# Patient Record
Sex: MALE | Race: WHITE | NOT HISPANIC OR LATINO | Employment: OTHER | URBAN - METROPOLITAN AREA
[De-identification: names, ages, dates, MRNs, and addresses within clinical notes are randomized per-mention and may not be internally consistent; named-entity substitution may affect disease eponyms.]

---

## 2017-01-05 ENCOUNTER — PATIENT OUTREACH (OUTPATIENT)
Dept: HEALTH INFORMATION MANAGEMENT | Facility: OTHER | Age: 67
End: 2017-01-05

## 2017-01-05 NOTE — PROGRESS NOTES
Referral request from Director of Care Coordination. Patient does not have a telephone number listed in Epic for CC to contact patient to determine if interested in enrollment in care management.     CC contacted [facility]. Patient was discharged from skilled nursing. Per representative reports patient was discharge over a month ago.  CC did not see notes in Media.  Patient did not keep appointment with Pulmonary on 12/15/16 or Cardiologist on 12/29/16. Patient no showed to both appointments. CC unable to follow patient in care management. No contact information.

## 2017-01-11 ENCOUNTER — HOSPITAL ENCOUNTER (EMERGENCY)
Facility: MEDICAL CENTER | Age: 67
End: 2017-01-12
Attending: EMERGENCY MEDICINE
Payer: MEDICARE

## 2017-01-11 VITALS
HEART RATE: 96 BPM | RESPIRATION RATE: 20 BRPM | TEMPERATURE: 97.5 F | BODY MASS INDEX: 41.94 KG/M2 | DIASTOLIC BLOOD PRESSURE: 85 MMHG | WEIGHT: 267.86 LBS | OXYGEN SATURATION: 93 % | SYSTOLIC BLOOD PRESSURE: 151 MMHG

## 2017-01-11 DIAGNOSIS — R53.82 CHRONIC FATIGUE: ICD-10-CM

## 2017-01-11 DIAGNOSIS — R19.7 DIARRHEA, UNSPECIFIED TYPE: ICD-10-CM

## 2017-01-11 LAB
AMORPH CRY #/AREA URNS HPF: PRESENT /HPF
APPEARANCE UR: ABNORMAL
BACTERIA #/AREA URNS HPF: ABNORMAL /HPF
BILIRUB UR QL STRIP.AUTO: NEGATIVE
COLOR UR: ABNORMAL
CULTURE IF INDICATED INDCX: YES UA CULTURE
GLUCOSE UR STRIP.AUTO-MCNC: NEGATIVE MG/DL
KETONES UR STRIP.AUTO-MCNC: NEGATIVE MG/DL
LEUKOCYTE ESTERASE UR QL STRIP.AUTO: NEGATIVE
MICRO URNS: ABNORMAL
MUCOUS THREADS #/AREA URNS HPF: ABNORMAL /HPF
NITRITE UR QL STRIP.AUTO: NEGATIVE
PH UR STRIP.AUTO: 5.5 [PH]
PROT UR QL STRIP: 30 MG/DL
RBC UR QL AUTO: NEGATIVE
SP GR UR STRIP.AUTO: 1.02
WBC #/AREA URNS HPF: ABNORMAL /HPF

## 2017-01-11 PROCEDURE — 700102 HCHG RX REV CODE 250 W/ 637 OVERRIDE(OP): Performed by: EMERGENCY MEDICINE

## 2017-01-11 PROCEDURE — 87086 URINE CULTURE/COLONY COUNT: CPT

## 2017-01-11 PROCEDURE — 99284 EMERGENCY DEPT VISIT MOD MDM: CPT

## 2017-01-11 PROCEDURE — 93970 EXTREMITY STUDY: CPT

## 2017-01-11 PROCEDURE — 93005 ELECTROCARDIOGRAM TRACING: CPT | Performed by: EMERGENCY MEDICINE

## 2017-01-11 PROCEDURE — A9270 NON-COVERED ITEM OR SERVICE: HCPCS | Performed by: EMERGENCY MEDICINE

## 2017-01-11 PROCEDURE — 81001 URINALYSIS AUTO W/SCOPE: CPT

## 2017-01-11 PROCEDURE — 700101 HCHG RX REV CODE 250: Performed by: EMERGENCY MEDICINE

## 2017-01-11 PROCEDURE — 93970 EXTREMITY STUDY: CPT | Mod: 26 | Performed by: SURGERY

## 2017-01-11 RX ORDER — LIDOCAINE HYDROCHLORIDE 10 MG/ML
20 INJECTION, SOLUTION INFILTRATION; PERINEURAL ONCE
Status: COMPLETED | OUTPATIENT
Start: 2017-01-11 | End: 2017-01-11

## 2017-01-11 RX ORDER — FUROSEMIDE 40 MG/1
20 TABLET ORAL ONCE
Status: COMPLETED | OUTPATIENT
Start: 2017-01-11 | End: 2017-01-11

## 2017-01-11 RX ADMIN — LIDOCAINE HYDROCHLORIDE 20 ML: 10 INJECTION, SOLUTION INFILTRATION; PERINEURAL at 23:15

## 2017-01-11 RX ADMIN — FUROSEMIDE 20 MG: 40 TABLET ORAL at 21:15

## 2017-01-11 NOTE — ED NOTES
"Chief Complaint   Patient presents with   • Diarrhea     pt reports that he has a \"chronic white count.\" diarrhea x 3 weeks     Pt reports Hx c-diff, did not fill his Rx when he left rehab.  Blood pressure 151/85, pulse 100, temperature 36.4 °C (97.6 °F), resp. rate 17, weight 121.5 kg (267 lb 13.7 oz), SpO2 94 %.  Pt informed of wait times. Educated on triage process.  Asked to return to triage RN for any new or worsening of symptoms. Thanked for patience.        "

## 2017-01-11 NOTE — ED AVS SNAPSHOT
1/12/2017          Maxi Esposito  [address] [place] [address]    Dear Maxi:    [facility] wants to ensure your discharge home is safe and you or your loved ones have had all your questions answered regarding your care after you leave the hospital.    You may receive a telephone call within two days of your discharge.  This call is to make certain you understand your discharge instructions as well as ensure we provided you with the best care possible during your stay with us.     The call will only last approximately 3-5 minutes and will be done by a nurse.    Once again, we want to ensure your discharge home is safe and that you have a clear understanding of any next steps in your care.  If you have any questions or concerns, please do not hesitate to contact us, we are here for you.  Thank you for choosing [facility] for your healthcare needs.    Sincerely,    James Woods    [facility]

## 2017-01-11 NOTE — ED AVS SNAPSHOT
ITDatabase Access Code: [email or web address]  Expires: 2/11/2017  1:57 AM    Your email address is not on file at [facility].  Email Addresses are required for you to sign up for ITDatabase, please contact [phone] to verify your personal information and to provide your email address prior to attempting to register for ITDatabase.    Maxi Skinnynicole  [address] [place] [address]    [employer]  A secure, online tool to manage your health information     [facility]’s ITDatabase® is a secure, online tool that connects you to your personalized health information from the privacy of your home -- day or night - making it very easy for you to manage your healthcare. Once the activation process is completed, you can even access your medical information using the ITDatabase stephie, which is available for free in the Apple Stephie store or Google Play store.     To learn more about ITDatabase, visit [email or web address]    There are two levels of access available (as shown below):   My Chart Features  [facility] Primary Care Doctor [facility]  Specialists [facility]  Urgent  Care Non-[facility] Primary Care Doctor   Email your healthcare team securely and privately 24/7 X X X    Manage appointments: schedule your next appointment; view details of past/upcoming appointments X      Request prescription refills. X      View recent personal medical records, including lab and immunizations X X X X   View health record, including health history, allergies, medications X X X X   Read reports about your outpatient visits, procedures, consult and ER notes X X X X   See your discharge summary, which is a recap of your hospital and/or ER visit that includes your diagnosis, lab results, and care plan X X  X     How to register for [employer]:  Once your e-mail address has been verified, follow the following steps to sign up for ITDatabase.     1. Go to  [email or web address]  2. Click on the Sign Up Now box, which takes you to the New Member Sign Up page. You will need  to provide the following information:  a. Enter your [employer] Access Code exactly as it appears at the top of this page. (You will not need to use this code after you’ve completed the sign-up process. If you do not sign up before the expiration date, you must request a new code.)   b. Enter your date of birth.   c. Enter your home email address.   d. Click Submit, and follow the next screen’s instructions.  3. Create a [employer] ID. This will be your [employer] login ID and cannot be changed, so think of one that is secure and easy to remember.  4. Create a [employer] password. You can change your password at any time.  5. Enter your Password Reset Question and Answer. This can be used at a later time if you forget your password.   6. Enter your e-mail address. This allows you to receive e-mail notifications when new information is available in [employer].  7. Click Sign Up. You can now view your health information.    For assistance activating your [employer] account, call [phone]

## 2017-01-11 NOTE — ED AVS SNAPSHOT
After Visit Summary                                                                                                                Maxi Esposito   MRN: [id number]    Department:  [facility], Emergency Dept   Date of Visit:  1/11/2017            [facility], Emergency Dept    [address] [place] [id number]    Phone:  [phone]      You were seen by     Eileen Moreira M.D.      Your Diagnosis Was     Chronic fatigue     R53.82       These are the medications you received during your hospitalization from 01/11/2017 1159 to 01/12/2017 0157     Date/Time Order Dose Route Action    01/11/2017 2115 furosemide (LASIX) tablet 20 mg 20 mg Oral Given    01/11/2017 2315 lidocaine (XYLOCAINE) 1%  injection 20 mL Other Given      Follow-up Information     1. Follow up with [facility].    Why:  please call at 8:00 tomorrow morning to schedule a follow-up appointment. They will be able to treat you regardless of your insurance status.    Contact information    [address] [place] [address] [phone]        2. Go to [facility], Emergency Dept.    Specialty:  Emergency Medicine    Why:  If symptoms worsen or do not continue to improve    Contact information    [address] [place] [id number] [phone]      Medication Information     Review all of your home medications and newly ordered medications with your primary doctor and/or pharmacist as soon as possible. Follow medication instructions as directed by your doctor and/or pharmacist.     Please keep your complete medication list with you and share with your physician. Update the information when medications are discontinued, doses are changed, or new medications (including over-the-counter products) are added; and carry medication information at all times in the event of emergency situations.               Medication List      ASK your doctor about these medications        Instructions    Acetaminophen 650 MG Tabs    Take 650 mg by mouth every 6 hours as needed for Fever or Moderate Pain (Temp >101.5F).   Dose:  650 mg       alprazolam 0.5 MG Tabs   Commonly known as:  XANAX    Take 0.5 mg by mouth at bedtime as needed for Sleep.   Dose:  0.5 mg       aspirin EC 81 MG Tbec   Commonly known as:  ECOTRIN    Take 81 mg by mouth every day.   Dose:  81 mg       atorvastatin 20 MG Tabs   Commonly known as:  LIPITOR    Take 1 Tab by mouth every bedtime.   Dose:  20 mg       BREO ELLIPTA 100-25 MCG/INH Aepb   Generic drug:  Fluticasone Furoate-Vilanterol    Inhale 1 Puff by mouth every day.   Dose:  1 Puff       budesonide-formoterol 80-4.5 MCG/ACT Aero   Commonly known as:  SYMBICORT    Inhale 2 Puffs by mouth 2 Times a Day.   Dose:  2 Puff       carvedilol 6.25 MG Tabs   Commonly known as:  COREG    Take 1 Tab by mouth 2 times a day, with meals.   Dose:  6.25 mg       enalapril 20 MG tablet   Commonly known as:  VASOTEC    Take 40 mg by mouth every day.   Dose:  40 mg       furosemide 40 MG Tabs   Commonly known as:  LASIX    Take 40 mg by mouth every day.   Dose:  40 mg       hydrALAZINE 25 MG Tabs   Commonly known as:  APRESOLINE    Take 1 Tab by mouth 3 times a day.   Dose:  25 mg       ipratropium-albuterol 0.5-2.5 (3) MG/3ML nebulizer solution   Commonly known as:  DUONEB    3 mL by Nebulization route every four hours as needed for Shortness of Breath.   Dose:  3 mL       lisinopril 30 MG tablet   Commonly known as:  PRINIVIL, ZESTRIL    Take 30 mg by mouth every day.   Dose:  30 mg       NON SPECIFIED    Artifical Tears, SI Drop in both eyes twice a day       omeprazole 20 MG delayed-release capsule   Commonly known as:  PRILOSEC    Take 20 mg by mouth every day.   Dose:  20 mg       ondansetron 4 MG Tbdp   Commonly known as:  ZOFRAN ODT    Take 1 Tab by mouth every four hours as needed for Nausea/Vomiting (give PO if IV route is unavailable. May give per feeding tube.).   Dose:   4 mg       oxycodone immediate-release 5 MG Tabs   Commonly known as:  ROXICODONE    Take 1 Tab by mouth every 6 hours as needed for Severe Pain.   Dose:  5 mg       PAXIL 40 MG tablet   Generic drug:  paroxetine    Take 40 mg by mouth every day.   Dose:  40 mg       potassium chloride SA 20 MEQ Tbcr   Commonly known as:  K-DUR    Take 1 Tab by mouth every day.   Dose:  20 mEq       senna-docusate 8.6-50 MG Tabs   Commonly known as:  PERICOLACE or SENOKOT S    Take 1 Tab by mouth every 24 hours as needed for Constipation.   Dose:  1 Tab       SINGULAIR 10 MG Tabs   Generic drug:  montelukast    Take 10 mg by mouth every day.   Dose:  10 mg       XOPENEX 0.63 MG/3ML Nebu   Generic drug:  levalbuterol    0.63 mg by Nebulization route every four hours as needed for Shortness of Breath.   Dose:  0.63 mg               Procedures and tests performed during your visit     BTYPE NATRIURETIC PEPTIDE    CBC WITH DIFFERENTIAL    COMP METABOLIC PANEL    EKG (ER)    ESTIMATED GFR    LE VENOUS DUPLEX (Specify in Comments Left, Right Or Bilateral)    LIPASE    NURSING COMMUNICATION    TROPONIN    URINALYSIS CULTURE, IF INDICATED    URINE CULTURE(NEW)    URINE MICROSCOPIC (W/UA)        Discharge Instructions       Please follow-up with your primary care physician for complete recheck in 24-48 hours. Please return to the emergency department if you develop any new or worsening symptoms. Is very important that you fill your prescriptions and follow-up with your primary care provider.      Chronic Diarrhea  Diarrhea is frequent loose and watery bowel movements. It can cause you to feel weak and dehydrated. Dehydration can cause you to become tired and thirsty and to have a dry mouth, decreased urination, and dark yellow urine. Diarrhea is a sign of another problem, most often an infection that will not last long. In most cases, diarrhea lasts 2-3 days. Diarrhea that lasts longer than 4 weeks is called long-lasting (chronic) diarrhea.  It is important to treat your diarrhea as directed by your health care provider to lessen or prevent future episodes of diarrhea.   CAUSES   There are many causes of chronic diarrhea. The following are some possible causes:   · Gastrointestinal infections caused by viruses, bacteria, or parasites.    · Food poisoning or food allergies.    · Certain medicines, such as antibiotics, chemotherapy, and laxatives.    · Artificial sweeteners and fructose.    · Digestive disorders, such as celiac disease and inflammatory bowel diseases.    · Irritable bowel syndrome.  · Some disorders of the pancreas.  · Disorders of the thyroid.  · Reduced blood flow to the intestines.  · Cancer.  Sometimes the cause of chronic diarrhea is unknown.  RISK FACTORS  · Having a severely weakened immune system, such as from HIV or AIDS.    · Taking certain types of cancer-fighting drugs (such as with chemotherapy) or other medicines.    · Having had a recent organ transplant.    · Having a portion of the stomach or small bowel removed.    · Traveling to countries where food and water supplies are often contaminated.    SYMPTOMS   In addition to frequent, loose stools, diarrhea may cause:   · Cramping.    · Abdominal pain.    · Nausea.    · Fever.  · Fatigue.  · Urgent need to use the bathroom.  · Loss of bowel control.  DIAGNOSIS   Your health care provider must take a careful history and perform a physical exam. Tests given are based on your symptoms and history. Tests may include:   · Blood or stool tests. Three or more stool samples may be examined. Stool cultures may be used to test for bacteria or parasites.    · X-rays.    · A procedure in which a thin tube is inserted into the mouth or rectum (endoscopy). This allows the health care provider to look inside the intestine.    TREATMENT   · Treatment is aimed at correcting the cause of the diarrhea when possible.  · Diarrhea caused by an infection can often be treated with antibiotic  medicines.  · Diarrhea not caused by an infection may require you to take long-term medicine or have surgery. Specific treatment should be discussed with your health care provider.  · If the cause cannot be determined, treatment aims to relieve symptoms and prevent dehydration. Serious health problems can occur if you do not maintain proper fluid levels. Treatment may include:  ¨ Taking an oral rehydration solution (ORS).  ¨ Not drinking beverages that contain caffeine (such as tea, coffee, and soft drinks).  ¨ Not drinking alcohol.  ¨ Maintaining well-balanced nutrition to help you recover faster.  HOME CARE INSTRUCTIONS   · Drink enough fluids to keep urine clear or pale yellow. Drink 1 cup (8 oz) of fluid for each diarrhea episode. Avoid fluids that contain simple sugars, fruit juices, whole milk products, and sodas. Hydrate with an ORS. You may purchase the ORS or prepare it at home by mixing the following ingredients together:  ¨  - tsp (1.7-3  mL) table salt.  ¨ ¾ tsp (3 ¾ mL) baking soda.  ¨  tsp (1.7 mL) salt substitute containing potassium chloride.  ¨ 1 tbsp (20 mL) sugar.  ¨ 4.2 c (1 L) of water.    · Certain foods and beverages may increase the speed at which food moves through the gastrointestinal (GI) tract. These foods and beverages should be avoided. They include:  ¨ Caffeinated and alcoholic beverages.  ¨ High-fiber foods, such as raw fruits and vegetables, nuts, seeds, and whole grain breads and cereals.  ¨ Foods and beverages sweetened with sugar alcohols, such as xylitol, sorbitol, and mannitol.    · Some foods may be well tolerated and may help thicken stool. These include:  ¨ Starchy foods, such as rice, toast, pasta, low-sugar cereal, oatmeal, grits, baked potatoes, crackers, and bagels.  ¨ Bananas.  ¨ Applesauce.  · Add probiotic-rich foods to help increase healthy bacteria in the GI tract. These include yogurt and fermented milk products.  · Wash your hands well after each diarrhea  episode.  · Only take over-the-counter or prescription medicines as directed by your health care provider.  · Take a warm bath to relieve any burning or pain from frequent diarrhea episodes.  SEEK MEDICAL CARE IF:   · You are not urinating as often.  · Your urine is a dark color.  · You become very tired or dizzy.  · You have severe pain in the abdomen or rectum.  · Your have blood or pus in your stools.  · Your stools look black and tarry.  SEEK IMMEDIATE MEDICAL CARE IF:   · You are unable to keep fluids down.  · You have persistent vomiting.  · You have blood in your stool.  · Your stools are black and tarry.  · You do not urinate in 6-8 hours, or there is only a small amount of very dark urine.  · You have abdominal pain that increases or localizes.  · You have weakness, dizziness, confusion, or lightheadedness.  · You have a severe headache.  · Your diarrhea gets worse or does not get better.  · You have a fever or persistent symptoms for more than 2-3 days.  · You have a fever and your symptoms suddenly get worse.  MAKE SURE YOU:   · Understand these instructions.  · Will watch your condition.  · Will get help right away if you are not doing well or get worse.     This information is not intended to replace advice given to you by your health care provider. Make sure you discuss any questions you have with your health care provider.     Document Released: 03/09/2005 Document Revised: 12/23/2014 Document Reviewed: 06/12/2014  [employer] Interactive Patient Education ©2016 [employer]            Patient Information     Patient Information    Following emergency treatment: all patient requiring follow-up care must return either to a private physician or a clinic if your condition worsens before you are able to obtain further medical attention, please return to the emergency room.     Billing Information    At [facility], we work to make the billing process streamlined for our patients.  Our Representatives are  here to answer any questions you may have regarding your hospital bill.  If you have insurance coverage and have supplied your insurance information to us, we will submit a claim to your insurer on your behalf.  Should you have any questions regarding your bill, we can be reached online or by phone as follows:  Online: You are able pay your bills online or live chat with our representatives about any billing questions you may have. We are here to help Monday - Friday from 8:00am to 7:30pm and 9:00am - 12:00pm on Saturdays.  Please visit [email or web address]  for more information.   Phone:  [phone] or [phone]    Please note that your emergency physician, surgeon, pathologist, radiologist, anesthesiologist, and other specialists are not employed by [facility] and will therefore bill separately for their services.  Please contact them directly for any questions concerning their bills at the numbers below:     Emergency Physician Services:  [phone]  [facility]:  [phone]  [facility]:  [phone]  [facility]:  [phone]    1. Your final bill may vary from the amount quoted upon discharge if all procedures are not complete at that time, or if your doctor has additional procedures of which we are not aware. You will receive an additional bill if you return to the Emergency Department at [facility] for suture removal regardless of the facility of which the sutures were placed.     2. Please arrange for settlement of this account at the emergency registration.    3. All self-pay accounts are due in full at the time of treatment.  If you are unable to meet this obligation then payment is expected within 4-5 days.     4. If you have had radiology studies (CT, X-ray, Ultrasound, MRI), you have received a preliminary result during your emergency department visit. Please contact the radiology department [phone] to  receive a copy of your final result. Please discuss the Final result with your primary physician or with the follow up physician provided.     Crisis Hotline:  [place] Crisis Hotline:  4-731-TLNTLPK or 1-199.700.4477  [place] Crisis Hotline:    1-488.975.5279 or 567-647-0777         ED Discharge Follow Up Questions    1. In order to provide you with very good care, we would like to follow up with a phone call in the next few days.  May we have your permission to contact you?     YES /  NO    2. What is the best phone number to call you? (       )_____-__________    3. What is the best time to call you?      Morning  /  Afternoon  /  Evening                   Patient Signature:  ____________________________________________________________    Date:  ____________________________________________________________

## 2017-01-12 LAB
ALBUMIN SERPL BCP-MCNC: 3.3 G/DL (ref 3.2–4.9)
ALBUMIN/GLOB SERPL: 1.1 G/DL
ALP SERPL-CCNC: 53 U/L (ref 30–99)
ALT SERPL-CCNC: 6 U/L (ref 2–50)
ANION GAP SERPL CALC-SCNC: 10 MMOL/L (ref 0–11.9)
AST SERPL-CCNC: 18 U/L (ref 12–45)
BASOPHILS # BLD AUTO: 0.7 % (ref 0–1.8)
BASOPHILS # BLD: 0.08 K/UL (ref 0–0.12)
BILIRUB SERPL-MCNC: 0.7 MG/DL (ref 0.1–1.5)
BNP SERPL-MCNC: 39 PG/ML (ref 0–100)
BUN SERPL-MCNC: 9 MG/DL (ref 8–22)
CALCIUM SERPL-MCNC: 8.5 MG/DL (ref 8.5–10.5)
CHLORIDE SERPL-SCNC: 105 MMOL/L (ref 96–112)
CO2 SERPL-SCNC: 22 MMOL/L (ref 20–33)
CREAT SERPL-MCNC: 0.92 MG/DL (ref 0.5–1.4)
EOSINOPHIL # BLD AUTO: 0.48 K/UL (ref 0–0.51)
EOSINOPHIL NFR BLD: 4.3 % (ref 0–6.9)
ERYTHROCYTE [DISTWIDTH] IN BLOOD BY AUTOMATED COUNT: 54.5 FL (ref 35.9–50)
GFR SERPL CREATININE-BSD FRML MDRD: >60 ML/MIN/1.73 M 2
GLOBULIN SER CALC-MCNC: 3 G/DL (ref 1.9–3.5)
GLUCOSE SERPL-MCNC: 102 MG/DL (ref 65–99)
HCT VFR BLD AUTO: 46.1 % (ref 42–52)
HGB BLD-MCNC: 15.6 G/DL (ref 14–18)
IMM GRANULOCYTES # BLD AUTO: 0.03 K/UL (ref 0–0.11)
IMM GRANULOCYTES NFR BLD AUTO: 0.3 % (ref 0–0.9)
LIPASE SERPL-CCNC: 32 U/L (ref 11–82)
LYMPHOCYTES # BLD AUTO: 4.36 K/UL (ref 1–4.8)
LYMPHOCYTES NFR BLD: 39 % (ref 22–41)
MCH RBC QN AUTO: 29.5 PG (ref 27–33)
MCHC RBC AUTO-ENTMCNC: 33.8 G/DL (ref 33.7–35.3)
MCV RBC AUTO: 87.1 FL (ref 81.4–97.8)
MONOCYTES # BLD AUTO: 1.58 K/UL (ref 0–0.85)
MONOCYTES NFR BLD AUTO: 14.1 % (ref 0–13.4)
NEUTROPHILS # BLD AUTO: 4.66 K/UL (ref 1.82–7.42)
NEUTROPHILS NFR BLD: 41.6 % (ref 44–72)
NRBC # BLD AUTO: 0 K/UL
NRBC BLD AUTO-RTO: 0 /100 WBC
PLATELET # BLD AUTO: 330 K/UL (ref 164–446)
PMV BLD AUTO: 10.8 FL (ref 9–12.9)
POTASSIUM SERPL-SCNC: 3.6 MMOL/L (ref 3.6–5.5)
PROT SERPL-MCNC: 6.3 G/DL (ref 6–8.2)
RBC # BLD AUTO: 5.29 M/UL (ref 4.7–6.1)
SODIUM SERPL-SCNC: 137 MMOL/L (ref 135–145)
TROPONIN I SERPL-MCNC: <0.01 NG/ML (ref 0–0.04)
WBC # BLD AUTO: 11.2 K/UL (ref 4.8–10.8)

## 2017-01-12 PROCEDURE — 85025 COMPLETE CBC W/AUTO DIFF WBC: CPT

## 2017-01-12 PROCEDURE — 80053 COMPREHEN METABOLIC PANEL: CPT

## 2017-01-12 PROCEDURE — 83690 ASSAY OF LIPASE: CPT

## 2017-01-12 PROCEDURE — 83880 ASSAY OF NATRIURETIC PEPTIDE: CPT

## 2017-01-12 PROCEDURE — 84484 ASSAY OF TROPONIN QUANT: CPT

## 2017-01-12 NOTE — ED NOTES
Primary triage completed. Vital signs reassessed. Apologized for wait times and educated regarding ER process. Pt verbalizes understanding.

## 2017-01-12 NOTE — ED PROVIDER NOTES
"ED Provider Note    Scribed for Eileen Moreira M.D. by Wandy Sosa. 1/11/2017, 7:53 PM.    Primary care provider: Roberto Morales M.D.  Means of arrival: Walk-In  History obtained from: Patient  History limited by: None    CHIEF COMPLAINT  Chief Complaint   Patient presents with   • Diarrhea     pt reports that he has a \"chronic white count.\" diarrhea x 3 weeks     HPI  Maxi Esposito is a 67 y.o. male who presents to the Emergency Department with persistent diarrhea onset three weeks ago.  Eight weeks ago the patient was discharged from the hospital, despite having a reported \"chronically elevated white count.\"  The patient has been completing rehab since then but has not felt improved.  He denies receiving a recent prescription for antibiotics.  The patient reports feeling \"run down.\"  He has also noticed slight, bilateral leg swelling.  Per patient, the swelling will \"come out of nowhere\" and will have associated \"shooting pains.\"  Earlier today, after eating french fries, the patient's diarrhea worsened.  He developed generalized abdominal pain that has remained intermittent in nature.  The patient has associated chills.  He does not note any fevers or nausea.  His surgical history is limited to a splenectomy over thirty years ago.  The patient denies additional symptoms or further pertinent medical history.     He states he is having some financial difficulties, not currently adherent to prescribed medication regimen.     REVIEW OF SYSTEMS  Pertinent positives include abdominal pain, leg swelling, diarrhea, chills, weakness, fatigue. Pertinent negatives include no fevers, nausea.  All other systems reviewed and negative.    PAST MEDICAL HISTORY   has a past medical history of Hypertension; Diabetes (HCC); Sepsis (HCC); Pneumonia; Chronic obstructive pulmonary disease (HCC); and Medical non-compliance.    SURGICAL HISTORY   has past surgical history that includes splenectomy (1980).    SOCIAL " "HISTORY  Social History   Substance Use Topics   • Smoking status: Former Smoker     Quit date: 07/16/2016   • Smokeless tobacco: None   • Alcohol Use: No      History   Drug Use No   The patient was unaccompanied to the ED.     FAMILY HISTORY  History reviewed. No pertinent family history.    CURRENT MEDICATIONS  Home Medications     Reviewed by James Huggins R.N. (Registered Nurse) on 01/11/17 at 1927  Med List Status: Partial    Medication Last Dose Status    acetaminophen 650 MG Tab not taking Active    alprazolam (XANAX) 0.5 MG Tab ran out Active    aspirin EC (ECOTRIN) 81 MG Tablet Delayed Response not taking Active    atorvastatin (LIPITOR) 20 MG Tab ran out Active    budesonide-formoterol (SYMBICORT) 80-4.5 MCG/ACT Aerosol ran out Active    carvedilol (COREG) 6.25 MG Tab ran out Active    enalapril (VASOTEC) 20 MG tablet ran out Active    Fluticasone Furoate-Vilanterol (BREO ELLIPTA) 100-25 MCG/INH AEROSOL POWDER, BREATH ACTIVATED ran out Active    furosemide (LASIX) 40 MG Tab ran out Active    hydrALAZINE (APRESOLINE) 25 MG Tab ran out Active    ipratropium-albuterol (DUONEB) 0.5-2.5 (3) MG/3ML nebulizer solution  Active    levalbuterol (XOPENEX) 0.63 MG/3ML Nebu Soln not taking Active    lisinopril (PRINIVIL, ZESTRIL) 30 MG tablet ran out Active    montelukast (SINGULAIR) 10 MG Tab not taking Active    NON SPECIFIED  Active    omeprazole (PRILOSEC) 20 MG delayed-release capsule not taking Active    ondansetron (ZOFRAN ODT) 4 MG TABLET DISPERSIBLE  Active    oxycodone immediate-release (ROXICODONE) 5 MG Tab not taking Active    paroxetine (PAXIL) 40 MG tablet not taking Active    potassium chloride SA (K-DUR) 20 MEQ Tab CR  Active    senna-docusate (PERICOLACE OR SENOKOT S) 8.6-50 MG Tab  Active              ALLERGIES  Allergies   Allergen Reactions   • Shellfish Allergy Anaphylaxis and Swelling     \"eye's closed up really tight\" \"hard to brearth\"     PHYSICAL EXAM  Vital Signs: /85 mmHg  Pulse 96 "  Temp(Src) 36.4 °C (97.5 °F)  Resp 20  Wt 121.5 kg (267 lb 13.7 oz)  SpO2 93%  Constitutional: Alert, no acute distress  HENT: Normocephalic, atraumatic, moist mucus membranes  Eyes: Pupils equal and reactive, normal conjunctiva, non-icteric  Neck: Supple, normal range of motion, no stridor  Cardiovascular: Regular rate, regular rhythm, Normal peripheral perfusion, no cyanosis, Normal cardiac auscultation  Pulmonary: No respiratory distress, normal work of breathing, no accessory muscle usage, Clear to auscultation but quiet breath sounds bilaterally, no appreciable crackles or wheezes, no coarse breath sounds  Abdomen: Soft, non tender, no peritoneal signs, bowel sounds are present.   Skin: Warm, dry, no rashes or lesions  Back: No pain with active range of motion  Musculoskeletal: Normal range of motion in all extremities, no deformity noted, symmetric bilateral mild redness overlying distal lower extremity, not involving feet, circumferential, uncomfortable on palpation, 1 + edema, mild chronic scaling of skin overlying bilateral shins  Neurologic: Alert, oriented, normal motor function, no speech deficits  Psychiatric: Normal and appropriate mood and affect    DIAGNOSTIC STUDIES/PROCEDURES:    LABS  Labs Reviewed   CBC WITH DIFFERENTIAL - Abnormal; Notable for the following:     WBC 11.2 (*)     RDW 54.5 (*)     Neutrophils-Polys 41.60 (*)     Monocytes 14.10 (*)     Monos (Absolute) 1.58 (*)     All other components within normal limits   COMP METABOLIC PANEL - Abnormal; Notable for the following:     Glucose 102 (*)     All other components within normal limits   URINALYSIS,CULTURE IF INDICATED - Abnormal; Notable for the following:     Character Cloudy (*)     Protein 30 (*)     All other components within normal limits   URINE MICROSCOPIC (W/UA) - Abnormal; Notable for the following:     WBC 0-2 (*)     Bacteria Few (*)     All other components within normal limits   LIPASE   TROPONIN   BTYPE NATRIURETIC  PEPTIDE   URINE CULTURE(NEW)   ESTIMATED GFR     All labs reviewed by me.    Radiology results revealed:   LE VENOUS DUPLEX (Specify in Comments Left, Right Or Bilateral)   Preliminary Result          EKG: Rate 84, normal sinus rhythm, no ST elevation or depression, no T-wave inversions, no ectopy is significant complex morphology change from previous EKG dated 11/15/60    COURSE & MEDICAL DECISION MAKING  Pertinent Labs & Imaging studies reviewed. (See chart for details)    Review of old medical records for continuity of care. The patient's last ED discharge was in November 2016. Emergency department visit reviewed from 11/15/16, patient presented with multiple complaints predominantly concerned because he felt rundown and tired. Complained of edema to bilateral lower extremities. Additionally reported shortness of breath. Chest x-ray with no lobar consolidation, increased interstitial opacity which may represent minor edema, pneumonia or chronic fibrosis. He had recently been discharged from [facility] care facility. He does have long-standing history of noncompliance, case management was involved in his discharge for this reason. Discharge summary reviewed from 5/29/16, patient admitted for sepsis with pneumonia, additionally has diabetes, COPD with continued tobacco dependence, hypertension and nonadherence. Treated with Unasyn and vancomycin for right lower extremity cellulitis. Blood cultures were negative. Cellulitis improved significantly, nearly resolved.    Differential diagnoses include but are not limited to: Heart failure exacerbation, electrolyte abnormality, cellulitis, DVT, hyperglycemia, Clostridium difficile    7:53 PM - Patient seen and examined at bedside. Ordered CBC, CMP, Lipase Troponin, BNP, Urinalysis, Le Venous Duplex, and CDIFF by PCR to evaluate his symptoms. The patient will be treated with 20 mg of Lasix via tablet.     9:18 PM- At this time I obtained and reviewed the patient's EKG.  My  findings can be seen above. The patient will be updated during my next recheck.     9:32 PM- Reviewed the patient's urine microscopic and Le Venous Duplex results.  The patient presents with a negative urinalysis and urine microscopic.  The patient also presents with a negative Le Venous Duplex.     10:18 PM- The patient's attending nurse informed me that the phlebotomist has not yet been successful at drawing a sample from the patient.  The patient has requested a break and lab has agreed. He will be treated with Lidocaine, after which Lab can attempt another blood draw.     11:04 PM- I rechecked the patient at bedside. He was resting comfortably without further complaints. I began the ultrasound PIV. However, before I could complete this intervention, I was acutely called into another room.     11:36 PM- I returned to the patient's room at this time to finish his US PIV.  While this was done the patient and I discussed his available lab results.  Due to the patient's noncompliance to sit still, I was unable to establish an IV. He requested another break and a urinal. Another attempt will be made in a few minutes.     12:33 AM- Rechecked the patient at bedside. The patient is currently resting comfortably without questions, concerns, or complaints.  At this time I attempted the US PIV again.  This attempt was successful.  An IV was established, blood was drawn, and samples were sent to the lab. During this recheck, the patient reported being noncompliant with his daily medications. He reports failing to fill his prescriptions due to a lost Medicaid card. I spent approximately twenty minutes with the patient during this recheck, during which he remained calm and cooperative.     1:52 AM - At this time the patient's ED work up is complete.  I extensively reviewed the lab results, see above.  The patient will be updated shortly.     1:59 AM- Re-examined; The patient is resting in bed comfortably. I discussed his above  findings and plans for discharge. He was given a referral to [facility], where he will go for follow up in one day. The patient was instructed to return to the ED if his symptoms worsen. He was instructed to stay compliant with his medications.  The patient will receive further information on chronic diarrhea to take home.  All questions and concerns were addressed.  Patient understands and agrees.     Decision Making:  This is a 67 y.o. year old male who presents with history and physical exam as documented above. He presents with a multitude of vague symptoms including generalized fatigue, chronic diarrhea worsened today, loss of appetite and lower extremity pain and swelling. Lower extremity pain is in a stocking distribution bilaterally, suspect this may be caused by diabetic neuropathy. He does have some very mild redness, this appears to be chronic skin changes, appearance is much less concerning for cellulitis. Laboratory evaluation was delayed as the patient was a very difficult stick, I used ultrasound guidance to obtain blood sample.    Urinalysis negative for evidence of infectious etiology, negative for glucose. White blood count very mildly elevated 11.2, on review of previous lab values this white blood count is improved from previous. No bands. Normal immature granulocytes. He has no electrolyte abnormalities, undetectable troponin, negative lipase without evidence of pancreatitis. BNP is negative without evidence of heart failure exacerbation.    Bilateral lower extremity negative for DVT.    He continues to have complaints of chronic fatigue and chronic shortness of breath. He again states that he has not filled any of his outpatient medications, states he has all of his prescriptions in his apartment. He has not attempted to fill these medications, states he is not sure if his insurance will cover them. At this time I see no indication for admission to the hospital, he is not in heart  failure, oxygen saturation is at baseline. Strongly recommend the patient fill his medications and continue taking my outpatient basis. He is discharged home, instructed to contact [facility] this morning for a follow-up appointment. He has not yet attempted to schedule a primary care follow-up appointment. Counseled him extensively on the importance of outpatient follow-up as well as taking all prescribed medications. Return precautions given.     The patient will return for new or worsening symptoms and is stable at the time of discharge.    The patient is referred to a primary physician for blood pressure management, diabetic screening, and for all other preventative health concerns.    DISPOSITION:  Patient will be discharged home in stable condition.    FOLLOW UP:  [facility]  [address] [address]  [phone]    please call at 8:00 tomorrow morning to schedule a follow-up appointment. They will be able to treat you regardless of your insurance status.    [facility], Emergency Dept  [address] [facility] [address]  [phone]  Go to  If symptoms worsen or do not continue to improve    OUTPATIENT MEDICATIONS:  Discharge Medication List as of 1/12/2017  1:57 AM        FINAL IMPRESSION  1. Chronic fatigue    2. Diarrhea, unspecified type        I, Wandy Sosa (Scribe), am scribing for, and in the presence of, Eileen Moreira M.D..    Electronically signed by: Wandy Sosa (Scribe), 1/11/2017    IEileen M.D. personally performed the services described in this documentation, as scribed by Wandy Sosa in my presence, and it is both accurate and complete.    The note accurately reflects work and decisions made by me.  Eileen Moreira  1/12/2017  6:00 AM

## 2017-01-12 NOTE — ED NOTES
Pt d/c home. Told to f/u with PMD and to return for any increasing symptoms or concerns. Pt able to ambulate with a steady gait. Stable for d/c.

## 2017-01-12 NOTE — DISCHARGE INSTRUCTIONS
Please follow-up with your primary care physician for complete recheck in 24-48 hours. Please return to the emergency department if you develop any new or worsening symptoms. Is very important that you fill your prescriptions and follow-up with your primary care provider.      Chronic Diarrhea  Diarrhea is frequent loose and watery bowel movements. It can cause you to feel weak and dehydrated. Dehydration can cause you to become tired and thirsty and to have a dry mouth, decreased urination, and dark yellow urine. Diarrhea is a sign of another problem, most often an infection that will not last long. In most cases, diarrhea lasts 2-3 days. Diarrhea that lasts longer than 4 weeks is called long-lasting (chronic) diarrhea. It is important to treat your diarrhea as directed by your health care provider to lessen or prevent future episodes of diarrhea.   CAUSES   There are many causes of chronic diarrhea. The following are some possible causes:   · Gastrointestinal infections caused by viruses, bacteria, or parasites.    · Food poisoning or food allergies.    · Certain medicines, such as antibiotics, chemotherapy, and laxatives.    · Artificial sweeteners and fructose.    · Digestive disorders, such as celiac disease and inflammatory bowel diseases.    · Irritable bowel syndrome.  · Some disorders of the pancreas.  · Disorders of the thyroid.  · Reduced blood flow to the intestines.  · Cancer.  Sometimes the cause of chronic diarrhea is unknown.  RISK FACTORS  · Having a severely weakened immune system, such as from HIV or AIDS.    · Taking certain types of cancer-fighting drugs (such as with chemotherapy) or other medicines.    · Having had a recent organ transplant.    · Having a portion of the stomach or small bowel removed.    · Traveling to countries where food and water supplies are often contaminated.    SYMPTOMS   In addition to frequent, loose stools, diarrhea may cause:   · Cramping.    · Abdominal pain.     · Nausea.    · Fever.  · Fatigue.  · Urgent need to use the bathroom.  · Loss of bowel control.  DIAGNOSIS   Your health care provider must take a careful history and perform a physical exam. Tests given are based on your symptoms and history. Tests may include:   · Blood or stool tests. Three or more stool samples may be examined. Stool cultures may be used to test for bacteria or parasites.    · X-rays.    · A procedure in which a thin tube is inserted into the mouth or rectum (endoscopy). This allows the health care provider to look inside the intestine.    TREATMENT   · Treatment is aimed at correcting the cause of the diarrhea when possible.  · Diarrhea caused by an infection can often be treated with antibiotic medicines.  · Diarrhea not caused by an infection may require you to take long-term medicine or have surgery. Specific treatment should be discussed with your health care provider.  · If the cause cannot be determined, treatment aims to relieve symptoms and prevent dehydration. Serious health problems can occur if you do not maintain proper fluid levels. Treatment may include:  ¨ Taking an oral rehydration solution (ORS).  ¨ Not drinking beverages that contain caffeine (such as tea, coffee, and soft drinks).  ¨ Not drinking alcohol.  ¨ Maintaining well-balanced nutrition to help you recover faster.  HOME CARE INSTRUCTIONS   · Drink enough fluids to keep urine clear or pale yellow. Drink 1 cup (8 oz) of fluid for each diarrhea episode. Avoid fluids that contain simple sugars, fruit juices, whole milk products, and sodas. Hydrate with an ORS. You may purchase the ORS or prepare it at home by mixing the following ingredients together:  ¨  - tsp (1.7-3  mL) table salt.  ¨ ¾ tsp (3 ¾ mL) baking soda.  ¨  tsp (1.7 mL) salt substitute containing potassium chloride.  ¨ 1 tbsp (20 mL) sugar.  ¨ 4.2 c (1 L) of water.    · Certain foods and beverages may increase the speed at which food moves through the  gastrointestinal (GI) tract. These foods and beverages should be avoided. They include:  ¨ Caffeinated and alcoholic beverages.  ¨ High-fiber foods, such as raw fruits and vegetables, nuts, seeds, and whole grain breads and cereals.  ¨ Foods and beverages sweetened with sugar alcohols, such as xylitol, sorbitol, and mannitol.    · Some foods may be well tolerated and may help thicken stool. These include:  ¨ Starchy foods, such as rice, toast, pasta, low-sugar cereal, oatmeal, grits, baked potatoes, crackers, and bagels.  ¨ Bananas.  ¨ Applesauce.  · Add probiotic-rich foods to help increase healthy bacteria in the GI tract. These include yogurt and fermented milk products.  · Wash your hands well after each diarrhea episode.  · Only take over-the-counter or prescription medicines as directed by your health care provider.  · Take a warm bath to relieve any burning or pain from frequent diarrhea episodes.  SEEK MEDICAL CARE IF:   · You are not urinating as often.  · Your urine is a dark color.  · You become very tired or dizzy.  · You have severe pain in the abdomen or rectum.  · Your have blood or pus in your stools.  · Your stools look black and tarry.  SEEK IMMEDIATE MEDICAL CARE IF:   · You are unable to keep fluids down.  · You have persistent vomiting.  · You have blood in your stool.  · Your stools are black and tarry.  · You do not urinate in 6-8 hours, or there is only a small amount of very dark urine.  · You have abdominal pain that increases or localizes.  · You have weakness, dizziness, confusion, or lightheadedness.  · You have a severe headache.  · Your diarrhea gets worse or does not get better.  · You have a fever or persistent symptoms for more than 2-3 days.  · You have a fever and your symptoms suddenly get worse.  MAKE SURE YOU:   · Understand these instructions.  · Will watch your condition.  · Will get help right away if you are not doing well or get worse.     This information is not intended  to replace advice given to you by your health care provider. Make sure you discuss any questions you have with your health care provider.     Document Released: 03/09/2005 Document Revised: 12/23/2014 Document Reviewed: 06/12/2014  Elsevier Interactive Patient Education ©2016 [employer]

## 2017-01-13 LAB
BACTERIA UR CULT: NORMAL
SIGNIFICANT IND 70042: NORMAL
SITE SITE: NORMAL
SOURCE SOURCE: NORMAL

## 2017-01-18 NOTE — DISCHARGE PLANNING
Medical Social Work    This  received a call from pt stating that pt believed his ID got sent to [facility].  This  provided pt with [facility] phone number to call and speak with  there.  Pt understood.

## 2017-01-26 LAB — EKG IMPRESSION: NORMAL

## 2017-03-03 ENCOUNTER — RESOLUTE PROFESSIONAL BILLING HOSPITAL PROF FEE (OUTPATIENT)
Dept: HOSPITALIST | Facility: MEDICAL CENTER | Age: 67
End: 2017-03-03
Payer: MEDICARE

## 2017-03-03 ENCOUNTER — HOSPITAL ENCOUNTER (INPATIENT)
Facility: MEDICAL CENTER | Age: 67
LOS: 6 days | DRG: 189 | End: 2017-03-09
Attending: EMERGENCY MEDICINE | Admitting: HOSPITALIST
Payer: MEDICARE

## 2017-03-03 ENCOUNTER — APPOINTMENT (OUTPATIENT)
Dept: RADIOLOGY | Facility: MEDICAL CENTER | Age: 67
DRG: 189 | End: 2017-03-03
Attending: EMERGENCY MEDICINE
Payer: MEDICARE

## 2017-03-03 DIAGNOSIS — J44.1 ACUTE EXACERBATION OF CHRONIC OBSTRUCTIVE PULMONARY DISEASE (COPD) (HCC): ICD-10-CM

## 2017-03-03 DIAGNOSIS — J98.01 BRONCHOSPASM: ICD-10-CM

## 2017-03-03 DIAGNOSIS — R09.02 HYPOXIA: ICD-10-CM

## 2017-03-03 PROBLEM — J45.901 ACUTE EXACERBATION OF COPD WITH ASTHMA (HCC): Status: ACTIVE | Noted: 2017-03-03

## 2017-03-03 PROBLEM — J96.01 ACUTE RESPIRATORY FAILURE WITH HYPOXIA (HCC): Status: ACTIVE | Noted: 2017-03-03

## 2017-03-03 LAB
ALBUMIN SERPL BCP-MCNC: 3.2 G/DL (ref 3.2–4.9)
ALBUMIN/GLOB SERPL: 1 G/DL
ALP SERPL-CCNC: 68 U/L (ref 30–99)
ALT SERPL-CCNC: 8 U/L (ref 2–50)
ANION GAP SERPL CALC-SCNC: 10 MMOL/L (ref 0–11.9)
AST SERPL-CCNC: 14 U/L (ref 12–45)
BASOPHILS # BLD AUTO: 1 % (ref 0–1.8)
BASOPHILS # BLD: 0.12 K/UL (ref 0–0.12)
BILIRUB SERPL-MCNC: 0.4 MG/DL (ref 0.1–1.5)
BNP SERPL-MCNC: 37 PG/ML (ref 0–100)
BUN SERPL-MCNC: 10 MG/DL (ref 8–22)
CALCIUM SERPL-MCNC: 8.8 MG/DL (ref 8.5–10.5)
CHLORIDE SERPL-SCNC: 103 MMOL/L (ref 96–112)
CO2 SERPL-SCNC: 28 MMOL/L (ref 20–33)
CREAT SERPL-MCNC: 0.73 MG/DL (ref 0.5–1.4)
EKG IMPRESSION: NORMAL
EOSINOPHIL # BLD AUTO: 0.55 K/UL (ref 0–0.51)
EOSINOPHIL NFR BLD: 4.5 % (ref 0–6.9)
ERYTHROCYTE [DISTWIDTH] IN BLOOD BY AUTOMATED COUNT: 58.4 FL (ref 35.9–50)
GFR SERPL CREATININE-BSD FRML MDRD: >60 ML/MIN/1.73 M 2
GLOBULIN SER CALC-MCNC: 3.3 G/DL (ref 1.9–3.5)
GLUCOSE BLD-MCNC: 182 MG/DL (ref 65–99)
GLUCOSE SERPL-MCNC: 109 MG/DL (ref 65–99)
HCT VFR BLD AUTO: 48 % (ref 42–52)
HGB BLD-MCNC: 15.4 G/DL (ref 14–18)
IMM GRANULOCYTES # BLD AUTO: 0.04 K/UL (ref 0–0.11)
IMM GRANULOCYTES NFR BLD AUTO: 0.3 % (ref 0–0.9)
LACTATE BLD-SCNC: 1.3 MMOL/L (ref 0.5–2)
LYMPHOCYTES # BLD AUTO: 3.95 K/UL (ref 1–4.8)
LYMPHOCYTES NFR BLD: 32.1 % (ref 22–41)
MCH RBC QN AUTO: 29.2 PG (ref 27–33)
MCHC RBC AUTO-ENTMCNC: 32.1 G/DL (ref 33.7–35.3)
MCV RBC AUTO: 91.1 FL (ref 81.4–97.8)
MONOCYTES # BLD AUTO: 1.59 K/UL (ref 0–0.85)
MONOCYTES NFR BLD AUTO: 12.9 % (ref 0–13.4)
NEUTROPHILS # BLD AUTO: 6.04 K/UL (ref 1.82–7.42)
NEUTROPHILS NFR BLD: 49.2 % (ref 44–72)
NRBC # BLD AUTO: 0 K/UL
NRBC BLD AUTO-RTO: 0 /100 WBC
PLATELET # BLD AUTO: 286 K/UL (ref 164–446)
PMV BLD AUTO: 10.8 FL (ref 9–12.9)
POTASSIUM SERPL-SCNC: 4.2 MMOL/L (ref 3.6–5.5)
PROT SERPL-MCNC: 6.5 G/DL (ref 6–8.2)
RBC # BLD AUTO: 5.27 M/UL (ref 4.7–6.1)
SODIUM SERPL-SCNC: 141 MMOL/L (ref 135–145)
TROPONIN I SERPL-MCNC: <0.01 NG/ML (ref 0–0.04)
WBC # BLD AUTO: 12.3 K/UL (ref 4.8–10.8)

## 2017-03-03 PROCEDURE — 93005 ELECTROCARDIOGRAM TRACING: CPT | Performed by: EMERGENCY MEDICINE

## 2017-03-03 PROCEDURE — 700102 HCHG RX REV CODE 250 W/ 637 OVERRIDE(OP)

## 2017-03-03 PROCEDURE — 71010 DX-CHEST-PORTABLE (1 VIEW): CPT

## 2017-03-03 PROCEDURE — 80053 COMPREHEN METABOLIC PANEL: CPT

## 2017-03-03 PROCEDURE — 99223 1ST HOSP IP/OBS HIGH 75: CPT | Mod: AI | Performed by: HOSPITALIST

## 2017-03-03 PROCEDURE — 87502 INFLUENZA DNA AMP PROBE: CPT

## 2017-03-03 PROCEDURE — 94640 AIRWAY INHALATION TREATMENT: CPT

## 2017-03-03 PROCEDURE — 83880 ASSAY OF NATRIURETIC PEPTIDE: CPT

## 2017-03-03 PROCEDURE — 700105 HCHG RX REV CODE 258: Performed by: HOSPITALIST

## 2017-03-03 PROCEDURE — 700111 HCHG RX REV CODE 636 W/ 250 OVERRIDE (IP): Performed by: HOSPITALIST

## 2017-03-03 PROCEDURE — 93005 ELECTROCARDIOGRAM TRACING: CPT | Performed by: HOSPITALIST

## 2017-03-03 PROCEDURE — 99285 EMERGENCY DEPT VISIT HI MDM: CPT

## 2017-03-03 PROCEDURE — 83605 ASSAY OF LACTIC ACID: CPT

## 2017-03-03 PROCEDURE — 36415 COLL VENOUS BLD VENIPUNCTURE: CPT

## 2017-03-03 PROCEDURE — 770006 HCHG ROOM/CARE - MED/SURG/GYN SEMI*

## 2017-03-03 PROCEDURE — 96367 TX/PROPH/DG ADDL SEQ IV INF: CPT

## 2017-03-03 PROCEDURE — 87503 INFLUENZA DNA AMP PROB ADDL: CPT

## 2017-03-03 PROCEDURE — 94760 N-INVAS EAR/PLS OXIMETRY 1: CPT

## 2017-03-03 PROCEDURE — 84484 ASSAY OF TROPONIN QUANT: CPT

## 2017-03-03 PROCEDURE — 96365 THER/PROPH/DIAG IV INF INIT: CPT

## 2017-03-03 PROCEDURE — 85025 COMPLETE CBC W/AUTO DIFF WBC: CPT

## 2017-03-03 PROCEDURE — 87040 BLOOD CULTURE FOR BACTERIA: CPT

## 2017-03-03 PROCEDURE — 93010 ELECTROCARDIOGRAM REPORT: CPT | Performed by: INTERNAL MEDICINE

## 2017-03-03 PROCEDURE — 82962 GLUCOSE BLOOD TEST: CPT

## 2017-03-03 PROCEDURE — 700101 HCHG RX REV CODE 250: Performed by: EMERGENCY MEDICINE

## 2017-03-03 PROCEDURE — A9270 NON-COVERED ITEM OR SERVICE: HCPCS

## 2017-03-03 RX ORDER — NEOMYCIN SULFATE, POLYMYXIN B SULFATE AND BACITRACIN ZINC 3.5; 10000; 4 MG/G; [USP'U]/G; [USP'U]/G
OINTMENT OPHTHALMIC
Status: DISCONTINUED | OUTPATIENT
Start: 2017-03-03 | End: 2017-03-09 | Stop reason: HOSPADM

## 2017-03-03 RX ORDER — LISINOPRIL 10 MG/1
30 TABLET ORAL DAILY
Status: DISCONTINUED | OUTPATIENT
Start: 2017-03-04 | End: 2017-03-06

## 2017-03-03 RX ORDER — IPRATROPIUM BROMIDE AND ALBUTEROL SULFATE 2.5; .5 MG/3ML; MG/3ML
3 SOLUTION RESPIRATORY (INHALATION)
Status: DISCONTINUED | OUTPATIENT
Start: 2017-03-04 | End: 2017-03-04

## 2017-03-03 RX ORDER — PAROXETINE HYDROCHLORIDE 20 MG/1
40 TABLET, FILM COATED ORAL DAILY
Status: DISCONTINUED | OUTPATIENT
Start: 2017-03-04 | End: 2017-03-09 | Stop reason: HOSPADM

## 2017-03-03 RX ORDER — MORPHINE SULFATE 4 MG/ML
2 INJECTION, SOLUTION INTRAMUSCULAR; INTRAVENOUS
Status: DISCONTINUED | OUTPATIENT
Start: 2017-03-03 | End: 2017-03-09 | Stop reason: HOSPADM

## 2017-03-03 RX ORDER — LABETALOL HYDROCHLORIDE 5 MG/ML
10 INJECTION, SOLUTION INTRAVENOUS EVERY 4 HOURS PRN
Status: DISCONTINUED | OUTPATIENT
Start: 2017-03-03 | End: 2017-03-09 | Stop reason: HOSPADM

## 2017-03-03 RX ORDER — BUDESONIDE AND FORMOTEROL FUMARATE DIHYDRATE 160; 4.5 UG/1; UG/1
2 AEROSOL RESPIRATORY (INHALATION)
Status: DISCONTINUED | OUTPATIENT
Start: 2017-03-03 | End: 2017-03-04

## 2017-03-03 RX ORDER — ATORVASTATIN CALCIUM 20 MG/1
20 TABLET, FILM COATED ORAL
Status: DISCONTINUED | OUTPATIENT
Start: 2017-03-03 | End: 2017-03-09 | Stop reason: HOSPADM

## 2017-03-03 RX ORDER — CARVEDILOL 6.25 MG/1
6.25 TABLET ORAL 2 TIMES DAILY WITH MEALS
Status: DISCONTINUED | OUTPATIENT
Start: 2017-03-04 | End: 2017-03-06

## 2017-03-03 RX ORDER — AMOXICILLIN 250 MG
2 CAPSULE ORAL 2 TIMES DAILY
Status: DISCONTINUED | OUTPATIENT
Start: 2017-03-03 | End: 2017-03-09 | Stop reason: HOSPADM

## 2017-03-03 RX ORDER — POLYETHYLENE GLYCOL 3350 17 G/17G
1 POWDER, FOR SOLUTION ORAL
Status: DISCONTINUED | OUTPATIENT
Start: 2017-03-03 | End: 2017-03-09 | Stop reason: HOSPADM

## 2017-03-03 RX ORDER — NICOTINE 21 MG/24HR
21 PATCH, TRANSDERMAL 24 HOURS TRANSDERMAL
Status: DISCONTINUED | OUTPATIENT
Start: 2017-03-04 | End: 2017-03-09 | Stop reason: HOSPADM

## 2017-03-03 RX ORDER — DEXTROSE MONOHYDRATE 25 G/50ML
25 INJECTION, SOLUTION INTRAVENOUS
Status: DISCONTINUED | OUTPATIENT
Start: 2017-03-03 | End: 2017-03-09 | Stop reason: HOSPADM

## 2017-03-03 RX ORDER — HEPARIN SODIUM 5000 [USP'U]/ML
5000 INJECTION, SOLUTION INTRAVENOUS; SUBCUTANEOUS EVERY 8 HOURS
Status: DISCONTINUED | OUTPATIENT
Start: 2017-03-03 | End: 2017-03-09 | Stop reason: HOSPADM

## 2017-03-03 RX ORDER — BISACODYL 10 MG
10 SUPPOSITORY, RECTAL RECTAL
Status: DISCONTINUED | OUTPATIENT
Start: 2017-03-03 | End: 2017-03-09 | Stop reason: HOSPADM

## 2017-03-03 RX ORDER — MONTELUKAST SODIUM 10 MG/1
10 TABLET ORAL DAILY
Status: DISCONTINUED | OUTPATIENT
Start: 2017-03-04 | End: 2017-03-09 | Stop reason: HOSPADM

## 2017-03-03 RX ORDER — FLUTICASONE PROPIONATE 50 MCG
1 SPRAY, SUSPENSION (ML) NASAL DAILY
COMMUNITY
End: 2017-05-04

## 2017-03-03 RX ORDER — IPRATROPIUM BROMIDE AND ALBUTEROL SULFATE 2.5; .5 MG/3ML; MG/3ML
3 SOLUTION RESPIRATORY (INHALATION)
Status: DISCONTINUED | OUTPATIENT
Start: 2017-03-03 | End: 2017-03-09 | Stop reason: HOSPADM

## 2017-03-03 RX ORDER — LORAZEPAM 0.5 MG/1
0.5 TABLET ORAL EVERY 6 HOURS PRN
Status: DISCONTINUED | OUTPATIENT
Start: 2017-03-03 | End: 2017-03-09 | Stop reason: HOSPADM

## 2017-03-03 RX ORDER — ONDANSETRON 4 MG/1
4 TABLET, ORALLY DISINTEGRATING ORAL EVERY 4 HOURS PRN
Status: DISCONTINUED | OUTPATIENT
Start: 2017-03-03 | End: 2017-03-09 | Stop reason: HOSPADM

## 2017-03-03 RX ORDER — OXYCODONE HYDROCHLORIDE 5 MG/1
2.5 TABLET ORAL
Status: DISCONTINUED | OUTPATIENT
Start: 2017-03-03 | End: 2017-03-09 | Stop reason: HOSPADM

## 2017-03-03 RX ORDER — OXYCODONE HYDROCHLORIDE 5 MG/1
5 TABLET ORAL
Status: DISCONTINUED | OUTPATIENT
Start: 2017-03-03 | End: 2017-03-09 | Stop reason: HOSPADM

## 2017-03-03 RX ORDER — GLIPIZIDE 2.5 MG/1
2.5 TABLET, EXTENDED RELEASE ORAL DAILY
Status: DISCONTINUED | OUTPATIENT
Start: 2017-03-04 | End: 2017-03-09 | Stop reason: HOSPADM

## 2017-03-03 RX ORDER — FUROSEMIDE 20 MG/1
20 TABLET ORAL DAILY
Status: ON HOLD | COMMUNITY
End: 2017-03-09

## 2017-03-03 RX ORDER — FLUTICASONE PROPIONATE 50 MCG
1 SPRAY, SUSPENSION (ML) NASAL DAILY
Status: DISCONTINUED | OUTPATIENT
Start: 2017-03-04 | End: 2017-03-03

## 2017-03-03 RX ORDER — METHYLPREDNISOLONE SODIUM SUCCINATE 125 MG/2ML
125 INJECTION, POWDER, LYOPHILIZED, FOR SOLUTION INTRAMUSCULAR; INTRAVENOUS EVERY 6 HOURS
Status: DISCONTINUED | OUTPATIENT
Start: 2017-03-04 | End: 2017-03-05

## 2017-03-03 RX ORDER — HYDROXYZINE HYDROCHLORIDE 25 MG/1
25 TABLET, FILM COATED ORAL
COMMUNITY
End: 2017-05-04

## 2017-03-03 RX ORDER — POTASSIUM CHLORIDE 20 MEQ/1
20 TABLET, EXTENDED RELEASE ORAL DAILY
Status: DISCONTINUED | OUTPATIENT
Start: 2017-03-04 | End: 2017-03-09 | Stop reason: HOSPADM

## 2017-03-03 RX ORDER — LORAZEPAM 2 MG/ML
0.5 INJECTION INTRAMUSCULAR EVERY 6 HOURS PRN
Status: DISCONTINUED | OUTPATIENT
Start: 2017-03-03 | End: 2017-03-09 | Stop reason: HOSPADM

## 2017-03-03 RX ORDER — GLIPIZIDE 2.5 MG/1
2.5 TABLET, EXTENDED RELEASE ORAL EVERY MORNING
COMMUNITY

## 2017-03-03 RX ORDER — ENALAPRILAT 1.25 MG/ML
1.25 INJECTION INTRAVENOUS EVERY 6 HOURS PRN
Status: DISCONTINUED | OUTPATIENT
Start: 2017-03-03 | End: 2017-03-09 | Stop reason: HOSPADM

## 2017-03-03 RX ORDER — OMEPRAZOLE 20 MG/1
20 CAPSULE, DELAYED RELEASE ORAL DAILY
Status: DISCONTINUED | OUTPATIENT
Start: 2017-03-04 | End: 2017-03-09 | Stop reason: HOSPADM

## 2017-03-03 RX ORDER — HYDROXYZINE HYDROCHLORIDE 25 MG/1
25 TABLET, FILM COATED ORAL
Status: DISCONTINUED | OUTPATIENT
Start: 2017-03-03 | End: 2017-03-09 | Stop reason: HOSPADM

## 2017-03-03 RX ORDER — ACETAMINOPHEN 325 MG/1
650 TABLET ORAL EVERY 6 HOURS PRN
Status: DISCONTINUED | OUTPATIENT
Start: 2017-03-03 | End: 2017-03-09 | Stop reason: HOSPADM

## 2017-03-03 RX ORDER — FUROSEMIDE 10 MG/ML
60 INJECTION INTRAMUSCULAR; INTRAVENOUS
Status: DISCONTINUED | OUTPATIENT
Start: 2017-03-03 | End: 2017-03-04

## 2017-03-03 RX ORDER — ZOLPIDEM TARTRATE 5 MG/1
5 TABLET ORAL
Status: DISCONTINUED | OUTPATIENT
Start: 2017-03-03 | End: 2017-03-09 | Stop reason: HOSPADM

## 2017-03-03 RX ORDER — ALBUTEROL SULFATE 2.5 MG/3ML
2.5 SOLUTION RESPIRATORY (INHALATION)
Status: DISCONTINUED | OUTPATIENT
Start: 2017-03-03 | End: 2017-03-03

## 2017-03-03 RX ORDER — ONDANSETRON 2 MG/ML
4 INJECTION INTRAMUSCULAR; INTRAVENOUS EVERY 4 HOURS PRN
Status: DISCONTINUED | OUTPATIENT
Start: 2017-03-03 | End: 2017-03-09 | Stop reason: HOSPADM

## 2017-03-03 RX ADMIN — IPRATROPIUM BROMIDE 0.5 MG: 0.5 SOLUTION RESPIRATORY (INHALATION) at 17:29

## 2017-03-03 RX ADMIN — ALBUTEROL SULFATE 2.5 MG: 2.5 SOLUTION RESPIRATORY (INHALATION) at 17:29

## 2017-03-03 RX ADMIN — CEFTRIAXONE 2 G: 2 INJECTION, POWDER, FOR SOLUTION INTRAMUSCULAR; INTRAVENOUS at 22:19

## 2017-03-03 RX ADMIN — AZITHROMYCIN 500 MG: 500 INJECTION, POWDER, LYOPHILIZED, FOR SOLUTION INTRAVENOUS at 22:55

## 2017-03-03 ASSESSMENT — ENCOUNTER SYMPTOMS
EYE REDNESS: 1
NAUSEA: 0
SPUTUM PRODUCTION: 1
EYE DISCHARGE: 1
CHILLS: 1
ABDOMINAL PAIN: 0
VOMITING: 0
SHORTNESS OF BREATH: 1
EYE PAIN: 1
COUGH: 1
FEVER: 0

## 2017-03-03 ASSESSMENT — COPD QUESTIONNAIRES
HAVE YOU SMOKED AT LEAST 100 CIGARETTES IN YOUR ENTIRE LIFE: YES
DO YOU EVER COUGH UP ANY MUCUS OR PHLEGM?: NO/ONLY WITH OCCASIONAL COLDS OR INFECTIONS
COPD SCREENING SCORE: 6
DURING THE PAST 4 WEEKS HOW MUCH DID YOU FEEL SHORT OF BREATH: SOME OF THE TIME

## 2017-03-03 ASSESSMENT — LIFESTYLE VARIABLES: EVER_SMOKED: YES

## 2017-03-03 ASSESSMENT — PAIN SCALES - GENERAL: PAINLEVEL_OUTOF10: 9

## 2017-03-03 NOTE — IP AVS SNAPSHOT
" <p align=\"LEFT\"><IMG SRC=\"//[email or web address]\" alt=\"Image\" WIDTH=\"50%\" HEIGHT=\"200\" BORDER=\"\"></p>                   Name:Maxi Esposito  Medical Record Number:[id number]  CSN: [id number]    YOB: 1950   Age: 67 y.o.  Sex: male  HT:1.702 m (5' 7\") WT: 123.6 kg (272 lb 7.8 oz)          Admit Date: 3/3/2017     Discharge Date:   Today's Date: 3/9/2017  Attending Doctor:  Sal Robin M.D.                  Allergies:  Shellfish allergy          Your appointments     Apr 03, 2017  3:40 PM   New Patient with Awilda Cobian M.D.   [facility] [address]    [address]  [facility] [address]  [facility] [address]   [phone]           Please bring Photo ID, Insurance Cards, All Medication Bottles and copies of any legal documents (such as Living Will, Power of ) If speaking a language besides English please bring an adult . Please arrive 30 minutes prior for check in and registration. You will be receiving a confirmation call a few days before your appointment from our automated call confirmation system.                 Medication List      Take these Medications        Instructions    alprazolam 0.5 MG Tabs   Commonly known as:  XANAX    Take 1 Tab by mouth 2 times a day as needed for Anxiety.   Dose:  0.5 mg       amlodipine 10 MG Tabs   Commonly known as:  NORVASC    Take 1 Tab by mouth every day.   Dose:  10 mg       aspirin EC 81 MG Tbec   Commonly known as:  ECOTRIN    Take 81 mg by mouth every day.   Dose:  81 mg       atorvastatin 20 MG Tabs   Commonly known as:  LIPITOR    Take 1 Tab by mouth every bedtime.   Dose:  20 mg       BREO ELLIPTA 100-25 MCG/INH Aepb   Generic drug:  Fluticasone Furoate-Vilanterol    Inhale 1 Puff by mouth every day.   Dose:  1 Puff       carvedilol 6.25 MG Tabs   Commonly known as:  COREG    Take 1 Tab by mouth 2 times a day, with meals.   Dose:  6.25 mg       cefdinir 300 MG Caps   Commonly known as:  OMNICEF    Take " 1 Cap by mouth every 12 hours for 3 days.   Dose:  300 mg       fluticasone 50 MCG/ACT nasal spray   Commonly known as:  FLONASE    Spray 1 Spray in nose every day.   Dose:  1 Spray       furosemide 40 MG Tabs   What changed:    - medication strength  - how much to take   Commonly known as:  LASIX    Take 1 Tab by mouth every day.   Dose:  40 mg       glipiZIDE SR 2.5 MG Tb24   Commonly known as:  GLUCOTROL    Take 2.5 mg by mouth every day.   Dose:  2.5 mg       hydrOXYzine 25 MG Tabs   Commonly known as:  ATARAX    Take 25 mg by mouth every bedtime.   Dose:  25 mg       lisinopril 40 MG tablet   What changed:    - medication strength  - how much to take   Commonly known as:  PRINIVIL, ZESTRIL    Take 1 Tab by mouth every day.   Dose:  40 mg       omeprazole 20 MG delayed-release capsule   Commonly known as:  PRILOSEC    Take 20 mg by mouth every day.   Dose:  20 mg       PAXIL 40 MG tablet   Generic drug:  paroxetine    Take 40 mg by mouth every day.   Dose:  40 mg       potassium chloride SA 20 MEQ Tbcr   Commonly known as:  Kdur    Take 1 Tab by mouth every day.   Dose:  20 mEq       predniSONE 10 MG Tabs   Commonly known as:  DELTASONE    4 tabs qd x 3d, 3 tabs qd x 3d, 2 tabs qd x 3d, 1 tab qd x 3d.       SINGULAIR 10 MG Tabs   Generic drug:  montelukast    Take 10 mg by mouth every day.   Dose:  10 mg

## 2017-03-03 NOTE — IP AVS SNAPSHOT
" Home Care Instructions                                                                                                                  Name:Maxi Esposito  Medical Record Number:[id number]  CSN: [id number]    YOB: 1950   Age: 67 y.o.  Sex: male  HT:1.702 m (5' 7\") WT: 123.6 kg (272 lb 7.8 oz)          Admit Date: 3/3/2017     Discharge Date:   Today's Date: 3/9/2017  Attending Doctor:  Sal Robin M.D.                  Allergies:  Shellfish allergy            Discharge Instructions       Discharge Instructions    Discharged to home by car with self. Discharged via wheelchair, hospital escort: Yes.  Special equipment needed: Not Applicable    Be sure to schedule a follow-up appointment with your primary care doctor or any specialists as instructed.     Discharge Plan:   Influenza Vaccine Indication: Indicated: 65 years and older  Influenza Vaccine Given - only chart on this line when given: Influenza Vaccine Given (See MAR)    I understand that a diet low in cholesterol, fat, and sodium is recommended for good health. Unless I have been given specific instructions below for another diet, I accept this instruction as my diet prescription.   Other diet: diabetic    Special Instructions: None    · Is patient discharged on Warfarin / Coumadin?   No     · Is patient Post Blood Transfusion?  No    Depression / Suicide Risk    As you are discharged from this [facility] facility, it is important to learn how to keep safe from harming yourself.    Recognize the warning signs:  · Abrupt changes in personality, positive or negative- including increase in energy   · Giving away possessions  · Change in eating patterns- significant weight changes-  positive or negative  · Change in sleeping patterns- unable to sleep or sleeping all the time   · Unwillingness or inability to communicate  · Depression  · Unusual sadness, discouragement and loneliness  · Talk of wanting to die  · Neglect of personal " appearance   · Rebelliousness- reckless behavior  · Withdrawal from people/activities they love  · Confusion- inability to concentrate     If you or a loved one observes any of these behaviors or has concerns about self-harm, here's what you can do:  · Talk about it- your feelings and reasons for harming yourself  · Remove any means that you might use to hurt yourself (examples: pills, rope, extension cords, firearm)  · Get professional help from the community (Mental Health, Substance Abuse, psychological counseling)  · Do not be alone:Call your Safe Contact- someone whom you trust who will be there for you.  · Call your local CRISIS HOTLINE 661-4012 or 191-682-0859  · Call your local Children's Mobile Crisis Response Team [place] (453) 177-4294 or www.Cutefund  · Call the toll free National Suicide Prevention Hotlines   · National Suicide Prevention Lifeline 275-785-TIVE (5946)  · GBook[employer] Line Network 800-SUICIDE (791-9162)        Your appointments     Apr 03, 2017  3:40 PM   New Patient with Awilda Cobian M.D.   [facility] [place] [facility] [address]    [address]  [address]  [facility] [address] [phone]           Please bring Photo ID, Insurance Cards, All Medication Bottles and copies of any legal documents (such as Living Will, Power of ) If speaking a language besides English please bring an adult . Please arrive 30 minutes prior for check in and registration. You will be receiving a confirmation call a few days before your appointment from our automated call confirmation system.                 Discharge Medication Instructions:    Below are the medications your physician expects you to take upon discharge:    Review all your home medications and newly ordered medications with your doctor and/or pharmacist. Follow medication instructions as directed by your doctor and/or pharmacist.    Please keep your medication list with you and share with  your physician.               Medication List      START taking these medications        Instructions    alprazolam 0.5 MG Tabs   Commonly known as:  XANAX    Take 1 Tab by mouth 2 times a day as needed for Anxiety.   Dose:  0.5 mg       amlodipine 10 MG Tabs   Last time this was given:  10 mg on 3/9/2017  8:41 AM   Commonly known as:  NORVASC    Take 1 Tab by mouth every day.   Dose:  10 mg       cefdinir 300 MG Caps   Last time this was given:  300 mg on 3/9/2017  8:41 AM   Commonly known as:  OMNICEF    Take 1 Cap by mouth every 12 hours for 3 days.   Dose:  300 mg       predniSONE 10 MG Tabs   Last time this was given:  50 mg on 3/9/2017  8:40 AM   Commonly known as:  DELTASONE    4 tabs qd x 3d, 3 tabs qd x 3d, 2 tabs qd x 3d, 1 tab qd x 3d.         CHANGE how you take these medications        Instructions    furosemide 40 MG Tabs   What changed:    - medication strength  - how much to take   Last time this was given:  40 mg on 3/9/2017  8:41 AM   Commonly known as:  LASIX    Take 1 Tab by mouth every day.   Dose:  40 mg       lisinopril 40 MG tablet   What changed:    - medication strength  - how much to take   Last time this was given:  40 mg on 3/9/2017  8:40 AM   Commonly known as:  PRINIVIL, ZESTRIL    Take 1 Tab by mouth every day.   Dose:  40 mg         CONTINUE taking these medications        Instructions    aspirin EC 81 MG Tbec   Last time this was given:  81 mg on 3/9/2017  8:41 AM   Commonly known as:  ECOTRIN    Take 81 mg by mouth every day.   Dose:  81 mg       atorvastatin 20 MG Tabs   Last time this was given:  20 mg on 3/8/2017  9:27 PM   Commonly known as:  LIPITOR    Take 1 Tab by mouth every bedtime.   Dose:  20 mg       BREO ELLIPTA 100-25 MCG/INH Aepb   Generic drug:  Fluticasone Furoate-Vilanterol    Inhale 1 Puff by mouth every day.   Dose:  1 Puff       carvedilol 6.25 MG Tabs   Last time this was given:  12.5 mg on 3/9/2017  8:41 AM   Commonly known as:  COREG    Take 1 Tab by mouth 2  times a day, with meals.   Dose:  6.25 mg       fluticasone 50 MCG/ACT nasal spray   Commonly known as:  FLONASE    Spray 1 Spray in nose every day.   Dose:  1 Spray       glipiZIDE SR 2.5 MG Tb24   Last time this was given:  2.5 mg on 3/9/2017  8:40 AM   Commonly known as:  GLUCOTROL    Take 2.5 mg by mouth every day.   Dose:  2.5 mg       hydrOXYzine 25 MG Tabs   Last time this was given:  25 mg on 3/8/2017  9:27 PM   Commonly known as:  ATARAX    Take 25 mg by mouth every bedtime.   Dose:  25 mg       omeprazole 20 MG delayed-release capsule   Last time this was given:  20 mg on 3/9/2017  8:40 AM   Commonly known as:  PRILOSEC    Take 20 mg by mouth every day.   Dose:  20 mg       PAXIL 40 MG tablet   Last time this was given:  40 mg on 3/9/2017  1:36 PM   Generic drug:  paroxetine    Take 40 mg by mouth every day.   Dose:  40 mg       potassium chloride SA 20 MEQ Tbcr   Last time this was given:  20 mEq on 3/9/2017  8:42 AM   Commonly known as:  Kdur    Take 1 Tab by mouth every day.   Dose:  20 mEq       SINGULAIR 10 MG Tabs   Last time this was given:  10 mg on 3/9/2017  8:40 AM   Generic drug:  montelukast    Take 10 mg by mouth every day.   Dose:  10 mg               Instructions           Diet / Nutrition:    Follow any diet instructions given to you by your doctor or the dietician, including how much salt (sodium) you are allowed each day.    If you are overweight, talk to your doctor about a weight reduction plan.    Activity:    Remain physically active following your doctor's instructions about exercise and activity.    Rest often.     Any time you become even a little tired or short of breath, SIT DOWN and rest.    Worsening Symptoms:    Report any of the following signs and symptoms to the doctor's office immediately:    *Pain of jaw, arm, or neck  *Chest pain not relieved by medication                               *Dizziness or loss of consciousness  *Difficulty breathing even when at rest   *More  tired than usual                                       *Bleeding drainage or swelling of surgical site  *Swelling of feet, ankles, legs or stomach                 *Fever (>100ºF)  *Pink or blood tinged sputum  *Weight gain (3lbs/day or 5lbs /week)           *Shock from internal defibrillator (if applicable)  *Palpitations or irregular heartbeats                *Cool and/or numb extremities    Stroke Awareness    Common Risk Factors for Stroke include:    Age  Atrial Fibrillation  Carotid Artery Stenosis  Diabetes Mellitus  Excessive alcohol consumption  High blood pressure  Overweight   Physical inactivity  Smoking    Warning signs and symptoms of a stroke include:    *Sudden numbness or weakness of the face, arm or leg (especially on one side of the body).  *Sudden confusion, trouble speaking or understanding.  *Sudden trouble seeing in one or both eyes.  *Sudden trouble walking, dizziness, loss of balance or coordination.Sudden severe headache with no known cause.    It is very important to get treatment quickly when a stroke occurs. If you experience any of the above warning signs, call 911 immediately.                   Disclaimer         Quit Smoking / Tobacco Use:    I understand the use of any tobacco products increases my chance of suffering from future heart disease or stroke and could cause other illnesses which may shorten my life. Quitting the use of tobacco products is the single most important thing I can do to improve my health. For further information on smoking / tobacco cessation call a Toll Free Quit Line at 1-705.483.9607 (*National Cancer [place]) or 1-575.697.1875 (American Lung Association) or you can access the web based program at www.lungusa.org.    [place] Tobacco Users Help Line:  (354) 896-6157       Toll Free: 1-271.359.7070  Quit Tobacco Program [facility] (015)048-7299    Crisis Hotline:    [place] Crisis Hotline:  1-200-AFTYYUK or 1-541.719.3847    [place]  Crisis Hotline:    1-157.759.9658 or 042-049-0959    Discharge Survey:   Thank you for choosing [facility]. We hope we did everything we could to make your hospital stay a pleasant one. You may be receiving a phone survey and we would appreciate your time and participation in answering the questions. Your input is very valuable to us in our efforts to improve our service to our patients and their families.        My signature on this form indicates that:    1. I have reviewed and understand the above information.  2. My questions regarding this information have been answered to my satisfaction.  3. I have formulated a plan with my discharge nurse to obtain my prescribed medications for home.                  Disclaimer         __________________________________                     __________       ________                       Patient Signature                                                 Date                    Time

## 2017-03-03 NOTE — ED NOTES
C/o cough prod of yellow x about 1 week, runny nose, and red irritated eyes w drainage, niddm fsbs 182 at triage, just drank oj

## 2017-03-03 NOTE — IP AVS SNAPSHOT
O[employer] Access Code: [email or web address]  Expires: 3/12/2017 11:00 AM    Your email address is not on file at [facility].  Email Addresses are required for you to sign up for [employer], please contact [phone] to verify your personal information and to provide your email address prior to attempting to register for [employer].    Maxi Skinnynicole  [address] [place] [address]    REVSharet  A secure, online tool to manage your health information     [facility]’s [employer]® is a secure, online tool that connects you to your personalized health information from the privacy of your home -- day or night - making it very easy for you to manage your healthcare. Once the activation process is completed, you can even access your medical information using the O[employer] stephie, which is available for free in the Apple Stephie store or Google Play store.     To learn more about [employer], visit [email or web address]    There are two levels of access available (as shown below):   My Chart Features  [facility] Primary Care Doctor [facility]  Specialists [facility]  Urgent  Care Non-[facility] Primary Care Doctor   Email your healthcare team securely and privately 24/7 X X X    Manage appointments: schedule your next appointment; view details of past/upcoming appointments X      Request prescription refills. X      View recent personal medical records, including lab and immunizations X X X X   View health record, including health history, allergies, medications X X X X   Read reports about your outpatient visits, procedures, consult and ER notes X X X X   See your discharge summary, which is a recap of your hospital and/or ER visit that includes your diagnosis, lab results, and care plan X X  X     How to register for [employer]:  Once your e-mail address has been verified, follow the following steps to sign up for [employer].     1. Go to  [email or web address]  2. Click on the Sign Up Now box, which takes you to the New Member Sign Up page. You will need  to provide the following information:  a. Enter your Prizeo Access Code exactly as it appears at the top of this page. (You will not need to use this code after you’ve completed the sign-up process. If you do not sign up before the expiration date, you must request a new code.)   b. Enter your date of birth.   c. Enter your home email address.   d. Click Submit, and follow the next screen’s instructions.  3. Create a Prizeo ID. This will be your Prizeo login ID and cannot be changed, so think of one that is secure and easy to remember.  4. Create a Prizeo password. You can change your password at any time.  5. Enter your Password Reset Question and Answer. This can be used at a later time if you forget your password.   6. Enter your e-mail address. This allows you to receive e-mail notifications when new information is available in Prizeo.  7. Click Sign Up. You can now view your health information.    For assistance activating your [employer]o account, call [phone]

## 2017-03-03 NOTE — IP AVS SNAPSHOT
3/9/2017          Maxi sEposito  [address] [place] [address]    Dear Maxi:    [facility] wants to ensure your discharge home is safe and you or your loved ones have had all your questions answered regarding your care after you leave the hospital.    You may receive a telephone call within two days of your discharge.  This call is to make certain you understand your discharge instructions as well as ensure we provided you with the best care possible during your stay with us.     The call will only last approximately 3-5 minutes and will be done by a nurse.    Once again, we want to ensure your discharge home is safe and that you have a clear understanding of any next steps in your care.  If you have any questions or concerns, please do not hesitate to contact us, we are here for you.  Thank you for choosing [facility] for your healthcare needs.    Sincerely,    James Woods    [facility]

## 2017-03-04 PROBLEM — E87.6 HYPOKALEMIA: Status: ACTIVE | Noted: 2017-03-04

## 2017-03-04 PROBLEM — Z71.6 TOBACCO ABUSE COUNSELING: Status: ACTIVE | Noted: 2017-03-04

## 2017-03-04 LAB
EKG IMPRESSION: NORMAL
FLUAV H1 2009 PAND RNA SPEC QL NAA+PROBE: NOT DETECTED
FLUAV RNA SPEC QL NAA+PROBE: NEGATIVE
FLUBV RNA SPEC QL NAA+PROBE: NEGATIVE
GLUCOSE BLD-MCNC: 165 MG/DL (ref 65–99)
GLUCOSE BLD-MCNC: 183 MG/DL (ref 65–99)
GLUCOSE BLD-MCNC: 195 MG/DL (ref 65–99)
GLUCOSE BLD-MCNC: 239 MG/DL (ref 65–99)

## 2017-03-04 PROCEDURE — 94760 N-INVAS EAR/PLS OXIMETRY 1: CPT

## 2017-03-04 PROCEDURE — 99406 BEHAV CHNG SMOKING 3-10 MIN: CPT | Performed by: HOSPITALIST

## 2017-03-04 PROCEDURE — 99233 SBSQ HOSP IP/OBS HIGH 50: CPT | Mod: 25 | Performed by: HOSPITALIST

## 2017-03-04 PROCEDURE — 770006 HCHG ROOM/CARE - MED/SURG/GYN SEMI*

## 2017-03-04 PROCEDURE — 94640 AIRWAY INHALATION TREATMENT: CPT

## 2017-03-04 PROCEDURE — 700105 HCHG RX REV CODE 258: Performed by: HOSPITALIST

## 2017-03-04 PROCEDURE — A9270 NON-COVERED ITEM OR SERVICE: HCPCS | Performed by: HOSPITALIST

## 2017-03-04 PROCEDURE — 700102 HCHG RX REV CODE 250 W/ 637 OVERRIDE(OP): Performed by: HOSPITALIST

## 2017-03-04 PROCEDURE — A6250 SKIN SEAL PROTECT MOISTURIZR: HCPCS | Performed by: HOSPITALIST

## 2017-03-04 PROCEDURE — 700101 HCHG RX REV CODE 250: Performed by: HOSPITALIST

## 2017-03-04 PROCEDURE — 700111 HCHG RX REV CODE 636 W/ 250 OVERRIDE (IP): Performed by: HOSPITALIST

## 2017-03-04 PROCEDURE — 82962 GLUCOSE BLOOD TEST: CPT

## 2017-03-04 RX ORDER — IPRATROPIUM BROMIDE AND ALBUTEROL SULFATE 2.5; .5 MG/3ML; MG/3ML
3 SOLUTION RESPIRATORY (INHALATION)
Status: DISCONTINUED | OUTPATIENT
Start: 2017-03-05 | End: 2017-03-06

## 2017-03-04 RX ORDER — BUDESONIDE AND FORMOTEROL FUMARATE DIHYDRATE 160; 4.5 UG/1; UG/1
2 AEROSOL RESPIRATORY (INHALATION) 2 TIMES DAILY
Status: DISCONTINUED | OUTPATIENT
Start: 2017-03-04 | End: 2017-03-09 | Stop reason: HOSPADM

## 2017-03-04 RX ORDER — FUROSEMIDE 10 MG/ML
40 INJECTION INTRAMUSCULAR; INTRAVENOUS
Status: DISCONTINUED | OUTPATIENT
Start: 2017-03-05 | End: 2017-03-05

## 2017-03-04 RX ORDER — SODIUM CHLORIDE 9 MG/ML
INJECTION, SOLUTION INTRAVENOUS
Status: COMPLETED
Start: 2017-03-04 | End: 2017-03-05

## 2017-03-04 RX ADMIN — OXYCODONE HYDROCHLORIDE 5 MG: 5 TABLET ORAL at 01:18

## 2017-03-04 RX ADMIN — PAROXETINE HYDROCHLORIDE 40 MG: 20 TABLET, FILM COATED ORAL at 09:23

## 2017-03-04 RX ADMIN — ATORVASTATIN CALCIUM 20 MG: 20 TABLET, FILM COATED ORAL at 01:18

## 2017-03-04 RX ADMIN — GLIPIZIDE 2.5 MG: 2.5 TABLET, FILM COATED, EXTENDED RELEASE ORAL at 09:23

## 2017-03-04 RX ADMIN — BUDESONIDE AND FORMOTEROL FUMARATE DIHYDRATE 2 PUFF: 160; 4.5 AEROSOL RESPIRATORY (INHALATION) at 20:45

## 2017-03-04 RX ADMIN — NICOTINE 21 MG: 21 PATCH TRANSDERMAL at 05:55

## 2017-03-04 RX ADMIN — STANDARDIZED SENNA CONCENTRATE AND DOCUSATE SODIUM 2 TABLET: 8.6; 5 TABLET, FILM COATED ORAL at 01:18

## 2017-03-04 RX ADMIN — CEFTRIAXONE 2 G: 2 INJECTION, POWDER, FOR SOLUTION INTRAMUSCULAR; INTRAVENOUS at 20:46

## 2017-03-04 RX ADMIN — FUROSEMIDE 60 MG: 10 INJECTION, SOLUTION INTRAVENOUS at 05:46

## 2017-03-04 RX ADMIN — ACETAMINOPHEN 650 MG: 325 TABLET, FILM COATED ORAL at 02:04

## 2017-03-04 RX ADMIN — BUDESONIDE AND FORMOTEROL FUMARATE DIHYDRATE 2 PUFF: 160; 4.5 AEROSOL RESPIRATORY (INHALATION) at 01:30

## 2017-03-04 RX ADMIN — HEPARIN SODIUM 5000 UNITS: 5000 INJECTION, SOLUTION INTRAVENOUS; SUBCUTANEOUS at 13:46

## 2017-03-04 RX ADMIN — IPRATROPIUM BROMIDE AND ALBUTEROL SULFATE 3 ML: .5; 3 SOLUTION RESPIRATORY (INHALATION) at 06:26

## 2017-03-04 RX ADMIN — BUDESONIDE AND FORMOTEROL FUMARATE DIHYDRATE 2 PUFF: 160; 4.5 AEROSOL RESPIRATORY (INHALATION) at 06:26

## 2017-03-04 RX ADMIN — METHYLPREDNISOLONE SODIUM SUCCINATE 125 MG: 125 INJECTION, POWDER, FOR SOLUTION INTRAMUSCULAR; INTRAVENOUS at 17:59

## 2017-03-04 RX ADMIN — FUROSEMIDE 60 MG: 10 INJECTION, SOLUTION INTRAVENOUS at 01:20

## 2017-03-04 RX ADMIN — HEPARIN SODIUM 5000 UNITS: 5000 INJECTION, SOLUTION INTRAVENOUS; SUBCUTANEOUS at 05:57

## 2017-03-04 RX ADMIN — METHYLPREDNISOLONE SODIUM SUCCINATE 125 MG: 125 INJECTION, POWDER, FOR SOLUTION INTRAMUSCULAR; INTRAVENOUS at 05:47

## 2017-03-04 RX ADMIN — NEOMYCIN SULFATE, POLYMYXIN B SULFATE AND BACITRACIN ZINC: 3.5; 10000; 4 OINTMENT OPHTHALMIC at 15:49

## 2017-03-04 RX ADMIN — HEPARIN SODIUM 5000 UNITS: 5000 INJECTION, SOLUTION INTRAVENOUS; SUBCUTANEOUS at 20:47

## 2017-03-04 RX ADMIN — HYDROXYZINE HYDROCHLORIDE 25 MG: 50 TABLET, FILM COATED ORAL at 01:17

## 2017-03-04 RX ADMIN — NEOMYCIN SULFATE, POLYMYXIN B SULFATE AND BACITRACIN ZINC: 3.5; 10000; 4 OINTMENT OPHTHALMIC at 18:00

## 2017-03-04 RX ADMIN — CARVEDILOL 6.25 MG: 6.25 TABLET, FILM COATED ORAL at 06:01

## 2017-03-04 RX ADMIN — MONTELUKAST SODIUM 10 MG: 10 TABLET, FILM COATED ORAL at 09:23

## 2017-03-04 RX ADMIN — STANDARDIZED SENNA CONCENTRATE AND DOCUSATE SODIUM 2 TABLET: 8.6; 5 TABLET, FILM COATED ORAL at 09:23

## 2017-03-04 RX ADMIN — POTASSIUM CHLORIDE 20 MEQ: 1500 TABLET, EXTENDED RELEASE ORAL at 09:23

## 2017-03-04 RX ADMIN — LISINOPRIL 30 MG: 10 TABLET ORAL at 09:23

## 2017-03-04 RX ADMIN — IPRATROPIUM BROMIDE AND ALBUTEROL SULFATE 3 ML: .5; 3 SOLUTION RESPIRATORY (INHALATION) at 15:21

## 2017-03-04 RX ADMIN — OXYCODONE HYDROCHLORIDE 5 MG: 5 TABLET ORAL at 21:42

## 2017-03-04 RX ADMIN — HYDROXYZINE HYDROCHLORIDE 25 MG: 50 TABLET, FILM COATED ORAL at 20:46

## 2017-03-04 RX ADMIN — OMEPRAZOLE 20 MG: 20 CAPSULE, DELAYED RELEASE ORAL at 09:23

## 2017-03-04 RX ADMIN — NEOMYCIN SULFATE, POLYMYXIN B SULFATE AND BACITRACIN ZINC: 3.5; 10000; 4 OINTMENT OPHTHALMIC at 20:49

## 2017-03-04 RX ADMIN — NEOMYCIN SULFATE, POLYMYXIN B SULFATE AND BACITRACIN ZINC: 3.5; 10000; 4 OINTMENT OPHTHALMIC at 09:31

## 2017-03-04 RX ADMIN — NEOMYCIN SULFATE, POLYMYXIN B SULFATE AND BACITRACIN ZINC: 3.5; 10000; 4 OINTMENT OPHTHALMIC at 06:10

## 2017-03-04 RX ADMIN — ASPIRIN 81 MG: 81 TABLET ORAL at 09:23

## 2017-03-04 RX ADMIN — METHYLPREDNISOLONE SODIUM SUCCINATE 125 MG: 125 INJECTION, POWDER, FOR SOLUTION INTRAMUSCULAR; INTRAVENOUS at 12:09

## 2017-03-04 RX ADMIN — METHYLPREDNISOLONE SODIUM SUCCINATE 125 MG: 125 INJECTION, POWDER, FOR SOLUTION INTRAMUSCULAR; INTRAVENOUS at 01:21

## 2017-03-04 RX ADMIN — CARVEDILOL 6.25 MG: 6.25 TABLET, FILM COATED ORAL at 17:59

## 2017-03-04 RX ADMIN — NEOMYCIN SULFATE, POLYMYXIN B SULFATE AND BACITRACIN ZINC: 3.5; 10000; 4 OINTMENT OPHTHALMIC at 01:32

## 2017-03-04 RX ADMIN — ATORVASTATIN CALCIUM 20 MG: 20 TABLET, FILM COATED ORAL at 20:46

## 2017-03-04 ASSESSMENT — ENCOUNTER SYMPTOMS
HEADACHES: 0
CHILLS: 0
SORE THROAT: 0
SHORTNESS OF BREATH: 1
SPUTUM PRODUCTION: 1
FEVER: 0
PALPITATIONS: 0
NAUSEA: 0
ABDOMINAL PAIN: 0
COUGH: 1
MYALGIAS: 0
DIZZINESS: 0
DEPRESSION: 0
TINGLING: 0
FOCAL WEAKNESS: 0
PHOTOPHOBIA: 0
VOMITING: 0
DIARRHEA: 0
WHEEZING: 0

## 2017-03-04 ASSESSMENT — PAIN SCALES - GENERAL
PAINLEVEL_OUTOF10: 10
PAINLEVEL_OUTOF10: 7
PAINLEVEL_OUTOF10: 3
PAINLEVEL_OUTOF10: 3
PAINLEVEL_OUTOF10: 5

## 2017-03-04 ASSESSMENT — LIFESTYLE VARIABLES
EVER_SMOKED: YES
ALCOHOL_USE: NO

## 2017-03-04 NOTE — PROGRESS NOTES
Hospital Medicine Progress Note, Adult, Complex               Author: Luli Gama Date & Time created: 3/4/2017  8:11 AM     CC: 67M homeless and with active tobacco abuse presented with 1 week of cough and SOB and admitted for COPD exacerbation.  Patient with multiple complaints including that he is unable to take care of himself and that he keeps coming back to the hospital with the same issues.      Interval History:  Continues to feel poorly, reports that he can't leave hospital unless he had a place to live, ensured him that social work will attempt to help him but that shelters may be his only option.      Review of Systems:  Review of Systems   Constitutional: Positive for malaise/fatigue. Negative for fever and chills.   HENT: Negative for congestion and sore throat.    Eyes: Negative for photophobia.   Respiratory: Positive for cough, sputum production and shortness of breath. Negative for wheezing.    Cardiovascular: Negative for chest pain and palpitations.   Gastrointestinal: Negative for nausea, vomiting, abdominal pain and diarrhea.   Genitourinary: Negative for dysuria.   Musculoskeletal: Negative for myalgias.   Skin: Negative.    Neurological: Negative for dizziness, tingling, focal weakness and headaches.   Psychiatric/Behavioral: Negative for depression and suicidal ideas.       Physical Exam:  Physical Exam   Constitutional: He is oriented to person, place, and time. No distress.   HENT:   Head: Normocephalic and atraumatic.   Right Ear: External ear normal.   Left Ear: External ear normal.   Eyes: EOM are normal. Right eye exhibits no discharge. Left eye exhibits no discharge.   Neck: Neck supple. No JVD present.   Cardiovascular: Normal rate, regular rhythm and normal heart sounds.    Pulmonary/Chest: Effort normal. No respiratory distress. He has wheezes. He has rales. He exhibits no tenderness.   Abdominal: Soft. Bowel sounds are normal. He exhibits no distension. There is no tenderness.    Musculoskeletal: He exhibits no edema.   Neurological: He is alert and oriented to person, place, and time. No cranial nerve deficit.   Skin: Skin is dry. He is not diaphoretic. No erythema.   Psychiatric: He has a normal mood and affect. His behavior is normal.   Nursing note and vitals reviewed.      Labs:        Invalid input(s): [id number]  Recent Labs      17   TROPONINI  <0.01   BNPBTYPENAT  37     Recent Labs      17   SODIUM  141   POTASSIUM  4.2   CHLORIDE  103   CO2  28   BUN  10   CREATININE  0.73   CALCIUM  8.8     Recent Labs      17   ALTSGPT  8   ASTSGOT  14   ALKPHOSPHAT  68   TBILIRUBIN  0.4   GLUCOSE  109*     Recent Labs      17   RBC  5.27   HEMOGLOBIN  15.4   HEMATOCRIT  48.0   PLATELETCT  286     Recent Labs      17   WBC  12.3*   NEUTSPOLYS  49.20   LYMPHOCYTES  32.10   MONOCYTES  12.90   EOSINOPHILS  4.50   BASOPHILS  1.00   ASTSGOT  14   ALTSGPT  8   ALKPHOSPHAT  68   TBILIRUBIN  0.4           Hemodynamics:  Temp (24hrs), Av.3 °C (97.4 °F), Min:35.9 °C (96.6 °F), Max:36.8 °C (98.2 °F)  Temperature: 35.9 °C (96.6 °F)  Pulse  Av.4  Min: 55  Max: 109   Blood Pressure : 152/83 mmHg, NIBP: 158/99 mmHg     Respiratory:    Respiration: 18, Pulse Oximetry: 92 %, O2 Daily Delivery Respiratory : OxyMask     Given By:: Mouthpiece, #MDI/DPI Given: MDI/DPI x 1, Work Of Breathing / Effort: Mild  RUL Breath Sounds: Coarse Crackles, RML Breath Sounds: Coarse Crackles, RLL Breath Sounds: Diminished, REYNA Breath Sounds: Diminished, LLL Breath Sounds: Diminished  Fluids:    Intake/Output Summary (Last 24 hours) at 17 0812  Last data filed at 17 0600   Gross per 24 hour   Intake   1000 ml   Output    850 ml   Net    150 ml     Weight: 119 kg (262 lb 5.6 oz)  GI/Nutrition:  Orders Placed This Encounter   Procedures   • Diet Order     Standing Status: Standing      Number of Occurrences: 1      Standing Expiration Date:       Order Specific Question:  Diet:     Answer:  Cardiac [6]     Order Specific Question:  Diet:     Answer:  Diabetic [3]     Order Specific Question:  Calorie modifications:     Answer:  2000 kcals [5]     Medical Decision Making, by Problem:  Active Hospital Problems    Diagnosis   • Acute respiratory failure with hypoxia (CMS-HCC) [J96.01], Acute exacerbation of COPD with asthma (CMS-HCC) [J44.1, J45.901] with suspected early PNA  - Patient was at 84% on RA at admission  - Unable to wean off O2 overnight  - Continue inhalers  - Continue IV steroids, C3/Azithro, RT protocol and wean O2  - Will decrease Lasix which was started for pulmonary edema and suspected heart failure  - ECHO pending  - Monitor cultures  - Tobacco cessation counseling     • Type 2 diabetes mellitus with hyperglycemia (CMS-HCC) [E11.65]  - HBA1C pending  - Continue glipizide, ISS and accuchecks     • HTN (hypertension) [I10]  - Well controlled on coreg and lisinopril     • HLD  - Chronic, stable on home lipitor and ASA     • Depression  - Chronic, stable on home paxil     • Tobacco abuse counseling [Z71.6]  - Greater than 3 minutes spent at bedside in cessation counseling, patient reports that he has increased social stressors, he is not yet in precontemplation stage, continue to encourage, nicotine replacement     • Medical Noncompliance  - Patient has been educated that he needs to take his medicines as prescribed and to stop smoking, he is often noncompliant and complains that he keeps coming back to hospital.  Poor insight into the link between his behavior and his health.  Argumentative and wants hospital to find him a place to live but educated that this is outside of the scope of my practice and while SW will consult, ultimately this is his responsibility       Labs reviewed and Medications reviewed              Antibiotics: Treating active infection/contamination beyond 24 hours perioperative coverage

## 2017-03-04 NOTE — H&P
CHIEF COMPLAINT:  Shortness of breath.    PRIMARY CARE PHYSICIAN:  Unassigned.    ADMITTED TO:  [facility] hospitalist, Dr. Wing.    DIAGNOSIS:  Acute respiratory failure with acute chronic obstructive pulmonary   disease exacerbation.    HISTORY OF CURRENT ILLNESS:  The patient is a 67-year-old gentleman who came   over from the shelter tonight because of ongoing shortness of breath.  He was   evaluated in the emergency room, found to be in COPD exacerbation.  The   patient thus at this point will be admitted to the medical floor for IV   steroids, nebulizer treatments, RT protocol, oxygen and then we will also   evaluate him for heart failure as the patient had been fluid overloaded along   with short of breath and he has slight cellulitis of both lower extremities.    Patient at this point will be full admission to the medical floor, given the   fact that he has respiratory failure and will require more than 2 midnights of   inpatient stay.    PAST MEDICAL HISTORY:  Includes COPD, tobacco abuse disorder, ongoing   hypertension, diabetes mellitus type 2, history of sepsis, history of   pneumonia and then medication noncompliance.    PAST SURGICAL HISTORY:  Includes splenectomy.    ALLERGIES:  HE IS WITH ALLERGY TO SHELLFISH ONLY.    MEDICATIONS:  At the time of admission include potassium 20 mEq p.o. daily,   Paxil 40 mg p.o. daily, omeprazole 20 mg p.o. b.i.d., Singulair 10 mg p.o.   daily, lisinopril 30 mg p.o. daily, Atarax 25 mg every 6 hours p.r.n. for   itching, glipizide 2.5 mg p.o. daily, Lasix 20 mg p.o. daily, Breo Ellipta   10/20 inhaled twice daily, Flonase 50 mcg per nose twice daily, Coreg 6.25 mg   p.o. b.i.d., Lipitor 20 mg p.o. at bedtime, aspirin 81 mg p.o. daily.    SOCIAL HISTORY:  He is a current smoker, 1 pack per day for about 20 years on   and off.  No alcohol, no drugs.  He has no advanced directive.  He wishes to   be full code status.    FAMILY HISTORY:  Both mom and dad had diabetes and  heart disease.    REVIEW OF SYSTEMS:  He complains of several weeks of shortness of breath which   has intensified and gotten worse, it is accompanied now by nausea, cough and   yellowish phlegm production.  He says he has some chest tightness from the   coughing.  Otherwise, no real chest pain.  He has abdominal pain from the   coughing.  No problems with urination or defecation.  He is definitely weak in   the legs.  He has got swelling in the legs, redness in the legs.  He has a   headache and he has fevers and chills ongoing with it.  All other review of   systems were reviewed and are negative.    PHYSICAL EXAMINATION:  VITAL SIGNS:  Temperature 36.8 degrees Celsius, pulse 109, respirations 16,   blood pressure _____, he is 121 kilos in weight and 170 cm tall.  GENERAL:  He is currently alert, awake, oriented x3, pleasant, cooperative   gentleman who appears his stated age.  HEENT:  Head is atraumatic.  Eyes follow normal range of gaze.  He has got   bilateral conjunctivitis with crusting all around his eyes and redness in the   sclerae.  Nose is midline without discharge.  Ears bilaterally intact without   discharge.  Oral cavity, moist mucous membranes.  No apparent focus infection   in the oral cavity.  NECK:  Soft and supple.  No carotid bruit, no thyromegaly, no lymphadenopathy   appreciated.  CHEST:  Chest wall moves equal with inspiration and expiration.  No   reproducible chest wall tenderness.  HEART:  S1, S2 tachycardic.  No murmurs or gallops detected.  LUNGS:  Diffuse decrease in breath sounds with expiratory and inspiratory   wheezes and crackles throughout all lung fields.  He has accessory muscle work   throughout both lung fields and there is increased work of breathing.  ABDOMEN:  Soft.  Bowel sounds present in all 4 quadrants.  No hepatomegaly, no   splenomegaly, no rebound, no tenderness elicited.  SKIN:  There is redness of both lower extremities.  There is swelling of both   lower  extremities, +1 edema.  There is also midline abdominal scar from   previous splenectomy, which is well healed.  GENITOURINARY:  Genitals within normal limits.  RECTAL:  Within normal limits.  NEUROLOGIC:  Cranial nerves II-XII grossly within normal limits without any   focal deficits appreciated.    LABORATORY EVALUATION:  WBC count 12.3 with a hemoglobin of 15.4, hematocrit   48, platelets are 286.  Sodium 141, potassium 4.2, chloride 103, CO2 of 28,   BUN 10, creatinine 0.73, calcium 8.8 with a glucose of 109.  Liver function   tests are within normal limits.  Troponin less than 0.01 and chest x-ray,   which I reviewed myself at this point shows interstitial edema, possibly lung   disease, which at this point is prominent throughout all lung fields.  No bony   abnormalities mediastinum is of normal size.    IMPRESSION AND PLAN:  At this point:  1.  Acute hypoxic respiratory failure.  Patient will be placed on RT protocol,   oxygen and nebulizer treatments.  2.  Acute chronic obstructive pulmonary disease exacerbation.  The patient   will be given IV Solu-Medrol 125 mg IV every 6 hours.  Patient will continue   Rocephin and azithromycin.  3.  Suspect early pneumonia.  Patient will be continued at this point on   antibiotic treatment.  4.  Medication noncompliance.  I have talked to him in detail about making   sure he takes his medications.  5.  Diabetes mellitus type 2.  He will be on diabetic diet, Accu-Cheks with   sliding scale coverage.  He will continue at this point with monitoring of a   hemoglobin A1c.  6.  Tobacco abuse.  He will be given a nicotine patch.  7.  Leukocytosis, monitor.  8.  History of heart disease, continue with Coreg, aspirin, and Lipitor and   ACE inhibitor.  9.  Possible heart failure.  Continue at this point with Lasix 60 mg IV b.i.d.   and check an echocardiogram.  10.  Gastroesophageal reflux disease.  Continue with omeprazole.  11.  Depression.  Continue with Paxil.  12.  Chronic  itching.  Continue with p.r.n. Atarax.  13.  Nasal allergies.  Continue with Flonase.  14.  History of hypokalemia, continue with potassium and monitor potassium   levels.  Patient will be on deep venous thrombosis and gastroesophageal reflux   disease prophylaxis.  The patient is full admission to the medical floor.       ____________________________________     MD ALVERTO TORO / REVA    DD:  03/03/2017 21:51:11  DT:  03/03/2017 22:42:32    D#:  [id number]  Job#:  [id number]

## 2017-03-04 NOTE — ED PROVIDER NOTES
ED Provider Note  Scribed for Robel Joya M.D. by Deangelo Richmond. 3/3/2017, 5:01 PM.    Primary care provider: Pcp Pt States None  Means of arrival: Walk in  History obtained from: Patient  History limited by: none    CHIEF COMPLAINT  Chief Complaint   Patient presents with   • Cough   • Eye Drainage     HPI  Maxi Esposito is a 67 y.o. male who presents to the Emergency Department complaining of persistent cough and eye swelling, onset one week. The patient notes that his cough is productive of yellow sputum and began one week ago. He notes that his bilateral eyes are swollen, red, and draining at this time. He has not alleviating factors for his symptoms. Patient also reports that he is on Lasix and is only intermittently compliant with this medication. Per patient he has noticed worsening bilateral leg swelling with pain. He has no alleviating factors for his symptoms.     REVIEW OF SYSTEMS  Review of Systems   Constitutional: Positive for chills. Negative for fever.   Eyes: Positive for pain, discharge and redness.   Respiratory: Positive for cough, sputum production and shortness of breath.    Cardiovascular: Positive for leg swelling.   Gastrointestinal: Negative for nausea, vomiting and abdominal pain.   All other systems reviewed and are negative.  C.     PAST MEDICAL HISTORY   has a past medical history of Hypertension; Diabetes (CMS-[facility]); Sepsis (CMS-HCC); Pneumonia; Chronic obstructive pulmonary disease (CMS-HCC); and Medical non-compliance.    SURGICAL HISTORY   has past surgical history that includes splenectomy (1980).    SOCIAL HISTORY  Social History   Substance Use Topics   • Smoking status: Former Smoker     Quit date: 07/16/2016   • Alcohol Use: No      History   Drug Use No     FAMILY HISTORY  No pertinent family history     CURRENT MEDICATIONS  No current facility-administered medications on file prior to encounter.     Current Outpatient Prescriptions on File Prior to Encounter    Medication Sig Dispense Refill   • alprazolam (XANAX) 0.5 MG Tab Take 0.5 mg by mouth at bedtime as needed for Sleep.     • paroxetine (PAXIL) 40 MG tablet Take 40 mg by mouth every day.     • omeprazole (PRILOSEC) 20 MG delayed-release capsule Take 20 mg by mouth every day.     • Fluticasone Furoate-Vilanterol (BREO ELLIPTA) 100-25 MCG/INH AEROSOL POWDER, BREATH ACTIVATED Inhale 1 Puff by mouth every day.     • lisinopril (PRINIVIL, ZESTRIL) 30 MG tablet Take 30 mg by mouth every day.     • NON SPECIFIED Artifical Tears, SI Drop in both eyes twice a day     • montelukast (SINGULAIR) 10 MG Tab Take 10 mg by mouth every day.     • furosemide (LASIX) 40 MG Tab Take 40 mg by mouth every day.     • levalbuterol (XOPENEX) 0.63 MG/3ML Nebu Soln 0.63 mg by Nebulization route every four hours as needed for Shortness of Breath.     • ipratropium-albuterol (DUONEB) 0.5-2.5 (3) MG/3ML nebulizer solution 3 mL by Nebulization route every four hours as needed for Shortness of Breath.     • hydrALAZINE (APRESOLINE) 25 MG Tab Take 1 Tab by mouth 3 times a day. (Patient not taking: Reported on 10/25/2016) 90 Tab 3   • oxycodone immediate-release (ROXICODONE) 5 MG Tab Take 1 Tab by mouth every 6 hours as needed for Severe Pain.     • ondansetron (ZOFRAN ODT) 4 MG TABLET DISPERSIBLE Take 1 Tab by mouth every four hours as needed for Nausea/Vomiting (give PO if IV route is unavailable. May give per feeding tube.). (Patient not taking: Reported on 10/25/2016) 10 Tab 0   • carvedilol (COREG) 6.25 MG Tab Take 1 Tab by mouth 2 times a day, with meals. 60 Tab 3   • atorvastatin (LIPITOR) 20 MG Tab Take 1 Tab by mouth every bedtime. 30 Tab 11   • acetaminophen 650 MG Tab Take 650 mg by mouth every 6 hours as needed for Fever or Moderate Pain (Temp >101.5F). (Patient taking differently: Take 325 mg by mouth every 6 hours as needed (PRN).) 30 Tab 0   • senna-docusate (PERICOLACE OR SENOKOT S) 8.6-50 MG Tab Take 1 Tab by mouth every 24  "hours as needed for Constipation. 30 Tab 0   • potassium chloride SA (K-DUR) 20 MEQ Tab CR Take 1 Tab by mouth every day.     • budesonide-formoterol (SYMBICORT) 80-4.5 MCG/ACT Aerosol Inhale 2 Puffs by mouth 2 Times a Day.     • aspirin EC (ECOTRIN) 81 MG Tablet Delayed Response Take 81 mg by mouth every day.     • enalapril (VASOTEC) 20 MG tablet Take 40 mg by mouth every day.       ALLERGIES  Allergies   Allergen Reactions   • Shellfish Allergy Anaphylaxis and Swelling     \"eye's closed up really tight\" \"hard to brearth\"       PHYSICAL EXAM  VITAL SIGNS: /77 mmHg  Pulse 109  Temp(Src) 36.8 °C (98.2 °F) (Temporal)  Resp 16  Ht 1.702 m (5' 7\")  Wt 121 kg (266 lb 12.1 oz)  BMI 41.77 kg/m2  SpO2 91%    Constitutional: Mild acute distress  HENT:  Moist mucous membranes  Eyes: Bilateral conjunctivitis with drainage, no icterus.   Neck:  trachea is midline, no palpable thyroid  Lymphatic:  No cervical lymphadenopathy  Cardiovascular:  Regular rate and rhythm, no murmurs  Thorax & Lungs: Wheezing and rales bilaterally, left greater than right.  Abdomen:  Soft, Non-tender  Skin:. Warm, dry, no rash  Back:  Non-tender, no CVA tenderness  Extremities:  No deformities, 2+ edema BLE.   Vascular:  symmetric radial pulse  Neurologic:  Normal gross motor    LABS  Labs Reviewed   ACCU-CHEK GLUCOSE - Abnormal; Notable for the following:     Glucose - Accu-Ck 182 (*)     All other components within normal limits   COMP METABOLIC PANEL   TROPONIN   LACTIC ACID   BLOOD CULTURE    Narrative:     Per Hospital Policy: Only change Specimen Src: to \"Line\" if  specified by physician order.   CBC WITH DIFFERENTIAL   BTYPE NATRIURETIC PEPTIDE   BLOOD CULTURE     All labs reviewed by me.    EKG Interpretation  Interpreted by me  Normal Sinus Rhythm. Rate 82  Normal QTc  Normal QRS  Normal Axis  Normal EKG unchanged from prior on 1/11/2017    RADIOLOGY  DX-CHEST-PORTABLE (1 VIEW)   Final Result      Diffuse interstitial prominence " could be due to pulmonary edema or interstitial lung disease.        The radiologist's interpretation of all radiological studies have been reviewed by me.    COURSE & MEDICAL DECISION MAKING  Pertinent Labs & Imaging studies reviewed. (See chart for details)    5:01 PM - Patient seen and examined at bedside. Patient will be treated with Atrovent and Proventil. Ordered chest x ray, CBC, CMP, Troponin, BNP, lactic acid, blood culture, accu chek glucose, and EKG to evaluate his symptoms. The differential diagnoses include but are not limited to: conjunctivitis and rhonchi/wheezing; rule out COPD exacerbation, pneumonia, and CHF.      Case was discussed with [facility]. They are aware of the patient and agree to admit him into their care.      DISPOSITION:  Patient will be admitted to medicine in guarded condition.    FINAL IMPRESSION  1. Bronchospasm    2. Acute exacerbation of chronic obstructive pulmonary disease (COPD) (CMS-HCC)    3. Hypoxia       Deangelo LONG (Scribe), am scribing for, and in the presence of, Robel Joya M.D..    Electronically signed by: Deangelo Richmond (Scrjaimee), 3/3/2017    Robel LONG M.D. personally performed the services described in this documentation, as scribed by Deangelo Richmond in my presence, and it is both accurate and complete.    The note accurately reflects work and decisions made by me.  Robel Joya  3/5/2017  5:03 PM

## 2017-03-04 NOTE — ED NOTES
Med rec complete per pt and Libby @ [phone]  Allergies reviewed   No ABX in last month  Pt states he has not had his medications in a couple of days  Because he only takes a couple of days worth of medications  With him to the shelter

## 2017-03-04 NOTE — ED NOTES
Pt. Resting comfortably in [facility] at this time. Call light within reach. Will continue to monitor.

## 2017-03-04 NOTE — CARE PLAN
Problem: Communication  Goal: The ability to communicate needs accurately and effectively will improve  Outcome: PROGRESSING AS EXPECTED  Educated patient on plan of care. Updated white board. All questions answered.     Problem: Safety  Goal: Will remain free from falls  Outcome: PROGRESSING AS EXPECTED  Pt remained free from falls during shift. Pt oriented to call light, bed in low position and wheels locked. Pt encouraged to call for assistance. Bed alarm on and intact.

## 2017-03-04 NOTE — ED NOTES
Pt. Provided box lunch. Pt. Is sleeping in [facility] at this time. Call light within reach. Will continue to monitor.

## 2017-03-04 NOTE — PROGRESS NOTES
Pt arrived to the unit from ER via transport on a gurney, ambulates to bed and bathroom, oriented to call light, bed alarm on and intact, 4L nasal cannula, barrier cream applied to redness/excoriation under pannus.

## 2017-03-04 NOTE — PROGRESS NOTES
Two RN skin check performed with Rm, sacral redness, blanching, BLE cellulitis, red, warm, edematous.   Skin intact.

## 2017-03-04 NOTE — ED NOTES
Pt stood up to bedside to use urinal with 1 person standby assist. Pt unable to void at this time. Pt c/o mild SOB with exertion with O2, denies chest discomfort. Pt sating 93% on 4L NC. Assisted pt with repositioning for better comfort. Updated on POC. Awaiting admission orders. Call light in reach.

## 2017-03-04 NOTE — ED NOTES
Pt transferred to [facility]3 [facility], Ortho unit aware of pt's pending arrival, all belongings accounted for.

## 2017-03-05 LAB
ERYTHROCYTE [DISTWIDTH] IN BLOOD BY AUTOMATED COUNT: 58.1 FL (ref 35.9–50)
EST. AVERAGE GLUCOSE BLD GHB EST-MCNC: 154 MG/DL
GLUCOSE BLD-MCNC: 228 MG/DL (ref 65–99)
GLUCOSE BLD-MCNC: 238 MG/DL (ref 65–99)
GLUCOSE BLD-MCNC: 249 MG/DL (ref 65–99)
GLUCOSE BLD-MCNC: 308 MG/DL (ref 65–99)
HBA1C MFR BLD: 7 % (ref 0–5.6)
HCT VFR BLD AUTO: 47 % (ref 42–52)
HGB BLD-MCNC: 15.2 G/DL (ref 14–18)
LV EJECT FRACT  99904: 60
MAGNESIUM SERPL-MCNC: 2 MG/DL (ref 1.5–2.5)
MCH RBC QN AUTO: 29.5 PG (ref 27–33)
MCHC RBC AUTO-ENTMCNC: 32.3 G/DL (ref 33.7–35.3)
MCV RBC AUTO: 91.3 FL (ref 81.4–97.8)
PHOSPHATE SERPL-MCNC: 3.2 MG/DL (ref 2.5–4.5)
PLATELET # BLD AUTO: 288 K/UL (ref 164–446)
PMV BLD AUTO: 11.5 FL (ref 9–12.9)
RBC # BLD AUTO: 5.15 M/UL (ref 4.7–6.1)
TSH SERPL DL<=0.005 MIU/L-ACNC: 0.23 UIU/ML (ref 0.3–3.7)
WBC # BLD AUTO: 16.3 K/UL (ref 4.8–10.8)

## 2017-03-05 PROCEDURE — 90471 IMMUNIZATION ADMIN: CPT

## 2017-03-05 PROCEDURE — 770006 HCHG ROOM/CARE - MED/SURG/GYN SEMI*

## 2017-03-05 PROCEDURE — 700101 HCHG RX REV CODE 250: Performed by: HOSPITALIST

## 2017-03-05 PROCEDURE — 36415 COLL VENOUS BLD VENIPUNCTURE: CPT

## 2017-03-05 PROCEDURE — 83036 HEMOGLOBIN GLYCOSYLATED A1C: CPT

## 2017-03-05 PROCEDURE — 700111 HCHG RX REV CODE 636 W/ 250 OVERRIDE (IP): Performed by: HOSPITALIST

## 2017-03-05 PROCEDURE — 84100 ASSAY OF PHOSPHORUS: CPT

## 2017-03-05 PROCEDURE — 82962 GLUCOSE BLOOD TEST: CPT

## 2017-03-05 PROCEDURE — 90662 IIV NO PRSV INCREASED AG IM: CPT | Performed by: HOSPITALIST

## 2017-03-05 PROCEDURE — 84443 ASSAY THYROID STIM HORMONE: CPT

## 2017-03-05 PROCEDURE — 94760 N-INVAS EAR/PLS OXIMETRY 1: CPT

## 2017-03-05 PROCEDURE — A9270 NON-COVERED ITEM OR SERVICE: HCPCS | Performed by: HOSPITALIST

## 2017-03-05 PROCEDURE — 700102 HCHG RX REV CODE 250 W/ 637 OVERRIDE(OP): Performed by: HOSPITALIST

## 2017-03-05 PROCEDURE — 93306 TTE W/DOPPLER COMPLETE: CPT

## 2017-03-05 PROCEDURE — 700105 HCHG RX REV CODE 258

## 2017-03-05 PROCEDURE — 93306 TTE W/DOPPLER COMPLETE: CPT | Mod: 26 | Performed by: INTERNAL MEDICINE

## 2017-03-05 PROCEDURE — 83735 ASSAY OF MAGNESIUM: CPT

## 2017-03-05 PROCEDURE — 85027 COMPLETE CBC AUTOMATED: CPT

## 2017-03-05 PROCEDURE — 99232 SBSQ HOSP IP/OBS MODERATE 35: CPT | Performed by: HOSPITALIST

## 2017-03-05 PROCEDURE — 3E0234Z INTRODUCTION OF SERUM, TOXOID AND VACCINE INTO MUSCLE, PERCUTANEOUS APPROACH: ICD-10-PCS | Performed by: FAMILY MEDICINE

## 2017-03-05 PROCEDURE — 94640 AIRWAY INHALATION TREATMENT: CPT

## 2017-03-05 RX ORDER — AZITHROMYCIN 250 MG/1
250 TABLET, FILM COATED ORAL DAILY
Status: COMPLETED | OUTPATIENT
Start: 2017-03-05 | End: 2017-03-07

## 2017-03-05 RX ORDER — CEFDINIR 300 MG/1
300 CAPSULE ORAL EVERY 12 HOURS
Status: DISCONTINUED | OUTPATIENT
Start: 2017-03-05 | End: 2017-03-09 | Stop reason: HOSPADM

## 2017-03-05 RX ORDER — PREDNISONE 50 MG/1
50 TABLET ORAL DAILY
Status: DISCONTINUED | OUTPATIENT
Start: 2017-03-06 | End: 2017-03-09 | Stop reason: HOSPADM

## 2017-03-05 RX ORDER — FUROSEMIDE 40 MG/1
40 TABLET ORAL
Status: DISCONTINUED | OUTPATIENT
Start: 2017-03-06 | End: 2017-03-09 | Stop reason: HOSPADM

## 2017-03-05 RX ADMIN — OXYCODONE HYDROCHLORIDE 5 MG: 5 TABLET ORAL at 00:36

## 2017-03-05 RX ADMIN — GLIPIZIDE 2.5 MG: 2.5 TABLET, FILM COATED, EXTENDED RELEASE ORAL at 08:01

## 2017-03-05 RX ADMIN — CEFDINIR 300 MG: 300 CAPSULE ORAL at 20:26

## 2017-03-05 RX ADMIN — HEPARIN SODIUM 5000 UNITS: 5000 INJECTION, SOLUTION INTRAVENOUS; SUBCUTANEOUS at 13:15

## 2017-03-05 RX ADMIN — NEOMYCIN SULFATE, POLYMYXIN B SULFATE AND BACITRACIN ZINC: 3.5; 10000; 4 OINTMENT OPHTHALMIC at 20:27

## 2017-03-05 RX ADMIN — HYDROXYZINE HYDROCHLORIDE 25 MG: 50 TABLET, FILM COATED ORAL at 20:24

## 2017-03-05 RX ADMIN — INFLUENZA A VIRUS A/CALIFORNIA/7/2009 X-179A (H1N1) ANTIGEN (FORMALDEHYDE INACTIVATED), INFLUENZA A VIRUS A/HONG KONG/4801/2014 X-263B (H3N2) ANTIGEN (FORMALDEHYDE INACTIVATED), AND INFLUENZA B VIRUS B/BRISBANE/60/2008 ANTIGEN (FORMALDEHYDE INACTIVATED) 0.5 ML: 60; 60; 60 INJECTION, SUSPENSION INTRAMUSCULAR at 05:32

## 2017-03-05 RX ADMIN — OXYCODONE HYDROCHLORIDE 5 MG: 5 TABLET ORAL at 05:31

## 2017-03-05 RX ADMIN — OMEPRAZOLE 20 MG: 20 CAPSULE, DELAYED RELEASE ORAL at 08:01

## 2017-03-05 RX ADMIN — LORAZEPAM 0.5 MG: 1 TABLET ORAL at 20:25

## 2017-03-05 RX ADMIN — METHYLPREDNISOLONE SODIUM SUCCINATE 125 MG: 125 INJECTION, POWDER, FOR SOLUTION INTRAMUSCULAR; INTRAVENOUS at 00:35

## 2017-03-05 RX ADMIN — FUROSEMIDE 40 MG: 10 INJECTION, SOLUTION INTRAVENOUS at 08:02

## 2017-03-05 RX ADMIN — POTASSIUM CHLORIDE 20 MEQ: 1500 TABLET, EXTENDED RELEASE ORAL at 08:02

## 2017-03-05 RX ADMIN — ATORVASTATIN CALCIUM 20 MG: 20 TABLET, FILM COATED ORAL at 20:26

## 2017-03-05 RX ADMIN — CEFDINIR 300 MG: 300 CAPSULE ORAL at 13:15

## 2017-03-05 RX ADMIN — METHYLPREDNISOLONE SODIUM SUCCINATE 125 MG: 125 INJECTION, POWDER, FOR SOLUTION INTRAMUSCULAR; INTRAVENOUS at 11:38

## 2017-03-05 RX ADMIN — LISINOPRIL 30 MG: 10 TABLET ORAL at 08:02

## 2017-03-05 RX ADMIN — AZITHROMYCIN 250 MG: 250 TABLET, FILM COATED ORAL at 13:15

## 2017-03-05 RX ADMIN — CARVEDILOL 6.25 MG: 6.25 TABLET, FILM COATED ORAL at 08:01

## 2017-03-05 RX ADMIN — HEPARIN SODIUM 5000 UNITS: 5000 INJECTION, SOLUTION INTRAVENOUS; SUBCUTANEOUS at 20:27

## 2017-03-05 RX ADMIN — NEOMYCIN SULFATE, POLYMYXIN B SULFATE AND BACITRACIN ZINC: 3.5; 10000; 4 OINTMENT OPHTHALMIC at 16:54

## 2017-03-05 RX ADMIN — ASPIRIN 81 MG: 81 TABLET ORAL at 08:01

## 2017-03-05 RX ADMIN — METHYLPREDNISOLONE SODIUM SUCCINATE 125 MG: 125 INJECTION, POWDER, FOR SOLUTION INTRAMUSCULAR; INTRAVENOUS at 05:31

## 2017-03-05 RX ADMIN — MONTELUKAST SODIUM 10 MG: 10 TABLET, FILM COATED ORAL at 08:02

## 2017-03-05 RX ADMIN — IPRATROPIUM BROMIDE AND ALBUTEROL SULFATE 3 ML: .5; 3 SOLUTION RESPIRATORY (INHALATION) at 10:27

## 2017-03-05 RX ADMIN — OXYCODONE HYDROCHLORIDE 5 MG: 5 TABLET ORAL at 08:55

## 2017-03-05 RX ADMIN — PAROXETINE HYDROCHLORIDE 40 MG: 20 TABLET, FILM COATED ORAL at 08:01

## 2017-03-05 RX ADMIN — AZITHROMYCIN 500 MG: 500 INJECTION, POWDER, LYOPHILIZED, FOR SOLUTION INTRAVENOUS at 00:35

## 2017-03-05 RX ADMIN — NEOMYCIN SULFATE, POLYMYXIN B SULFATE AND BACITRACIN ZINC: 3.5; 10000; 4 OINTMENT OPHTHALMIC at 08:02

## 2017-03-05 RX ADMIN — OXYCODONE HYDROCHLORIDE 5 MG: 5 TABLET ORAL at 20:24

## 2017-03-05 RX ADMIN — NICOTINE 21 MG: 21 PATCH TRANSDERMAL at 05:31

## 2017-03-05 RX ADMIN — IPRATROPIUM BROMIDE AND ALBUTEROL SULFATE 3 ML: .5; 3 SOLUTION RESPIRATORY (INHALATION) at 18:52

## 2017-03-05 RX ADMIN — HEPARIN SODIUM 5000 UNITS: 5000 INJECTION, SOLUTION INTRAVENOUS; SUBCUTANEOUS at 05:32

## 2017-03-05 RX ADMIN — IPRATROPIUM BROMIDE AND ALBUTEROL SULFATE 3 ML: .5; 3 SOLUTION RESPIRATORY (INHALATION) at 06:12

## 2017-03-05 RX ADMIN — CARVEDILOL 6.25 MG: 6.25 TABLET, FILM COATED ORAL at 16:53

## 2017-03-05 RX ADMIN — NEOMYCIN SULFATE, POLYMYXIN B SULFATE AND BACITRACIN ZINC: 3.5; 10000; 4 OINTMENT OPHTHALMIC at 05:31

## 2017-03-05 RX ADMIN — NEOMYCIN SULFATE, POLYMYXIN B SULFATE AND BACITRACIN ZINC: 3.5; 10000; 4 OINTMENT OPHTHALMIC at 13:16

## 2017-03-05 RX ADMIN — SODIUM CHLORIDE 500 ML: 9 INJECTION, SOLUTION INTRAVENOUS at 06:01

## 2017-03-05 RX ADMIN — BUDESONIDE AND FORMOTEROL FUMARATE DIHYDRATE 2 PUFF: 160; 4.5 AEROSOL RESPIRATORY (INHALATION) at 06:12

## 2017-03-05 RX ADMIN — BUDESONIDE AND FORMOTEROL FUMARATE DIHYDRATE 2 PUFF: 160; 4.5 AEROSOL RESPIRATORY (INHALATION) at 20:29

## 2017-03-05 ASSESSMENT — ENCOUNTER SYMPTOMS
FOCAL WEAKNESS: 0
COUGH: 1
TINGLING: 0
NAUSEA: 0
DIARRHEA: 0
HEADACHES: 0
VOMITING: 0
WHEEZING: 0
ABDOMINAL PAIN: 0
DIZZINESS: 0
CHILLS: 0
PALPITATIONS: 0
SORE THROAT: 0
PHOTOPHOBIA: 0
SHORTNESS OF BREATH: 1
DEPRESSION: 0
MYALGIAS: 0
FEVER: 0
SPUTUM PRODUCTION: 1

## 2017-03-05 ASSESSMENT — PAIN SCALES - GENERAL
PAINLEVEL_OUTOF10: 7
PAINLEVEL_OUTOF10: 5
PAINLEVEL_OUTOF10: 6
PAINLEVEL_OUTOF10: 4
PAINLEVEL_OUTOF10: 3
PAINLEVEL_OUTOF10: 2

## 2017-03-05 NOTE — PROGRESS NOTES
Hospital Medicine Progress Note, Adult, Complex               Author: Luli Gama Date & Time created: 3/5/2017  8:14 AM     CC: 67M homeless and with active tobacco abuse presented with 1 week of cough and SOB and admitted for COPD exacerbation.  Patient with multiple complaints including that he is unable to take care of himself and that he keeps coming back to the hospital with the same issues.      Interval History:  3/4 - Continues to feel poorly, reports that he can't leave hospital unless he had a place to live, ensured him that social work will attempt to help him but that shelters may be his only option.  3/5 - Continues to report feeling poorly and short of breath. Remains argumentative.  Insists that he is still hypoxic and that he needs O2 and states that the RN and I are not communicating when I explain to him that he has been weaned off O2.  Bedside RN and I had already discussed this patient prior to my visit and he has been weaned off O2 and is saturating well above 88% on room air.  In fact, whenever she puts the mask on him, the patient takes it off again.    Review of Systems:  Review of Systems   Constitutional: Positive for malaise/fatigue. Negative for fever and chills.   HENT: Negative for congestion and sore throat.    Eyes: Negative for photophobia.   Respiratory: Positive for cough, sputum production and shortness of breath. Negative for wheezing.    Cardiovascular: Negative for chest pain and palpitations.   Gastrointestinal: Negative for nausea, vomiting, abdominal pain and diarrhea.   Genitourinary: Negative for dysuria.   Musculoskeletal: Negative for myalgias.   Skin: Negative.    Neurological: Negative for dizziness, tingling, focal weakness and headaches.   Psychiatric/Behavioral: Negative for depression and suicidal ideas.       Physical Exam:  Physical Exam   Constitutional: He is oriented to person, place, and time. No distress.   HENT:   Head: Normocephalic and atraumatic.    Right Ear: External ear normal.   Left Ear: External ear normal.   Eyes: EOM are normal. Right eye exhibits no discharge. Left eye exhibits no discharge.   Neck: Neck supple. No JVD present.   Cardiovascular: Normal rate, regular rhythm and normal heart sounds.    Pulmonary/Chest: Effort normal. No respiratory distress. He has no wheezes (Resolved). He has no rales (Resolved). He exhibits no tenderness.   Abdominal: Soft. Bowel sounds are normal. He exhibits no distension. There is no tenderness.   Musculoskeletal: He exhibits no edema.   Neurological: He is alert and oriented to person, place, and time. No cranial nerve deficit.   Skin: Skin is dry. He is not diaphoretic. No erythema.   Psychiatric: He has a normal mood and affect. His behavior is normal.   Nursing note and vitals reviewed.      Labs:        Invalid input(s): [id number]  Recent Labs      17   TROPONINI  <0.01   BNPBTYPENAT  37     Recent Labs      17   SODIUM  141   --    POTASSIUM  4.2   --    CHLORIDE  103   --    CO2  28   --    BUN  10   --    CREATININE  0.73   --    MAGNESIUM   --   2.0   PHOSPHORUS   --   3.2   CALCIUM  8.8   --      Recent Labs      17   ALTSGPT  8   ASTSGOT  14   ALKPHOSPHAT  68   TBILIRUBIN  0.4   GLUCOSE  109*     Recent Labs      17   033   RBC  5.27  5.15   HEMOGLOBIN  15.4  15.2   HEMATOCRIT  48.0  47.0   PLATELETCT  286  288     Recent Labs      17   WBC  12.3*  16.3*   NEUTSPOLYS  49.20   --    LYMPHOCYTES  32.10   --    MONOCYTES  12.90   --    EOSINOPHILS  4.50   --    BASOPHILS  1.00   --    ASTSGOT  14   --    ALTSGPT  8   --    ALKPHOSPHAT  68   --    TBILIRUBIN  0.4   --            Hemodynamics:  Temp (24hrs), Av.5 °C (97.7 °F), Min:36.1 °C (97 °F), Max:36.7 °C (98.1 °F)  Temperature: 36.7 °C (98.1 °F)  Pulse  Av.1  Min: 55  Max: 109   Blood Pressure : 158/97 mmHg     Respiratory:     Respiration: 18, Pulse Oximetry: 90 %, O2 Daily Delivery Respiratory : Room Air with O2 Available     Given By:: Mouthpiece, #MDI/DPI Given: MDI/DPI x 1, Work Of Breathing / Effort: Mild  RUL Breath Sounds: Diminished, RML Breath Sounds: Diminished, RLL Breath Sounds: Diminished, REYNA Breath Sounds: Diminished, LLL Breath Sounds: Diminished  Fluids:    Intake/Output Summary (Last 24 hours) at 03/05/17 0814  Last data filed at 03/05/17 0400   Gross per 24 hour   Intake   1200 ml   Output    500 ml   Net    700 ml        GI/Nutrition:  Orders Placed This Encounter   Procedures   • Diet Order     Standing Status: Standing      Number of Occurrences: 1      Standing Expiration Date:      Order Specific Question:  Diet:     Answer:  Cardiac [6]     Order Specific Question:  Diet:     Answer:  Diabetic [3]     Order Specific Question:  Calorie modifications:     Answer:  2000 kcals [5]     Medical Decision Making, by Problem:  Active Hospital Problems    Diagnosis   • Acute respiratory failure with hypoxia (CMS-HCC) [J96.01], Acute exacerbation of COPD with asthma (CMS-HCC) [J44.1, J45.901] with suspected early PNA  - Patient was at 84% on RA at admission, now weaned off O2 overnight and stable at 94% on room air  - Discontinue IV steroids, C3/Azithro and change to PO steroids and abx  - Continue inhalers and RT protocol as needed  - Discontinue IV Lasix and restart home dose which he had not been taking  - ECHO pending read, last ECHO from April 2016 showed EF 50%  - Cultures without growth to date  - Tobacco cessation counseling     • Type 2 diabetes mellitus with hyperglycemia (CMS-HCC) [E11.65]  - HBA1C shows good control at 7  - Continue glipizide, ISS and accuchecks     • HTN (hypertension) [I10]  - Controlled on coreg and lisinopril     • HLD  - Chronic, stable on home lipitor and ASA     • Depression  - Chronic, stable on home paxil     • Tobacco abuse counseling [Z71.6]  - Counseled     • Medical Noncompliance  -  3/4 Patient has been educated that he needs to take his medicines as prescribed and to stop smoking, he is often noncompliant and complains that he keeps coming back to hospital.  Poor insight into the link between his behavior and his health.  Argumentative and wants hospital to find him a place to live but educated that this is outside of the scope of my practice and while SW will consult, ultimately this is his responsibility  - 3/5 patient continues to be combative and disagrees with medical assessment, argues that he still needs O2 despite the fact that he is saturating at 94% on room air.  Still with poor insight but also concerns for secondary gain as he does not want to live in a homeless shelter       Labs reviewed and Medications reviewed              Antibiotics: Treating active infection/contamination beyond 24 hours perioperative coverage

## 2017-03-05 NOTE — CARE PLAN
Problem: Oxygenation:  Goal: Maintain adequate oxygenation dependent on patient condition  Pt sat on RA 94%  Says he alternates from RA to oxymask 2 L depending how he feels  He states his normal saturation 90-92% outside hospital    Problem: Bronchoconstriction:  Goal: Improve in air movement and diminished wheezing  DUOneb QID  Pt states mild benefit from tx  Talks throughout svn when being giving, even with constant instruction  Becomes agitated if you tell him to focus on svn

## 2017-03-05 NOTE — PROGRESS NOTES
Received report from day RN, assumed care at 1915; Pt A&Ox4; Pt reports pain 7/10 on his head, legs, and lower part of the abdomen; Pt does call appropriately; Bed alarm is ON; Pt is up w SA assist steady; PIV assessed and is patent, CDI; Pt is on RA to 2-4L on oxy mask w SaO2 >90%; Call light and personal possessions within reach, discussed safety interventions w pt; Reviewed recent notes, labs, diagnostics, MD orders; Hourly rounding in place

## 2017-03-05 NOTE — CARE PLAN
Problem: Safety  Goal: Will remain free from injury  Outcome: PROGRESSING AS EXPECTED  Place pt call light and belongings within reached, pt calls approriately; Instructed to call for assistance, Risk for fall education implemented; Non-skid socks on when OOB; Bed alarm is ON;  Bed lowered and locked, upper siderails up.    Problem: Venous Thromboembolism (VTW)/Deep Vein Thrombosis (DVT) Prevention:  Goal: Patient will participate in Venous Thrombosis (VTE)/Deep Vein Thrombosis (DVT)Prevention Measures  Outcome: PROGRESSING AS EXPECTED  Pt refuse SCD's. Pt ambulatory. Sched. Heparin given     Problem: Pain Management  Goal: Pain level will decrease to patient’s comfort goal  Outcome: PROGRESSING AS EXPECTED  Pt complains of pain 5-7/10. Medicated per MAR. Pain decreases to 4/10. Pt able to sleep and rest.    Problem: Respiratory:  Goal: Respiratory status will improve  Outcome: PROGRESSING AS EXPECTED  Pt tolerating RA when sitted, at night it decreases to 80%, put oxygen back at 2-4L oxy mask. Reinforced IS, managed to do 1500 ml.

## 2017-03-06 ENCOUNTER — APPOINTMENT (OUTPATIENT)
Dept: RADIOLOGY | Facility: MEDICAL CENTER | Age: 67
DRG: 189 | End: 2017-03-06
Attending: HOSPITALIST
Payer: MEDICARE

## 2017-03-06 PROBLEM — R07.9 CHEST PAIN: Status: ACTIVE | Noted: 2017-03-06

## 2017-03-06 PROBLEM — I16.0 HYPERTENSIVE URGENCY: Status: ACTIVE | Noted: 2017-03-06

## 2017-03-06 LAB
EKG IMPRESSION: NORMAL
GLUCOSE BLD-MCNC: 173 MG/DL (ref 65–99)
GLUCOSE BLD-MCNC: 223 MG/DL (ref 65–99)
GLUCOSE BLD-MCNC: 227 MG/DL (ref 65–99)
GLUCOSE BLD-MCNC: 230 MG/DL (ref 65–99)
TROPONIN I SERPL-MCNC: <0.01 NG/ML (ref 0–0.04)
TROPONIN I SERPL-MCNC: <0.01 NG/ML (ref 0–0.04)

## 2017-03-06 PROCEDURE — 84484 ASSAY OF TROPONIN QUANT: CPT

## 2017-03-06 PROCEDURE — 82962 GLUCOSE BLOOD TEST: CPT

## 2017-03-06 PROCEDURE — 36415 COLL VENOUS BLD VENIPUNCTURE: CPT

## 2017-03-06 PROCEDURE — 93010 ELECTROCARDIOGRAM REPORT: CPT | Performed by: INTERNAL MEDICINE

## 2017-03-06 PROCEDURE — A9270 NON-COVERED ITEM OR SERVICE: HCPCS | Performed by: HOSPITALIST

## 2017-03-06 PROCEDURE — 71010 DX-CHEST-PORTABLE (1 VIEW): CPT

## 2017-03-06 PROCEDURE — 770020 HCHG ROOM/CARE - TELE (206)

## 2017-03-06 PROCEDURE — 85027 COMPLETE CBC AUTOMATED: CPT

## 2017-03-06 PROCEDURE — 700101 HCHG RX REV CODE 250: Performed by: HOSPITALIST

## 2017-03-06 PROCEDURE — 700111 HCHG RX REV CODE 636 W/ 250 OVERRIDE (IP): Performed by: HOSPITALIST

## 2017-03-06 PROCEDURE — 93005 ELECTROCARDIOGRAM TRACING: CPT | Performed by: HOSPITALIST

## 2017-03-06 PROCEDURE — 94760 N-INVAS EAR/PLS OXIMETRY 1: CPT

## 2017-03-06 PROCEDURE — 99233 SBSQ HOSP IP/OBS HIGH 50: CPT | Performed by: HOSPITALIST

## 2017-03-06 PROCEDURE — 700102 HCHG RX REV CODE 250 W/ 637 OVERRIDE(OP): Performed by: HOSPITALIST

## 2017-03-06 PROCEDURE — 94640 AIRWAY INHALATION TREATMENT: CPT

## 2017-03-06 RX ORDER — CARVEDILOL 6.25 MG/1
6.25 TABLET ORAL ONCE
Status: COMPLETED | OUTPATIENT
Start: 2017-03-06 | End: 2017-03-06

## 2017-03-06 RX ORDER — CARVEDILOL 12.5 MG/1
12.5 TABLET ORAL 2 TIMES DAILY WITH MEALS
Status: DISCONTINUED | OUTPATIENT
Start: 2017-03-06 | End: 2017-03-09 | Stop reason: HOSPADM

## 2017-03-06 RX ORDER — HYDRALAZINE HYDROCHLORIDE 20 MG/ML
20 INJECTION INTRAMUSCULAR; INTRAVENOUS EVERY 6 HOURS PRN
Status: DISCONTINUED | OUTPATIENT
Start: 2017-03-06 | End: 2017-03-09 | Stop reason: HOSPADM

## 2017-03-06 RX ORDER — LISINOPRIL 20 MG/1
40 TABLET ORAL DAILY
Status: DISCONTINUED | OUTPATIENT
Start: 2017-03-07 | End: 2017-03-09 | Stop reason: HOSPADM

## 2017-03-06 RX ORDER — LISINOPRIL 10 MG/1
10 TABLET ORAL ONCE
Status: COMPLETED | OUTPATIENT
Start: 2017-03-06 | End: 2017-03-06

## 2017-03-06 RX ORDER — LABETALOL HYDROCHLORIDE 5 MG/ML
10 INJECTION, SOLUTION INTRAVENOUS EVERY 4 HOURS PRN
Status: DISCONTINUED | OUTPATIENT
Start: 2017-03-06 | End: 2017-03-09 | Stop reason: HOSPADM

## 2017-03-06 RX ADMIN — HYDROXYZINE HYDROCHLORIDE 25 MG: 50 TABLET, FILM COATED ORAL at 21:02

## 2017-03-06 RX ADMIN — ATORVASTATIN CALCIUM 20 MG: 20 TABLET, FILM COATED ORAL at 21:02

## 2017-03-06 RX ADMIN — BUDESONIDE AND FORMOTEROL FUMARATE DIHYDRATE 2 PUFF: 160; 4.5 AEROSOL RESPIRATORY (INHALATION) at 21:02

## 2017-03-06 RX ADMIN — LORAZEPAM 0.5 MG: 1 TABLET ORAL at 22:44

## 2017-03-06 RX ADMIN — AZITHROMYCIN 250 MG: 250 TABLET, FILM COATED ORAL at 09:45

## 2017-03-06 RX ADMIN — CARVEDILOL 6.25 MG: 6.25 TABLET, FILM COATED ORAL at 09:38

## 2017-03-06 RX ADMIN — OXYCODONE HYDROCHLORIDE 5 MG: 5 TABLET ORAL at 05:55

## 2017-03-06 RX ADMIN — PREDNISONE 50 MG: 50 TABLET ORAL at 09:37

## 2017-03-06 RX ADMIN — POTASSIUM CHLORIDE 20 MEQ: 1500 TABLET, EXTENDED RELEASE ORAL at 09:37

## 2017-03-06 RX ADMIN — FUROSEMIDE 40 MG: 40 TABLET ORAL at 09:38

## 2017-03-06 RX ADMIN — MONTELUKAST SODIUM 10 MG: 10 TABLET, FILM COATED ORAL at 09:37

## 2017-03-06 RX ADMIN — LISINOPRIL 30 MG: 10 TABLET ORAL at 09:37

## 2017-03-06 RX ADMIN — CEFDINIR 300 MG: 300 CAPSULE ORAL at 21:01

## 2017-03-06 RX ADMIN — GLIPIZIDE 2.5 MG: 2.5 TABLET, FILM COATED, EXTENDED RELEASE ORAL at 09:37

## 2017-03-06 RX ADMIN — IPRATROPIUM BROMIDE AND ALBUTEROL SULFATE 3 ML: .5; 3 SOLUTION RESPIRATORY (INHALATION) at 08:31

## 2017-03-06 RX ADMIN — HEPARIN SODIUM 5000 UNITS: 5000 INJECTION, SOLUTION INTRAVENOUS; SUBCUTANEOUS at 05:55

## 2017-03-06 RX ADMIN — ENALAPRILAT 1.25 MG: 1.25 INJECTION INTRAVENOUS at 12:57

## 2017-03-06 RX ADMIN — CARVEDILOL 12.5 MG: 12.5 TABLET, FILM COATED ORAL at 17:33

## 2017-03-06 RX ADMIN — ASPIRIN 81 MG: 81 TABLET ORAL at 09:38

## 2017-03-06 RX ADMIN — NEOMYCIN SULFATE, POLYMYXIN B SULFATE AND BACITRACIN ZINC: 3.5; 10000; 4 OINTMENT OPHTHALMIC at 21:02

## 2017-03-06 RX ADMIN — NEOMYCIN SULFATE, POLYMYXIN B SULFATE AND BACITRACIN ZINC: 3.5; 10000; 4 OINTMENT OPHTHALMIC at 17:33

## 2017-03-06 RX ADMIN — NEOMYCIN SULFATE, POLYMYXIN B SULFATE AND BACITRACIN ZINC: 3.5; 10000; 4 OINTMENT OPHTHALMIC at 09:39

## 2017-03-06 RX ADMIN — BUDESONIDE AND FORMOTEROL FUMARATE DIHYDRATE 2 PUFF: 160; 4.5 AEROSOL RESPIRATORY (INHALATION) at 09:38

## 2017-03-06 RX ADMIN — LORAZEPAM 0.5 MG: 1 TABLET ORAL at 16:39

## 2017-03-06 RX ADMIN — HEPARIN SODIUM 5000 UNITS: 5000 INJECTION, SOLUTION INTRAVENOUS; SUBCUTANEOUS at 13:32

## 2017-03-06 RX ADMIN — HEPARIN SODIUM 5000 UNITS: 5000 INJECTION, SOLUTION INTRAVENOUS; SUBCUTANEOUS at 21:02

## 2017-03-06 RX ADMIN — PAROXETINE HYDROCHLORIDE 40 MG: 20 TABLET, FILM COATED ORAL at 09:37

## 2017-03-06 RX ADMIN — NEOMYCIN SULFATE, POLYMYXIN B SULFATE AND BACITRACIN ZINC: 3.5; 10000; 4 OINTMENT OPHTHALMIC at 13:29

## 2017-03-06 RX ADMIN — NEOMYCIN SULFATE, POLYMYXIN B SULFATE AND BACITRACIN ZINC: 3.5; 10000; 4 OINTMENT OPHTHALMIC at 05:55

## 2017-03-06 RX ADMIN — STANDARDIZED SENNA CONCENTRATE AND DOCUSATE SODIUM 2 TABLET: 8.6; 5 TABLET, FILM COATED ORAL at 09:37

## 2017-03-06 RX ADMIN — NICOTINE 21 MG: 21 PATCH TRANSDERMAL at 05:55

## 2017-03-06 RX ADMIN — CARVEDILOL 6.25 MG: 6.25 TABLET, FILM COATED ORAL at 13:32

## 2017-03-06 RX ADMIN — IPRATROPIUM BROMIDE AND ALBUTEROL SULFATE 3 ML: .5; 3 SOLUTION RESPIRATORY (INHALATION) at 10:09

## 2017-03-06 RX ADMIN — LISINOPRIL 10 MG: 10 TABLET ORAL at 13:32

## 2017-03-06 RX ADMIN — CEFDINIR 300 MG: 300 CAPSULE ORAL at 09:38

## 2017-03-06 RX ADMIN — IPRATROPIUM BROMIDE AND ALBUTEROL SULFATE 3 ML: .5; 3 SOLUTION RESPIRATORY (INHALATION) at 14:20

## 2017-03-06 RX ADMIN — OMEPRAZOLE 20 MG: 20 CAPSULE, DELAYED RELEASE ORAL at 09:37

## 2017-03-06 ASSESSMENT — ENCOUNTER SYMPTOMS
ABDOMINAL PAIN: 0
WHEEZING: 0
TINGLING: 0
DIARRHEA: 0
PALPITATIONS: 0
MYALGIAS: 0
SORE THROAT: 0
PHOTOPHOBIA: 0
FOCAL WEAKNESS: 0
NAUSEA: 0
CHILLS: 0
SPUTUM PRODUCTION: 1
HEADACHES: 0
DEPRESSION: 0
VOMITING: 0
FEVER: 0
COUGH: 1
SHORTNESS OF BREATH: 1
DIZZINESS: 0

## 2017-03-06 ASSESSMENT — PAIN SCALES - GENERAL
PAINLEVEL_OUTOF10: 0
PAINLEVEL_OUTOF10: 0

## 2017-03-06 NOTE — PROGRESS NOTES
Change in patient condition due to: Cardiac and Resp.  Rapid Response called at: 0815  Physician Dr. Gama notified at 830    See Code Blue timeline for rapid response events. Patient Transferred to Higher Level of Care T 716 bed 1

## 2017-03-06 NOTE — CODE DOCUMENTATION
Patient talking in multiple sentence breaths.   Very concerned about living situation, currently homeless

## 2017-03-06 NOTE — PROGRESS NOTES
Pt higher level of care arrive to T716-1, pt a&o x4 and no longer c/o chest pain/pressure.  Oriented to rm and unit and updated with plan of care.  Call light in reach and encourage to call for assistance.

## 2017-03-06 NOTE — CARE PLAN
Problem: Nutritional:  Goal: Patient to verbalize or demonstrate understanding of diet  Outcome: NOT MET  Consult for diabetic diet education received. Attempted to provide written and verbal education to patient at bedside. The patient preferred to do most of the talking despite attempts to redirect the conversation back to the education at hand. The patient was requesting food that was not allowed on his diet. The patient had spoke at length at his current financial and social situation and how hard it is for him to receive proper food access. The patient did not seem interested in hearing about his prescribed diet. The patient does seem in need of a  consult for a food assistance program. Patient also with BMI >40 (=41.08). Weight loss counseling not appropriate in acute care setting.

## 2017-03-06 NOTE — PROGRESS NOTES
Received report from day RN, assumed care at 1915; Pt A&Ox4; Pt pain is 6/10 on his head; Pt does not call appropriately; Pt is up w 0-SA assist; Pt is on RA w SaO2 >90%; Call light and personal possessions within reach, discussed safety interventions w pt; Reviewed recent notes, labs, diagnostics, MD orders; Hourly rounding in place

## 2017-03-06 NOTE — CONSULTS
"Diabetes Education:  Patient with existing Type II DM, HbA1c= 7.0% on glipizide 2.5 mg.  He reports he takes his pills \"off and on\" due to living situation in the shelter for the past week and prefers to be taken care of in the hospital where he can eat exactly what they give him, let them test his blood sugars and give him his pills.  He has no desire to learn how to care for himself and his diabetes.  Denies symptoms of hypoglycemia.  FSBG while inpatient elevated due to steriods.  If discharged on steroids, I recommend increasing the dose of glipizide slightly for the duration of the steroids.    "

## 2017-03-06 NOTE — PROGRESS NOTES
Received bedside shift report from night RN. Does call appropriately. Bed alarm is off.  Pt is sleeping in bed. PIV assessed and is patent. Pt is on 2 L NC when sleeping. Reviewed orders, notes, labs, and test results. Hourly rounding in place with RN rounding on odd hours and CNA on even hours. At 0800 CNA reported unstable vital signs to this RN and pt was assessed and rapid response was called. Pt states SOB and chest pains.

## 2017-03-06 NOTE — PROGRESS NOTES
Hospital Medicine Progress Note, Adult, Complex               Author: Luli Gama Date & Time created: 3/6/2017  8:33 AM     CC: 67M homeless and with active tobacco abuse presented with 1 week of cough and SOB and admitted for COPD exacerbation.  Patient with multiple complaints including that he is unable to take care of himself and that he keeps coming back to the hospital with the same issues.      Interval History:  3/4 - Continues to feel poorly, reports that he can't leave hospital unless he had a place to live, ensured him that social work will attempt to help him but that shelters may be his only option.  3/5 - Continues to report feeling poorly and short of breath. Remains argumentative.  Insists that he is still hypoxic and that he needs O2 and states that the RN and I are not communicating when I explain to him that he has been weaned off O2.  Bedside RN and I had already discussed this patient prior to my visit and he has been weaned off O2 and is saturating well above 88% on room air.  In fact, whenever she puts the mask on him, the patient takes it off again.  3/6 - Patient with uncontrolled BP this AM and complained of chest pain, rapid response called and patient transferred to telemetry for cardiac monitoring    Review of Systems:  Review of Systems   Constitutional: Positive for malaise/fatigue. Negative for fever and chills.   HENT: Negative for congestion and sore throat.    Eyes: Negative for photophobia.   Respiratory: Positive for cough, sputum production and shortness of breath. Negative for wheezing.    Cardiovascular: Negative for chest pain and palpitations.   Gastrointestinal: Negative for nausea, vomiting, abdominal pain and diarrhea.   Genitourinary: Negative for dysuria.   Musculoskeletal: Negative for myalgias.   Skin: Negative.    Neurological: Negative for dizziness, tingling, focal weakness and headaches.   Psychiatric/Behavioral: Negative for depression and suicidal ideas.        Physical Exam:  Physical Exam   Constitutional: He is oriented to person, place, and time. No distress.   HENT:   Head: Normocephalic and atraumatic.   Right Ear: External ear normal.   Left Ear: External ear normal.   Eyes: EOM are normal. Right eye exhibits no discharge. Left eye exhibits no discharge.   Neck: Neck supple. No JVD present.   Cardiovascular: Normal rate, regular rhythm and normal heart sounds.    Pulmonary/Chest: Effort normal. No respiratory distress. He has no wheezes (Resolved). He has no rales (Resolved). He exhibits no tenderness.   Abdominal: Soft. Bowel sounds are normal. He exhibits no distension. There is no tenderness.   Musculoskeletal: He exhibits no edema.   Neurological: He is alert and oriented to person, place, and time. No cranial nerve deficit.   Skin: Skin is dry. He is not diaphoretic. No erythema.   Psychiatric: He has a normal mood and affect. His behavior is normal.   Nursing note and vitals reviewed.      Labs:        Invalid input(s): [id number]  Recent Labs      17   TROPONINI  <0.01   BNPBTYPENAT  37     Recent Labs      17   SODIUM  141   --    POTASSIUM  4.2   --    CHLORIDE  103   --    CO2  28   --    BUN  10   --    CREATININE  0.73   --    MAGNESIUM   --   2.0   PHOSPHORUS   --   3.2   CALCIUM  8.8   --      Recent Labs      17   ALTSGPT  8   ASTSGOT  14   ALKPHOSPHAT  68   TBILIRUBIN  0.4   GLUCOSE  109*     Recent Labs      17   RBC  5.27  5.15   HEMOGLOBIN  15.4  15.2   HEMATOCRIT  48.0  47.0   PLATELETCT  286  288     Recent Labs      17   WBC  12.3*  16.3*   NEUTSPOLYS  49.20   --    LYMPHOCYTES  32.10   --    MONOCYTES  12.90   --    EOSINOPHILS  4.50   --    BASOPHILS  1.00   --    ASTSGOT  14   --    ALTSGPT  8   --    ALKPHOSPHAT  68   --    TBILIRUBIN  0.4   --            Hemodynamics:  Temp (24hrs), Av.6 °C (97.9 °F), Min:36.2  °C (97.2 °F), Max:37.1 °C (98.7 °F)  Temperature: 36.2 °C (97.2 °F)  Pulse  Av.5  Min: 55  Max: 109   Blood Pressure : (!) 175/89 mmHg     Respiratory:    Respiration: 16, Pulse Oximetry: 98 %, O2 Daily Delivery Respiratory : OxyMask     Given By:: Mouthpiece, Work Of Breathing / Effort: Mild  RUL Breath Sounds: Clear;Diminished, RML Breath Sounds: Diminished, RLL Breath Sounds: Diminished, REYNA Breath Sounds: Clear;Diminished, LLL Breath Sounds: Diminished  Fluids:    Intake/Output Summary (Last 24 hours) at 17 0833  Last data filed at 17 0400   Gross per 24 hour   Intake    960 ml   Output      0 ml   Net    960 ml        GI/Nutrition:  Orders Placed This Encounter   Procedures   • Diet Order     Standing Status: Standing      Number of Occurrences: 1      Standing Expiration Date:      Order Specific Question:  Diet:     Answer:  Cardiac [6]     Order Specific Question:  Diet:     Answer:  Diabetic [3]     Order Specific Question:  Calorie modifications:     Answer:  2000 kcals [5]     Medical Decision Making, by Problem:  Active Hospital Problems    Diagnosis   • Acute respiratory failure with hypoxia (CMS-HCC) [J96.01], Acute exacerbation of COPD with asthma (CMS-HCC) [J44.1, J45.901] with suspected early PNA  - Patient previously weaned off O2 for the last day and now back on 5L  - Continue PO steroids and abx  - Continue inhalers and RT protocol as needed and wean O2  - Continue home lasix which he had not been taking  - ECHO pending read, last ECHO from 2016 showed EF 50%  - Cultures without growth to date  - Tobacco cessation counseling     • Chest pain  - Transfer to [facility] for monitoring and higher level of care  - Monitoring serial trops  - ECHO completed and elevated RVSP but normal systolic pressure with EF 60%, no wall motion abnormality noted  - Plan for risk stratification if trrops remain negative  - SALLY     • Type 2 diabetes mellitus with hyperglycemia (CMS-HCC) [E11.65]  -  HBA1C shows good control at 7  - Continue glipizide, ISS and accuchecks     • HTN (hypertension) [I10]  - Previously well controlled on coreg and lisinopril but elevated this AM  - Increase coreg to 12.5 and lisinpril to 40  - May need third agent or additional increase in BB if still high     • HLD  - Chronic, stable on home lipitor and ASA     • Depression  - Chronic, stable on home paxil     • Tobacco abuse counseling [Z71.6]  - Counseled     • Medical Noncompliance  - 3/4 Patient has been educated that he needs to take his medicines as prescribed and to stop smoking, he is often noncompliant and complains that he keeps coming back to hospital.  Poor insight into the link between his behavior and his health.  Argumentative and wants hospital to find him a place to live but educated that this is outside of the scope of my practice and while SW will consult, ultimately this is his responsibility  - 3/5 patient continues to be combative and disagrees with medical assessment, argues that he still needs O2 despite the fact that he is saturating at 94% on room air.  Still with poor insight but also concerns for secondary gain as he does not want to live in a homeless shelter         Labs reviewed and Medications reviewed              Antibiotics: Treating active infection/contamination beyond 24 hours perioperative coverage

## 2017-03-07 ENCOUNTER — APPOINTMENT (OUTPATIENT)
Dept: RADIOLOGY | Facility: MEDICAL CENTER | Age: 67
DRG: 189 | End: 2017-03-07
Attending: HOSPITALIST
Payer: MEDICARE

## 2017-03-07 PROBLEM — E11.9 DM2 (DIABETES MELLITUS, TYPE 2) (HCC): Status: ACTIVE | Noted: 2017-03-07

## 2017-03-07 PROBLEM — I87.8 VENOUS STASIS: Status: ACTIVE | Noted: 2017-03-07

## 2017-03-07 LAB
ANION GAP SERPL CALC-SCNC: 7 MMOL/L (ref 0–11.9)
BUN SERPL-MCNC: 21 MG/DL (ref 8–22)
CALCIUM SERPL-MCNC: 8.4 MG/DL (ref 8.5–10.5)
CHLORIDE SERPL-SCNC: 104 MMOL/L (ref 96–112)
CO2 SERPL-SCNC: 28 MMOL/L (ref 20–33)
CREAT SERPL-MCNC: 0.76 MG/DL (ref 0.5–1.4)
DEPRECATED D DIMER PPP IA-ACNC: <200 NG/ML(D-DU)
ERYTHROCYTE [DISTWIDTH] IN BLOOD BY AUTOMATED COUNT: 57.5 FL (ref 35.9–50)
ERYTHROCYTE [DISTWIDTH] IN BLOOD BY AUTOMATED COUNT: 57.9 FL (ref 35.9–50)
GFR SERPL CREATININE-BSD FRML MDRD: >60 ML/MIN/1.73 M 2
GLUCOSE BLD-MCNC: 113 MG/DL (ref 65–99)
GLUCOSE BLD-MCNC: 187 MG/DL (ref 65–99)
GLUCOSE BLD-MCNC: 207 MG/DL (ref 65–99)
GLUCOSE BLD-MCNC: 221 MG/DL (ref 65–99)
GLUCOSE SERPL-MCNC: 160 MG/DL (ref 65–99)
HCT VFR BLD AUTO: 45.3 % (ref 42–52)
HCT VFR BLD AUTO: 45.4 % (ref 42–52)
HGB BLD-MCNC: 14.5 G/DL (ref 14–18)
HGB BLD-MCNC: 14.8 G/DL (ref 14–18)
MCH RBC QN AUTO: 29.3 PG (ref 27–33)
MCH RBC QN AUTO: 29.6 PG (ref 27–33)
MCHC RBC AUTO-ENTMCNC: 32 G/DL (ref 33.7–35.3)
MCHC RBC AUTO-ENTMCNC: 32.6 G/DL (ref 33.7–35.3)
MCV RBC AUTO: 90.8 FL (ref 81.4–97.8)
MCV RBC AUTO: 91.5 FL (ref 81.4–97.8)
PLATELET # BLD AUTO: 281 K/UL (ref 164–446)
PLATELET # BLD AUTO: 289 K/UL (ref 164–446)
PMV BLD AUTO: 11.3 FL (ref 9–12.9)
PMV BLD AUTO: 11.4 FL (ref 9–12.9)
POTASSIUM SERPL-SCNC: 4.1 MMOL/L (ref 3.6–5.5)
RBC # BLD AUTO: 4.95 M/UL (ref 4.7–6.1)
RBC # BLD AUTO: 5 M/UL (ref 4.7–6.1)
SODIUM SERPL-SCNC: 139 MMOL/L (ref 135–145)
TROPONIN I SERPL-MCNC: <0.01 NG/ML (ref 0–0.04)
WBC # BLD AUTO: 16.4 K/UL (ref 4.8–10.8)
WBC # BLD AUTO: 18.3 K/UL (ref 4.8–10.8)

## 2017-03-07 PROCEDURE — G8989 SELF CARE D/C STATUS: HCPCS | Mod: CI

## 2017-03-07 PROCEDURE — 85379 FIBRIN DEGRADATION QUANT: CPT

## 2017-03-07 PROCEDURE — G8980 MOBILITY D/C STATUS: HCPCS | Mod: CI

## 2017-03-07 PROCEDURE — A9270 NON-COVERED ITEM OR SERVICE: HCPCS | Performed by: HOSPITALIST

## 2017-03-07 PROCEDURE — 770020 HCHG ROOM/CARE - TELE (206)

## 2017-03-07 PROCEDURE — G8978 MOBILITY CURRENT STATUS: HCPCS | Mod: CI

## 2017-03-07 PROCEDURE — 97165 OT EVAL LOW COMPLEX 30 MIN: CPT

## 2017-03-07 PROCEDURE — 97162 PT EVAL MOD COMPLEX 30 MIN: CPT

## 2017-03-07 PROCEDURE — 99233 SBSQ HOSP IP/OBS HIGH 50: CPT | Performed by: FAMILY MEDICINE

## 2017-03-07 PROCEDURE — A9502 TC99M TETROFOSMIN: HCPCS

## 2017-03-07 PROCEDURE — 700111 HCHG RX REV CODE 636 W/ 250 OVERRIDE (IP): Performed by: HOSPITALIST

## 2017-03-07 PROCEDURE — G8987 SELF CARE CURRENT STATUS: HCPCS | Mod: CI

## 2017-03-07 PROCEDURE — G8979 MOBILITY GOAL STATUS: HCPCS | Mod: CI

## 2017-03-07 PROCEDURE — 85027 COMPLETE CBC AUTOMATED: CPT

## 2017-03-07 PROCEDURE — 700111 HCHG RX REV CODE 636 W/ 250 OVERRIDE (IP)

## 2017-03-07 PROCEDURE — G8988 SELF CARE GOAL STATUS: HCPCS | Mod: CI

## 2017-03-07 PROCEDURE — 700102 HCHG RX REV CODE 250 W/ 637 OVERRIDE(OP): Performed by: HOSPITALIST

## 2017-03-07 PROCEDURE — 82962 GLUCOSE BLOOD TEST: CPT | Mod: 91

## 2017-03-07 PROCEDURE — 80048 BASIC METABOLIC PNL TOTAL CA: CPT

## 2017-03-07 PROCEDURE — 36415 COLL VENOUS BLD VENIPUNCTURE: CPT

## 2017-03-07 RX ORDER — REGADENOSON 0.08 MG/ML
INJECTION, SOLUTION INTRAVENOUS
Status: COMPLETED
Start: 2017-03-07 | End: 2017-03-07

## 2017-03-07 RX ADMIN — CEFDINIR 300 MG: 300 CAPSULE ORAL at 09:08

## 2017-03-07 RX ADMIN — HYDROXYZINE HYDROCHLORIDE 25 MG: 50 TABLET, FILM COATED ORAL at 20:36

## 2017-03-07 RX ADMIN — LISINOPRIL 40 MG: 20 TABLET ORAL at 09:01

## 2017-03-07 RX ADMIN — OMEPRAZOLE 20 MG: 20 CAPSULE, DELAYED RELEASE ORAL at 09:02

## 2017-03-07 RX ADMIN — HEPARIN SODIUM 5000 UNITS: 5000 INJECTION, SOLUTION INTRAVENOUS; SUBCUTANEOUS at 05:51

## 2017-03-07 RX ADMIN — NEOMYCIN SULFATE, POLYMYXIN B SULFATE AND BACITRACIN ZINC: 3.5; 10000; 4 OINTMENT OPHTHALMIC at 20:35

## 2017-03-07 RX ADMIN — ATORVASTATIN CALCIUM 20 MG: 20 TABLET, FILM COATED ORAL at 20:36

## 2017-03-07 RX ADMIN — PREDNISONE 50 MG: 50 TABLET ORAL at 09:02

## 2017-03-07 RX ADMIN — NEOMYCIN SULFATE, POLYMYXIN B SULFATE AND BACITRACIN ZINC 1 APPLICATION: 3.5; 10000; 4 OINTMENT OPHTHALMIC at 09:08

## 2017-03-07 RX ADMIN — PAROXETINE HYDROCHLORIDE 40 MG: 20 TABLET, FILM COATED ORAL at 09:02

## 2017-03-07 RX ADMIN — BUDESONIDE AND FORMOTEROL FUMARATE DIHYDRATE 2 PUFF: 160; 4.5 AEROSOL RESPIRATORY (INHALATION) at 09:09

## 2017-03-07 RX ADMIN — ASPIRIN 81 MG: 81 TABLET ORAL at 09:01

## 2017-03-07 RX ADMIN — NICOTINE 21 MG: 21 PATCH TRANSDERMAL at 05:51

## 2017-03-07 RX ADMIN — FUROSEMIDE 40 MG: 40 TABLET ORAL at 09:01

## 2017-03-07 RX ADMIN — CARVEDILOL 12.5 MG: 12.5 TABLET, FILM COATED ORAL at 09:01

## 2017-03-07 RX ADMIN — BUDESONIDE AND FORMOTEROL FUMARATE DIHYDRATE 2 PUFF: 160; 4.5 AEROSOL RESPIRATORY (INHALATION) at 20:35

## 2017-03-07 RX ADMIN — CEFDINIR 300 MG: 300 CAPSULE ORAL at 20:36

## 2017-03-07 RX ADMIN — LORAZEPAM 0.5 MG: 1 TABLET ORAL at 13:30

## 2017-03-07 RX ADMIN — NEOMYCIN SULFATE, POLYMYXIN B SULFATE AND BACITRACIN ZINC: 3.5; 10000; 4 OINTMENT OPHTHALMIC at 05:52

## 2017-03-07 RX ADMIN — NEOMYCIN SULFATE, POLYMYXIN B SULFATE AND BACITRACIN ZINC 1 APPLICATION: 3.5; 10000; 4 OINTMENT OPHTHALMIC at 13:20

## 2017-03-07 RX ADMIN — NEOMYCIN SULFATE, POLYMYXIN B SULFATE AND BACITRACIN ZINC: 3.5; 10000; 4 OINTMENT OPHTHALMIC at 17:45

## 2017-03-07 RX ADMIN — REGADENOSON 0.4 MG: 0.08 INJECTION, SOLUTION INTRAVENOUS at 11:23

## 2017-03-07 RX ADMIN — MONTELUKAST SODIUM 10 MG: 10 TABLET, FILM COATED ORAL at 09:01

## 2017-03-07 RX ADMIN — STANDARDIZED SENNA CONCENTRATE AND DOCUSATE SODIUM 2 TABLET: 8.6; 5 TABLET, FILM COATED ORAL at 09:02

## 2017-03-07 RX ADMIN — GLIPIZIDE 2.5 MG: 2.5 TABLET, FILM COATED, EXTENDED RELEASE ORAL at 09:08

## 2017-03-07 RX ADMIN — CARVEDILOL 12.5 MG: 12.5 TABLET, FILM COATED ORAL at 17:45

## 2017-03-07 RX ADMIN — HEPARIN SODIUM 5000 UNITS: 5000 INJECTION, SOLUTION INTRAVENOUS; SUBCUTANEOUS at 20:34

## 2017-03-07 RX ADMIN — HEPARIN SODIUM 5000 UNITS: 5000 INJECTION, SOLUTION INTRAVENOUS; SUBCUTANEOUS at 13:20

## 2017-03-07 RX ADMIN — OXYCODONE HYDROCHLORIDE 5 MG: 5 TABLET ORAL at 14:16

## 2017-03-07 RX ADMIN — AZITHROMYCIN 250 MG: 250 TABLET, FILM COATED ORAL at 09:01

## 2017-03-07 RX ADMIN — POTASSIUM CHLORIDE 20 MEQ: 1500 TABLET, EXTENDED RELEASE ORAL at 09:01

## 2017-03-07 ASSESSMENT — ENCOUNTER SYMPTOMS
SHORTNESS OF BREATH: 1
SORE THROAT: 0
HEADACHES: 0
BLURRED VISION: 0
ABDOMINAL PAIN: 0
COUGH: 0
DIARRHEA: 0
FEVER: 0
WEAKNESS: 1
WHEEZING: 1
NAUSEA: 0
CHILLS: 0
VOMITING: 0
DIZZINESS: 0
HEARTBURN: 0

## 2017-03-07 ASSESSMENT — GAIT ASSESSMENTS
DISTANCE (FEET): 600
GAIT LEVEL OF ASSIST: SUPERVISED

## 2017-03-07 ASSESSMENT — PAIN SCALES - GENERAL
PAINLEVEL_OUTOF10: 0
PAINLEVEL_OUTOF10: 3
PAINLEVEL_OUTOF10: 8

## 2017-03-07 ASSESSMENT — ACTIVITIES OF DAILY LIVING (ADL): TOILETING: INDEPENDENT

## 2017-03-07 NOTE — CARE PLAN
Problem: Knowledge Deficit  Goal: Knowledge of disease process/condition, treatment plan, diagnostic tests, and medications will improve  Intervention: Assess knowledge level of disease process/condition, treatment plan, diagnostic tests, and medications  Knowledge assessed regarding plan of care, patient verbalized understanding.

## 2017-03-07 NOTE — PROGRESS NOTES
Pt educated on medical compliance, pt aware of no caffeine intake for stress test in am.  Pt also aware if he continues to be non compliant with caffeine intake he will be discharge per dr steele.  Pt upset but agreeable to medical compliance at this time.

## 2017-03-07 NOTE — CARE PLAN
Problem: Safety  Goal: Will remain free from falls  Intervention: Assess risk factors for falls  Fall risk assessed and patient educated to call for assistance when needed.

## 2017-03-07 NOTE — CARE PLAN
Problem: Safety  Goal: Will remain free from injury  Outcome: PROGRESSING AS EXPECTED  Bed locked and in lowest position, non skid socks in place, call light in reach        Problem: Knowledge Deficit  Goal: Knowledge of disease process/condition, treatment plan, diagnostic tests, and medications will improve  Outcome: PROGRESSING AS EXPECTED  Updated with plan of care, npo at mid/no caffeine, stress test in am

## 2017-03-07 NOTE — PROGRESS NOTES
Monitor summary: SR 69-88, HR drop 46-49 NS with 2.5-5.0 sec pause (dr steele aware) .14/.10/.34

## 2017-03-07 NOTE — PROGRESS NOTES
Bedside report received, assumed care of patient. Assessment performed. Pt has no complaints of pain. Discussed plan of care with pt. Patient verbalizes understanding of diet restrictions for stress test tomorrow. Call light within reach, pt educated to call for assistance.

## 2017-03-07 NOTE — THERAPY
"Physical Therapy Evaluation completed.   Bed Mobility:  Supine to Sit: Supervised  Transfers: Sit to Stand: Modified Independent  Gait: Level Of Assist: Supervised with No Equipment Needed       Plan of Care: Patient with no further skilled PT needs in the acute care setting at this time  Discharge Recommendations: Equipment: No Equipment Needed.     See \"Rehab Therapy-Acute\" Patient Summary Report for complete documentation.     "

## 2017-03-08 LAB
ANION GAP SERPL CALC-SCNC: 10 MMOL/L (ref 0–11.9)
BACTERIA BLD CULT: NORMAL
BACTERIA BLD CULT: NORMAL
BASOPHILS # BLD AUTO: 0.3 % (ref 0–1.8)
BASOPHILS # BLD: 0.05 K/UL (ref 0–0.12)
BUN SERPL-MCNC: 17 MG/DL (ref 8–22)
CALCIUM SERPL-MCNC: 9.1 MG/DL (ref 8.5–10.5)
CHLORIDE SERPL-SCNC: 98 MMOL/L (ref 96–112)
CO2 SERPL-SCNC: 32 MMOL/L (ref 20–33)
CREAT SERPL-MCNC: 0.84 MG/DL (ref 0.5–1.4)
EOSINOPHIL # BLD AUTO: 0.14 K/UL (ref 0–0.51)
EOSINOPHIL NFR BLD: 0.9 % (ref 0–6.9)
ERYTHROCYTE [DISTWIDTH] IN BLOOD BY AUTOMATED COUNT: 56.2 FL (ref 35.9–50)
GFR SERPL CREATININE-BSD FRML MDRD: >60 ML/MIN/1.73 M 2
GLUCOSE BLD-MCNC: 105 MG/DL (ref 65–99)
GLUCOSE BLD-MCNC: 136 MG/DL (ref 65–99)
GLUCOSE BLD-MCNC: 190 MG/DL (ref 65–99)
GLUCOSE BLD-MCNC: 228 MG/DL (ref 65–99)
GLUCOSE SERPL-MCNC: 127 MG/DL (ref 65–99)
HCT VFR BLD AUTO: 46.9 % (ref 42–52)
HGB BLD-MCNC: 15.3 G/DL (ref 14–18)
IMM GRANULOCYTES # BLD AUTO: 0.17 K/UL (ref 0–0.11)
IMM GRANULOCYTES NFR BLD AUTO: 1 % (ref 0–0.9)
LYMPHOCYTES # BLD AUTO: 5.21 K/UL (ref 1–4.8)
LYMPHOCYTES NFR BLD: 31.6 % (ref 22–41)
MCH RBC QN AUTO: 29.5 PG (ref 27–33)
MCHC RBC AUTO-ENTMCNC: 32.6 G/DL (ref 33.7–35.3)
MCV RBC AUTO: 90.4 FL (ref 81.4–97.8)
MONOCYTES # BLD AUTO: 1.83 K/UL (ref 0–0.85)
MONOCYTES NFR BLD AUTO: 11.1 % (ref 0–13.4)
NEUTROPHILS # BLD AUTO: 9.07 K/UL (ref 1.82–7.42)
NEUTROPHILS NFR BLD: 55.1 % (ref 44–72)
NRBC # BLD AUTO: 0 K/UL
NRBC BLD AUTO-RTO: 0 /100 WBC
PLATELET # BLD AUTO: 296 K/UL (ref 164–446)
PMV BLD AUTO: 12 FL (ref 9–12.9)
POTASSIUM SERPL-SCNC: 3.9 MMOL/L (ref 3.6–5.5)
RBC # BLD AUTO: 5.19 M/UL (ref 4.7–6.1)
SIGNIFICANT IND 70042: NORMAL
SIGNIFICANT IND 70042: NORMAL
SITE SITE: NORMAL
SITE SITE: NORMAL
SODIUM SERPL-SCNC: 140 MMOL/L (ref 135–145)
SOURCE SOURCE: NORMAL
SOURCE SOURCE: NORMAL
WBC # BLD AUTO: 16.5 K/UL (ref 4.8–10.8)

## 2017-03-08 PROCEDURE — A9270 NON-COVERED ITEM OR SERVICE: HCPCS | Performed by: HOSPITALIST

## 2017-03-08 PROCEDURE — 700102 HCHG RX REV CODE 250 W/ 637 OVERRIDE(OP): Performed by: FAMILY MEDICINE

## 2017-03-08 PROCEDURE — 700102 HCHG RX REV CODE 250 W/ 637 OVERRIDE(OP): Performed by: HOSPITALIST

## 2017-03-08 PROCEDURE — 36415 COLL VENOUS BLD VENIPUNCTURE: CPT

## 2017-03-08 PROCEDURE — 700111 HCHG RX REV CODE 636 W/ 250 OVERRIDE (IP): Performed by: HOSPITALIST

## 2017-03-08 PROCEDURE — 770020 HCHG ROOM/CARE - TELE (206)

## 2017-03-08 PROCEDURE — A9270 NON-COVERED ITEM OR SERVICE: HCPCS | Performed by: FAMILY MEDICINE

## 2017-03-08 PROCEDURE — 80048 BASIC METABOLIC PNL TOTAL CA: CPT

## 2017-03-08 PROCEDURE — 82962 GLUCOSE BLOOD TEST: CPT

## 2017-03-08 PROCEDURE — 85025 COMPLETE CBC W/AUTO DIFF WBC: CPT

## 2017-03-08 PROCEDURE — 99232 SBSQ HOSP IP/OBS MODERATE 35: CPT | Performed by: FAMILY MEDICINE

## 2017-03-08 RX ORDER — AMLODIPINE BESYLATE 5 MG/1
5 TABLET ORAL
Status: DISCONTINUED | OUTPATIENT
Start: 2017-03-08 | End: 2017-03-09

## 2017-03-08 RX ADMIN — AMLODIPINE BESYLATE 5 MG: 5 TABLET ORAL at 12:33

## 2017-03-08 RX ADMIN — CARVEDILOL 12.5 MG: 12.5 TABLET, FILM COATED ORAL at 17:43

## 2017-03-08 RX ADMIN — ATORVASTATIN CALCIUM 20 MG: 20 TABLET, FILM COATED ORAL at 21:27

## 2017-03-08 RX ADMIN — BUDESONIDE AND FORMOTEROL FUMARATE DIHYDRATE 2 PUFF: 160; 4.5 AEROSOL RESPIRATORY (INHALATION) at 08:34

## 2017-03-08 RX ADMIN — NEOMYCIN SULFATE, POLYMYXIN B SULFATE AND BACITRACIN ZINC: 3.5; 10000; 4 OINTMENT OPHTHALMIC at 08:34

## 2017-03-08 RX ADMIN — GLIPIZIDE 2.5 MG: 2.5 TABLET, FILM COATED, EXTENDED RELEASE ORAL at 08:33

## 2017-03-08 RX ADMIN — NICOTINE 21 MG: 21 PATCH TRANSDERMAL at 04:59

## 2017-03-08 RX ADMIN — ASPIRIN 81 MG: 81 TABLET ORAL at 08:34

## 2017-03-08 RX ADMIN — LORAZEPAM 0.5 MG: 1 TABLET ORAL at 05:07

## 2017-03-08 RX ADMIN — FUROSEMIDE 40 MG: 40 TABLET ORAL at 08:34

## 2017-03-08 RX ADMIN — CARVEDILOL 12.5 MG: 12.5 TABLET, FILM COATED ORAL at 08:33

## 2017-03-08 RX ADMIN — NEOMYCIN SULFATE, POLYMYXIN B SULFATE AND BACITRACIN ZINC: 3.5; 10000; 4 OINTMENT OPHTHALMIC at 14:00

## 2017-03-08 RX ADMIN — PREDNISONE 50 MG: 50 TABLET ORAL at 08:33

## 2017-03-08 RX ADMIN — POTASSIUM CHLORIDE 20 MEQ: 1500 TABLET, EXTENDED RELEASE ORAL at 08:33

## 2017-03-08 RX ADMIN — HYDRALAZINE HYDROCHLORIDE 20 MG: 20 INJECTION INTRAMUSCULAR; INTRAVENOUS at 04:59

## 2017-03-08 RX ADMIN — HEPARIN SODIUM 5000 UNITS: 5000 INJECTION, SOLUTION INTRAVENOUS; SUBCUTANEOUS at 15:36

## 2017-03-08 RX ADMIN — CEFDINIR 300 MG: 300 CAPSULE ORAL at 08:33

## 2017-03-08 RX ADMIN — LORAZEPAM 0.5 MG: 1 TABLET ORAL at 12:36

## 2017-03-08 RX ADMIN — HYDROXYZINE HYDROCHLORIDE 25 MG: 50 TABLET, FILM COATED ORAL at 21:27

## 2017-03-08 RX ADMIN — NEOMYCIN SULFATE, POLYMYXIN B SULFATE AND BACITRACIN ZINC: 3.5; 10000; 4 OINTMENT OPHTHALMIC at 21:30

## 2017-03-08 RX ADMIN — HEPARIN SODIUM 5000 UNITS: 5000 INJECTION, SOLUTION INTRAVENOUS; SUBCUTANEOUS at 04:58

## 2017-03-08 RX ADMIN — PAROXETINE HYDROCHLORIDE 40 MG: 20 TABLET, FILM COATED ORAL at 08:34

## 2017-03-08 RX ADMIN — LISINOPRIL 40 MG: 20 TABLET ORAL at 08:34

## 2017-03-08 RX ADMIN — OMEPRAZOLE 20 MG: 20 CAPSULE, DELAYED RELEASE ORAL at 08:33

## 2017-03-08 RX ADMIN — HEPARIN SODIUM 5000 UNITS: 5000 INJECTION, SOLUTION INTRAVENOUS; SUBCUTANEOUS at 21:28

## 2017-03-08 RX ADMIN — NEOMYCIN SULFATE, POLYMYXIN B SULFATE AND BACITRACIN ZINC: 3.5; 10000; 4 OINTMENT OPHTHALMIC at 05:08

## 2017-03-08 RX ADMIN — STANDARDIZED SENNA CONCENTRATE AND DOCUSATE SODIUM 2 TABLET: 8.6; 5 TABLET, FILM COATED ORAL at 21:27

## 2017-03-08 RX ADMIN — CEFDINIR 300 MG: 300 CAPSULE ORAL at 21:27

## 2017-03-08 RX ADMIN — BUDESONIDE AND FORMOTEROL FUMARATE DIHYDRATE 2 PUFF: 160; 4.5 AEROSOL RESPIRATORY (INHALATION) at 21:27

## 2017-03-08 RX ADMIN — LORAZEPAM 0.5 MG: 1 TABLET ORAL at 21:27

## 2017-03-08 RX ADMIN — NEOMYCIN SULFATE, POLYMYXIN B SULFATE AND BACITRACIN ZINC: 3.5; 10000; 4 OINTMENT OPHTHALMIC at 18:00

## 2017-03-08 RX ADMIN — MONTELUKAST SODIUM 10 MG: 10 TABLET, FILM COATED ORAL at 08:33

## 2017-03-08 ASSESSMENT — ENCOUNTER SYMPTOMS
DIZZINESS: 0
HEARTBURN: 0
COUGH: 0
WEAKNESS: 1
BLURRED VISION: 0
WHEEZING: 1
CHILLS: 0
FEVER: 0
HEADACHES: 0
SHORTNESS OF BREATH: 1
VOMITING: 0
ABDOMINAL PAIN: 0
DIARRHEA: 0
SORE THROAT: 0
NAUSEA: 0

## 2017-03-08 ASSESSMENT — PAIN SCALES - GENERAL
PAINLEVEL_OUTOF10: 0
PAINLEVEL_OUTOF10: 2

## 2017-03-08 NOTE — CARE PLAN
Problem: Safety  Goal: Will remain free from falls  Intervention: Assess risk factors for falls  Fall risk assessed and patient educated to call for assistance when needed.      Problem: Knowledge Deficit  Goal: Knowledge of disease process/condition, treatment plan, diagnostic tests, and medications will improve  Intervention: Assess knowledge level of disease process/condition, treatment plan, diagnostic tests, and medications  Knowledge assessed regarding plan of care, patient verbalized understanding of medications and diet ordered.

## 2017-03-08 NOTE — PROGRESS NOTES
Patient woke up feeling very anxious this morning, ativan administered. Blood pressure also elevated so medication administered per MAR.

## 2017-03-08 NOTE — PROGRESS NOTES
"Hospital Medicine Progress Note, Adult, Complex               Author: Sal Whitegrabielnoelle Date & Time created: 3/8/2017  2:08 PM     Interval History:  Admitted for COPD exacerbation, Respiratory failure, Chest pain.    COPDE - less wheezing  Resp failure - off O2  CP - MPI negative, remains according to pt., negative D dimer  HTN - not controlled  Diabetes - controlled  Lengthy discussion with pt, states \"I want to be back to normal.\" States he doesn't feel well, seen later today walking in the hallways with no difficulty.     Review of Systems:  Review of Systems   Constitutional: Positive for malaise/fatigue. Negative for fever and chills.   HENT: Negative for sore throat.    Eyes: Negative for blurred vision.   Respiratory: Positive for shortness of breath and wheezing. Negative for cough.    Cardiovascular: Positive for chest pain and leg swelling.   Gastrointestinal: Negative for heartburn, nausea, vomiting, abdominal pain and diarrhea.   Genitourinary: Negative for dysuria.   Neurological: Positive for weakness. Negative for dizziness and headaches.       Physical Exam:  Physical Exam   Constitutional: He is oriented to person, place, and time. He appears well-developed and well-nourished.   HENT:   Head: Normocephalic and atraumatic.   Eyes: Conjunctivae are normal. Pupils are equal, round, and reactive to light.   Neck: No tracheal deviation present. No thyromegaly present.   Cardiovascular: Normal rate and regular rhythm.    Pulmonary/Chest: Effort normal. He has wheezes.   Abdominal: Soft. Bowel sounds are normal. He exhibits no distension. There is no tenderness.   Musculoskeletal: He exhibits edema.   Lymphadenopathy:     He has no cervical adenopathy.   Neurological: He is alert and oriented to person, place, and time.   Skin:   Venous stasis changes both legs   Nursing note and vitals reviewed.      Labs:        Invalid input(s): [id number]  Recent Labs      03/06/17   1142  03/06/17   1719  " 17   TROPONINI  <0.01  <0.01  <0.01     Recent Labs      176  17   0306   SODIUM  139  140   POTASSIUM  4.1  3.9   CHLORIDE  104  98   CO2  28  32   BUN  21  17   CREATININE  0.76  0.84   CALCIUM  8.4*  9.1     Recent Labs      176  17   0306   GLUCOSE  160*  127*     Recent Labs      17   0306   RBC  4.95  5.00  5.19   HEMOGLOBIN  14.5  14.8  15.3   HEMATOCRIT  45.3  45.4  46.9   PLATELETCT  289  281  296     Recent Labs      17   0306   WBC  18.3*  16.4*  16.5*   NEUTSPOLYS   --    --   55.10   LYMPHOCYTES   --    --   31.60   MONOCYTES   --    --   11.10   EOSINOPHILS   --    --   0.90   BASOPHILS   --    --   0.30           Hemodynamics:  Temp (24hrs), Av.3 °C (97.3 °F), Min:35.8 °C (96.5 °F), Max:36.6 °C (97.8 °F)  Temperature: 36.3 °C (97.3 °F)  Pulse  Av.1  Min: 55  Max: 109   Blood Pressure : 152/100 mmHg (Rn notified)     Respiratory:    Respiration: 19, Pulse Oximetry: 93 %     Work Of Breathing / Effort: Mild  RUL Breath Sounds: Diminished, RML Breath Sounds: Diminished, RLL Breath Sounds: Diminished, REYNA Breath Sounds: Diminished, LLL Breath Sounds: Diminished  Fluids:    Intake/Output Summary (Last 24 hours) at 17 1408  Last data filed at 17 0615   Gross per 24 hour   Intake   1570 ml   Output   4450 ml   Net  -2880 ml     Weight: 123.6 kg (272 lb 7.8 oz)  GI/Nutrition:  Orders Placed This Encounter   Procedures   • DIET ORDER     Standing Status: Standing      Number of Occurrences: 1      Standing Expiration Date:      Order Specific Question:  Diet:     Answer:  Diabetic [3]     Medical Decision Making, by Problem:  Active Hospital Problems    Diagnosis   • *COPD exacerbation (CMS-[facility]) [J44.1]       Prednisone, Symbicort, Omnicef   • Acute respiratory failure with hypoxia (CMS-HCC) [J96.01]       off O2   • DM2 (diabetes mellitus, type 2) (CMS-HCC)  [E11.9]       Glipizide, SSI   • Hypertensive urgency [I16.0]          Coreg, Lisinopril, add Norvasc   • Chest pain [R07.9]        MPI and D dimer negative   • Venous stasis [I87.8]       Lasix   • Tobacco abuse counseling [Z71.6]    Nicotine patch       Medications reviewed, EKG reviewed, Labs reviewed and Radiology images reviewed  Arshad catheter: No Arshad      DVT Prophylaxis: Heparin    Ulcer prophylaxis: Yes

## 2017-03-08 NOTE — PROGRESS NOTES
Bedside report received, assumed care of patient. Patient ambulating in hallway and tolerating well. Assessment performed.  Discussed plan of care with pt.  Call light within reach, pt educated to call for assistance.

## 2017-03-08 NOTE — PROGRESS NOTES
Hospital Medicine Progress Note, Adult, Complex               Author: Sal Robin Date & Time created: 3/7/2017  5:08 PM     Interval History:  Admitted for COPD exacerbation, Respiratory failure, Chest pain.    COPDE - states wheezing has not improved  Resp failure - off O2  CP - MPI negative, remains according to pt.  HTN - not controlled  Diabetes - controlled    Review of Systems:  Review of Systems   Constitutional: Positive for malaise/fatigue. Negative for fever and chills.   HENT: Negative for sore throat.    Eyes: Negative for blurred vision.   Respiratory: Positive for shortness of breath and wheezing. Negative for cough.    Cardiovascular: Positive for chest pain and leg swelling.   Gastrointestinal: Negative for heartburn, nausea, vomiting, abdominal pain and diarrhea.   Genitourinary: Negative for dysuria.   Neurological: Positive for weakness. Negative for dizziness and headaches.       Physical Exam:  Physical Exam   Constitutional: He is oriented to person, place, and time. He appears well-developed and well-nourished.   HENT:   Head: Normocephalic and atraumatic.   Eyes: Conjunctivae are normal. Pupils are equal, round, and reactive to light.   Neck: No tracheal deviation present. No thyromegaly present.   Cardiovascular: Normal rate and regular rhythm.    Pulmonary/Chest: Effort normal. He has wheezes.   Abdominal: Soft. Bowel sounds are normal. He exhibits no distension. There is no tenderness.   Musculoskeletal: He exhibits edema.   Lymphadenopathy:     He has no cervical adenopathy.   Neurological: He is alert and oriented to person, place, and time.   Skin:   Venous stasis changes both legs   Nursing note and vitals reviewed.      Labs:        Invalid input(s): [id number]  Recent Labs      03/06/17   1142  03/06/17   1719  03/06/17   2338   TROPONINI  <0.01  <0.01  <0.01     Recent Labs      03/05/17   0334  03/07/17   0156   SODIUM   --   139   POTASSIUM   --   4.1   CHLORIDE   --    104   CO2   --   28   BUN   --   21   CREATININE   --   0.76   MAGNESIUM  2.0   --    PHOSPHORUS  3.2   --    CALCIUM   --   8.4*     Recent Labs      17   0156   GLUCOSE  160*     Recent Labs      17   0334  178  17   0156   RBC  5.15  4.95  5.00   HEMOGLOBIN  15.2  14.5  14.8   HEMATOCRIT  47.0  45.3  45.4   PLATELETCT  288  289  281     Recent Labs      17   0334  17   0156   WBC  16.3*  18.3*  16.4*           Hemodynamics:  Temp (24hrs), Av.4 °C (97.6 °F), Min:36.1 °C (97 °F), Max:36.7 °C (98.1 °F)  Temperature: 36.1 °C (97 °F)  Pulse  Av.1  Min: 55  Max: 109   Blood Pressure : 150/89 mmHg     Respiratory:    Respiration: 18, Pulse Oximetry: 93 %     Work Of Breathing / Effort: Mild  RUL Breath Sounds: Diminished, RML Breath Sounds: Diminished, RLL Breath Sounds: Diminished, REYNA Breath Sounds: Diminished, LLL Breath Sounds: Diminished  Fluids:    Intake/Output Summary (Last 24 hours) at 17 1708  Last data filed at 17 1500   Gross per 24 hour   Intake    500 ml   Output   1550 ml   Net  -1050 ml     Weight: (!) 126.2 kg (278 lb 3.5 oz)  GI/Nutrition:  Orders Placed This Encounter   Procedures   • DIET ORDER     Standing Status: Standing      Number of Occurrences: 1      Standing Expiration Date:      Order Specific Question:  Diet:     Answer:  Diabetic [3]     Medical Decision Making, by Problem:  Active Hospital Problems    Diagnosis   • *COPD exacerbation (CMS-[facility]) [J44.1]       Prednisone, Symbicort, Omnicef   • Acute respiratory failure with hypoxia (CMS-[facility]) [J96.01]       off O2   • DM2 (diabetes mellitus, type 2) (CMS-[facility]) [E11.9]       Glipizide, SSI   • Hypertensive urgency [I16.0]       Coreg and Lisinopril increased today   • Chest pain [R07.9]      check D dimer   • Venous stasis [I87.8]       Lasix   • Tobacco abuse counseling [Z71.6]    Nicotine patch       Medications reviewed, EKG reviewed, Labs reviewed and  Radiology images reviewed  Arshad catheter: No Arshad      DVT Prophylaxis: Heparin    Ulcer prophylaxis: Yes

## 2017-03-09 ENCOUNTER — PATIENT OUTREACH (OUTPATIENT)
Dept: HEALTH INFORMATION MANAGEMENT | Facility: OTHER | Age: 67
End: 2017-03-09

## 2017-03-09 VITALS
BODY MASS INDEX: 42.77 KG/M2 | RESPIRATION RATE: 19 BRPM | HEIGHT: 67 IN | WEIGHT: 272.49 LBS | HEART RATE: 85 BPM | DIASTOLIC BLOOD PRESSURE: 68 MMHG | TEMPERATURE: 98.6 F | OXYGEN SATURATION: 93 % | SYSTOLIC BLOOD PRESSURE: 126 MMHG

## 2017-03-09 LAB
ANION GAP SERPL CALC-SCNC: 11 MMOL/L (ref 0–11.9)
BASOPHILS # BLD AUTO: 0.4 % (ref 0–1.8)
BASOPHILS # BLD: 0.06 K/UL (ref 0–0.12)
BUN SERPL-MCNC: 22 MG/DL (ref 8–22)
CALCIUM SERPL-MCNC: 9.4 MG/DL (ref 8.5–10.5)
CHLORIDE SERPL-SCNC: 95 MMOL/L (ref 96–112)
CO2 SERPL-SCNC: 30 MMOL/L (ref 20–33)
CREAT SERPL-MCNC: 0.98 MG/DL (ref 0.5–1.4)
EOSINOPHIL # BLD AUTO: 0.36 K/UL (ref 0–0.51)
EOSINOPHIL NFR BLD: 2.1 % (ref 0–6.9)
ERYTHROCYTE [DISTWIDTH] IN BLOOD BY AUTOMATED COUNT: 54.3 FL (ref 35.9–50)
GFR SERPL CREATININE-BSD FRML MDRD: >60 ML/MIN/1.73 M 2
GLUCOSE BLD-MCNC: 103 MG/DL (ref 65–99)
GLUCOSE BLD-MCNC: 158 MG/DL (ref 65–99)
GLUCOSE SERPL-MCNC: 153 MG/DL (ref 65–99)
HCT VFR BLD AUTO: 50.1 % (ref 42–52)
HGB BLD-MCNC: 16.5 G/DL (ref 14–18)
IMM GRANULOCYTES # BLD AUTO: 0.39 K/UL (ref 0–0.11)
IMM GRANULOCYTES NFR BLD AUTO: 2.3 % (ref 0–0.9)
LYMPHOCYTES # BLD AUTO: 5.15 K/UL (ref 1–4.8)
LYMPHOCYTES NFR BLD: 30.5 % (ref 22–41)
MCH RBC QN AUTO: 29.5 PG (ref 27–33)
MCHC RBC AUTO-ENTMCNC: 32.9 G/DL (ref 33.7–35.3)
MCV RBC AUTO: 89.5 FL (ref 81.4–97.8)
MONOCYTES # BLD AUTO: 1.77 K/UL (ref 0–0.85)
MONOCYTES NFR BLD AUTO: 10.5 % (ref 0–13.4)
NEUTROPHILS # BLD AUTO: 9.17 K/UL (ref 1.82–7.42)
NEUTROPHILS NFR BLD: 54.2 % (ref 44–72)
NRBC # BLD AUTO: 0.03 K/UL
NRBC BLD AUTO-RTO: 0.2 /100 WBC
PLATELET # BLD AUTO: 317 K/UL (ref 164–446)
PMV BLD AUTO: 12.3 FL (ref 9–12.9)
POTASSIUM SERPL-SCNC: 3.9 MMOL/L (ref 3.6–5.5)
RBC # BLD AUTO: 5.6 M/UL (ref 4.7–6.1)
SODIUM SERPL-SCNC: 136 MMOL/L (ref 135–145)
WBC # BLD AUTO: 16.9 K/UL (ref 4.8–10.8)

## 2017-03-09 PROCEDURE — 700102 HCHG RX REV CODE 250 W/ 637 OVERRIDE(OP): Performed by: HOSPITALIST

## 2017-03-09 PROCEDURE — 700111 HCHG RX REV CODE 636 W/ 250 OVERRIDE (IP): Performed by: HOSPITALIST

## 2017-03-09 PROCEDURE — 36415 COLL VENOUS BLD VENIPUNCTURE: CPT

## 2017-03-09 PROCEDURE — 700102 HCHG RX REV CODE 250 W/ 637 OVERRIDE(OP): Performed by: FAMILY MEDICINE

## 2017-03-09 PROCEDURE — A9270 NON-COVERED ITEM OR SERVICE: HCPCS | Performed by: HOSPITALIST

## 2017-03-09 PROCEDURE — 700101 HCHG RX REV CODE 250: Performed by: HOSPITALIST

## 2017-03-09 PROCEDURE — 82962 GLUCOSE BLOOD TEST: CPT | Mod: 91

## 2017-03-09 PROCEDURE — A9270 NON-COVERED ITEM OR SERVICE: HCPCS | Performed by: FAMILY MEDICINE

## 2017-03-09 PROCEDURE — 700102 HCHG RX REV CODE 250 W/ 637 OVERRIDE(OP)

## 2017-03-09 PROCEDURE — 80048 BASIC METABOLIC PNL TOTAL CA: CPT

## 2017-03-09 PROCEDURE — 85025 COMPLETE CBC W/AUTO DIFF WBC: CPT

## 2017-03-09 PROCEDURE — 99239 HOSP IP/OBS DSCHRG MGMT >30: CPT | Performed by: FAMILY MEDICINE

## 2017-03-09 PROCEDURE — A9270 NON-COVERED ITEM OR SERVICE: HCPCS

## 2017-03-09 RX ORDER — PAROXETINE HYDROCHLORIDE 20 MG/1
40 TABLET, FILM COATED ORAL ONCE
Status: COMPLETED | OUTPATIENT
Start: 2017-03-09 | End: 2017-03-09

## 2017-03-09 RX ORDER — AMLODIPINE BESYLATE 10 MG/1
10 TABLET ORAL
Status: DISCONTINUED | OUTPATIENT
Start: 2017-03-09 | End: 2017-03-09 | Stop reason: HOSPADM

## 2017-03-09 RX ORDER — FUROSEMIDE 40 MG/1
40 TABLET ORAL DAILY
Qty: 30 TAB | Refills: 1 | Status: SHIPPED | OUTPATIENT
Start: 2017-03-09 | End: 2017-05-04

## 2017-03-09 RX ORDER — AMLODIPINE BESYLATE 10 MG/1
10 TABLET ORAL DAILY
Qty: 30 TAB | Refills: 1 | Status: SHIPPED | OUTPATIENT
Start: 2017-03-09 | End: 2017-05-04

## 2017-03-09 RX ORDER — CEFDINIR 300 MG/1
300 CAPSULE ORAL EVERY 12 HOURS
Qty: 6 CAP | Refills: 0 | Status: SHIPPED | OUTPATIENT
Start: 2017-03-09 | End: 2017-03-12

## 2017-03-09 RX ORDER — LISINOPRIL 40 MG/1
40 TABLET ORAL DAILY
Qty: 30 TAB | Refills: 1 | Status: SHIPPED | OUTPATIENT
Start: 2017-03-09 | End: 2017-05-04

## 2017-03-09 RX ORDER — ALPRAZOLAM 0.5 MG/1
0.5 TABLET ORAL 2 TIMES DAILY PRN
Qty: 30 TAB | Refills: 0 | Status: SHIPPED | OUTPATIENT
Start: 2017-03-09 | End: 2017-05-04

## 2017-03-09 RX ORDER — PREDNISONE 10 MG/1
TABLET ORAL
Qty: 30 TAB | Refills: 0 | Status: SHIPPED | OUTPATIENT
Start: 2017-03-09 | End: 2017-05-04

## 2017-03-09 RX ADMIN — LABETALOL HYDROCHLORIDE 10 MG: 5 INJECTION, SOLUTION INTRAVENOUS at 04:24

## 2017-03-09 RX ADMIN — FUROSEMIDE 40 MG: 40 TABLET ORAL at 08:41

## 2017-03-09 RX ADMIN — BUDESONIDE AND FORMOTEROL FUMARATE DIHYDRATE 2 PUFF: 160; 4.5 AEROSOL RESPIRATORY (INHALATION) at 08:39

## 2017-03-09 RX ADMIN — LORAZEPAM 0.5 MG: 1 TABLET ORAL at 08:47

## 2017-03-09 RX ADMIN — CEFDINIR 300 MG: 300 CAPSULE ORAL at 08:41

## 2017-03-09 RX ADMIN — OMEPRAZOLE 20 MG: 20 CAPSULE, DELAYED RELEASE ORAL at 08:40

## 2017-03-09 RX ADMIN — PAROXETINE HYDROCHLORIDE 40 MG: 20 TABLET, FILM COATED ORAL at 13:36

## 2017-03-09 RX ADMIN — AMLODIPINE BESYLATE 10 MG: 10 TABLET ORAL at 08:41

## 2017-03-09 RX ADMIN — CARVEDILOL 12.5 MG: 12.5 TABLET, FILM COATED ORAL at 08:41

## 2017-03-09 RX ADMIN — NICOTINE 21 MG: 21 PATCH TRANSDERMAL at 05:39

## 2017-03-09 RX ADMIN — LORAZEPAM 0.5 MG: 1 TABLET ORAL at 15:17

## 2017-03-09 RX ADMIN — LISINOPRIL 40 MG: 20 TABLET ORAL at 08:40

## 2017-03-09 RX ADMIN — NEOMYCIN SULFATE, POLYMYXIN B SULFATE AND BACITRACIN ZINC: 3.5; 10000; 4 OINTMENT OPHTHALMIC at 05:46

## 2017-03-09 RX ADMIN — ASPIRIN 81 MG: 81 TABLET ORAL at 08:41

## 2017-03-09 RX ADMIN — POTASSIUM CHLORIDE 20 MEQ: 1500 TABLET, EXTENDED RELEASE ORAL at 08:42

## 2017-03-09 RX ADMIN — MONTELUKAST SODIUM 10 MG: 10 TABLET, FILM COATED ORAL at 08:40

## 2017-03-09 RX ADMIN — HEPARIN SODIUM 5000 UNITS: 5000 INJECTION, SOLUTION INTRAVENOUS; SUBCUTANEOUS at 05:45

## 2017-03-09 RX ADMIN — GLIPIZIDE 2.5 MG: 2.5 TABLET, FILM COATED, EXTENDED RELEASE ORAL at 08:40

## 2017-03-09 RX ADMIN — PREDNISONE 50 MG: 50 TABLET ORAL at 08:40

## 2017-03-09 ASSESSMENT — PAIN SCALES - GENERAL: PAINLEVEL_OUTOF10: 0

## 2017-03-09 NOTE — CARE PLAN
Problem: Communication  Goal: The ability to communicate needs accurately and effectively will improve  Intervention: Educate patient and significant other/support system about the plan of care, procedures, treatments, medications and allow for questions  Patient educated about checking blood sugar at home, encourage to make healthier eating choices
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
## 2017-03-09 NOTE — CARE PLAN
Problem: Safety  Goal: Will remain free from injury  Outcome: PROGRESSING AS EXPECTED  Patient steady on his feet, ambulate in boyer frequently, call bell within reach, educate patient to alert staff in case of dizziness and not to get up on his own

## 2017-03-09 NOTE — PROGRESS NOTES
DC papers carefully reviewed with pt including new scripts and changed doses. Pt states he has other medicines listed and if not he will discuss at new PCP appt which was made prior to DC. Pt given bus pass prior to leaving by JUDITH who discussed all need/issues with him. Showered prior to leaving. Took all personal belongings.

## 2017-03-09 NOTE — DISCHARGE PLANNING
Medical Social Work    Update: Presented IMM to pt. Pt refused to look at IMM letter or sign it. LSW updated charge RN that pt will have to be escorted out by security.

## 2017-03-09 NOTE — DISCHARGE INSTRUCTIONS
Discharge Instructions    Discharged to home by car with self. Discharged via wheelchair, hospital escort: Yes.  Special equipment needed: Not Applicable    Be sure to schedule a follow-up appointment with your primary care doctor or any specialists as instructed.     Discharge Plan:   Influenza Vaccine Indication: Indicated: 65 years and older  Influenza Vaccine Given - only chart on this line when given: Influenza Vaccine Given (See MAR)    I understand that a diet low in cholesterol, fat, and sodium is recommended for good health. Unless I have been given specific instructions below for another diet, I accept this instruction as my diet prescription.   Other diet: diabetic    Special Instructions: None    · Is patient discharged on Warfarin / Coumadin?   No     · Is patient Post Blood Transfusion?  No    Depression / Suicide Risk    As you are discharged from this [facility] facility, it is important to learn how to keep safe from harming yourself.    Recognize the warning signs:  · Abrupt changes in personality, positive or negative- including increase in energy   · Giving away possessions  · Change in eating patterns- significant weight changes-  positive or negative  · Change in sleeping patterns- unable to sleep or sleeping all the time   · Unwillingness or inability to communicate  · Depression  · Unusual sadness, discouragement and loneliness  · Talk of wanting to die  · Neglect of personal appearance   · Rebelliousness- reckless behavior  · Withdrawal from people/activities they love  · Confusion- inability to concentrate     If you or a loved one observes any of these behaviors or has concerns about self-harm, here's what you can do:  · Talk about it- your feelings and reasons for harming yourself  · Remove any means that you might use to hurt yourself (examples: pills, rope, extension cords, firearm)  · Get professional help from the community (Mental Health, Substance Abuse, psychological  counseling)  · Do not be alone:Call your Safe Contact- someone whom you trust who will be there for you.  · Call your local CRISIS HOTLINE 171-4040 or 857-498-3146  · Call your local Children's Mobile Crisis Response Team [place] (230) 821-7300 or www.OrdrIt  · Call the toll free National Suicide Prevention Hotlines   · National Suicide Prevention Lifeline 480-909-LPNI (4003)  · National Hope Line Network 800-SUICIDE (527-3269)

## 2017-03-09 NOTE — PROGRESS NOTES
Rounded on patient, patient is sleeping, switch oxymask to nasal cannula, pt stated that he could not feel oxygen coming through oxymask, patient denies any more need and went back to sleep

## 2017-03-09 NOTE — PROGRESS NOTES
Bedside report taken on patient, Assume care of patient. Patient is alert and oriented, Assessment done on patient, Medication given, patient requested ativan for anxiety prn, med given, BG checked, patient denies any need at this time

## 2017-03-10 ENCOUNTER — PATIENT OUTREACH (OUTPATIENT)
Dept: HEALTH INFORMATION MANAGEMENT | Facility: OTHER | Age: 67
End: 2017-03-10

## 2017-03-10 NOTE — DISCHARGE SUMMARY
DISCHARGE DIAGNOSES:  1.  Chronic obstructive pulmonary disease exacerbation.  2.  Acute respiratory failure with hypoxia, resolved.  3.  Diabetes mellitus type 2.  4.  Hypertensive urgency.  5.  Chest pain.  6.  Venous stasis.  7.  Tobacco abuse.    CONSULTS:  None.    PROCEDURES:  1.  Myocardial perfusion scan, which showed no evidence of significant   jeopardized viable myocardium or prior myocardial infarction.  There is normal   left ventricular size, ejection fraction and wall motion.  2.  Echocardiogram, which showed ejection fraction, which is estimated to be   60%.  Right ventricular systolic pressure is estimated to be 40 mmHg.    HISTORY OF PRESENT ILLNESS:  For detailed H and P, please see Dr. Wing's note   on 03/03/2017, brief summary, 67-year-old white male who came in secondary to   increasing shortness of breath as well as apparently wheezing and cough.    Patient was seen in the emergency room and was noted to be in COPD   exacerbation.  He was apparently smoking again.  Patient was admitted for   further evaluation and management.    HOSPITAL COURSE:  Upon admission, he was started on intravenous steroids, was   also placed on intravenous azithromycin and Rocephin for his respiratory   failure.  He was placed on O2 supplementation and was later weaned off.  He   has been taken off oxygen.  On further questioning, he appears to be   noncompliant with taking his medications, also restarted smoking again.  When   it was told that he would be discharged soon, he started complaining of chest   pain.  D-dimer was done, which was noted to be negative, troponins were done,   which were also noted to be negative.  He does have some risk factors.  A   myocardial perfusion scan was done, which was also negative.  Patient kept   complaining that he was not feeling better that he was too tired to walk.  At   some point, staff noted that he was actually walking the hallways without any   difficulty at all.   Patient insisted that he be discharged to a skilled   nursing facility.  Physical therapy and occupational therapy came to evaluate   him.  They felt that he had no further therapy needs at all.  Patient stated   that he was not going to get _____ until April 1st and needed to be kept in   the hospital until then.  At this point, we told him that he is medically   stable for discharge.  His blood pressure is now better controlled.  He is now   on oral prednisone.  He has responded well to treatment.  He will now be   discharged in improved and in stable condition.    DISCHARGE MEDICATIONS:  1.  Xanax 0.5 mg twice a day as needed for anxiety.  2.  Norvasc 10 mg per day.  3.  Omnicef 300 mg twice a day for 3 more days.  4.  Prednisone 10 mg 4 tabs daily for 3 days, 3 tabs daily for 3 days, then 2   tabs daily for 3 days, then 1 tab daily for 3 days.  5.  Lasix 20 mg per day.  6.  Lisinopril 40 mg per day.  7.  Aspirin 81 mg per day.  8.  Lipitor 20 mg at bedtime.  9.  Breo Ellipta 1 puff daily.  10.  Carvedilol 6.25 mg twice a day.  11.  Flonase nasal spray.  12.  Glipizide SR 2.5 mg per day.  13.  _____ mg per day.  14.  Omeprazole 20 mg per day.  15.  Paxil 40 mg per day.  16.  K-Dur 20 mEq per day.  17.  Singular 10 mg per day.    DIET:  Diabetic, cardiac.    ACTIVITY:  As tolerated.    FOLLOWUP:  Follow up with Dr. Awilda Cobian on 04/03/2017.    Total discharge time was 35 minutes.       ____________________________________     MD TORRIE PARKER / REVA    DD:  03/09/2017 17:17:30  DT:  03/10/2017 02:14:30    D#:  [id number]  Job#:  [id number]

## 2017-03-17 ENCOUNTER — APPOINTMENT (OUTPATIENT)
Dept: RADIOLOGY | Facility: MEDICAL CENTER | Age: 67
End: 2017-03-17
Attending: EMERGENCY MEDICINE
Payer: MEDICARE

## 2017-03-17 ENCOUNTER — HOSPITAL ENCOUNTER (EMERGENCY)
Facility: MEDICAL CENTER | Age: 67
End: 2017-03-17
Attending: EMERGENCY MEDICINE
Payer: MEDICARE

## 2017-03-17 VITALS
TEMPERATURE: 98.2 F | HEIGHT: 67 IN | DIASTOLIC BLOOD PRESSURE: 100 MMHG | SYSTOLIC BLOOD PRESSURE: 153 MMHG | HEART RATE: 96 BPM | RESPIRATION RATE: 16 BRPM | OXYGEN SATURATION: 94 %

## 2017-03-17 DIAGNOSIS — Z59.00 HOMELESSNESS: ICD-10-CM

## 2017-03-17 DIAGNOSIS — F12.90 MARIJUANA USE: ICD-10-CM

## 2017-03-17 DIAGNOSIS — Z91.148 NONCOMPLIANCE WITH MEDICATIONS: ICD-10-CM

## 2017-03-17 DIAGNOSIS — D72.829 LEUKOCYTOSIS, UNSPECIFIED TYPE: ICD-10-CM

## 2017-03-17 LAB
AMORPH CRY #/AREA URNS HPF: PRESENT /HPF
AMPHET UR QL SCN: NEGATIVE
ANION GAP SERPL CALC-SCNC: 9 MMOL/L (ref 0–11.9)
APPEARANCE UR: ABNORMAL
BARBITURATES UR QL SCN: NEGATIVE
BASOPHILS # BLD AUTO: 0.4 % (ref 0–1.8)
BASOPHILS # BLD: 0.07 K/UL (ref 0–0.12)
BENZODIAZ UR QL SCN: NEGATIVE
BILIRUB UR QL STRIP.AUTO: NEGATIVE
BNP SERPL-MCNC: 19 PG/ML (ref 0–100)
BUN SERPL-MCNC: 11 MG/DL (ref 8–22)
BZE UR QL SCN: NEGATIVE
CALCIUM SERPL-MCNC: 8.8 MG/DL (ref 8.5–10.5)
CANNABINOIDS UR QL SCN: POSITIVE
CHLORIDE SERPL-SCNC: 105 MMOL/L (ref 96–112)
CO2 SERPL-SCNC: 24 MMOL/L (ref 20–33)
COLOR UR: YELLOW
CREAT SERPL-MCNC: 0.85 MG/DL (ref 0.5–1.4)
CULTURE IF INDICATED INDCX: NO UA CULTURE
EOSINOPHIL # BLD AUTO: 0.35 K/UL (ref 0–0.51)
EOSINOPHIL NFR BLD: 2.1 % (ref 0–6.9)
ERYTHROCYTE [DISTWIDTH] IN BLOOD BY AUTOMATED COUNT: 58 FL (ref 35.9–50)
ETHANOL BLD-MCNC: 0.01 G/DL
GFR SERPL CREATININE-BSD FRML MDRD: >60 ML/MIN/1.73 M 2
GLUCOSE SERPL-MCNC: 112 MG/DL (ref 65–99)
GLUCOSE UR STRIP.AUTO-MCNC: NEGATIVE MG/DL
HCT VFR BLD AUTO: 47.7 % (ref 42–52)
HGB BLD-MCNC: 15.4 G/DL (ref 14–18)
IMM GRANULOCYTES # BLD AUTO: 0.11 K/UL (ref 0–0.11)
IMM GRANULOCYTES NFR BLD AUTO: 0.7 % (ref 0–0.9)
KETONES UR STRIP.AUTO-MCNC: 20 MG/DL
LEUKOCYTE ESTERASE UR QL STRIP.AUTO: NEGATIVE
LYMPHOCYTES # BLD AUTO: 3.07 K/UL (ref 1–4.8)
LYMPHOCYTES NFR BLD: 18.8 % (ref 22–41)
MCH RBC QN AUTO: 29.7 PG (ref 27–33)
MCHC RBC AUTO-ENTMCNC: 32.3 G/DL (ref 33.7–35.3)
MCV RBC AUTO: 91.9 FL (ref 81.4–97.8)
MDMA UR QL SCN: NEGATIVE
METHADONE UR QL SCN: NEGATIVE
MICRO URNS: ABNORMAL
MONOCYTES # BLD AUTO: 1.98 K/UL (ref 0–0.85)
MONOCYTES NFR BLD AUTO: 12.1 % (ref 0–13.4)
MUCOUS THREADS #/AREA URNS HPF: ABNORMAL /HPF
NEUTROPHILS # BLD AUTO: 10.79 K/UL (ref 1.82–7.42)
NEUTROPHILS NFR BLD: 65.9 % (ref 44–72)
NITRITE UR QL STRIP.AUTO: NEGATIVE
NRBC # BLD AUTO: 0 K/UL
NRBC BLD AUTO-RTO: 0 /100 WBC
OPIATES UR QL SCN: NEGATIVE
OXYCODONE UR QL SCN: NEGATIVE
PCP UR QL SCN: NEGATIVE
PH UR STRIP.AUTO: 5.5 [PH]
PLATELET # BLD AUTO: 285 K/UL (ref 164–446)
PMV BLD AUTO: 11.6 FL (ref 9–12.9)
POC BREATHALIZER: 0 PERCENT (ref 0–0.01)
POTASSIUM SERPL-SCNC: 4 MMOL/L (ref 3.6–5.5)
PROPOXYPH UR QL SCN: NEGATIVE
PROT UR QL STRIP: 30 MG/DL
RBC # BLD AUTO: 5.19 M/UL (ref 4.7–6.1)
RBC # URNS HPF: ABNORMAL /HPF
RBC UR QL AUTO: NEGATIVE
SODIUM SERPL-SCNC: 138 MMOL/L (ref 135–145)
SP GR UR STRIP.AUTO: 1.03
WBC # BLD AUTO: 16.4 K/UL (ref 4.8–10.8)
WBC #/AREA URNS HPF: ABNORMAL /HPF

## 2017-03-17 PROCEDURE — 96360 HYDRATION IV INFUSION INIT: CPT

## 2017-03-17 PROCEDURE — 80307 DRUG TEST PRSMV CHEM ANLYZR: CPT

## 2017-03-17 PROCEDURE — 81001 URINALYSIS AUTO W/SCOPE: CPT | Mod: 59

## 2017-03-17 PROCEDURE — 83880 ASSAY OF NATRIURETIC PEPTIDE: CPT

## 2017-03-17 PROCEDURE — 99284 EMERGENCY DEPT VISIT MOD MDM: CPT

## 2017-03-17 PROCEDURE — 71010 DX-CHEST-PORTABLE (1 VIEW): CPT

## 2017-03-17 PROCEDURE — 85025 COMPLETE CBC W/AUTO DIFF WBC: CPT

## 2017-03-17 PROCEDURE — 700105 HCHG RX REV CODE 258: Performed by: EMERGENCY MEDICINE

## 2017-03-17 PROCEDURE — 80048 BASIC METABOLIC PNL TOTAL CA: CPT

## 2017-03-17 PROCEDURE — 302970 POC BREATHALIZER: Performed by: EMERGENCY MEDICINE

## 2017-03-17 RX ORDER — SODIUM CHLORIDE 9 MG/ML
1000 INJECTION, SOLUTION INTRAVENOUS ONCE
Status: COMPLETED | OUTPATIENT
Start: 2017-03-17 | End: 2017-03-17

## 2017-03-17 RX ADMIN — SODIUM CHLORIDE 1000 ML: 9 INJECTION, SOLUTION INTRAVENOUS at 06:00

## 2017-03-17 SDOH — ECONOMIC STABILITY - HOUSING INSECURITY: HOMELESSNESS UNSPECIFIED: Z59.00

## 2017-03-17 ASSESSMENT — ENCOUNTER SYMPTOMS
WEAKNESS: 1
FOCAL WEAKNESS: 0
FEVER: 0
FLANK PAIN: 0
FALLS: 0
HEADACHES: 0
NAUSEA: 0
VOMITING: 0
SENSORY CHANGE: 0
SHORTNESS OF BREATH: 0

## 2017-03-17 NOTE — ED AVS SNAPSHOT
Pinevent Access Code: [email or web address]  Expires: 4/10/2017 11:33 AM    Your email address is not on file at [facility].  Email Addresses are required for you to sign up for Pinevent, please contact [phone] to verify your personal information and to provide your email address prior to attempting to register for Pinevent.    Maxi Skinnynicole  [address] [place] [address]    [employer]  A secure, online tool to manage your health information     [facility]’s Pinevent® is a secure, online tool that connects you to your personalized health information from the privacy of your home -- day or night - making it very easy for you to manage your healthcare. Once the activation process is completed, you can even access your medical information using the Pinevent stephie, which is available for free in the Apple Stephie store or Google Play store.     To learn more about Pinevent, visit [email or web address]    There are two levels of access available (as shown below):   My Chart Features  [facility] Primary Care Doctor [facility]  Specialists [facility]  Urgent  Care Non-[facility] Primary Care Doctor   Email your healthcare team securely and privately 24/7 X X X    Manage appointments: schedule your next appointment; view details of past/upcoming appointments X      Request prescription refills. X      View recent personal medical records, including lab and immunizations X X X X   View health record, including health history, allergies, medications X X X X   Read reports about your outpatient visits, procedures, consult and ER notes X X X X   See your discharge summary, which is a recap of your hospital and/or ER visit that includes your diagnosis, lab results, and care plan X X  X     How to register for Pinevent:  Once your e-mail address has been verified, follow the following steps to sign up for Pinevent.     1. Go to  [email or web address]  2. Click on the Sign Up Now box, which takes you to the New Member Sign Up page. You will need  to provide the following information:  a. Enter your utoopia Access Code exactly as it appears at the top of this page. (You will not need to use this code after you’ve completed the sign-up process. If you do not sign up before the expiration date, you must request a new code.)   b. Enter your date of birth.   c. Enter your home email address.   d. Click Submit, and follow the next screen’s instructions.  3. Create a utoopia ID. This will be your utoopia login ID and cannot be changed, so think of one that is secure and easy to remember.  4. Create a utoopia password. You can change your password at any time.  5. Enter your Password Reset Question and Answer. This can be used at a later time if you forget your password.   6. Enter your e-mail address. This allows you to receive e-mail notifications when new information is available in utoopia.  7. Click Sign Up. You can now view your health information.    For assistance activating your utoopia account, call [phone]

## 2017-03-17 NOTE — ED PROVIDER NOTES
ED Provider Note    Scribed for Alia Hannon D.O. by Brian Alicia. 3/17/2017, 5:26 AM.    Primary care provider: Pcp Pt States None  Means of arrival: Walk in  History obtained from: Patient  History limited by: Altered mental status    CHIEF COMPLAINT  Chief Complaint   Patient presents with   • Altered Mental Status     pt arrived via taxi, pt confused rambling about money and not being able to talk writes on paper but does not make any sense, pt does have a [facility] wrist band on for todays date, pt is not having any difficulty speaking resp are even and unlabored, pt is alert to person, place and time, has generalized weakness no unilateral weakness noted       HPI  Maxi Esposito is a 67 y.o. male who presents to the Emergency Department for evaluation of altered mental status. Per nursing notes, patient was at [facility] asking for food and television. After denying his requests, he came to [facility] ED for the same requests. Currently, he is rambling about money and his finances. Chin has reported difficulty speaking that he suddenly will have inability to speak. There's been no observation of this here in the ED. She complains that he is homeless, that he hasn't eaten that he needs something to eat and the remote to watch the television. He asks when he'll be admitted and how long he'll be able to stay. He reports not having a primary care doctor. He does not know what medication he takes. He states it all in his records here. He states he was recently discharged with prescriptions but never felt numb.    REVIEW OF SYSTEMS  Review of Systems   Constitutional: Negative for fever.   HENT: Positive for congestion.    Respiratory: Negative for shortness of breath.    Cardiovascular: Negative for chest pain.   Gastrointestinal: Negative for nausea and vomiting.   Genitourinary: Negative for flank pain.   Musculoskeletal: Negative for falls.   Skin: Negative for rash.   Neurological: Positive for  "weakness. Negative for sensory change, focal weakness and headaches.       PAST MEDICAL HISTORY   has a past medical history of Hypertension; Diabetes (CMS-[facility]); Sepsis (CMS-HCC); Pneumonia; Chronic obstructive pulmonary disease (CMS-[facility]); and Medical non-compliance.    SURGICAL HISTORY   has past surgical history that includes splenectomy (1980).    SOCIAL HISTORY  Social History   Substance Use Topics   • Smoking status: Former Smoker     Quit date: 07/16/2016   • Alcohol Use: No      History   Drug Use No       FAMILY HISTORY  None noted    CURRENT MEDICATIONS  Home Medications     **Home medications have not yet been reviewed for this encounter**          ALLERGIES  Allergies   Allergen Reactions   • Shellfish Allergy Shortness of Breath and Swelling     \"Eyes closed up really tight and it was hard to breath\"       PHYSICAL EXAM  VITAL SIGNS: /100 mmHg  Pulse 96  Temp(Src) 36.8 °C (98.2 °F)  Resp 16  Ht 1.702 m (5' 7\")  SpO2 94%  Vitals reviewed.  Constitutional: Patient is oriented to person, place, and time. Appears well-developed and well-nourished. No distress.  he appears to be resting comfortably. His voice is slightly hoarse but is articulate   Head: Normocephalic and atraumatic.   Ears: Normal external ears bilaterally.   Mouth/Throat: Oropharynx is clear and moist  Eyes: Conjunctivae are normal. Pupils are equal, round, and reactive to light.   Cardiovascular: Normal rate, regular rhythm and normal heart sounds. Normal peripheral pulses.  Pulmonary/Chest: Effort normal and breath sounds normal. No respiratory distress, no wheezes, rhonchi, or rales.   Abdominal: Soft. Bowel sounds are normal. There is no tenderness, rebound or guarding, or peritoneal signs. No CVA tenderness.  Musculoskeletal: No edema and no tenderness. Mild chronic venous stasis bilateral  Neurological: No focal deficits. Normal speech area normal gait. Normal motor and sensory exam.  Skin: Skin is warm and dry. No " erythema. No pallor.   Psychiatric: Patient has a normal mood and affect.     LABS  Results for orders placed or performed during the hospital encounter of 03/17/17   DIAGNOSTIC ALCOHOL   Result Value Ref Range    Diagnostic Alcohol 0.01 (H) 0.00 g/dL   URINE DRUG SCREEN   Result Value Ref Range    Amphetamines Urine Negative Negative    Barbiturates Negative Negative    Benzodiazepines Negative Negative    Cocaine Metabolite Negative Negative    Methadone Negative Negative    Ecstasy Negative Negative    Opiates Negative Negative    Oxycodone Negative Negative    Phencyclidine -Pcp Negative Negative    Propoxyphene Negative Negative    Cannabinoid Metab Positive (A) Negative   URINALYSIS,CULTURE IF INDICATED   Result Value Ref Range    Micro Urine Req Microscopic     Color Yellow     Character Cloudy (A)     Specific Gravity 1.027 <1.035    Ph 5.5 5.0-8.0    Glucose Negative Negative mg/dL    Ketones 20 (A) Negative mg/dL    Protein 30 (A) Negative mg/dL    Bilirubin Negative Negative    Nitrite Negative Negative    Leukocyte Esterase Negative Negative    Occult Blood Negative Negative    Culture Indicated No UA Culture   CBC WITH DIFFERENTIAL   Result Value Ref Range    WBC 16.4 (H) 4.8 - 10.8 K/uL    RBC 5.19 4.70 - 6.10 M/uL    Hemoglobin 15.4 14.0 - 18.0 g/dL    Hematocrit 47.7 42.0 - 52.0 %    MCV 91.9 81.4 - 97.8 fL    MCH 29.7 27.0 - 33.0 pg    MCHC 32.3 (L) 33.7 - 35.3 g/dL    RDW 58.0 (H) 35.9 - 50.0 fL    Platelet Count 285 164 - 446 K/uL    MPV 11.6 9.0 - 12.9 fL    Neutrophils-Polys 65.90 44.00 - 72.00 %    Lymphocytes 18.80 (L) 22.00 - 41.00 %    Monocytes 12.10 0.00 - 13.40 %    Eosinophils 2.10 0.00 - 6.90 %    Basophils 0.40 0.00 - 1.80 %    Immature Granulocytes 0.70 0.00 - 0.90 %    Nucleated RBC 0.00 /100 WBC    Neutrophils (Absolute) 10.79 (H) 1.82 - 7.42 K/uL    Lymphs (Absolute) 3.07 1.00 - 4.80 K/uL    Monos (Absolute) 1.98 (H) 0.00 - 0.85 K/uL    Eos (Absolute) 0.35 0.00 - 0.51 K/uL    Jesus  (Absolute) 0.07 0.00 - 0.12 K/uL    Immature Granulocytes (abs) 0.11 0.00 - 0.11 K/uL    NRBC (Absolute) 0.00 K/uL   BASIC METABOLIC PANEL   Result Value Ref Range    Sodium 138 135 - 145 mmol/L    Potassium 4.0 3.6 - 5.5 mmol/L    Chloride 105 96 - 112 mmol/L    Co2 24 20 - 33 mmol/L    Glucose 112 (H) 65 - 99 mg/dL    Bun 11 8 - 22 mg/dL    Creatinine 0.85 0.50 - 1.40 mg/dL    Calcium 8.8 8.5 - 10.5 mg/dL    Anion Gap 9.0 0.0 - 11.9   URINE MICROSCOPIC (W/UA)   Result Value Ref Range    WBC 0-2 (A) /hpf    RBC 0-2 (A) /hpf    Mucous Threads Many /hpf    Amorphous Crystal Present /hpf   ESTIMATED GFR   Result Value Ref Range    GFR If African American >60 >60 mL/min/1.73 m 2    GFR If Non African American >60 >60 mL/min/1.73 m 2   BNP   Result Value Ref Range    B Natriuretic Peptide 19 0 - 100 pg/mL   POC BREATHALIZER   Result Value Ref Range    POC Breathalizer 0.002 0.00 - 0.01 Percent      All labs reviewed by me.    EKG Interpretation  Interpreted by me    Rhythm: normal sinus   Rate: normal  Axis: normal  Ectopy: none  Conduction: normal  ST Segments: no acute change  T Waves: no acute change  Q Waves: none    Clinical Impression: no acute changes and normal EKG    RADIOLOGY  DX-CHEST-PORTABLE (1 VIEW)   Final Result      1.  There are findings as described most consistent with interstitial pulmonary edema.        The radiologist's interpretation of all radiological studies have been reviewed by me.    COURSE & MEDICAL DECISION MAKING  Pertinent Labs & Imaging studies reviewed. (See chart for details)    Obtained and reviewed past medical records from 3/3/2017 with COPD exacerbation, respiratory failure with hypoxia. Has a history of diabetes. Here 1/2017 with diarrhea. Per contact with [facility], patient presented there tonight requesting television and something to eat. When it was not granted, he called to come here.    5:26 AM - Patient seen and examined at bedside. Patient will be treated with NS  infusion 1000 ml. Ordered CBC, BMP, urinalysis, estimated GFR, urine microscopic, POC breathalyzer, diagnostic alcohol, urine drug screen to evaluate his symptoms. The differential diagnoses include but are not limited to: UTI, dehydration, electrolyte disturbance, heart failure    10:43 AM Patient reevaluated at bedside. He's been able to ambulate to the bathroom on his own without difficulty. We discussed lab findings which were overall unrevealing. He does have an leukocytosis with a white blood cell count around 16. Looking back at his previous labs. He has had multiple CBCs done this month and they have been consistently in the same range. Otherwise, BMP was normal. There is some congestion noted on x-ray which I suspect is somewhat related to position. The patient has had normal speech albeit somewhat hoarse since he is arrived here but he has had no inability to speak. He has no focal deficits. At this time, I feel that no further emergent intervention. Though he is disappointed at this, he would like to stay and watch television and get something to eat but I have advised this is not the correct location for that. Patient be discharged home in stable condition.      FINAL IMPRESSION  1. Homelessness    2. Marijuana use    3. Noncompliance with medications    4. Leukocytosis, unspecified type          I, Brian Alicia (Heidyibe), am scribing for, and in the presence of, Alia Hannon D.O..    Electronically signed by: Brian Alicia (Scribe), 3/17/2017    IAlia D.O. personally performed the services described in this documentation, as scribed by Brian Alicia in my presence, and it is both accurate and complete.    The note accurately reflects work and decisions made by me.  Alia Hannon  3/17/2017  1:06 PM

## 2017-03-17 NOTE — ED AVS SNAPSHOT
Home Care Instructions                                                                                                                Maxi Esposito   MRN: [id number]    Department:  [facility], Emergency Dept   Date of Visit:  3/17/2017            [facility], Emergency Dept    [address] [place] [id number]    Phone:  [phone]      You were seen by     Alia Hannon D.O.      Your Diagnosis Was     Homelessness     Z59.0       These are the medications you received during your hospitalization from 03/17/2017 0338 to 03/17/2017 1133     Date/Time Order Dose Route Action    03/17/2017 0600 NS infusion 1,000 mL 1,000 mL Intravenous New Bag      Follow-up Information     1. Follow up with Your Physician In 3 days.    Specialty:  Emergency Medicine    Contact information    Varies          2. Follow up with [facility], Emergency Dept.    Specialty:  Emergency Medicine    Why:  If symptoms worsen    Contact information    [address] [place] [id number] [phone]      Medication Information     Review all of your home medications and newly ordered medications with your primary doctor and/or pharmacist as soon as possible. Follow medication instructions as directed by your doctor and/or pharmacist.     Please keep your complete medication list with you and share with your physician. Update the information when medications are discontinued, doses are changed, or new medications (including over-the-counter products) are added; and carry medication information at all times in the event of emergency situations.               Medication List      ASK your doctor about these medications        Instructions    Morning Afternoon Evening Bedtime    alprazolam 0.5 MG Tabs   Commonly known as:  XANAX        Take 1 Tab by mouth 2 times a day as needed for Anxiety.   Dose:  0.5 mg                        amlodipine 10 MG Tabs   Commonly known as:   NORVASC        Take 1 Tab by mouth every day.   Dose:  10 mg                        aspirin EC 81 MG Tbec   Commonly known as:  ECOTRIN        Take 81 mg by mouth every day.   Dose:  81 mg                        atorvastatin 20 MG Tabs   Commonly known as:  LIPITOR        Take 1 Tab by mouth every bedtime.   Dose:  20 mg                        BREO ELLIPTA 100-25 MCG/INH Aepb   Generic drug:  Fluticasone Furoate-Vilanterol        Inhale 1 Puff by mouth every day.   Dose:  1 Puff                        carvedilol 6.25 MG Tabs   Commonly known as:  COREG        Take 1 Tab by mouth 2 times a day, with meals.   Dose:  6.25 mg                        fluticasone 50 MCG/ACT nasal spray   Commonly known as:  FLONASE        Spray 1 Spray in nose every day.   Dose:  1 Spray                        furosemide 40 MG Tabs   Commonly known as:  LASIX        Take 1 Tab by mouth every day.   Dose:  40 mg                        glipiZIDE SR 2.5 MG Tb24   Commonly known as:  GLUCOTROL        Take 2.5 mg by mouth every day.   Dose:  2.5 mg                        hydrOXYzine 25 MG Tabs   Commonly known as:  ATARAX        Take 25 mg by mouth every bedtime.   Dose:  25 mg                        lisinopril 40 MG tablet   Commonly known as:  PRINIVIL, ZESTRIL        Take 1 Tab by mouth every day.   Dose:  40 mg                        omeprazole 20 MG delayed-release capsule   Commonly known as:  PRILOSEC        Take 20 mg by mouth every day.   Dose:  20 mg                        PAXIL 40 MG tablet   Generic drug:  paroxetine        Take 40 mg by mouth every day.   Dose:  40 mg                        potassium chloride SA 20 MEQ Tbcr   Commonly known as:  Kdur        Take 1 Tab by mouth every day.   Dose:  20 mEq                        predniSONE 10 MG Tabs   Commonly known as:  DELTASONE        4 tabs qd x 3d, 3 tabs qd x 3d, 2 tabs qd x 3d, 1 tab qd x 3d.                        SINGULAIR 10 MG Tabs   Generic drug:  montelukast        Take  "10 mg by mouth every day.   Dose:  10 mg                                Procedures and tests performed during your visit     BASIC METABOLIC PANEL    BNP    CBC WITH DIFFERENTIAL    DIAGNOSTIC ALCOHOL    DX-CHEST-PORTABLE (1 VIEW)    ED CONSULT TO BEHAVIORAL HEALTH    ESTIMATED GFR    NURSING COMMUNICATION    POC BREATHALIZER    SALINE LOCK    URINALYSIS,CULTURE IF INDICATED    URINE DRUG SCREEN    URINE MICROSCOPIC (W/UA)        Discharge Instructions             Cannabis Use Disorder  Cannabis use disorder is a mental disorder. It is not one-time or occasional use of cannabis, more commonly known as marijuana. Cannabis use disorder is the continued, nonmedical use of cannabis that interferes with normal life activities or causes health problems. People with cannabis use disorder get a feeling of extreme pleasure and relaxation from cannabis use. This \"high\" is very rewarding and causes people to use over and over.   The mind-altering ingredient in cannabis is know as THC. THC can also interfere with motor coordination, memory, judgment, and accurate sense of space and time. These effects can last for a few days after using cannabis. Regular heavy cannabis use can cause long-lasting problems with thinking and learning. In young people, these problems may be permanent. Cannabis sometimes causes severe anxiety, paranoia, or visual hallucinations. Man-made (synthetic) cannabis-like drugs, such as \"spice\" and \"K2,\" cause the same effects as THC but are much stronger. Cannabis-like drugs can cause dangerously high blood pressure and heart rate.   Cannabis use disorder usually starts in the teenage years. It can trigger the development of schizophrenia. It is somewhat more common in men than women. People who have family members with the disorder or existing mental health issues such as depression and posttraumatic stress disorder are more likely to develop cannabis use disorder. People with cannabis use disorder are at " higher risk for use of other drugs of abuse.   SIGNS AND SYMPTOMS  Signs and symptoms of cannabis use disorder include:   · Use of cannabis in larger amounts or over a longer period than intended.    · Unsuccessful attempts to cut down or control cannabis use.    · A lot of time spent obtaining, using, or recovering from the effects of cannabis.    · A strong desire or urge to use cannabis (cravings).    · Continued use of cannabis in spite of problems at work, school, or home because of use.    · Continued use of cannabis in spite of relationship problems because of use.  · Giving up or cutting down on important life activities because of cannabis use.  · Use of cannabis over and over even in situations when it is physically hazardous, such as when driving a car.    · Continued use of cannabis in spite of a physical problem that is likely related to use. Physical problems can include:  ¨ Chronic cough.  ¨ Bronchitis.  ¨ Emphysema.  ¨ Throat and lung cancer.  · Continued use of cannabis in spite of a mental problem that is likely related to use. Mental problems can include:  ¨ Psychosis.  ¨ Anxiety.  ¨ Difficulty sleeping.  · Need to use more and more cannabis to get the same effect, or lessened effect over time with use of the same amount (tolerance).  · Having withdrawal symptoms when cannabis use is stopped, or using cannabis to reduce or avoid withdrawal symptoms. Withdrawal symptoms include:  ¨ Irritability or anger.  ¨ Anxiety or restlessness.  ¨ Difficulty sleeping.  ¨ Loss of appetite or weight.  ¨ Aches and pains.  ¨ Shakiness.  ¨ Sweating.  ¨ Chills.  DIAGNOSIS  Cannabis use disorder is diagnosed by your health care provider. You may be asked questions about your cannabis use and how it affects your life. A physical exam may be done. A drug screen may be done. You may be referred to a mental health professional. The diagnosis of cannabis use disorder requires at least two symptoms within 12 months. The  type of cannabis use disorder you have depends on the number of symptoms you have. The type may be:  · Mild. Two or three signs and symptoms.    · Moderate. Four or five signs and symptoms.    · Severe. Six or more signs and symptoms.    TREATMENT  Treatment is usually provided by mental health professionals with training in substance use disorders. The following options are available:  · Counseling or talk therapy. Talk therapy addresses the reasons you use cannabis. It also addresses ways to keep you from using again. The goals of talk therapy include:  ¨ Identifying and avoiding triggers for use.  ¨ Learning how to handle cravings.  ¨ Replacing use with healthy activities.  · Support groups. Support groups provide emotional support, advice, and guidance.  · Medicine. Medicine is used to treat mental health issues that trigger cannabis use or that result from it.  HOME CARE INSTRUCTIONS  · Take medicines only as directed by your health care provider.  · Check with your health care provider before starting any new medicines.  · Keep all follow-up visits as directed by your health care provider.  SEEK MEDICAL CARE IF:  · You are not able to take your medicines as directed.  · Your symptoms get worse.  SEEK IMMEDIATE MEDICAL CARE IF:  You have serious thoughts about hurting yourself or others.  FOR MORE INFORMATION  · National [place] on Drug Abuse: www.drugabuse.gov  · Substance Abuse and Mental Health Services Administration: www.samhsa.gov     This information is not intended to replace advice given to you by your health care provider. Make sure you discuss any questions you have with your health care provider.     Document Released: 12/15/2001 Document Revised: 01/08/2016 Document Reviewed: 12/31/2014  [employer] Interactive Patient Education ©2016 [employer]  Leukocytosis  Leukocytosis means you have more white blood cells than normal. White blood cells are made in your bone marrow. The main job of white  blood cells is to fight infection. Having too many white blood cells is a common condition. It can develop as a result of many types of medical problems.  CAUSES   In some cases, your bone marrow may be normal, but it is still making too many white blood cells. This could be the result of:  · Infection.  · Injury.  · Physical stress.  · Emotional stress.  · Surgery.  · Allergic reactions.  · Tumors that do not start in the blood or bone marrow.  · An inherited disease.  · Certain medicines.  · Pregnancy and labor.  In other cases, you may have a bone marrow disorder that is causing your body to make too many white blood cells. Bone marrow disorders include:  · Leukemia. This is a type of blood cancer.  · Myeloproliferative disorders. These disorders cause blood cells to grow abnormally.  SYMPTOMS   Some people have no symptoms. Others have symptoms due to the medical problem that is causing their leukocytosis. These symptoms may include:  · Bleeding.  · Bruising.  · Fever.  · Night sweats.  · Repeated infections.  · Weakness.  · Weight loss.  DIAGNOSIS   Leukocytosis is often found during blood tests that are done as part of a normal physical exam. Your caregiver will probably order other tests to help determine why you have too many white blood cells. These tests may include:  · A complete blood count (CBC). This test measures all the types of blood cells in your body.  · Chest X-rays, urine tests (urinalysis), or other tests to look for signs of infection.  · Bone marrow aspiration. For this test, a needle is put into your bone. Cells from the bone marrow are removed through the needle. The cells are then examined under a microscope.  TREATMENT   Treatment is usually not needed for leukocytosis. However, if a disorder is causing your leukocytosis, it will need to be treated. Treatment may include:  · Antibiotic medicines if you have a bacterial infection.  · Bone marrow transplant. Your diseased bone marrow is  replaced with healthy cells that will grow new bone marrow.  · Chemotherapy. This is the use of drugs to kill cancer cells.  HOME CARE INSTRUCTIONS  · Only take over-the-counter or prescription medicines as directed by your caregiver.  · Maintain a healthy weight. Ask your caregiver what weight is best for you.  · Eat foods that are low in saturated fats and high in fiber. Eat plenty of fruits and vegetables.  · Drink enough fluids to keep your urine clear or pale yellow.  · Get 30 minutes of exercise at least 5 times a week. Check with your caregiver before starting a new exercise routine.  · Limit caffeine and alcohol.  · Do not smoke.  · Keep all follow-up appointments as directed by your caregiver.  SEEK MEDICAL CARE IF:  · You feel weak or more tired than usual.  · You develop chills, a cough, or nasal congestion.  · You lose weight without trying.  · You have night sweats.  · You bruise easily.  SEEK IMMEDIATE MEDICAL CARE IF:  · You bleed more than normal.  · You have chest pain.  · You have trouble breathing.  · You have a fever.  · You have uncontrolled nausea or vomiting.  · You feel dizzy or lightheaded.  MAKE SURE YOU:  · Understand these instructions.  · Will watch your condition.  · Will get help right away if you are not doing well or get worse.     This information is not intended to replace advice given to you by your health care provider. Make sure you discuss any questions you have with your health care provider.     Document Released: 12/06/2012 Document Revised: 03/11/2013 Document Reviewed: 12/06/2012  [employer] Interactive Patient Education ©2016 [employer]            Patient Information     Patient Information    Following emergency treatment: all patient requiring follow-up care must return either to a private physician or a clinic if your condition worsens before you are able to obtain further medical attention, please return to the emergency room.     Billing Information    At [facility], we work to make the billing process streamlined for our patients.  Our Representatives are here to answer any questions you may have regarding your hospital bill.  If you have insurance coverage and have supplied your insurance information to us, we will submit a claim to your insurer on your behalf.  Should you have any questions regarding your bill, we can be reached online or by phone as follows:  Online: You are able pay your bills online or live chat with our representatives about any billing questions you may have. We are here to help Monday - Friday from 8:00am to 7:30pm and 9:00am - 12:00pm on Saturdays.  Please visit [email or web address]  for more information.   Phone:  [phone] or [phone]    Please note that your emergency physician, surgeon, pathologist, radiologist, anesthesiologist, and other specialists are not employed by [facility] and will therefore bill separately for their services.  Please contact them directly for any questions concerning their bills at the numbers below:     Emergency Physician Services:  [phone]  [facility]:  [phone]  [facility]:  [phone]  [facility]:  [phone]    1. Your final bill may vary from the amount quoted upon discharge if all procedures are not complete at that time, or if your doctor has additional procedures of which we are not aware. You will receive an additional bill if you return to the Emergency Department at [facility] for suture removal regardless of the facility of which the sutures were placed.     2. Please arrange for settlement of this account at the emergency registration.    3. All self-pay accounts are due in full at the time of treatment.  If you are unable to meet this obligation then payment is expected within 4-5 days.     4. If you have had radiology studies (CT, X-ray, Ultrasound, MRI), you have received a preliminary result  during your emergency department visit. Please contact the radiology department [phone] to receive a copy of your final result. Please discuss the Final result with your primary physician or with the follow up physician provided.     Crisis Hotline:  [place] Crisis Hotline:  8-590-TXLTYMP or 1-474.667.3046  [place] Crisis Hotline:    1-247.286.8950 or 765-372-1703         ED Discharge Follow Up Questions    1. In order to provide you with very good care, we would like to follow up with a phone call in the next few days.  May we have your permission to contact you?     YES /  NO    2. What is the best phone number to call you? (       )_____-__________    3. What is the best time to call you?      Morning  /  Afternoon  /  Evening                   Patient Signature:  ____________________________________________________________    Date:  ____________________________________________________________      Your appointments     Apr 03, 2017  3:40 PM   New Patient with Awilda Cobian M.D.   [address] ([facility])    [address] [place] [id number]   [phone]           Please bring Photo ID, Insurance Cards, All Medication Bottles and copies of any legal documents (such as Living Will, Power of ) If speaking a language besides English please bring an adult . Please arrive 30 minutes prior for check in and registration. You will be receiving a confirmation call a few days before your appointment from our automated call confirmation system.

## 2017-03-17 NOTE — DISCHARGE INSTRUCTIONS
"      Cannabis Use Disorder  Cannabis use disorder is a mental disorder. It is not one-time or occasional use of cannabis, more commonly known as marijuana. Cannabis use disorder is the continued, nonmedical use of cannabis that interferes with normal life activities or causes health problems. People with cannabis use disorder get a feeling of extreme pleasure and relaxation from cannabis use. This \"high\" is very rewarding and causes people to use over and over.   The mind-altering ingredient in cannabis is know as THC. THC can also interfere with motor coordination, memory, judgment, and accurate sense of space and time. These effects can last for a few days after using cannabis. Regular heavy cannabis use can cause long-lasting problems with thinking and learning. In young people, these problems may be permanent. Cannabis sometimes causes severe anxiety, paranoia, or visual hallucinations. Man-made (synthetic) cannabis-like drugs, such as \"spice\" and \"K2,\" cause the same effects as THC but are much stronger. Cannabis-like drugs can cause dangerously high blood pressure and heart rate.   Cannabis use disorder usually starts in the teenage years. It can trigger the development of schizophrenia. It is somewhat more common in men than women. People who have family members with the disorder or existing mental health issues such as depression and posttraumatic stress disorder are more likely to develop cannabis use disorder. People with cannabis use disorder are at higher risk for use of other drugs of abuse.   SIGNS AND SYMPTOMS  Signs and symptoms of cannabis use disorder include:   · Use of cannabis in larger amounts or over a longer period than intended.    · Unsuccessful attempts to cut down or control cannabis use.    · A lot of time spent obtaining, using, or recovering from the effects of cannabis.    · A strong desire or urge to use cannabis (cravings).    · Continued use of cannabis in spite of problems at " work, school, or home because of use.    · Continued use of cannabis in spite of relationship problems because of use.  · Giving up or cutting down on important life activities because of cannabis use.  · Use of cannabis over and over even in situations when it is physically hazardous, such as when driving a car.    · Continued use of cannabis in spite of a physical problem that is likely related to use. Physical problems can include:  ¨ Chronic cough.  ¨ Bronchitis.  ¨ Emphysema.  ¨ Throat and lung cancer.  · Continued use of cannabis in spite of a mental problem that is likely related to use. Mental problems can include:  ¨ Psychosis.  ¨ Anxiety.  ¨ Difficulty sleeping.  · Need to use more and more cannabis to get the same effect, or lessened effect over time with use of the same amount (tolerance).  · Having withdrawal symptoms when cannabis use is stopped, or using cannabis to reduce or avoid withdrawal symptoms. Withdrawal symptoms include:  ¨ Irritability or anger.  ¨ Anxiety or restlessness.  ¨ Difficulty sleeping.  ¨ Loss of appetite or weight.  ¨ Aches and pains.  ¨ Shakiness.  ¨ Sweating.  ¨ Chills.  DIAGNOSIS  Cannabis use disorder is diagnosed by your health care provider. You may be asked questions about your cannabis use and how it affects your life. A physical exam may be done. A drug screen may be done. You may be referred to a mental health professional. The diagnosis of cannabis use disorder requires at least two symptoms within 12 months. The type of cannabis use disorder you have depends on the number of symptoms you have. The type may be:  · Mild. Two or three signs and symptoms.    · Moderate. Four or five signs and symptoms.    · Severe. Six or more signs and symptoms.    TREATMENT  Treatment is usually provided by mental health professionals with training in substance use disorders. The following options are available:  · Counseling or talk therapy. Talk therapy addresses the reasons you use  cannabis. It also addresses ways to keep you from using again. The goals of talk therapy include:  ¨ Identifying and avoiding triggers for use.  ¨ Learning how to handle cravings.  ¨ Replacing use with healthy activities.  · Support groups. Support groups provide emotional support, advice, and guidance.  · Medicine. Medicine is used to treat mental health issues that trigger cannabis use or that result from it.  HOME CARE INSTRUCTIONS  · Take medicines only as directed by your health care provider.  · Check with your health care provider before starting any new medicines.  · Keep all follow-up visits as directed by your health care provider.  SEEK MEDICAL CARE IF:  · You are not able to take your medicines as directed.  · Your symptoms get worse.  SEEK IMMEDIATE MEDICAL CARE IF:  You have serious thoughts about hurting yourself or others.  FOR MORE INFORMATION  · National [place] on Drug Abuse: www.drugabuse.gov  · Substance Abuse and Mental Health Services Administration: www.samhsa.gov     This information is not intended to replace advice given to you by your health care provider. Make sure you discuss any questions you have with your health care provider.     Document Released: 12/15/2001 Document Revised: 01/08/2016 Document Reviewed: 12/31/2014  [employer] Interactive Patient Education ©2016 [employer]  Leukocytosis  Leukocytosis means you have more white blood cells than normal. White blood cells are made in your bone marrow. The main job of white blood cells is to fight infection. Having too many white blood cells is a common condition. It can develop as a result of many types of medical problems.  CAUSES   In some cases, your bone marrow may be normal, but it is still making too many white blood cells. This could be the result of:  · Infection.  · Injury.  · Physical stress.  · Emotional stress.  · Surgery.  · Allergic reactions.  · Tumors that do not start in the blood or bone marrow.  · An inherited  disease.  · Certain medicines.  · Pregnancy and labor.  In other cases, you may have a bone marrow disorder that is causing your body to make too many white blood cells. Bone marrow disorders include:  · Leukemia. This is a type of blood cancer.  · Myeloproliferative disorders. These disorders cause blood cells to grow abnormally.  SYMPTOMS   Some people have no symptoms. Others have symptoms due to the medical problem that is causing their leukocytosis. These symptoms may include:  · Bleeding.  · Bruising.  · Fever.  · Night sweats.  · Repeated infections.  · Weakness.  · Weight loss.  DIAGNOSIS   Leukocytosis is often found during blood tests that are done as part of a normal physical exam. Your caregiver will probably order other tests to help determine why you have too many white blood cells. These tests may include:  · A complete blood count (CBC). This test measures all the types of blood cells in your body.  · Chest X-rays, urine tests (urinalysis), or other tests to look for signs of infection.  · Bone marrow aspiration. For this test, a needle is put into your bone. Cells from the bone marrow are removed through the needle. The cells are then examined under a microscope.  TREATMENT   Treatment is usually not needed for leukocytosis. However, if a disorder is causing your leukocytosis, it will need to be treated. Treatment may include:  · Antibiotic medicines if you have a bacterial infection.  · Bone marrow transplant. Your diseased bone marrow is replaced with healthy cells that will grow new bone marrow.  · Chemotherapy. This is the use of drugs to kill cancer cells.  HOME CARE INSTRUCTIONS  · Only take over-the-counter or prescription medicines as directed by your caregiver.  · Maintain a healthy weight. Ask your caregiver what weight is best for you.  · Eat foods that are low in saturated fats and high in fiber. Eat plenty of fruits and vegetables.  · Drink enough fluids to keep your urine clear or pale  yellow.  · Get 30 minutes of exercise at least 5 times a week. Check with your caregiver before starting a new exercise routine.  · Limit caffeine and alcohol.  · Do not smoke.  · Keep all follow-up appointments as directed by your caregiver.  SEEK MEDICAL CARE IF:  · You feel weak or more tired than usual.  · You develop chills, a cough, or nasal congestion.  · You lose weight without trying.  · You have night sweats.  · You bruise easily.  SEEK IMMEDIATE MEDICAL CARE IF:  · You bleed more than normal.  · You have chest pain.  · You have trouble breathing.  · You have a fever.  · You have uncontrolled nausea or vomiting.  · You feel dizzy or lightheaded.  MAKE SURE YOU:  · Understand these instructions.  · Will watch your condition.  · Will get help right away if you are not doing well or get worse.     This information is not intended to replace advice given to you by your health care provider. Make sure you discuss any questions you have with your health care provider.     Document Released: 12/06/2012 Document Revised: 03/11/2013 Document Reviewed: 12/06/2012  [employer] Interactive Patient Education ©2016 [employer]

## 2017-03-17 NOTE — ED NOTES
"Pt asked me to hold his \"little wieny\" so that he could urinate, I told him I would find a male tech to do so.    "

## 2017-03-17 NOTE — ED NOTES
"Pt sitting on the end of the bed with legs hanging off, pt has head hung down and is asleep, woke pt and asked him to move back up into bed, pt became angry and yelled \"I can't cause my legs don't work and you people won't give me anything to eat or drink, just give me the damn remote!\"  Advised pt that if he did not move back up onto the bed that security would be called, pt immediately moved back up while mumbling.  "

## 2017-03-17 NOTE — ED NOTES
Pt continues to ask if he will be admitted and hgow many days he can stay, also asks for food, water and juice.  Pt still denies SI or HI at this time

## 2017-03-17 NOTE — ED NOTES
"Found out that pt left [facility] after he asked for food and a TV and they said no he said \" then I'll go to [facility]\"   "

## 2017-03-17 NOTE — ED NOTES
.  Chief Complaint   Patient presents with   • Altered Mental Status     pt arrived via taxi, pt confused rambling about money and not being able to talk writes on paper but does not make any sense, pt does have a [facility] wrist band on for todays date, pt is not having any difficulty speaking resp are even and unlabored, pt is alert to person, place and time, has generalized weakness no unilateral weakness noted

## 2017-03-17 NOTE — ED NOTES
"Pt refusing to get into bed stated did not want to listen to nurses about getting undressed and into bed.  Pt stated he needs us to speak lower and slower this was done for him yet he still refused to get into bed. Then he stated \"if i had a gun I would make you hit the floor\" and then yelled \"shut up\" to me throwing his hands in the air towards my face and then falling back on to the bed.  I asked for security to help with a presence so that I could get him into bed this worked.    "

## 2017-03-17 NOTE — ED AVS SNAPSHOT
3/17/2017          Maxi Esposito  [address] [place] [address]    Dear Maxi:    [facility] wants to ensure your discharge home is safe and you or your loved ones have had all your questions answered regarding your care after you leave the hospital.    You may receive a telephone call within two days of your discharge.  This call is to make certain you understand your discharge instructions as well as ensure we provided you with the best care possible during your stay with us.     The call will only last approximately 3-5 minutes and will be done by a nurse.    Once again, we want to ensure your discharge home is safe and that you have a clear understanding of any next steps in your care.  If you have any questions or concerns, please do not hesitate to contact us, we are here for you.  Thank you for choosing [facility] for your healthcare needs.    Sincerely,    James Woods    [facility]

## 2017-03-22 ENCOUNTER — HOSPITAL ENCOUNTER (EMERGENCY)
Facility: MEDICAL CENTER | Age: 67
End: 2017-03-22
Attending: EMERGENCY MEDICINE
Payer: MEDICARE

## 2017-03-22 ENCOUNTER — APPOINTMENT (OUTPATIENT)
Dept: RADIOLOGY | Facility: MEDICAL CENTER | Age: 67
End: 2017-03-22
Attending: EMERGENCY MEDICINE
Payer: MEDICARE

## 2017-03-22 VITALS
HEART RATE: 82 BPM | RESPIRATION RATE: 18 BRPM | WEIGHT: 271.17 LBS | TEMPERATURE: 99.8 F | BODY MASS INDEX: 42.56 KG/M2 | DIASTOLIC BLOOD PRESSURE: 101 MMHG | HEIGHT: 67 IN | SYSTOLIC BLOOD PRESSURE: 148 MMHG | OXYGEN SATURATION: 89 %

## 2017-03-22 DIAGNOSIS — J44.9 CHRONIC OBSTRUCTIVE PULMONARY DISEASE, UNSPECIFIED COPD TYPE (HCC): ICD-10-CM

## 2017-03-22 DIAGNOSIS — F99 PSYCHIATRIC DISORDER: ICD-10-CM

## 2017-03-22 LAB
ALBUMIN SERPL BCP-MCNC: 3.7 G/DL (ref 3.2–4.9)
ALBUMIN/GLOB SERPL: 1.1 G/DL
ALP SERPL-CCNC: 76 U/L (ref 30–99)
ALT SERPL-CCNC: 17 U/L (ref 2–50)
ANION GAP SERPL CALC-SCNC: 5 MMOL/L (ref 0–11.9)
APPEARANCE UR: CLEAR
AST SERPL-CCNC: 24 U/L (ref 12–45)
BASOPHILS # BLD AUTO: 0.8 % (ref 0–1.8)
BASOPHILS # BLD: 0.07 K/UL (ref 0–0.12)
BILIRUB SERPL-MCNC: 0.4 MG/DL (ref 0.1–1.5)
BILIRUB UR QL STRIP.AUTO: NEGATIVE
BLOOD CULTURE HOLD CXBCH: NORMAL
BNP SERPL-MCNC: 36 PG/ML (ref 0–100)
BUN SERPL-MCNC: 14 MG/DL (ref 8–22)
CALCIUM SERPL-MCNC: 9.3 MG/DL (ref 8.5–10.5)
CHLORIDE SERPL-SCNC: 101 MMOL/L (ref 96–112)
CO2 SERPL-SCNC: 31 MMOL/L (ref 20–33)
COLOR UR: YELLOW
CREAT SERPL-MCNC: 0.84 MG/DL (ref 0.5–1.4)
CULTURE IF INDICATED INDCX: NO UA CULTURE
EOSINOPHIL # BLD AUTO: 0.51 K/UL (ref 0–0.51)
EOSINOPHIL NFR BLD: 5.5 % (ref 0–6.9)
ERYTHROCYTE [DISTWIDTH] IN BLOOD BY AUTOMATED COUNT: 56.4 FL (ref 35.9–50)
GFR SERPL CREATININE-BSD FRML MDRD: >60 ML/MIN/1.73 M 2
GLOBULIN SER CALC-MCNC: 3.3 G/DL (ref 1.9–3.5)
GLUCOSE BLD-MCNC: 94 MG/DL (ref 65–99)
GLUCOSE SERPL-MCNC: 110 MG/DL (ref 65–99)
GLUCOSE UR STRIP.AUTO-MCNC: NEGATIVE MG/DL
HCT VFR BLD AUTO: 48.5 % (ref 42–52)
HGB BLD-MCNC: 15.6 G/DL (ref 14–18)
IMM GRANULOCYTES # BLD AUTO: 0.02 K/UL (ref 0–0.11)
IMM GRANULOCYTES NFR BLD AUTO: 0.2 % (ref 0–0.9)
KETONES UR STRIP.AUTO-MCNC: NEGATIVE MG/DL
LEUKOCYTE ESTERASE UR QL STRIP.AUTO: NEGATIVE
LYMPHOCYTES # BLD AUTO: 2.78 K/UL (ref 1–4.8)
LYMPHOCYTES NFR BLD: 30 % (ref 22–41)
MCH RBC QN AUTO: 29.5 PG (ref 27–33)
MCHC RBC AUTO-ENTMCNC: 32.2 G/DL (ref 33.7–35.3)
MCV RBC AUTO: 91.9 FL (ref 81.4–97.8)
MICRO URNS: NORMAL
MONOCYTES # BLD AUTO: 1.13 K/UL (ref 0–0.85)
MONOCYTES NFR BLD AUTO: 12.2 % (ref 0–13.4)
NEUTROPHILS # BLD AUTO: 4.76 K/UL (ref 1.82–7.42)
NEUTROPHILS NFR BLD: 51.3 % (ref 44–72)
NITRITE UR QL STRIP.AUTO: NEGATIVE
NRBC # BLD AUTO: 0 K/UL
NRBC BLD AUTO-RTO: 0 /100 WBC
PH UR STRIP.AUTO: 7 [PH]
PLATELET # BLD AUTO: 251 K/UL (ref 164–446)
PMV BLD AUTO: 11.6 FL (ref 9–12.9)
POTASSIUM SERPL-SCNC: 4 MMOL/L (ref 3.6–5.5)
PROT SERPL-MCNC: 7 G/DL (ref 6–8.2)
PROT UR QL STRIP: NEGATIVE MG/DL
RBC # BLD AUTO: 5.28 M/UL (ref 4.7–6.1)
RBC UR QL AUTO: NEGATIVE
SODIUM SERPL-SCNC: 137 MMOL/L (ref 135–145)
SP GR UR STRIP.AUTO: 1.01
WBC # BLD AUTO: 9.3 K/UL (ref 4.8–10.8)

## 2017-03-22 PROCEDURE — 80053 COMPREHEN METABOLIC PANEL: CPT

## 2017-03-22 PROCEDURE — 82962 GLUCOSE BLOOD TEST: CPT

## 2017-03-22 PROCEDURE — 99284 EMERGENCY DEPT VISIT MOD MDM: CPT

## 2017-03-22 PROCEDURE — 71010 DX-CHEST-PORTABLE (1 VIEW): CPT

## 2017-03-22 PROCEDURE — 85025 COMPLETE CBC W/AUTO DIFF WBC: CPT

## 2017-03-22 PROCEDURE — 304562 HCHG STAT O2 MASK/CANNULA

## 2017-03-22 PROCEDURE — 81003 URINALYSIS AUTO W/O SCOPE: CPT

## 2017-03-22 PROCEDURE — 304561 HCHG STAT O2

## 2017-03-22 PROCEDURE — 83880 ASSAY OF NATRIURETIC PEPTIDE: CPT

## 2017-03-22 RX ORDER — LISINOPRIL 20 MG/1
40 TABLET ORAL ONCE
Status: DISCONTINUED | OUTPATIENT
Start: 2017-03-22 | End: 2017-03-22

## 2017-03-22 RX ORDER — FUROSEMIDE 40 MG/1
40 TABLET ORAL ONCE
Status: DISCONTINUED | OUTPATIENT
Start: 2017-03-22 | End: 2017-03-22

## 2017-03-22 RX ORDER — CARVEDILOL 3.12 MG/1
3.12 TABLET ORAL ONCE
Status: DISCONTINUED | OUTPATIENT
Start: 2017-03-22 | End: 2017-03-22

## 2017-03-22 NOTE — ED NOTES
Patient sterling king from [facility] in [place] was found to be sleeping , per [facility]sa patient fell asleep enroute, fsbs for hem 140, at triage is 94,patient does not take any of his  meds, patients speech is rambling, non stop, is alert oriented x 3, o2 sat on RA 88%, placed on o2 2l w o2 sat 94%

## 2017-03-22 NOTE — ED PROVIDER NOTES
ED Provider Note    Scribed for Eileen Moreira M.D. by Alia Rousseau. 3/22/2017, 3:13 PM.    Primary care provider: Pcp Pt States None  Means of arrival: walk-in  History obtained from: patient  History limited by: patient's psychiatric disorder    CHIEF COMPLAINT  Chief Complaint   Patient presents with   • Weakness       HPI  Maxi Esposito is a 67 y.o. male who presents to the Emergency Department for general malaise onset 1 week ago with associated tightness in his throat. Patient has been here 7x in the last 5 months. He has been staying at shelters lately. Patient is non- compliant with any of his daily medications. No complaints of chest pain. He is very tangential, begins to answer questions appropriately but then begins to ramble about unrelated topics. Denies chest pain, no shortness of breath, no nausea or vomiting. No leg pain or leg swelling.    Further history limited secondary to patient's psychiatric disorder.    REVIEW OF SYSTEMS  Pertinent positives include malaise, tightness in the throat. Pertinent negatives include no chest pain.  ROS limited secondary to patient's psychiatric disorder.    PAST MEDICAL HISTORY   has a past medical history of Hypertension; Diabetes (CMS-[facility]); Sepsis (CMS-[facility]); Pneumonia; Chronic obstructive pulmonary disease (CMS-[facility]); and Medical non-compliance.    SURGICAL HISTORY   has past surgical history that includes splenectomy (1980).    SOCIAL HISTORY  Social History   Substance Use Topics   • Smoking status: Former Smoker     Quit date: 07/16/2016   • Alcohol Use: No      History   Drug Use No       FAMILY HISTORY  No pertinent family history    CURRENT MEDICATIONS  Home Medications     Reviewed by Mary Reno (Pharmacy Tech) on 03/22/17 at 1847  Med List Status: Complete    Medication Last Dose Status    alprazolam (XANAX) 0.5 MG Tab > 2 days Active    amlodipine (NORVASC) 10 MG Tab > 2 days Active    aspirin EC (ECOTRIN) 81 MG Tablet Delayed Response >2  "days Active    atorvastatin (LIPITOR) 20 MG Tab >2 days Active    carvedilol (COREG) 6.25 MG Tab >2 days Active    fluticasone (FLONASE) 50 MCG/ACT nasal spray >2 days Active    Fluticasone Furoate-Vilanterol (BREO ELLIPTA) 100-25 MCG/INH AEROSOL POWDER, BREATH ACTIVATED >2 days Active    furosemide (LASIX) 40 MG Tab > 2 days Active    glipiZIDE SR (GLUCOTROL) 2.5 MG TABLET SR 24 HR >2 days Active    hydrOXYzine (ATARAX) 25 MG Tab >2 days Active    lisinopril (PRINIVIL, ZESTRIL) 40 MG tablet > 2 days Active    montelukast (SINGULAIR) 10 MG Tab >2 days Active    omeprazole (PRILOSEC) 20 MG delayed-release capsule >2 days Active    paroxetine (PAXIL) 40 MG tablet >2 days Active    potassium chloride SA (K-DUR) 20 MEQ Tab CR >2 days Active    predniSONE (DELTASONE) 10 MG Tab > 2 days Active                ALLERGIES  Allergies   Allergen Reactions   • Shellfish Allergy Shortness of Breath and Swelling     \"Eyes closed up really tight and it was hard to breath\"       PHYSICAL EXAM  Vital Signs: /101 mmHg  Pulse 95  Temp(Src) 36.6 °C (97.9 °F) (Temporal)  Resp 16  Ht 1.702 m (5' 7.01\")  Wt 123 kg (271 lb 2.7 oz)  BMI 42.46 kg/m2  SpO2 93%    Constitutional: Alert, no acute distress  HENT: Normocephalic, atraumatic  Eyes: Pupils equal and reactive, normal conjunctiva, non-icteric  Neck: Supple, normal range of motion, no stridor  Cardiovascular: Regular rhythm, Normal peripheral perfusion, no cyanosis, Normal cardiac auscultation  Pulmonary: No respiratory distress, normal work of breathing, no accessory muscle usage, Clear to auscultation bilaterally, no wheezing, no accessory muscle usage, room air oxygen saturation 90-92%  Abdomen: Soft, non tender, no peritoneal signs, bowel sounds are present, obese.   Skin: Warm, dry, no rashes or lesions. Chronic lower extremity skin changes  Back: No pain with active range of motion  Musculoskeletal: Normal range of motion in all extremities, 2+ pitting edema " bilaterally, chronic lower extremity skin changes  Neurologic: Alert, answers questions appropriately initially then begins rambling, ambulates easily without assistance  Psychiatric: Tangential thought, labile affect    DIAGNOSTIC STUDIES/PROCEDURES:    LABS  Labs Reviewed   CBC WITH DIFFERENTIAL - Abnormal; Notable for the following:     MCHC 32.2 (*)     RDW 56.4 (*)     Monos (Absolute) 1.13 (*)     All other components within normal limits   COMP METABOLIC PANEL - Abnormal; Notable for the following:     Glucose 110 (*)     All other components within normal limits   BTYPE NATRIURETIC PEPTIDE   URINALYSIS,CULTURE IF INDICATED   ESTIMATED GFR   ACCU-CHEK GLUCOSE     All labs reviewed by me.    Radiology results revealed:   DX-CHEST-PORTABLE (1 VIEW)   Final Result      1.  Bibasilar interstitial densities are more pronounced on the right and likely represent atelectasis.      2.  Mild interstitial edema           COURSE & MEDICAL DECISION MAKING  Pertinent Labs & Imaging studies reviewed. (See chart for details)    Review of old medical records for continuity of care. Patient seen and evaluated in this emergency department 3/17/17. Presented for evaluation of altered mental status, noted to have rambling speech about his money and finances. Requesting food and television. Also requesting admission, states that he is homeless. Chest x-ray with findings consistent for interstitial pulmonary edema. He does have a persistent leukocytosis noted. Normal speech, somewhat hoarse. Is able to ambulate without assistance. Discharged home in stable condition, the requesting admission for a place to stay.    Discharge summary reviewed. Patient admitted 3/3/17, discharged 3/9/17. Presented with shortness of breath, noted to have COPD exacerbation. History of daily tobacco use. Negative myocardial perfusion scan performed. Stated that he was unable to walk, then noticed to be walking the hallways without assistance and without  difficulty.    Differential diagnoses include but are not limited to: COPD, COPD exacerbation, pneumonia, electrolyte abnormality, symptomatic anemia, heart failure exacerbation, fluid overload, medication noncompliance    3:13 PM - Patient seen and examined at bedside. Ordered Accu-check glucose, chest xray, CBC, CMP, BTYPE Natriuretic Peptide, UA to evaluate his symptoms.     6:56 PM case discussed with social work, patient has been offered several resources, often makes unreasonable demands. He has demanded admission in the past and has required escort out by security. He has been offered medication assistance, shelter placement in skilled nursing placement and has refused all of these interventions.    Decision Making:  This is a 67 y.o. year old male who presents with history and physical exam as described above. Presentation on arrival is consistent with presentation documented at most recent emergency department visit. He is afebrile, oxygen saturation is 92% on room air, given history of COPD, noncompliance with medications and continued tobacco use I suspect this is his baseline. On physical exam he has no crackles, no accessory muscle usage, no respiratory distress. No significant wheezing. He does have chronic lower extremity edema that is symmetric. He has not been compliant with his prescribed Lasix.    He is afebrile, white blood count is 9.3, no evidence of infectious etiology. No electrolyte abnormalities. BNP is 36 without evidence of heart failure. Urinalysis negative for evidence of infection. Again mild interstitial edema is noted on chest x-ray.    He initially stated he had difficulty walking, he was able to walk the full length of the boyer without assistance, easily ambulates to the restroom. At this time I see no indication for admission. He was given his home medications including antihypertensives, Lasix. Instructed to follow up with primary care for complete recheck. We did offer social  work assistance, however that he has declined all of their services in the past.  states they have no other resources to offer. He is instructed to return if he would like medication assistance, placement assistance or any other .     Discharge home in stable condition    FINAL IMPRESSION  1. Chronic obstructive pulmonary disease, unspecified COPD type (CMS-HCC)    2. Psychiatric disorder          Alia LONG (Scribe), am scribing for, and in the presence of, Eileen Moreira M.D..    Electronically signed by: Alia Rousseau (Scribe), 3/22/2017    IEileen M.D. personally performed the services described in this documentation, as scribed by Alia Rousseau in my presence, and it is both accurate and complete.    The note accurately reflects work and decisions made by me.  Eileen Moreira  3/22/2017  7:05 PM

## 2017-03-22 NOTE — ED AVS SNAPSHOT
3/22/2017          Maxi Esposito  [address] [place] [address]    Dear Maxi:    [facility] wants to ensure your discharge home is safe and you or your loved ones have had all your questions answered regarding your care after you leave the hospital.    You may receive a telephone call within two days of your discharge.  This call is to make certain you understand your discharge instructions as well as ensure we provided you with the best care possible during your stay with us.     The call will only last approximately 3-5 minutes and will be done by a nurse.    Once again, we want to ensure your discharge home is safe and that you have a clear understanding of any next steps in your care.  If you have any questions or concerns, please do not hesitate to contact us, we are here for you.  Thank you for choosing [facility] for your healthcare needs.    Sincerely,    James Woods    [facility]

## 2017-03-22 NOTE — ED AVS SNAPSHOT
Home Care Instructions                                                                                                                Maxi Esposito   MRN: [id number]    Department:  [facility], Emergency Dept   Date of Visit:  3/22/2017            [facility], Emergency Dept    [address] [facility] [address]    Phone:  [phone]      You were seen by     Eileen Moreira M.D.      Your Diagnosis Was     Chronic obstructive pulmonary disease, unspecified COPD type (CMS-HCC)     J44.9       Follow-up Information     1. Follow up with [facility].    Why:  please call tomorrow morning to schedule a follow-up appointment    Contact information    [address] [place] [address] [phone]        2. Go to [facility], Emergency Dept.    Specialty:  Emergency Medicine    Why:  If symptoms worsen or do not continue to improve    Contact information    [address] [facility] [address] [phone]      Medication Information     Review all of your home medications and newly ordered medications with your primary doctor and/or pharmacist as soon as possible. Follow medication instructions as directed by your doctor and/or pharmacist.     Please keep your complete medication list with you and share with your physician. Update the information when medications are discontinued, doses are changed, or new medications (including over-the-counter products) are added; and carry medication information at all times in the event of emergency situations.               Medication List      ASK your doctor about these medications        Instructions    Morning Afternoon Evening Bedtime    alprazolam 0.5 MG Tabs   Commonly known as:  XANAX        Take 1 Tab by mouth 2 times a day as needed for Anxiety.   Dose:  0.5 mg                        amlodipine 10 MG Tabs   Commonly known as:  NORVASC        Take 1 Tab by mouth every day.   Dose:  10 mg                          aspirin EC 81 MG Tbec   Commonly known as:  ECOTRIN        Take 81 mg by mouth every day.   Dose:  81 mg                        atorvastatin 20 MG Tabs   Commonly known as:  LIPITOR        Take 1 Tab by mouth every bedtime.   Dose:  20 mg                        BREO ELLIPTA 100-25 MCG/INH Aepb   Generic drug:  Fluticasone Furoate-Vilanterol        Inhale 1 Puff by mouth every day.   Dose:  1 Puff                        carvedilol 6.25 MG Tabs   Commonly known as:  COREG        Take 1 Tab by mouth 2 times a day, with meals.   Dose:  6.25 mg                        fluticasone 50 MCG/ACT nasal spray   Commonly known as:  FLONASE        Spray 1 Spray in nose every day.   Dose:  1 Spray                        furosemide 40 MG Tabs   Commonly known as:  LASIX        Take 1 Tab by mouth every day.   Dose:  40 mg                        glipiZIDE SR 2.5 MG Tb24   Commonly known as:  GLUCOTROL        Take 2.5 mg by mouth every day.   Dose:  2.5 mg                        hydrOXYzine 25 MG Tabs   Commonly known as:  ATARAX        Take 25 mg by mouth every bedtime.   Dose:  25 mg                        lisinopril 40 MG tablet   Commonly known as:  PRINIVIL, ZESTRIL        Take 1 Tab by mouth every day.   Dose:  40 mg                        omeprazole 20 MG delayed-release capsule   Commonly known as:  PRILOSEC        Take 20 mg by mouth every day.   Dose:  20 mg                        PAXIL 40 MG tablet   Generic drug:  paroxetine        Take 40 mg by mouth every day.   Dose:  40 mg                        potassium chloride SA 20 MEQ Tbcr   Commonly known as:  Kdur        Take 1 Tab by mouth every day.   Dose:  20 mEq                        predniSONE 10 MG Tabs   Commonly known as:  DELTASONE        4 tabs qd x 3d, 3 tabs qd x 3d, 2 tabs qd x 3d, 1 tab qd x 3d.                        SINGULAIR 10 MG Tabs   Generic drug:  montelukast        Take 10 mg by mouth every day.   Dose:  10 mg                                 Procedures and tests performed during your visit     ACCU-CHEK GLUCOSE    Btype Natriuretic Peptide    CBC w/ Differential    Complete Metabolic Panel (CMP)    DX-CHEST-PORTABLE (1 VIEW)    ESTIMATED GFR    Urinalysis, culture if indicated        Discharge Instructions       Please consider refilling your medications, please contact the emergency department if he would like assistance with prescriptions. Return to the emergency department if he developed chest pain, worsening shortness of breath, or any new or worsening symptoms.  Please follow up tomorrow with the primary care clinic listed.    Chronic Obstructive Pulmonary Disease  Chronic obstructive pulmonary disease (COPD) is a common lung condition in which airflow from the lungs is limited. COPD is a general term that can be used to describe many different lung problems that limit airflow, including both chronic bronchitis and emphysema. If you have COPD, your lung function will probably never return to normal, but there are measures you can take to improve lung function and make yourself feel better.  CAUSES   · Smoking (common).  · Exposure to secondhand smoke.  · Genetic problems.  · Chronic inflammatory lung diseases or recurrent infections.  SYMPTOMS  · Shortness of breath, especially with physical activity.  · Deep, persistent (chronic) cough with a large amount of thick mucus.  · Wheezing.  · Rapid breaths (tachypnea).  · Gray or bluish discoloration (cyanosis) of the skin, especially in your fingers, toes, or lips.  · Fatigue.  · Weight loss.  · Frequent infections or episodes when breathing symptoms become much worse (exacerbations).  · Chest tightness.  DIAGNOSIS  Your health care provider will take a medical history and perform a physical examination to diagnose COPD. Additional tests for COPD may include:  · Lung (pulmonary) function tests.  · Chest X-ray.  · CT scan.  · Blood tests.  TREATMENT   Treatment for COPD may  include:  · Inhaler and nebulizer medicines. These help manage the symptoms of COPD and make your breathing more comfortable.  · Supplemental oxygen. Supplemental oxygen is only helpful if you have a low oxygen level in your blood.  · Exercise and physical activity. These are beneficial for nearly all people with COPD.  · Lung surgery or transplant.  · Nutrition therapy to gain weight, if you are underweight.  · Pulmonary rehabilitation. This may involve working with a team of health care providers and specialists, such as respiratory, occupational, and physical therapists.  HOME CARE INSTRUCTIONS  · Take all medicines (inhaled or pills) as directed by your health care provider.  · Avoid over-the-counter medicines or cough syrups that dry up your airway (such as antihistamines) and slow down the elimination of secretions unless instructed otherwise by your health care provider.  · If you are a smoker, the most important thing that you can do is stop smoking. Continuing to smoke will cause further lung damage and breathing trouble. Ask your health care provider for help with quitting smoking. He or she can direct you to community resources or hospitals that provide support.  · Avoid exposure to irritants such as smoke, chemicals, and fumes that aggravate your breathing.  · Use oxygen therapy and pulmonary rehabilitation if directed by your health care provider. If you require home oxygen therapy, ask your health care provider whether you should purchase a pulse oximeter to measure your oxygen level at home.  · Avoid contact with individuals who have a contagious illness.  · Avoid extreme temperature and humidity changes.  · Eat healthy foods. Eating smaller, more frequent meals and resting before meals may help you maintain your strength.  · Stay active, but balance activity with periods of rest. Exercise and physical activity will help you maintain your ability to do things you want to do.  · Preventing infection  and hospitalization is very important when you have COPD. Make sure to receive all the vaccines your health care provider recommends, especially the pneumococcal and influenza vaccines. Ask your health care provider whether you need a pneumonia vaccine.  · Learn and use relaxation techniques to manage stress.  · Learn and use controlled breathing techniques as directed by your health care provider. Controlled breathing techniques include:  ¨ Pursed lip breathing. Start by breathing in (inhaling) through your nose for 1 second. Then, purse your lips as if you were going to whistle and breathe out (exhale) through the pursed lips for 2 seconds.  ¨ Diaphragmatic breathing. Start by putting one hand on your abdomen just above your waist. Inhale slowly through your nose. The hand on your abdomen should move out. Then purse your lips and exhale slowly. You should be able to feel the hand on your abdomen moving in as you exhale.  · Learn and use controlled coughing to clear mucus from your lungs. Controlled coughing is a series of short, progressive coughs. The steps of controlled coughing are:  1. Lean your head slightly forward.  2. Breathe in deeply using diaphragmatic breathing.  3. Try to hold your breath for 3 seconds.  4. Keep your mouth slightly open while coughing twice.  5. Spit any mucus out into a tissue.  6. Rest and repeat the steps once or twice as needed.  SEEK MEDICAL CARE IF:  · You are coughing up more mucus than usual.  · There is a change in the color or thickness of your mucus.  · Your breathing is more labored than usual.  · Your breathing is faster than usual.  SEEK IMMEDIATE MEDICAL CARE IF:  · You have shortness of breath while you are resting.  · You have shortness of breath that prevents you from:  ¨ Being able to talk.  ¨ Performing your usual physical activities.  · You have chest pain lasting longer than 5 minutes.  · Your skin color is more cyanotic than usual.  · You measure low oxygen  saturations for longer than 5 minutes with a pulse oximeter.  MAKE SURE YOU:  · Understand these instructions.  · Will watch your condition.  · Will get help right away if you are not doing well or get worse.     This information is not intended to replace advice given to you by your health care provider. Make sure you discuss any questions you have with your health care provider.     Document Released: 09/27/2006 Document Revised: 01/08/2016 Document Reviewed: 08/14/2014  [employer] Interactive Patient Education ©2016 [employer]            Patient Information     Patient Information    Following emergency treatment: all patient requiring follow-up care must return either to a private physician or a clinic if your condition worsens before you are able to obtain further medical attention, please return to the emergency room.     Billing Information    At [facility], we work to make the billing process streamlined for our patients.  Our Representatives are here to answer any questions you may have regarding your hospital bill.  If you have insurance coverage and have supplied your insurance information to us, we will submit a claim to your insurer on your behalf.  Should you have any questions regarding your bill, we can be reached online or by phone as follows:  Online: You are able pay your bills online or live chat with our representatives about any billing questions you may have. We are here to help Monday - Friday from 8:00am to 7:30pm and 9:00am - 12:00pm on Saturdays.  Please visit [email or web address]  for more information.   Phone:  [phone] or [phone]    Please note that your emergency physician, surgeon, pathologist, radiologist, anesthesiologist, and other specialists are not employed by [facility] and will therefore bill separately for their services.  Please contact them directly for any questions concerning their bills at the numbers below:     Emergency  Physician Services:  [phone]  [facility]:  [phone]  [facility]:  [phone]  [facility]:  [phone]    1. Your final bill may vary from the amount quoted upon discharge if all procedures are not complete at that time, or if your doctor has additional procedures of which we are not aware. You will receive an additional bill if you return to the Emergency Department at [facility] for suture removal regardless of the facility of which the sutures were placed.     2. Please arrange for settlement of this account at the emergency registration.    3. All self-pay accounts are due in full at the time of treatment.  If you are unable to meet this obligation then payment is expected within 4-5 days.     4. If you have had radiology studies (CT, X-ray, Ultrasound, MRI), you have received a preliminary result during your emergency department visit. Please contact the radiology department [phone] to receive a copy of your final result. Please discuss the Final result with your primary physician or with the follow up physician provided.     Crisis Hotline:  [place] Crisis Hotline:  6-752-CMDRRBC or 1-885.401.5485  [place] Crisis Hotline:    1-383.811.5998 or 613-216-5119         ED Discharge Follow Up Questions    1. In order to provide you with very good care, we would like to follow up with a phone call in the next few days.  May we have your permission to contact you?     YES /  NO    2. What is the best phone number to call you? (       )_____-__________    3. What is the best time to call you?      Morning  /  Afternoon  /  Evening                   Patient Signature:  ____________________________________________________________    Date:  ____________________________________________________________      Your appointments     Apr 03, 2017  3:40 PM   New Patient with Awilda Cobian M.D.   [facility] [address] [place] [address] ([facility])      [address]  [facility] [address]  [place] [address]   [phone]           Please bring Photo ID, Insurance Cards, All Medication Bottles and copies of any legal documents (such as Living Will, Power of ) If speaking a language besides English please bring an adult . Please arrive 30 minutes prior for check in and registration. You will be receiving a confirmation call a few days before your appointment from our automated call confirmation system.

## 2017-03-22 NOTE — ED NOTES
Assumed care of pt, report received from Tana TRISTAN. Pt aware of plan of care, 3rd Rn at bedside to attempt IV placement.

## 2017-03-22 NOTE — ED AVS SNAPSHOT
Learnhive Access Code: [email or web address]  Expires: 4/10/2017 11:33 AM    Your email address is not on file at [facility].  Email Addresses are required for you to sign up for Learnhive, please contact [phone] to verify your personal information and to provide your email address prior to attempting to register for Learnhive.    Maxi Skinnynicole  [address] [place] [address]    [employer]  A secure, online tool to manage your health information     [facility]’s Learnhive® is a secure, online tool that connects you to your personalized health information from the privacy of your home -- day or night - making it very easy for you to manage your healthcare. Once the activation process is completed, you can even access your medical information using the Learnhive stephie, which is available for free in the Apple Stephie store or Google Play store.     To learn more about Learnhive, visit [email or web address]    There are two levels of access available (as shown below):   My Chart Features  [facility] Primary Care Doctor [facility]  Specialists [facility]  Urgent  Care Non-[facility] Primary Care Doctor   Email your healthcare team securely and privately 24/7 X X X    Manage appointments: schedule your next appointment; view details of past/upcoming appointments X      Request prescription refills. X      View recent personal medical records, including lab and immunizations X X X X   View health record, including health history, allergies, medications X X X X   Read reports about your outpatient visits, procedures, consult and ER notes X X X X   See your discharge summary, which is a recap of your hospital and/or ER visit that includes your diagnosis, lab results, and care plan X X  X     How to register for Learnhive:  Once your e-mail address has been verified, follow the following steps to sign up for Learnhive.     1. Go to  [email or web address]  2. Click on the Sign Up Now box, which takes you to the New Member Sign Up page. You will need  to provide the following information:  a. Enter your Snacksquare Access Code exactly as it appears at the top of this page. (You will not need to use this code after you’ve completed the sign-up process. If you do not sign up before the expiration date, you must request a new code.)   b. Enter your date of birth.   c. Enter your home email address.   d. Click Submit, and follow the next screen’s instructions.  3. Create a Snacksquare ID. This will be your Snacksquare login ID and cannot be changed, so think of one that is secure and easy to remember.  4. Create a Snacksquare password. You can change your password at any time.  5. Enter your Password Reset Question and Answer. This can be used at a later time if you forget your password.   6. Enter your e-mail address. This allows you to receive e-mail notifications when new information is available in Snacksquare.  7. Click Sign Up. You can now view your health information.    For assistance activating your Snacksquare account, call [phone]

## 2017-03-22 NOTE — ED NOTES
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

## 2017-03-23 ENCOUNTER — PATIENT OUTREACH (OUTPATIENT)
Dept: HEALTH INFORMATION MANAGEMENT | Facility: OTHER | Age: 67
End: 2017-03-23

## 2017-03-23 PROCEDURE — 99283 EMERGENCY DEPT VISIT LOW MDM: CPT

## 2017-03-23 NOTE — ED NOTES
Pt given discharge instructions, follow up care. Pt verbalized understanding. RN to answer and questions pt had. VSS. Pt ambulated out to front lobby via  for refusing to leave room.

## 2017-03-23 NOTE — PROGRESS NOTES
· Chart reviewed.  Patient was discharged from [facility] 3/22/17.  Patient does not have a contact phone number listed in Epic record.  Unable to complete discharge outreach phone call at this time.

## 2017-03-23 NOTE — ED NOTES
"Med rec updated and complete  Allergies reviewed.  Pt has not taken any of his medications in  > 2 days.  Pt stated he fills at [facility] \"over the road\".  Called and [facility] and pt does not have   A profile .  Left medications on med rec per interview with pt.  "

## 2017-03-23 NOTE — DISCHARGE INSTRUCTIONS
Please consider refilling your medications, please contact the emergency department if he would like assistance with prescriptions. Return to the emergency department if he developed chest pain, worsening shortness of breath, or any new or worsening symptoms.  Please follow up tomorrow with the primary care clinic listed.    Chronic Obstructive Pulmonary Disease  Chronic obstructive pulmonary disease (COPD) is a common lung condition in which airflow from the lungs is limited. COPD is a general term that can be used to describe many different lung problems that limit airflow, including both chronic bronchitis and emphysema. If you have COPD, your lung function will probably never return to normal, but there are measures you can take to improve lung function and make yourself feel better.  CAUSES   · Smoking (common).  · Exposure to secondhand smoke.  · Genetic problems.  · Chronic inflammatory lung diseases or recurrent infections.  SYMPTOMS  · Shortness of breath, especially with physical activity.  · Deep, persistent (chronic) cough with a large amount of thick mucus.  · Wheezing.  · Rapid breaths (tachypnea).  · Gray or bluish discoloration (cyanosis) of the skin, especially in your fingers, toes, or lips.  · Fatigue.  · Weight loss.  · Frequent infections or episodes when breathing symptoms become much worse (exacerbations).  · Chest tightness.  DIAGNOSIS  Your health care provider will take a medical history and perform a physical examination to diagnose COPD. Additional tests for COPD may include:  · Lung (pulmonary) function tests.  · Chest X-ray.  · CT scan.  · Blood tests.  TREATMENT   Treatment for COPD may include:  · Inhaler and nebulizer medicines. These help manage the symptoms of COPD and make your breathing more comfortable.  · Supplemental oxygen. Supplemental oxygen is only helpful if you have a low oxygen level in your blood.  · Exercise and physical activity. These are beneficial for nearly all  people with COPD.  · Lung surgery or transplant.  · Nutrition therapy to gain weight, if you are underweight.  · Pulmonary rehabilitation. This may involve working with a team of health care providers and specialists, such as respiratory, occupational, and physical therapists.  HOME CARE INSTRUCTIONS  · Take all medicines (inhaled or pills) as directed by your health care provider.  · Avoid over-the-counter medicines or cough syrups that dry up your airway (such as antihistamines) and slow down the elimination of secretions unless instructed otherwise by your health care provider.  · If you are a smoker, the most important thing that you can do is stop smoking. Continuing to smoke will cause further lung damage and breathing trouble. Ask your health care provider for help with quitting smoking. He or she can direct you to community resources or hospitals that provide support.  · Avoid exposure to irritants such as smoke, chemicals, and fumes that aggravate your breathing.  · Use oxygen therapy and pulmonary rehabilitation if directed by your health care provider. If you require home oxygen therapy, ask your health care provider whether you should purchase a pulse oximeter to measure your oxygen level at home.  · Avoid contact with individuals who have a contagious illness.  · Avoid extreme temperature and humidity changes.  · Eat healthy foods. Eating smaller, more frequent meals and resting before meals may help you maintain your strength.  · Stay active, but balance activity with periods of rest. Exercise and physical activity will help you maintain your ability to do things you want to do.  · Preventing infection and hospitalization is very important when you have COPD. Make sure to receive all the vaccines your health care provider recommends, especially the pneumococcal and influenza vaccines. Ask your health care provider whether you need a pneumonia vaccine.  · Learn and use relaxation techniques to manage  stress.  · Learn and use controlled breathing techniques as directed by your health care provider. Controlled breathing techniques include:  ¨ Pursed lip breathing. Start by breathing in (inhaling) through your nose for 1 second. Then, purse your lips as if you were going to whistle and breathe out (exhale) through the pursed lips for 2 seconds.  ¨ Diaphragmatic breathing. Start by putting one hand on your abdomen just above your waist. Inhale slowly through your nose. The hand on your abdomen should move out. Then purse your lips and exhale slowly. You should be able to feel the hand on your abdomen moving in as you exhale.  · Learn and use controlled coughing to clear mucus from your lungs. Controlled coughing is a series of short, progressive coughs. The steps of controlled coughing are:  1. Lean your head slightly forward.  2. Breathe in deeply using diaphragmatic breathing.  3. Try to hold your breath for 3 seconds.  4. Keep your mouth slightly open while coughing twice.  5. Spit any mucus out into a tissue.  6. Rest and repeat the steps once or twice as needed.  SEEK MEDICAL CARE IF:  · You are coughing up more mucus than usual.  · There is a change in the color or thickness of your mucus.  · Your breathing is more labored than usual.  · Your breathing is faster than usual.  SEEK IMMEDIATE MEDICAL CARE IF:  · You have shortness of breath while you are resting.  · You have shortness of breath that prevents you from:  ¨ Being able to talk.  ¨ Performing your usual physical activities.  · You have chest pain lasting longer than 5 minutes.  · Your skin color is more cyanotic than usual.  · You measure low oxygen saturations for longer than 5 minutes with a pulse oximeter.  MAKE SURE YOU:  · Understand these instructions.  · Will watch your condition.  · Will get help right away if you are not doing well or get worse.     This information is not intended to replace advice given to you by your health care provider.  Make sure you discuss any questions you have with your health care provider.     Document Released: 09/27/2006 Document Revised: 01/08/2016 Document Reviewed: 08/14/2014  Elsevier Interactive Patient Education ©2016 Elsevier Inc.

## 2017-03-23 NOTE — ED NOTES
Pt lying in bed sleeping. Monitors in place. VSS. No s/s of distress noted. Will continue to monitor

## 2017-03-24 ENCOUNTER — HOSPITAL ENCOUNTER (EMERGENCY)
Facility: MEDICAL CENTER | Age: 67
End: 2017-03-24
Attending: EMERGENCY MEDICINE
Payer: MEDICARE

## 2017-03-24 VITALS
DIASTOLIC BLOOD PRESSURE: 98 MMHG | TEMPERATURE: 100 F | OXYGEN SATURATION: 95 % | BODY MASS INDEX: 40.73 KG/M2 | WEIGHT: 260.14 LBS | HEART RATE: 84 BPM | RESPIRATION RATE: 16 BRPM | SYSTOLIC BLOOD PRESSURE: 146 MMHG

## 2017-03-24 DIAGNOSIS — R53.1 GENERALIZED WEAKNESS: ICD-10-CM

## 2017-03-24 PROCEDURE — 304562 HCHG STAT O2 MASK/CANNULA

## 2017-03-24 ASSESSMENT — PAIN SCALES - GENERAL: PAINLEVEL_OUTOF10: 6

## 2017-03-24 NOTE — ED NOTES
"Maxi Esposito  Chief Complaint   Patient presents with   • Other     pt states that he is \"blacking out\",  \"close my eyes and it's a movie\"  \"I have an infection from the shelter that needs to be treated for 5 months\"     Pt biba,  ambulatory to triage with above complaint.  Pt flight of idea,  Was seen here yesterday and discharged.  VSS.    Pt returned to lobby, educated on triage process, and to inform staff of any changes or concerns.     "

## 2017-03-24 NOTE — ED AVS SNAPSHOT
[employer] Access Code: [email or web address]  Expires: 4/10/2017 11:33 AM    Your email address is not on file at [facility].  Email Addresses are required for you to sign up for [employer], please contact [phone] to verify your personal information and to provide your email address prior to attempting to register for [employer].    Maxi Skinnynicole  [address] [facility] [place] [address]    [employer]  A secure, online tool to manage your health information     [facility]’s [employer]® is a secure, online tool that connects you to your personalized health information from the privacy of your home -- day or night - making it very easy for you to manage your healthcare. Once the activation process is completed, you can even access your medical information using the [employer] stephie, which is available for free in the Apple Stephie store or Google Play store.     To learn more about [employer], visit [email or web address]    There are two levels of access available (as shown below):   My Chart Features  [facility] Primary Care Doctor [facility]  Specialists [facility]  Urgent  Care Non-[facility] Primary Care Doctor   Email your healthcare team securely and privately 24/7 X X X    Manage appointments: schedule your next appointment; view details of past/upcoming appointments X      Request prescription refills. X      View recent personal medical records, including lab and immunizations X X X X   View health record, including health history, allergies, medications X X X X   Read reports about your outpatient visits, procedures, consult and ER notes X X X X   See your discharge summary, which is a recap of your hospital and/or ER visit that includes your diagnosis, lab results, and care plan X X  X     How to register for [employer]:  Once your e-mail address has been verified, follow the following steps to sign up for [employer].     1. Go to  [email or web address]  2. Click on the Sign Up Now box, which takes you to the New Member Sign Up page. You will need  to provide the following information:  a. Enter your [employer] Access Code exactly as it appears at the top of this page. (You will not need to use this code after you’ve completed the sign-up process. If you do not sign up before the expiration date, you must request a new code.)   b. Enter your date of birth.   c. Enter your home email address.   d. Click Submit, and follow the next screen’s instructions.  3. Create a [employer] ID. This will be your [employer] login ID and cannot be changed, so think of one that is secure and easy to remember.  4. Create a [employer] password. You can change your password at any time.  5. Enter your Password Reset Question and Answer. This can be used at a later time if you forget your password.   6. Enter your e-mail address. This allows you to receive e-mail notifications when new information is available in [employer].  7. Click Sign Up. You can now view your health information.    For assistance activating your [employer] account, call [phone]

## 2017-03-24 NOTE — ED NOTES
Patient sleeping on gurney. No acute distress. Active chest rise and fall noted. No restlessness/agitation noted. No behavioral pain indicators noted.

## 2017-03-24 NOTE — ED PROVIDER NOTES
"ED Provider Note    CHIEF COMPLAINT  Chief Complaint   Patient presents with   • Other     pt states that he is \"blacking out\",  \"close my eyes and it's a movie\"  \"I have an infection from the shelter that needs to be treated for 5 months\"       HPI  Maxi Esposito is a 67 y.o. male who presents to the emergency department with generalized malaise. The patient was evaluated here for the same complaints yesterday and had a negative workup. He's been seen her multiple times in the past. He tells me he continues to have weakness and states that he continues to just fall asleep spontaneously. He has been noncompliant with follow-up as instructed. He has not had any fevers. He states he does have difficulty with breathing but does have a known history of COPD. He does not have any pain at this time.    REVIEW OF SYSTEMS  See HPI for further details. All other systems are negative.     PAST MEDICAL HISTORY  Past Medical History   Diagnosis Date   • Hypertension    • Diabetes (CMS-[facility])    • Sepsis (CMS-HCC)    • Pneumonia    • Chronic obstructive pulmonary disease (CMS-HCC)    • Medical non-compliance        SOCIAL HISTORY  Social History     Social History   • Marital Status: Single     Spouse Name: N/A   • Number of Children: N/A   • Years of Education: N/A     Social History Main Topics   • Smoking status: Former Smoker     Quit date: 07/16/2016   • Smokeless tobacco: Never Used   • Alcohol Use: No   • Drug Use: No   • Sexual Activity: Not Asked     Other Topics Concern   • None     Social History Narrative           PHYSICAL EXAM  VITAL SIGNS: /98 mmHg  Pulse 98  Temp(Src) 37.8 °C (100 °F)  Resp 16  Wt 118 kg (260 lb 2.3 oz)  SpO2 95%  Constitutional: Unkempt but no acute distress  HENT: Normocephalic, Atraumatic, tympanic membranes are intact and nonerythematous bilaterally, Oropharynx moist without exudates or erythema, Nose normal.   Eyes: PERRLA, EOMI, Conjunctiva normal.  Neck: Supple without " meningismus  Lymphatic: No lymphadenopathy noted.   Cardiovascular: Normal heart rate, Normal rhythm, No murmurs, No rubs, No gallops.   Thorax & Lungs: Normal breath sounds, No respiratory distress, No wheezing, No chest tenderness.   Abdomen: Bowel sounds normal, Soft, No tenderness, no rebound, no guarding, no distention, No masses, No pulsatile masses.   Skin: Warm, Dry, No erythema, No rash.   Back: No tenderness, No CVA tenderness.   Extremities: Atraumatic with symmetric distal pulses, No edema, No tenderness, No cyanosis, No clubbing.   Neurologic: Alert & oriented x 3, cranial nerves II through XII are intact, Normal motor function, Normal sensory function, No focal deficits noted.   Psychiatric: Affect normal, Judgment normal, Mood normal.       COURSE & MEDICAL DECISION MAKING  Pertinent Labs & Imaging studies reviewed. (See chart for details)  This is a 67-year-old gentleman who presents with generalized malaise and weakness. The patient is neurologically intact at this time. I am concerned that the patient continues to come back to the emergency department for secondary gain. The patient will be discharged with clear instructions to follow up with the heart clinic tomorrow for routine health maintenance as he is noncompliant. I reviewed his workup from yesterday and he does not have any significant metabolic derangements and his CBC does not show any evidence of an inflammatory and infectious process.     FINAL IMPRESSION  1. Generalized malaise     Disposition  The patient will be discharged in stable condition    Electronically signed by: Robe Kauffman, 3/24/2017 1:57 AM

## 2017-03-24 NOTE — ED AVS SNAPSHOT
Home Care Instructions                                                                                                                Maxi Esposito   MRN: [id number]    Department:  [facility], Emergency Dept   Date of Visit:  3/23/2017            [facility], Emergency Dept    [address]    [place] [address]    Phone:  [phone]      You were seen by     Robe Kauffman M.D.      Your Diagnosis Was     Generalized weakness     R53.1       Follow-up Information     1. Follow up with [facility] In 1 day.    Contact information    [address]  [place] [address] [phone]        Medication Information     Review all of your home medications and newly ordered medications with your primary doctor and/or pharmacist as soon as possible. Follow medication instructions as directed by your doctor and/or pharmacist.     Please keep your complete medication list with you and share with your physician. Update the information when medications are discontinued, doses are changed, or new medications (including over-the-counter products) are added; and carry medication information at all times in the event of emergency situations.               Medication List      ASK your doctor about these medications        Instructions    Morning Afternoon Evening Bedtime    alprazolam 0.5 MG Tabs   Commonly known as:  XANAX        Take 1 Tab by mouth 2 times a day as needed for Anxiety.   Dose:  0.5 mg                        amlodipine 10 MG Tabs   Commonly known as:  NORVASC        Take 1 Tab by mouth every day.   Dose:  10 mg                        aspirin EC 81 MG Tbec   Commonly known as:  ECOTRIN        Take 81 mg by mouth every day.   Dose:  81 mg                        atorvastatin 20 MG Tabs   Commonly known as:  LIPITOR        Take 1 Tab by mouth every bedtime.   Dose:  20 mg                        BREO ELLIPTA 100-25 MCG/INH Aepb   Generic drug:  Fluticasone  Furoate-Vilanterol        Inhale 1 Puff by mouth every day.   Dose:  1 Puff                        carvedilol 6.25 MG Tabs   Commonly known as:  COREG        Take 1 Tab by mouth 2 times a day, with meals.   Dose:  6.25 mg                        fluticasone 50 MCG/ACT nasal spray   Commonly known as:  FLONASE        Spray 1 Spray in nose every day.   Dose:  1 Spray                        furosemide 40 MG Tabs   Commonly known as:  LASIX        Take 1 Tab by mouth every day.   Dose:  40 mg                        glipiZIDE SR 2.5 MG Tb24   Commonly known as:  GLUCOTROL        Take 2.5 mg by mouth every day.   Dose:  2.5 mg                        hydrOXYzine 25 MG Tabs   Commonly known as:  ATARAX        Take 25 mg by mouth every bedtime.   Dose:  25 mg                        lisinopril 40 MG tablet   Commonly known as:  PRINIVIL, ZESTRIL        Take 1 Tab by mouth every day.   Dose:  40 mg                        omeprazole 20 MG delayed-release capsule   Commonly known as:  PRILOSEC        Take 20 mg by mouth every day.   Dose:  20 mg                        PAXIL 40 MG tablet   Generic drug:  paroxetine        Take 40 mg by mouth every day.   Dose:  40 mg                        potassium chloride SA 20 MEQ Tbcr   Commonly known as:  Kdur        Take 1 Tab by mouth every day.   Dose:  20 mEq                        predniSONE 10 MG Tabs   Commonly known as:  DELTASONE        4 tabs qd x 3d, 3 tabs qd x 3d, 2 tabs qd x 3d, 1 tab qd x 3d.                        SINGULAIR 10 MG Tabs   Generic drug:  montelukast        Take 10 mg by mouth every day.   Dose:  10 mg                                  Discharge Instructions       With the [facility] as discussed          Patient Information     Patient Information    Following emergency treatment: all patient requiring follow-up care must return either to a private physician or a clinic if your condition worsens before you are able to obtain further medical attention, please  return to the emergency room.     Billing Information    At [facility], we work to make the billing process streamlined for our patients.  Our Representatives are here to answer any questions you may have regarding your hospital bill.  If you have insurance coverage and have supplied your insurance information to us, we will submit a claim to your insurer on your behalf.  Should you have any questions regarding your bill, we can be reached online or by phone as follows:  Online: You are able pay your bills online or live chat with our representatives about any billing questions you may have. We are here to help Monday - Friday from 8:00am to 7:30pm and 9:00am - 12:00pm on Saturdays.  Please visit [email or web address]  for more information.   Phone:  [phone] or [phone]    Please note that your emergency physician, surgeon, pathologist, radiologist, anesthesiologist, and other specialists are not employed by [facility] and will therefore bill separately for their services.  Please contact them directly for any questions concerning their bills at the numbers below:     Emergency Physician Services:  [phone]  [facility]:  [phone]  [facility]:  [phone]  [facility]:  [phone]    1. Your final bill may vary from the amount quoted upon discharge if all procedures are not complete at that time, or if your doctor has additional procedures of which we are not aware. You will receive an additional bill if you return to the Emergency Department at [facility] for suture removal regardless of the facility of which the sutures were placed.     2. Please arrange for settlement of this account at the emergency registration.    3. All self-pay accounts are due in full at the time of treatment.  If you are unable to meet this obligation then payment is expected within 4-5 days.     4. If you have had radiology studies  (CT, X-ray, Ultrasound, MRI), you have received a preliminary result during your emergency department visit. Please contact the radiology department [phone] to receive a copy of your final result. Please discuss the Final result with your primary physician or with the follow up physician provided.     Crisis Hotline:  [place] Crisis Hotline:  2-542-NBQFNLL or 1-872.932.2052  [place] Crisis Hotline:    1-214.528.5357 or 734-341-7517         ED Discharge Follow Up Questions    1. In order to provide you with very good care, we would like to follow up with a phone call in the next few days.  May we have your permission to contact you?     YES /  NO    2. What is the best phone number to call you? (       )_____-__________    3. What is the best time to call you?      Morning  /  Afternoon  /  Evening                   Patient Signature:  ____________________________________________________________    Date:  ____________________________________________________________      Your appointments     Apr 03, 2017  3:40 PM   New Patient with Awilda Cobian M.D.   [address] ([facility])    [address] [place] [address] [address] [place] [id number]   [phone]           Please bring Photo ID, Insurance Cards, All Medication Bottles and copies of any legal documents (such as Living Will, Power of ) If speaking a language besides English please bring an adult . Please arrive 30 minutes prior for check in and registration. You will be receiving a confirmation call a few days before your appointment from our automated call confirmation system.

## 2017-03-26 ENCOUNTER — HOSPITAL ENCOUNTER (EMERGENCY)
Facility: MEDICAL CENTER | Age: 67
End: 2017-03-26
Attending: EMERGENCY MEDICINE
Payer: MEDICARE

## 2017-03-26 VITALS
HEART RATE: 73 BPM | WEIGHT: 260 LBS | DIASTOLIC BLOOD PRESSURE: 45 MMHG | TEMPERATURE: 99.7 F | SYSTOLIC BLOOD PRESSURE: 105 MMHG | OXYGEN SATURATION: 97 % | HEIGHT: 67 IN | BODY MASS INDEX: 40.81 KG/M2 | RESPIRATION RATE: 20 BRPM

## 2017-03-26 DIAGNOSIS — R53.83 MALAISE AND FATIGUE: ICD-10-CM

## 2017-03-26 DIAGNOSIS — R53.81 MALAISE AND FATIGUE: ICD-10-CM

## 2017-03-26 PROCEDURE — 99283 EMERGENCY DEPT VISIT LOW MDM: CPT

## 2017-03-26 NOTE — ED AVS SNAPSHOT
[email or web address] Access Code: [email or web address]  Expires: 4/10/2017 11:33 AM    Your email address is not on file at [facility].  Email Addresses are required for you to sign up for [email or web address], please contact [phone] to verify your personal information and to provide your email address prior to attempting to register for [email or web address].    Maxi Skinnynicole  [address] [place] [address]    AntVo[employer]et  A secure, online tool to manage your health information     [facility]’s [email or web address]® is a secure, online tool that connects you to your personalized health information from the privacy of your home -- day or night - making it very easy for you to manage your healthcare. Once the activation process is completed, you can even access your medical information using the [email or web address] stephie, which is available for free in the Apple Stephie store or Google Play store.     To learn more about [email or web address], visit [email or web address]    There are two levels of access available (as shown below):   My Chart Features  [facility] Primary Care Doctor [facility]  Specialists [facility]  Urgent  Care Non-[facility] Primary Care Doctor   Email your healthcare team securely and privately 24/7 X X X    Manage appointments: schedule your next appointment; view details of past/upcoming appointments X      Request prescription refills. X      View recent personal medical records, including lab and immunizations X X X X   View health record, including health history, allergies, medications X X X X   Read reports about your outpatient visits, procedures, consult and ER notes X X X X   See your discharge summary, which is a recap of your hospital and/or ER visit that includes your diagnosis, lab results, and care plan X X  X     How to register for [email or web address]:  Once your e-mail address has been verified, follow the following steps to sign up for [email or web address].     1. Go to  [email or web address]  2. Click on the Sign Up Now box, which takes you to the New Member Sign Up page. You will need  to provide the following information:  a. Enter your RepairPal Access Code exactly as it appears at the top of this page. (You will not need to use this code after you’ve completed the sign-up process. If you do not sign up before the expiration date, you must request a new code.)   b. Enter your date of birth.   c. Enter your home email address.   d. Click Submit, and follow the next screen’s instructions.  3. Create a RepairPal ID. This will be your RepairPal login ID and cannot be changed, so think of one that is secure and easy to remember.  4. Create a RepairPal password. You can change your password at any time.  5. Enter your Password Reset Question and Answer. This can be used at a later time if you forget your password.   6. Enter your e-mail address. This allows you to receive e-mail notifications when new information is available in RepairPal.  7. Click Sign Up. You can now view your health information.    For assistance activating your RepairPal account, call [phone]

## 2017-03-26 NOTE — ED AVS SNAPSHOT
3/26/2017          Maxi Esposito  [address] [place] [address]    Dear Maxi:    [facility] wants to ensure your discharge home is safe and you or your loved ones have had all your questions answered regarding your care after you leave the hospital.    You may receive a telephone call within two days of your discharge.  This call is to make certain you understand your discharge instructions as well as ensure we provided you with the best care possible during your stay with us.     The call will only last approximately 3-5 minutes and will be done by a nurse.    Once again, we want to ensure your discharge home is safe and that you have a clear understanding of any next steps in your care.  If you have any questions or concerns, please do not hesitate to contact us, we are here for you.  Thank you for choosing [facility] for your healthcare needs.    Sincerely,    James Woods    [facility]

## 2017-03-26 NOTE — ED AVS SNAPSHOT
Home Care Instructions                                                                                                                Maxi Esposito   MRN: [id number]    Department:  [facility], Emergency Dept   Date of Visit:  3/26/2017            [facility], Emergency Dept    [address] [facility] [address]    Phone:  [phone]      You were seen by     Jordi Kelly M.D.      Your Diagnosis Was     Malaise and fatigue     R53.81, R53.83       Follow-up Information     1. Follow up with [facility]. Call in 1 day.    Contact information    [address] [id number] [phone]      Medication Information     Review all of your home medications and newly ordered medications with your primary doctor and/or pharmacist as soon as possible. Follow medication instructions as directed by your doctor and/or pharmacist.     Please keep your complete medication list with you and share with your physician. Update the information when medications are discontinued, doses are changed, or new medications (including over-the-counter products) are added; and carry medication information at all times in the event of emergency situations.               Medication List      ASK your doctor about these medications        Instructions    Morning Afternoon Evening Bedtime    alprazolam 0.5 MG Tabs   Commonly known as:  XANAX        Take 1 Tab by mouth 2 times a day as needed for Anxiety.   Dose:  0.5 mg                        amlodipine 10 MG Tabs   Commonly known as:  NORVASC        Take 1 Tab by mouth every day.   Dose:  10 mg                        aspirin EC 81 MG Tbec   Commonly known as:  ECOTRIN        Take 81 mg by mouth every day.   Dose:  81 mg                        atorvastatin 20 MG Tabs   Commonly known as:  LIPITOR        Take 1 Tab by mouth every bedtime.   Dose:  20 mg                        BREO ELLIPTA 100-25 MCG/INH Aepb   Generic  drug:  Fluticasone Furoate-Vilanterol        Inhale 1 Puff by mouth every day.   Dose:  1 Puff                        carvedilol 6.25 MG Tabs   Commonly known as:  COREG        Take 1 Tab by mouth 2 times a day, with meals.   Dose:  6.25 mg                        fluticasone 50 MCG/ACT nasal spray   Commonly known as:  FLONASE        Spray 1 Spray in nose every day.   Dose:  1 Spray                        furosemide 40 MG Tabs   Commonly known as:  LASIX        Take 1 Tab by mouth every day.   Dose:  40 mg                        glipiZIDE SR 2.5 MG Tb24   Commonly known as:  GLUCOTROL        Take 2.5 mg by mouth every day.   Dose:  2.5 mg                        hydrOXYzine 25 MG Tabs   Commonly known as:  ATARAX        Take 25 mg by mouth every bedtime.   Dose:  25 mg                        lisinopril 40 MG tablet   Commonly known as:  PRINIVIL, ZESTRIL        Take 1 Tab by mouth every day.   Dose:  40 mg                        omeprazole 20 MG delayed-release capsule   Commonly known as:  PRILOSEC        Take 20 mg by mouth every day.   Dose:  20 mg                        PAXIL 40 MG tablet   Generic drug:  paroxetine        Take 40 mg by mouth every day.   Dose:  40 mg                        potassium chloride SA 20 MEQ Tbcr   Commonly known as:  Kdur        Take 1 Tab by mouth every day.   Dose:  20 mEq                        predniSONE 10 MG Tabs   Commonly known as:  DELTASONE        4 tabs qd x 3d, 3 tabs qd x 3d, 2 tabs qd x 3d, 1 tab qd x 3d.                        SINGULAIR 10 MG Tabs   Generic drug:  montelukast        Take 10 mg by mouth every day.   Dose:  10 mg                                Patient Information     Patient Information    Following emergency treatment: all patient requiring follow-up care must return either to a private physician or a clinic if your condition worsens before you are able to obtain further medical attention, please return to the emergency room.     Billing  Information    At [facility], we work to make the billing process streamlined for our patients.  Our Representatives are here to answer any questions you may have regarding your hospital bill.  If you have insurance coverage and have supplied your insurance information to us, we will submit a claim to your insurer on your behalf.  Should you have any questions regarding your bill, we can be reached online or by phone as follows:  Online: You are able pay your bills online or live chat with our representatives about any billing questions you may have. We are here to help Monday - Friday from 8:00am to 7:30pm and 9:00am - 12:00pm on Saturdays.  Please visit [email or web address]  for more information.   Phone:  [phone] or [phone]    Please note that your emergency physician, surgeon, pathologist, radiologist, anesthesiologist, and other specialists are not employed by [facility] and will therefore bill separately for their services.  Please contact them directly for any questions concerning their bills at the numbers below:     [facility] Physician Services:  [phone]  [facility]:  [phone]  [facility]:  [phone]  [facility]:  [phone]    1. Your final bill may vary from the amount quoted upon discharge if all procedures are not complete at that time, or if your doctor has additional procedures of which we are not aware. You will receive an additional bill if you return to the Emergency Department at [facility] for suture removal regardless of the facility of which the sutures were placed.     2. Please arrange for settlement of this account at the emergency registration.    3. All self-pay accounts are due in full at the time of treatment.  If you are unable to meet this obligation then payment is expected within 4-5 days.     4. If you have had radiology studies (CT, X-ray, Ultrasound, MRI), you have  received a preliminary result during your emergency department visit. Please contact the radiology department [phone] to receive a copy of your final result. Please discuss the Final result with your primary physician or with the follow up physician provided.     Crisis Hotline:  [place] Crisis Hotline:  1-248-MOUEJPS or 1-320.290.2142  [place] Crisis Hotline:    1-122.161.1818 or 344-474-3084         ED Discharge Follow Up Questions    1. In order to provide you with very good care, we would like to follow up with a phone call in the next few days.  May we have your permission to contact you?     YES /  NO    2. What is the best phone number to call you? (       )_____-__________    3. What is the best time to call you?      Morning  /  Afternoon  /  Evening                   Patient Signature:  ____________________________________________________________    Date:  ____________________________________________________________      Your appointments     Apr 03, 2017  3:40 PM   New Patient with Awilda Cobian M.D.   [address] ([facility])    [address] [place] [address] [address] [place] [address]   [phone]           Please bring Photo ID, Insurance Cards, All Medication Bottles and copies of any legal documents (such as Living Will, Power of ) If speaking a language besides English please bring an adult . Please arrive 30 minutes prior for check in and registration. You will be receiving a confirmation call a few days before your appointment from our automated call confirmation system.

## 2017-03-27 NOTE — ED NOTES
Chief Complaint   Patient presents with   • Tired   • Weakness     Generalized   Pt bib [facility] to triage.  Pt is homeless and is currently non-compliant with medications.  Pt sleeping while in triage.  Pt educated on triage process and instructed to notify triage RN of any change in status.

## 2017-03-27 NOTE — ED NOTES
Pt discharged no IV in place and no prescriptions given.     Security called to help escort pt out of Emergency Room.

## 2017-03-27 NOTE — ED PROVIDER NOTES
"ER Provider Note     Scribed for Jordi Kelly, * by Linda Griffin. 3/26/2017, 7:07 PM.    Primary Care Provider: Pcp Pt States None  Means of Arrival: EMS   History obtained from: Patient  History limited by: None     CHIEF COMPLAINT   Chief Complaint   Patient presents with   • Tired   • Weakness     Generalized       HPI   Maxi Esposito is a 67 y.o. who presents to the emergency department for malaise. Patient reports he is tired at this time. He states \"I had a stroke last Tuesday\" at [facility] with sudden onset aphasia. Denies weakness of upper or lower extremities. Patient is currently homeless.    Further history of present illness cannot be obtained due to the patient's altered mentation.     REVIEW OF SYSTEMS   Patient is mumbling throughout the review of systems he does wake up but appears very tired answers questions and probably goes back to sleep.  He denies any fevers chills I problem sore throat trouble breathing or chest pain. No vomiting no diarrhea. States he had the sudden onset of aphasia.      PAST MEDICAL HISTORY  Past Medical History   Diagnosis Date   • Hypertension    • Diabetes (CMS-HCC)    • Sepsis (CMS-[facility])    • Pneumonia    • Chronic obstructive pulmonary disease (CMS-Formerly [facility])    • Medical non-compliance        SURGICAL HISTORY  Past Surgical History   Procedure Laterality Date   • Splenectomy  1980       SOCIAL HISTORY  Social History   Substance Use Topics   • Smoking status: Former Smoker     Quit date: 07/16/2016   • Smokeless tobacco: Never Used   • Alcohol Use: No       CURRENT MEDICATIONS  Previous Medications    ALPRAZOLAM (XANAX) 0.5 MG TAB    Take 1 Tab by mouth 2 times a day as needed for Anxiety.    AMLODIPINE (NORVASC) 10 MG TAB    Take 1 Tab by mouth every day.    ASPIRIN EC (ECOTRIN) 81 MG TABLET DELAYED RESPONSE    Take 81 mg by mouth every day.    ATORVASTATIN (LIPITOR) 20 MG TAB    Take 1 Tab by mouth every bedtime.    CARVEDILOL (COREG) 6.25 MG TAB    Take 1 " "Tab by mouth 2 times a day, with meals.    FLUTICASONE (FLONASE) 50 MCG/ACT NASAL SPRAY    Spray 1 Spray in nose every day.    FLUTICASONE FUROATE-VILANTEROL (BREO ELLIPTA) 100-25 MCG/INH AEROSOL POWDER, BREATH ACTIVATED    Inhale 1 Puff by mouth every day.    FUROSEMIDE (LASIX) 40 MG TAB    Take 1 Tab by mouth every day.    GLIPIZIDE SR (GLUCOTROL) 2.5 MG TABLET SR 24 HR    Take 2.5 mg by mouth every day.    HYDROXYZINE (ATARAX) 25 MG TAB    Take 25 mg by mouth every bedtime.    LISINOPRIL (PRINIVIL, ZESTRIL) 40 MG TABLET    Take 1 Tab by mouth every day.    MONTELUKAST (SINGULAIR) 10 MG TAB    Take 10 mg by mouth every day.    OMEPRAZOLE (PRILOSEC) 20 MG DELAYED-RELEASE CAPSULE    Take 20 mg by mouth every day.    PAROXETINE (PAXIL) 40 MG TABLET    Take 40 mg by mouth every day.    POTASSIUM CHLORIDE SA (K-DUR) 20 MEQ TAB CR    Take 1 Tab by mouth every day.    PREDNISONE (DELTASONE) 10 MG TAB    4 tabs qd x 3d, 3 tabs qd x 3d, 2 tabs qd x 3d, 1 tab qd x 3d.       ALLERGIES     Allergies   Allergen Reactions   • Shellfish Allergy Shortness of Breath and Swelling     \"Eyes closed up really tight and it was hard to breath\"       PHYSICAL EXAM   Vital Signs: /45 mmHg  Pulse 87  Temp(Src) 37.6 °C (99.7 °F)  Resp 16  Ht 1.702 m (5' 7.01\")  Wt 117.935 kg (260 lb)  BMI 40.71 kg/m2  SpO2 92%     Constitutional: Elderly, disheveled, malodorous  Psychiatric: Calm. Not anxious.  Eyes: EOMI, Conjunctiva normal, No discharge.   Pulmonary: No respiratory distress.   Cardiovascular: Regular rhythm rate of 80 no murmurs  Abdomen: Soft nondistended and nontender.   Skin: Warm, Dry, No erythema, No rash.   Neurologic: Alert & oriented.   Ear nose throat: Oropharynx shows no exudate no erythema  Further physical exam cannot be obtained due to the patient's altered mentation.       COURSE & MEDICAL DECISION MAKING   Nursing notes, VS, PMSFSHx reviewed in chart . This patient as her multiple times. He is very sleepy. He " does have some hypotension but does not have a fever. We'll watch him carefully D repeat vital signs and do several exams.    7:07 PM - Patient was evaluated; Patient is sleeping at bedside.  Evaluation for present illness is secondary to altered mentation. Nurse informed me this is the patient's fourth day in a row in the E.D.      Patient was reexamined approximate 45. Resting, click. Easily woken. We'll continue to monitor.    11:15 PM -Recheck: Patient is sleeping at this time. I explained that he is now stable for discharge. He understands and will comply. After finally waking up the patient is ambulatory. He has no complaints at this time. Ice the patient simply is homeless and requires a place to stay. He did not enjoy being woken up and started having demands for the nurse to give him food and something to eat. This point, the patient has been here multiple times he is at high risk for serious issues as he is homeless and elderly but at this point I will find anything on exam or vital signs to abbott further workup. I did recommend a follow-up with his regular doctor or establish with one with [facility].    FINAL IMPRESSION  1. Malaise and fatigue        PRESCRIPTIONS  Discharge Medication List as of 3/26/2017 10:53 PM        FOLLOW UP  [address] [address]  [phone]  Call in 1 day          -DISCHARGE-     Linda LONG (Heidyibrosalinda), am scribing for, and in the presence of, Jordi Kelly, *.    Electronically signed by: Linda Griffin (Aditi), 3/26/2017    IJordi, * personally performed the services described in this documentation, as scribed by Linda Griffin in my presence, and it is both accurate and complete.    The note accurately reflects work and decisions made by me.  Jordi Kelly  3/27/2017  12:18 AM

## 2017-04-24 ENCOUNTER — HOSPITAL ENCOUNTER (EMERGENCY)
Facility: MEDICAL CENTER | Age: 67
End: 2017-04-24
Attending: EMERGENCY MEDICINE
Payer: MEDICARE

## 2017-04-24 ENCOUNTER — APPOINTMENT (OUTPATIENT)
Dept: RADIOLOGY | Facility: MEDICAL CENTER | Age: 67
End: 2017-04-24
Attending: EMERGENCY MEDICINE
Payer: MEDICARE

## 2017-04-24 VITALS
DIASTOLIC BLOOD PRESSURE: 77 MMHG | OXYGEN SATURATION: 97 % | SYSTOLIC BLOOD PRESSURE: 157 MMHG | HEART RATE: 84 BPM | TEMPERATURE: 98.1 F | HEIGHT: 67 IN | BODY MASS INDEX: 42.21 KG/M2 | WEIGHT: 268.96 LBS | RESPIRATION RATE: 16 BRPM

## 2017-04-24 DIAGNOSIS — Z91.148 NONCOMPLIANCE WITH MEDICATION REGIMEN: ICD-10-CM

## 2017-04-24 DIAGNOSIS — I50.9 ACUTE ON CHRONIC CONGESTIVE HEART FAILURE, UNSPECIFIED CONGESTIVE HEART FAILURE TYPE: ICD-10-CM

## 2017-04-24 DIAGNOSIS — R53.81 MALAISE: ICD-10-CM

## 2017-04-24 LAB
ANION GAP SERPL CALC-SCNC: 6 MMOL/L (ref 0–11.9)
APPEARANCE UR: CLEAR
BASOPHILS # BLD AUTO: 0.8 % (ref 0–1.8)
BASOPHILS # BLD: 0.11 K/UL (ref 0–0.12)
BILIRUB UR QL STRIP.AUTO: NEGATIVE
BNP SERPL-MCNC: 55 PG/ML (ref 0–100)
BUN SERPL-MCNC: 14 MG/DL (ref 8–22)
CALCIUM SERPL-MCNC: 8.7 MG/DL (ref 8.5–10.5)
CHLORIDE SERPL-SCNC: 105 MMOL/L (ref 96–112)
CO2 SERPL-SCNC: 27 MMOL/L (ref 20–33)
COLOR UR: NORMAL
CREAT SERPL-MCNC: 0.76 MG/DL (ref 0.5–1.4)
EKG IMPRESSION: NORMAL
EOSINOPHIL # BLD AUTO: 1.06 K/UL (ref 0–0.51)
EOSINOPHIL NFR BLD: 8 % (ref 0–6.9)
ERYTHROCYTE [DISTWIDTH] IN BLOOD BY AUTOMATED COUNT: 54.3 FL (ref 35.9–50)
ETHANOL BLD-MCNC: 0 G/DL
GFR SERPL CREATININE-BSD FRML MDRD: >60 ML/MIN/1.73 M 2
GLUCOSE BLD-MCNC: 129 MG/DL (ref 65–99)
GLUCOSE SERPL-MCNC: 107 MG/DL (ref 65–99)
GLUCOSE UR STRIP.AUTO-MCNC: NEGATIVE MG/DL
HCT VFR BLD AUTO: 47.7 % (ref 42–52)
HGB BLD-MCNC: 15.1 G/DL (ref 14–18)
IMM GRANULOCYTES # BLD AUTO: 0.04 K/UL (ref 0–0.11)
IMM GRANULOCYTES NFR BLD AUTO: 0.3 % (ref 0–0.9)
KETONES UR STRIP.AUTO-MCNC: NEGATIVE MG/DL
LEUKOCYTE ESTERASE UR QL STRIP.AUTO: NEGATIVE
LYMPHOCYTES # BLD AUTO: 3.64 K/UL (ref 1–4.8)
LYMPHOCYTES NFR BLD: 27.5 % (ref 22–41)
MAGNESIUM SERPL-MCNC: 2 MG/DL (ref 1.5–2.5)
MCH RBC QN AUTO: 29.2 PG (ref 27–33)
MCHC RBC AUTO-ENTMCNC: 31.7 G/DL (ref 33.7–35.3)
MCV RBC AUTO: 92.3 FL (ref 81.4–97.8)
MICRO URNS: NORMAL
MONOCYTES # BLD AUTO: 1.35 K/UL (ref 0–0.85)
MONOCYTES NFR BLD AUTO: 10.2 % (ref 0–13.4)
NEUTROPHILS # BLD AUTO: 7.06 K/UL (ref 1.82–7.42)
NEUTROPHILS NFR BLD: 53.2 % (ref 44–72)
NITRITE UR QL STRIP.AUTO: NEGATIVE
NRBC # BLD AUTO: 0 K/UL
NRBC BLD AUTO-RTO: 0 /100 WBC
PH UR STRIP.AUTO: 5 [PH]
PHOSPHATE SERPL-MCNC: 4 MG/DL (ref 2.5–4.5)
PLATELET # BLD AUTO: 302 K/UL (ref 164–446)
PMV BLD AUTO: 10.8 FL (ref 9–12.9)
POTASSIUM SERPL-SCNC: 4 MMOL/L (ref 3.6–5.5)
PROT UR QL STRIP: NEGATIVE MG/DL
RBC # BLD AUTO: 5.17 M/UL (ref 4.7–6.1)
RBC UR QL AUTO: NEGATIVE
SODIUM SERPL-SCNC: 138 MMOL/L (ref 135–145)
SP GR UR STRIP.AUTO: 1.01
WBC # BLD AUTO: 13.3 K/UL (ref 4.8–10.8)

## 2017-04-24 PROCEDURE — 81003 URINALYSIS AUTO W/O SCOPE: CPT | Mod: 59

## 2017-04-24 PROCEDURE — 83735 ASSAY OF MAGNESIUM: CPT

## 2017-04-24 PROCEDURE — 84100 ASSAY OF PHOSPHORUS: CPT

## 2017-04-24 PROCEDURE — 80048 BASIC METABOLIC PNL TOTAL CA: CPT

## 2017-04-24 PROCEDURE — 85025 COMPLETE CBC W/AUTO DIFF WBC: CPT

## 2017-04-24 PROCEDURE — 700102 HCHG RX REV CODE 250 W/ 637 OVERRIDE(OP): Performed by: EMERGENCY MEDICINE

## 2017-04-24 PROCEDURE — 94760 N-INVAS EAR/PLS OXIMETRY 1: CPT

## 2017-04-24 PROCEDURE — 71010 DX-CHEST-PORTABLE (1 VIEW): CPT

## 2017-04-24 PROCEDURE — 83880 ASSAY OF NATRIURETIC PEPTIDE: CPT

## 2017-04-24 PROCEDURE — 93005 ELECTROCARDIOGRAM TRACING: CPT | Performed by: EMERGENCY MEDICINE

## 2017-04-24 PROCEDURE — 80307 DRUG TEST PRSMV CHEM ANLYZR: CPT

## 2017-04-24 PROCEDURE — 99284 EMERGENCY DEPT VISIT MOD MDM: CPT

## 2017-04-24 PROCEDURE — 304562 HCHG STAT O2 MASK/CANNULA

## 2017-04-24 PROCEDURE — A9270 NON-COVERED ITEM OR SERVICE: HCPCS | Performed by: EMERGENCY MEDICINE

## 2017-04-24 PROCEDURE — 82962 GLUCOSE BLOOD TEST: CPT

## 2017-04-24 RX ORDER — FUROSEMIDE 40 MG/1
40 TABLET ORAL ONCE
Status: COMPLETED | OUTPATIENT
Start: 2017-04-24 | End: 2017-04-24

## 2017-04-24 RX ORDER — SODIUM CHLORIDE 9 MG/ML
1000 INJECTION, SOLUTION INTRAVENOUS ONCE
Status: DISCONTINUED | OUTPATIENT
Start: 2017-04-24 | End: 2017-04-24

## 2017-04-24 RX ADMIN — FUROSEMIDE 40 MG: 40 TABLET ORAL at 07:09

## 2017-04-24 ASSESSMENT — LIFESTYLE VARIABLES: DO YOU DRINK ALCOHOL: NO

## 2017-04-24 ASSESSMENT — PAIN SCALES - GENERAL: PAINLEVEL_OUTOF10: 0

## 2017-04-24 NOTE — ED PROVIDER NOTES
"ED Provider Note    Scribed for Arnulfo Miller M.D. by Brian Alicia. 4/24/2017  5:12 AM    Primary care provider: Pcp Pt States None  Means of arrival: EMS  History obtained from: Patient  History limited by: None    CHIEF COMPLAINT  Chief Complaint   Patient presents with   • Shortness of Breath   • Weakness       HPI  Maxi Esposito is a 67 y.o. male who presents to the Emergency Department complaining of whole-body weakness over the past few months. Patient states he has been going between [facility] and [facility] for similar symptoms. He states \"I know my body, and I know something is wrong.\" He notes being diagnosed for pneumonia 1 week ago at [facility] and believes the pneumonia has returned. Patient notes associated shortness of breath and fevers. He denies vomiting, diarrhea, and cough. Patient denies alcohol use.      REVIEW OF SYSTEMS  Pertinent positives include weakness, shortness of breath, fevers.   Pertinent negatives include no vomiting, diarrhea, and cough.    All other systems reviewed and negative.    C    PAST MEDICAL HISTORY   has a past medical history of Hypertension; Diabetes (CMS-[facility]); Sepsis (CMS-HCC); Pneumonia; Chronic obstructive pulmonary disease (CMS-HCC); and Medical non-compliance.    SURGICAL HISTORY   has past surgical history that includes splenectomy (1980).    SOCIAL HISTORY  Social History   Substance Use Topics   • Smoking status: Former Smoker     Quit date: 07/16/2016   • Smokeless tobacco: Never Used   • Alcohol Use: No      History   Drug Use No       FAMILY HISTORY  History reviewed. No pertinent family history.    CURRENT MEDICATIONS  Home Medications     Reviewed by Wandy Schaefer R.N. (Registered Nurse) on 04/24/17 at 0450  Med List Status: Partial    Medication Last Dose Status    alprazolam (XANAX) 0.5 MG Tab not taking Active    amlodipine (NORVASC) 10 MG Tab not taking Active    aspirin EC (ECOTRIN) 81 MG Tablet Delayed Response not taking Active    " "atorvastatin (LIPITOR) 20 MG Tab not taking Active    carvedilol (COREG) 6.25 MG Tab not taking Active    fluticasone (FLONASE) 50 MCG/ACT nasal spray not taking Active    Fluticasone Furoate-Vilanterol (BREO ELLIPTA) 100-25 MCG/INH AEROSOL POWDER, BREATH ACTIVATED not taking Active    furosemide (LASIX) 40 MG Tab not taking Active    glipiZIDE SR (GLUCOTROL) 2.5 MG TABLET SR 24 HR not taking Active    hydrOXYzine (ATARAX) 25 MG Tab not taking Active    lisinopril (PRINIVIL, ZESTRIL) 40 MG tablet not taking Active    montelukast (SINGULAIR) 10 MG Tab not takings Active    omeprazole (PRILOSEC) 20 MG delayed-release capsule >2 not taking Active    paroxetine (PAXIL) 40 MG tablet >2 not taking Active    potassium chloride SA (K-DUR) 20 MEQ Tab CR not taking Active    predniSONE (DELTASONE) 10 MG Tab not taking Active                ALLERGIES  Allergies   Allergen Reactions   • Shellfish Allergy Shortness of Breath and Swelling     \"Eyes closed up really tight and it was hard to breath\"       PHYSICAL EXAM  VITAL SIGNS: /77 mmHg  Pulse 95  Temp(Src) 36.5 °C (97.7 °F)  Resp 16  Ht 1.702 m (5' 7\")  Wt 122 kg (268 lb 15.4 oz)  BMI 42.12 kg/m2  SpO2 95%  Nursing note and vitals reviewed.  Constitutional: Well-developed and well-nourished. No distress.   HENT: Head is normocephalic and atraumatic. Oropharynx is clear without exudate or erythema. Dry mucus membranes.   Eyes: Pupils are equal, round, and reactive to light. Conjunctiva are normal.   Cardiovascular: Normal rate and regular rhythm. No murmur heard. Normal radial pulses.   Pulmonary/Chest: Breath sounds normal. No wheezes or rales. No chest wall tenderness.   Abdominal: Soft and non-tender. No distention. Obese.  Musculoskeletal: Extremities exhibit normal range of motion without edema or tenderness. No calf tenderness or palpable cords.   Neurological: Awake, alert and oriented to person, place, and time. No focal deficits noted.  Skin: Skin is warm " and dry. No rash.   Psychiatric: Normal mood and affect. Appropriate for clinical situation       DIAGNOSTIC STUDIES / PROCEDURES    EKG Interpretation  See labs below, interpreted by me.    LABS  Results for orders placed or performed during the hospital encounter of 04/24/17   CBC w/ Differential   Result Value Ref Range    WBC 13.3 (H) 4.8 - 10.8 K/uL    RBC 5.17 4.70 - 6.10 M/uL    Hemoglobin 15.1 14.0 - 18.0 g/dL    Hematocrit 47.7 42.0 - 52.0 %    MCV 92.3 81.4 - 97.8 fL    MCH 29.2 27.0 - 33.0 pg    MCHC 31.7 (L) 33.7 - 35.3 g/dL    RDW 54.3 (H) 35.9 - 50.0 fL    Platelet Count 302 164 - 446 K/uL    MPV 10.8 9.0 - 12.9 fL    Neutrophils-Polys 53.20 44.00 - 72.00 %    Lymphocytes 27.50 22.00 - 41.00 %    Monocytes 10.20 0.00 - 13.40 %    Eosinophils 8.00 (H) 0.00 - 6.90 %    Basophils 0.80 0.00 - 1.80 %    Immature Granulocytes 0.30 0.00 - 0.90 %    Nucleated RBC 0.00 /100 WBC    Neutrophils (Absolute) 7.06 1.82 - 7.42 K/uL    Lymphs (Absolute) 3.64 1.00 - 4.80 K/uL    Monos (Absolute) 1.35 (H) 0.00 - 0.85 K/uL    Eos (Absolute) 1.06 (H) 0.00 - 0.51 K/uL    Baso (Absolute) 0.11 0.00 - 0.12 K/uL    Immature Granulocytes (abs) 0.04 0.00 - 0.11 K/uL    NRBC (Absolute) 0.00 K/uL   Basic Metabolic Panel (BMP)   Result Value Ref Range    Sodium 138 135 - 145 mmol/L    Potassium 4.0 3.6 - 5.5 mmol/L    Chloride 105 96 - 112 mmol/L    Co2 27 20 - 33 mmol/L    Glucose 107 (H) 65 - 99 mg/dL    Bun 14 8 - 22 mg/dL    Creatinine 0.76 0.50 - 1.40 mg/dL    Calcium 8.7 8.5 - 10.5 mg/dL    Anion Gap 6.0 0.0 - 11.9   Btype Natriuretic Peptide   Result Value Ref Range    B Natriuretic Peptide 55 0 - 100 pg/mL   Magnesium   Result Value Ref Range    Magnesium 2.0 1.5 - 2.5 mg/dL   Phosphorus   Result Value Ref Range    Phosphorus 4.0 2.5 - 4.5 mg/dL   DIAGNOSTIC ALCOHOL   Result Value Ref Range    Diagnostic Alcohol 0.00 0.00 g/dL   URINALYSIS   Result Value Ref Range    Color Lt. Yellow     Character Clear     Specific Gravity  1.013 <1.035    Ph 5.0 5.0-8.0    Glucose Negative Negative mg/dL    Ketones Negative Negative mg/dL    Protein Negative Negative mg/dL    Bilirubin Negative Negative    Nitrite Negative Negative    Leukocyte Esterase Negative Negative    Occult Blood Negative Negative    Micro Urine Req see below    ESTIMATED GFR   Result Value Ref Range    GFR If African American >60 >60 mL/min/1.73 m 2    GFR If Non African American >60 >60 mL/min/1.73 m 2   ACCU-CHEK GLUCOSE   Result Value Ref Range    Glucose - Accu-Ck 129 (H) 65 - 99 mg/dL   EKG (ER)   Result Value Ref Range    Report       [facility] Emergency Dept.    Test Date:  2017  Pt Name:    KATARINA BANKS             Department: ER  MRN:        [id number]                      Room:        14  Gender:     M                            Technician: [id number]  :        1950                   Requested By:HENRY MENDEZ  Order #:    [id number]                    Reading MD: HENRY MENDEZ MD    Measurements  Intervals                                Axis  Rate:       55                           P:          65  AZ:         124                          QRS:        76  QRSD:       86                           T:          44  QT:         388  QTc:        371    Interpretive Statements  SINUS BRADYCARDIA  MULTIPLE ATRIAL PREMATURE COMPLEXES  Compared to ECG 2017 08:37:06  Atrial premature complex(es) now present  Sinus rhythm no longer present    Electronically Signed On 2017 8:38:31 PDT by HENRY MENDEZ MD        All labs reviewed by me.    RADIOLOGY  DX-CHEST-PORTABLE (1 VIEW)   Final Result      Mild diffuse interstitial pulmonary edema and stable enlargement of the cardiomediastinal silhouette. Constellation of findings is suggestive of CHF.        The radiologist's interpretation of all radiological studies have been reviewed by me.    COURSE & MEDICAL DECISION MAKING  Nursing notes, VS, PMSFHx reviewed in chart.     Review of  past medical records shows the patient was seen in ER on March 26, 2016 for fatigue and malaise.    5:12 AM - Patient seen and examined at bedside. Patient will be treated with Lasix 40 mg. Ordered DX chest, estimated GFR, urinalysis, diagnostic alcohol, CBC, BMP, BNP, magnesium, phosphorus, accu-chek glucose, EKG to evaluate his symptoms. The differential diagnoses include but are not limited to: electrolyte abnormality, pneumonia, UTI     10:15 AM Patient reevaluated at bedside. Discussed lab and radiology results which are normal. The patient will be discharged and should return if symptoms worsen or if new symptoms arise. The patient understands and agrees to plan.       The patient was discharged home with an information sheet and told to return immediately for any signs or symptoms listed.  The patient agreed to the discharge precautions and follow-up plan which is documented in EPIC.      The patient will return for new or worsening symptoms and is stable at the time of discharge.    DISPOSITION:  Patient will be discharged home in stable condition.    FOLLOW UP:  No follow-up provider specified.    OUTPATIENT MEDICATIONS:  Discharge Medication List as of 4/24/2017 10:16 AM             FINAL IMPRESSION  1. Acute on chronic congestive heart failure, unspecified congestive heart failure type (CMS-HCC)    2. Noncompliance with medication regimen    3. Malaise          Brian LONG (Aditi), am scribing for, and in the presence of, Arnulfo Miller M.D..    Electronically signed by: Brian Alicia (Aditi), 4/24/2017    Arnulfo LONG M.D. personally performed the services described in this documentation, as scribed by Brian Alicia in my presence, and it is both accurate and complete.    The note accurately reflects work and decisions made by me.  Arnulfo Miller  4/24/2017  11:19 AM

## 2017-04-24 NOTE — ED AVS SNAPSHOT
4/24/2017    Maxi Esposito  [address] [place] [address]    Dear Maxi:    [facility] wants to ensure your discharge home is safe and you or your loved ones have had all of your questions answered regarding your care after you leave the hospital.    Below is a list of resources and contact information should you have any questions regarding your hospital stay, follow-up instructions, or active medical symptoms.    Questions or Concerns Regarding… Contact   Medical Questions Related to Your Discharge  (7 days a week, 8am-5pm) Contact a Nurse Care Coordinator   [phone]   Medical Questions Not Related to Your Discharge  (24 hours a day / 7 days a week)  Contact the Nurse Health Line   [phone]    Medications or Discharge Instructions Refer to your discharge packet   or contact your [facility] Primary Care Provider   [phone]   Follow-up Appointment(s) Schedule your appointment via [employer]   or contact Scheduling [phone]   Billing Review your statement via [employer]  or contact Billing [phone]   Medical Records Review your records via [employer]   or contact Medical Records [phone]     You may receive a telephone call within two days of discharge. This call is to make certain you understand your discharge instructions and have the opportunity to have any questions answered. You can also easily access your medical information, test results and upcoming appointments via the [employer] free online health management tool. You can learn more and sign up at [email or web address]. For assistance setting up your [employer] account, please call [phone].    Once again, we want to ensure your discharge home is safe and that you have a clear understanding of any next steps in your care. If you have any questions or concerns, please do not hesitate to contact us, we are here for you. Thank you for choosing [facility] for your healthcare needs.    Sincerely,    Your [facility] Healthcare Team

## 2017-04-24 NOTE — ED NOTES
Maxi Esposito  67 y.o.  male  Chief Complaint   Patient presents with   • Shortness of Breath   • Weakness     Brought in by [employer] picked up in front of [employer]. C/o unable to sleep due to SOB. Lower leg swelling noted. Afebrile.

## 2017-04-24 NOTE — ED AVS SNAPSHOT
Home Care Instructions                                                                                                                Maxi Esposito   MRN: [id number]    Department:  [facility], Emergency Dept   Date of Visit:  4/24/2017            [facility], Emergency Dept    [address]    [place] [address]    Phone:  [phone]      You were seen by     Arnulfo Miller M.D.      Your Diagnosis Was     Acute on chronic congestive heart failure, unspecified congestive heart failure type (CMS-HCC)     I50.9       These are the medications you received during your hospitalization from 04/24/2017 0347 to 04/24/2017 1016     Date/Time Order Dose Route Action    04/24/2017 0709 furosemide (LASIX) tablet 40 mg 40 mg Oral Given      Medication Information     Review all of your home medications and newly ordered medications with your primary doctor and/or pharmacist as soon as possible. Follow medication instructions as directed by your doctor and/or pharmacist.     Please keep your complete medication list with you and share with your physician. Update the information when medications are discontinued, doses are changed, or new medications (including over-the-counter products) are added; and carry medication information at all times in the event of emergency situations.               Medication List      ASK your doctor about these medications        Instructions    Morning Afternoon Evening Bedtime    alprazolam 0.5 MG Tabs   Commonly known as:  XANAX        Take 1 Tab by mouth 2 times a day as needed for Anxiety.   Dose:  0.5 mg                        amlodipine 10 MG Tabs   Commonly known as:  NORVASC        Take 1 Tab by mouth every day.   Dose:  10 mg                        aspirin EC 81 MG Tbec   Commonly known as:  ECOTRIN        Take 81 mg by mouth every day.   Dose:  81 mg                        atorvastatin 20 MG Tabs   Commonly known as:  LIPITOR        Take 1  Tab by mouth every bedtime.   Dose:  20 mg                        BREO ELLIPTA 100-25 MCG/INH Aepb   Generic drug:  Fluticasone Furoate-Vilanterol        Inhale 1 Puff by mouth every day.   Dose:  1 Puff                        carvedilol 6.25 MG Tabs   Commonly known as:  COREG        Take 1 Tab by mouth 2 times a day, with meals.   Dose:  6.25 mg                        fluticasone 50 MCG/ACT nasal spray   Commonly known as:  FLONASE        Spray 1 Spray in nose every day.   Dose:  1 Spray                        furosemide 40 MG Tabs   Last time this was given:  40 mg on 4/24/2017  7:09 AM   Commonly known as:  LASIX        Take 1 Tab by mouth every day.   Dose:  40 mg                        glipiZIDE SR 2.5 MG Tb24   Commonly known as:  GLUCOTROL        Take 2.5 mg by mouth every day.   Dose:  2.5 mg                        hydrOXYzine 25 MG Tabs   Commonly known as:  ATARAX        Take 25 mg by mouth every bedtime.   Dose:  25 mg                        lisinopril 40 MG tablet   Commonly known as:  PRINIVIL, ZESTRIL        Take 1 Tab by mouth every day.   Dose:  40 mg                        omeprazole 20 MG delayed-release capsule   Commonly known as:  PRILOSEC        Take 20 mg by mouth every day.   Dose:  20 mg                        PAXIL 40 MG tablet   Generic drug:  paroxetine        Take 40 mg by mouth every day.   Dose:  40 mg                        potassium chloride SA 20 MEQ Tbcr   Commonly known as:  Kdur        Take 1 Tab by mouth every day.   Dose:  20 mEq                        predniSONE 10 MG Tabs   Commonly known as:  DELTASONE        4 tabs qd x 3d, 3 tabs qd x 3d, 2 tabs qd x 3d, 1 tab qd x 3d.                        SINGULAIR 10 MG Tabs   Generic drug:  montelukast        Take 10 mg by mouth every day.   Dose:  10 mg                                Procedures and tests performed during your visit     ACCU-CHEK GLUCOSE    Basic Metabolic Panel (BMP)    Btype Natriuretic Peptide    CBC w/  Differential    Cardiac Monitoring    DIAGNOSTIC ALCOHOL    DX-CHEST-PORTABLE (1 VIEW)    EKG (ER)    ESTIMATED GFR    IV Saline Lock    Magnesium    Phosphorus    Pulse Ox    URINALYSIS        Discharge Instructions       Fatigue  Fatigue is feeling tired all of the time, a lack of energy, or a lack of motivation. Occasional or mild fatigue is often a normal response to activity or life in general. However, long-lasting (chronic) or extreme fatigue may indicate an underlying medical condition.  HOME CARE INSTRUCTIONS   Watch your fatigue for any changes. The following actions may help to lessen any discomfort you are feeling:  · Talk to your health care provider about how much sleep you need each night. Try to get the required amount every night.  · Take medicines only as directed by your health care provider.  · Eat a healthy and nutritious diet. Ask your health care provider if you need help changing your diet.  · Drink enough fluid to keep your urine clear or pale yellow.  · Practice ways of relaxing, such as yoga, meditation, massage therapy, or acupuncture.  · Exercise regularly.    · Change situations that cause you stress. Try to keep your work and personal routine reasonable.  · Do not abuse illegal drugs.  · Limit alcohol intake to no more than 1 drink per day for nonpregnant women and 2 drinks per day for men. One drink equals 12 ounces of beer, 5 ounces of wine, or 1½ ounces of hard liquor.  · Take a multivitamin, if directed by your health care provider.  SEEK MEDICAL CARE IF:   · Your fatigue does not get better.  · You have a fever.    · You have unintentional weight loss or gain.  · You have headaches.    · You have difficulty:    · Falling asleep.  · Sleeping throughout the night.  · You feel angry, guilty, anxious, or sad.     · You are unable to have a bowel movement (constipation).    · You skin is dry.     · Your legs or another part of your body is swollen.    SEEK IMMEDIATE MEDICAL CARE IF:      · You feel confused.    · Your vision is blurry.  · You feel faint or pass out.    · You have a severe headache.    · You have severe abdominal, pelvic, or back pain.    · You have chest pain, shortness of breath, or an irregular or fast heartbeat.    · You are unable to urinate or you urinate less than normal.    · You develop abnormal bleeding, such as bleeding from the rectum, vagina, nose, lungs, or nipples.  · You vomit blood.     · You have thoughts about harming yourself or committing suicide.    · You are worried that you might harm someone else.       This information is not intended to replace advice given to you by your health care provider. Make sure you discuss any questions you have with your health care provider.     Document Released: 10/14/2008 Document Revised: 01/08/2016 Document Reviewed: 04/21/2015  [employer] Interactive Patient Education ©2016 Elsevier Inc.        Heart Failure  Heart failure means your heart has trouble pumping blood. This makes it hard for your body to work well. Heart failure is usually a long-term (chronic) condition. You must take good care of yourself and follow your doctor's treatment plan.  HOME CARE  · Take your heart medicine as told by your doctor.  ¨ Do not stop taking medicine unless your doctor tells you to.  ¨ Do not skip any dose of medicine.  ¨ Refill your medicines before they run out.  ¨ Take other medicines only as told by your doctor or pharmacist.  · Stay active if told by your doctor. The elderly and people with severe heart failure should talk with a doctor about physical activity.  · Eat heart-healthy foods. Choose foods that are without trans fat and are low in saturated fat, cholesterol, and salt (sodium). This includes fresh or frozen fruits and vegetables, fish, lean meats, fat-free or low-fat dairy foods, whole grains, and high-fiber foods. Lentils and dried peas and beans (legumes) are also good choices.  · Limit salt if told by your  doctor.  · Cook in a healthy way. Roast, grill, broil, bake, poach, steam, or stir-smith foods.  · Limit fluids as told by your doctor.  · Weigh yourself every morning. Do this after you pee (urinate) and before you eat breakfast. Write down your weight to give to your doctor.  · Take your blood pressure and write it down if your doctor tells you to.  · Ask your doctor how to check your pulse. Check your pulse as told.  · Lose weight if told by your doctor.  · Stop smoking or chewing tobacco. Do not use gum or patches that help you quit without your doctor's approval.  · Schedule and go to doctor visits as told.  · Nonpregnant women should have no more than 1 drink a day. Men should have no more than 2 drinks a day. Talk to your doctor about drinking alcohol.  · Stop illegal drug use.  · Stay current with shots (immunizations).  · Manage your health conditions as told by your doctor.  · Learn to manage your stress.  · Rest when you are tired.  · If it is really hot outside:  ¨ Avoid intense activities.  ¨ Use air conditioning or fans, or get in a cooler place.  ¨ Avoid caffeine and alcohol.  ¨ Wear loose-fitting, lightweight, and light-colored clothing.  · If it is really cold outside:  ¨ Avoid intense activities.  ¨ Layer your clothing.  ¨ Wear mittens or gloves, a hat, and a scarf when going outside.  ¨ Avoid alcohol.  · Learn about heart failure and get support as needed.  · Get help to maintain or improve your quality of life and your ability to care for yourself as needed.  GET HELP IF:   · You gain weight quickly.  · You are more short of breath than usual.  · You cannot do your normal activities.  · You tire easily.  · You cough more than normal, especially with activity.  · You have any or more puffiness (swelling) in areas such as your hands, feet, ankles, or belly (abdomen).  · You cannot sleep because it is hard to breathe.  · You feel like your heart is beating fast (palpitations).  · You get dizzy or  light-headed when you stand up.  GET HELP RIGHT AWAY IF:   · You have trouble breathing.  · There is a change in mental status, such as becoming less alert or not being able to focus.  · You have chest pain or discomfort.  · You faint.  MAKE SURE YOU:   · Understand these instructions.  · Will watch your condition.  · Will get help right away if you are not doing well or get worse.     This information is not intended to replace advice given to you by your health care provider. Make sure you discuss any questions you have with your health care provider.     Document Released: 09/26/2009 Document Revised: 01/08/2016 Document Reviewed: 02/03/2014  [employer] Interactive Patient Education ©2016 Elsevier Inc.            Patient Information     Patient Information    Following emergency treatment: all patient requiring follow-up care must return either to a private physician or a clinic if your condition worsens before you are able to obtain further medical attention, please return to the emergency room.     Billing Information    At [facility], we work to make the billing process streamlined for our patients.  Our Representatives are here to answer any questions you may have regarding your hospital bill.  If you have insurance coverage and have supplied your insurance information to us, we will submit a claim to your insurer on your behalf.  Should you have any questions regarding your bill, we can be reached online or by phone as follows:  Online: You are able pay your bills online or live chat with our representatives about any billing questions you may have. We are here to help Monday - Friday from 8:00am to 7:30pm and 9:00am - 12:00pm on Saturdays.  Please visit [email or web address]  for more information.   Phone:  [phone] or [phone]    Please note that your emergency physician, surgeon, pathologist, radiologist, anesthesiologist, and other specialists are not employed  by [facility] and will therefore bill separately for their services.  Please contact them directly for any questions concerning their bills at the numbers below:     Emergency Physician Services:  [phone]  [facility]:  [phone]  [facility]:  [phone]  [facility]:  [phone]    1. Your final bill may vary from the amount quoted upon discharge if all procedures are not complete at that time, or if your doctor has additional procedures of which we are not aware. You will receive an additional bill if you return to the Emergency Department at [facility] for suture removal regardless of the facility of which the sutures were placed.     2. Please arrange for settlement of this account at the emergency registration.    3. All self-pay accounts are due in full at the time of treatment.  If you are unable to meet this obligation then payment is expected within 4-5 days.     4. If you have had radiology studies (CT, X-ray, Ultrasound, MRI), you have received a preliminary result during your emergency department visit. Please contact the radiology department [phone] to receive a copy of your final result. Please discuss the Final result with your primary physician or with the follow up physician provided.     Crisis Hotline:  [place] Crisis Hotline:  5-515-SKYJPAX or 1-513.983.6460  [place] Crisis Hotline:    1-542.556.8247 or 397-813-6923         ED Discharge Follow Up Questions    1. In order to provide you with very good care, we would like to follow up with a phone call in the next few days.  May we have your permission to contact you?     YES /  NO    2. What is the best phone number to call you? (       )_____-__________    3. What is the best time to call you?      Morning  /  Afternoon  /  Evening                   Patient Signature:  ____________________________________________________________    Date:   ____________________________________________________________

## 2017-04-24 NOTE — ED NOTES
Pt up for discharge.  Pt refusing discharge at this time.  Requesting to speak with ERP.  Dr. Miller notified.

## 2017-04-24 NOTE — ED NOTES
Pt not in his rm, pt found sleeping on the gurney in R9.  Directed back to rm.  Sleeping in his rm, B14, chart up for ERP.

## 2017-04-24 NOTE — ED NOTES
Pt found outside of [facility], pt states was released last week from hospital for pneumonia , pt is non complaint with any meds etc, pt is diabetic states can't stay awake, denies any alcohol use or drugs at this time.

## 2017-04-24 NOTE — ED AVS SNAPSHOT
[employer] Access Code: [email or web address]  Expires: 5/9/2017 10:59 AM    Your email address is not on file at [employer].  Email Addresses are required for you to sign up for [employer], please contact [phone] to verify your personal information and to provide your email address prior to attempting to register for [employer].    Maxi Skinnynicole  [address] [place] [address]    [employer]  A secure, online tool to manage your health information     [employer]’s [employer]® is a secure, online tool that connects you to your personalized health information from the privacy of your home -- day or night - making it very easy for you to manage your healthcare. Once the activation process is completed, you can even access your medical information using the [employer] stephie, which is available for free in the Apple Stephie store or Google Play store.     To learn more about [employer], visit [email or web address]    There are two levels of access available (as shown below):   My Chart Features  [facility] Primary Care Doctor [facility]  Specialists [facility]  Urgent  Care Non-[facility] Primary Care Doctor   Email your healthcare team securely and privately 24/7 X X X    Manage appointments: schedule your next appointment; view details of past/upcoming appointments X      Request prescription refills. X      View recent personal medical records, including lab and immunizations X X X X   View health record, including health history, allergies, medications X X X X   Read reports about your outpatient visits, procedures, consult and ER notes X X X X   See your discharge summary, which is a recap of your hospital and/or ER visit that includes your diagnosis, lab results, and care plan X X  X     How to register for [employer]:  Once your e-mail address has been verified, follow the following steps to sign up for [employer].     1. Go to  [email or web address]  2. Click on the Sign Up Now box, which takes you to the New Member Sign Up page. You will need  to provide the following information:  a. Enter your [employer] Access Code exactly as it appears at the top of this page. (You will not need to use this code after you’ve completed the sign-up process. If you do not sign up before the expiration date, you must request a new code.)   b. Enter your date of birth.   c. Enter your home email address.   d. Click Submit, and follow the next screen’s instructions.  3. Create a [employer] ID. This will be your [employer] login ID and cannot be changed, so think of one that is secure and easy to remember.  4. Create a [employer] password. You can change your password at any time.  5. Enter your Password Reset Question and Answer. This can be used at a later time if you forget your password.   6. Enter your e-mail address. This allows you to receive e-mail notifications when new information is available in [employer].  7. Click Sign Up. You can now view your health information.    For assistance activating your [employer] account, call [phone]

## 2017-04-24 NOTE — DISCHARGE INSTRUCTIONS
Fatigue  Fatigue is feeling tired all of the time, a lack of energy, or a lack of motivation. Occasional or mild fatigue is often a normal response to activity or life in general. However, long-lasting (chronic) or extreme fatigue may indicate an underlying medical condition.  HOME CARE INSTRUCTIONS   Watch your fatigue for any changes. The following actions may help to lessen any discomfort you are feeling:  · Talk to your health care provider about how much sleep you need each night. Try to get the required amount every night.  · Take medicines only as directed by your health care provider.  · Eat a healthy and nutritious diet. Ask your health care provider if you need help changing your diet.  · Drink enough fluid to keep your urine clear or pale yellow.  · Practice ways of relaxing, such as yoga, meditation, massage therapy, or acupuncture.  · Exercise regularly.    · Change situations that cause you stress. Try to keep your work and personal routine reasonable.  · Do not abuse illegal drugs.  · Limit alcohol intake to no more than 1 drink per day for nonpregnant women and 2 drinks per day for men. One drink equals 12 ounces of beer, 5 ounces of wine, or 1½ ounces of hard liquor.  · Take a multivitamin, if directed by your health care provider.  SEEK MEDICAL CARE IF:   · Your fatigue does not get better.  · You have a fever.    · You have unintentional weight loss or gain.  · You have headaches.    · You have difficulty:    · Falling asleep.  · Sleeping throughout the night.  · You feel angry, guilty, anxious, or sad.     · You are unable to have a bowel movement (constipation).    · You skin is dry.     · Your legs or another part of your body is swollen.    SEEK IMMEDIATE MEDICAL CARE IF:   · You feel confused.    · Your vision is blurry.  · You feel faint or pass out.    · You have a severe headache.    · You have severe abdominal, pelvic, or back pain.    · You have chest pain, shortness of breath, or an  irregular or fast heartbeat.    · You are unable to urinate or you urinate less than normal.    · You develop abnormal bleeding, such as bleeding from the rectum, vagina, nose, lungs, or nipples.  · You vomit blood.     · You have thoughts about harming yourself or committing suicide.    · You are worried that you might harm someone else.       This information is not intended to replace advice given to you by your health care provider. Make sure you discuss any questions you have with your health care provider.     Document Released: 10/14/2008 Document Revised: 01/08/2016 Document Reviewed: 04/21/2015  [employer] Interactive Patient Education ©2016 [employer]        Heart Failure  Heart failure means your heart has trouble pumping blood. This makes it hard for your body to work well. Heart failure is usually a long-term (chronic) condition. You must take good care of yourself and follow your doctor's treatment plan.  HOME CARE  · Take your heart medicine as told by your doctor.  ¨ Do not stop taking medicine unless your doctor tells you to.  ¨ Do not skip any dose of medicine.  ¨ Refill your medicines before they run out.  ¨ Take other medicines only as told by your doctor or pharmacist.  · Stay active if told by your doctor. The elderly and people with severe heart failure should talk with a doctor about physical activity.  · Eat heart-healthy foods. Choose foods that are without trans fat and are low in saturated fat, cholesterol, and salt (sodium). This includes fresh or frozen fruits and vegetables, fish, lean meats, fat-free or low-fat dairy foods, whole grains, and high-fiber foods. Lentils and dried peas and beans (legumes) are also good choices.  · Limit salt if told by your doctor.  · Cook in a healthy way. Roast, grill, broil, bake, poach, steam, or stir-smith foods.  · Limit fluids as told by your doctor.  · Weigh yourself every morning. Do this after you pee (urinate) and before you eat breakfast. Write  down your weight to give to your doctor.  · Take your blood pressure and write it down if your doctor tells you to.  · Ask your doctor how to check your pulse. Check your pulse as told.  · Lose weight if told by your doctor.  · Stop smoking or chewing tobacco. Do not use gum or patches that help you quit without your doctor's approval.  · Schedule and go to doctor visits as told.  · Nonpregnant women should have no more than 1 drink a day. Men should have no more than 2 drinks a day. Talk to your doctor about drinking alcohol.  · Stop illegal drug use.  · Stay current with shots (immunizations).  · Manage your health conditions as told by your doctor.  · Learn to manage your stress.  · Rest when you are tired.  · If it is really hot outside:  ¨ Avoid intense activities.  ¨ Use air conditioning or fans, or get in a cooler place.  ¨ Avoid caffeine and alcohol.  ¨ Wear loose-fitting, lightweight, and light-colored clothing.  · If it is really cold outside:  ¨ Avoid intense activities.  ¨ Layer your clothing.  ¨ Wear mittens or gloves, a hat, and a scarf when going outside.  ¨ Avoid alcohol.  · Learn about heart failure and get support as needed.  · Get help to maintain or improve your quality of life and your ability to care for yourself as needed.  GET HELP IF:   · You gain weight quickly.  · You are more short of breath than usual.  · You cannot do your normal activities.  · You tire easily.  · You cough more than normal, especially with activity.  · You have any or more puffiness (swelling) in areas such as your hands, feet, ankles, or belly (abdomen).  · You cannot sleep because it is hard to breathe.  · You feel like your heart is beating fast (palpitations).  · You get dizzy or light-headed when you stand up.  GET HELP RIGHT AWAY IF:   · You have trouble breathing.  · There is a change in mental status, such as becoming less alert or not being able to focus.  · You have chest pain or discomfort.  · You  faint.  MAKE SURE YOU:   · Understand these instructions.  · Will watch your condition.  · Will get help right away if you are not doing well or get worse.     This information is not intended to replace advice given to you by your health care provider. Make sure you discuss any questions you have with your health care provider.     Document Released: 09/26/2009 Document Revised: 01/08/2016 Document Reviewed: 02/03/2014  [employer] Interactive Patient Education ©2016 Elsevier Inc.

## 2017-04-25 ENCOUNTER — PATIENT OUTREACH (OUTPATIENT)
Dept: HEALTH INFORMATION MANAGEMENT | Facility: OTHER | Age: 67
End: 2017-04-25

## 2017-04-25 NOTE — PROGRESS NOTES
· Chart reviewed.  Patient was discharged from [facility] 4/24/17.  Patient does not have a contact phone number listed in Epic record.  Unable to complete discharge outreach d/t no contact phone number listed in Epic record.

## 2017-05-04 ENCOUNTER — APPOINTMENT (OUTPATIENT)
Dept: RADIOLOGY | Facility: MEDICAL CENTER | Age: 67
DRG: 871 | End: 2017-05-04
Attending: EMERGENCY MEDICINE
Payer: MEDICARE

## 2017-05-04 ENCOUNTER — HOSPITAL ENCOUNTER (INPATIENT)
Facility: MEDICAL CENTER | Age: 67
LOS: 2 days | DRG: 871 | End: 2017-05-06
Attending: EMERGENCY MEDICINE | Admitting: HOSPITALIST
Payer: MEDICARE

## 2017-05-04 ENCOUNTER — RESOLUTE PROFESSIONAL BILLING HOSPITAL PROF FEE (OUTPATIENT)
Dept: HOSPITALIST | Facility: MEDICAL CENTER | Age: 67
End: 2017-05-04
Payer: MEDICARE

## 2017-05-04 DIAGNOSIS — I50.9 ACUTE ON CHRONIC CONGESTIVE HEART FAILURE, UNSPECIFIED CONGESTIVE HEART FAILURE TYPE: ICD-10-CM

## 2017-05-04 DIAGNOSIS — L03.119 CELLULITIS OF LOWER EXTREMITY, UNSPECIFIED LATERALITY: ICD-10-CM

## 2017-05-04 DIAGNOSIS — R06.02 SOB (SHORTNESS OF BREATH): ICD-10-CM

## 2017-05-04 PROBLEM — Z59.00 HOMELESSNESS: Status: ACTIVE | Noted: 2017-05-04

## 2017-05-04 PROBLEM — E13.9 DIABETES 1.5, MANAGED AS TYPE 2 (HCC): Status: ACTIVE | Noted: 2017-05-04

## 2017-05-04 PROBLEM — L03.115 CELLULITIS OF BOTH LOWER EXTREMITIES: Status: ACTIVE | Noted: 2017-05-04

## 2017-05-04 PROBLEM — Z91.148 NONCOMPLIANCE WITH MEDICATIONS: Status: ACTIVE | Noted: 2017-05-04

## 2017-05-04 PROBLEM — R60.0 BILATERAL LOWER EXTREMITY EDEMA: Status: ACTIVE | Noted: 2017-05-04

## 2017-05-04 PROBLEM — L03.116 CELLULITIS OF BOTH LOWER EXTREMITIES: Status: ACTIVE | Noted: 2017-05-04

## 2017-05-04 PROBLEM — R53.1 GENERALIZED WEAKNESS: Status: ACTIVE | Noted: 2017-05-04

## 2017-05-04 LAB
ALBUMIN SERPL BCP-MCNC: 3.5 G/DL (ref 3.2–4.9)
ALBUMIN/GLOB SERPL: 1.1 G/DL
ALP SERPL-CCNC: 60 U/L (ref 30–99)
ALT SERPL-CCNC: 11 U/L (ref 2–50)
ANION GAP SERPL CALC-SCNC: 8 MMOL/L (ref 0–11.9)
APTT PPP: 31.4 SEC (ref 24.7–36)
AST SERPL-CCNC: 14 U/L (ref 12–45)
BASOPHILS # BLD AUTO: 0.6 % (ref 0–1.8)
BASOPHILS # BLD: 0.07 K/UL (ref 0–0.12)
BILIRUB SERPL-MCNC: 0.4 MG/DL (ref 0.1–1.5)
BNP SERPL-MCNC: 130 PG/ML (ref 0–100)
BUN SERPL-MCNC: 12 MG/DL (ref 8–22)
CALCIUM SERPL-MCNC: 9 MG/DL (ref 8.5–10.5)
CHLORIDE SERPL-SCNC: 104 MMOL/L (ref 96–112)
CO2 SERPL-SCNC: 24 MMOL/L (ref 20–33)
CREAT SERPL-MCNC: 0.8 MG/DL (ref 0.5–1.4)
EOSINOPHIL # BLD AUTO: 0.68 K/UL (ref 0–0.51)
EOSINOPHIL NFR BLD: 5.4 % (ref 0–6.9)
ERYTHROCYTE [DISTWIDTH] IN BLOOD BY AUTOMATED COUNT: 52.1 FL (ref 35.9–50)
GFR SERPL CREATININE-BSD FRML MDRD: >60 ML/MIN/1.73 M 2
GLOBULIN SER CALC-MCNC: 3.1 G/DL (ref 1.9–3.5)
GLUCOSE BLD-MCNC: 154 MG/DL (ref 65–99)
GLUCOSE SERPL-MCNC: 99 MG/DL (ref 65–99)
HCT VFR BLD AUTO: 44.7 % (ref 42–52)
HGB BLD-MCNC: 14.4 G/DL (ref 14–18)
IMM GRANULOCYTES # BLD AUTO: 0.06 K/UL (ref 0–0.11)
IMM GRANULOCYTES NFR BLD AUTO: 0.5 % (ref 0–0.9)
INR PPP: 1.09 (ref 0.87–1.13)
LACTATE BLD-SCNC: 1.4 MMOL/L (ref 0.5–2)
LACTATE BLD-SCNC: 1.8 MMOL/L (ref 0.5–2)
LIPASE SERPL-CCNC: 30 U/L (ref 11–82)
LYMPHOCYTES # BLD AUTO: 1.46 K/UL (ref 1–4.8)
LYMPHOCYTES NFR BLD: 11.6 % (ref 22–41)
MCH RBC QN AUTO: 29.4 PG (ref 27–33)
MCHC RBC AUTO-ENTMCNC: 32.2 G/DL (ref 33.7–35.3)
MCV RBC AUTO: 91.2 FL (ref 81.4–97.8)
MONOCYTES # BLD AUTO: 0.87 K/UL (ref 0–0.85)
MONOCYTES NFR BLD AUTO: 6.9 % (ref 0–13.4)
NEUTROPHILS # BLD AUTO: 9.42 K/UL (ref 1.82–7.42)
NEUTROPHILS NFR BLD: 75 % (ref 44–72)
NRBC # BLD AUTO: 0 K/UL
NRBC BLD AUTO-RTO: 0 /100 WBC
PLATELET # BLD AUTO: 329 K/UL (ref 164–446)
PMV BLD AUTO: 10.6 FL (ref 9–12.9)
POTASSIUM SERPL-SCNC: 4 MMOL/L (ref 3.6–5.5)
PROT SERPL-MCNC: 6.6 G/DL (ref 6–8.2)
PROTHROMBIN TIME: 14.4 SEC (ref 12–14.6)
RBC # BLD AUTO: 4.9 M/UL (ref 4.7–6.1)
SODIUM SERPL-SCNC: 136 MMOL/L (ref 135–145)
T4 FREE SERPL-MCNC: 0.8 NG/DL (ref 0.53–1.43)
TROPONIN I SERPL-MCNC: <0.01 NG/ML (ref 0–0.04)
TSH SERPL DL<=0.005 MIU/L-ACNC: 0.69 UIU/ML (ref 0.3–3.7)
WBC # BLD AUTO: 12.6 K/UL (ref 4.8–10.8)

## 2017-05-04 PROCEDURE — 83880 ASSAY OF NATRIURETIC PEPTIDE: CPT

## 2017-05-04 PROCEDURE — 96375 TX/PRO/DX INJ NEW DRUG ADDON: CPT

## 2017-05-04 PROCEDURE — 84439 ASSAY OF FREE THYROXINE: CPT

## 2017-05-04 PROCEDURE — A9270 NON-COVERED ITEM OR SERVICE: HCPCS | Performed by: HOSPITALIST

## 2017-05-04 PROCEDURE — 83605 ASSAY OF LACTIC ACID: CPT | Mod: 91

## 2017-05-04 PROCEDURE — 83690 ASSAY OF LIPASE: CPT

## 2017-05-04 PROCEDURE — 94760 N-INVAS EAR/PLS OXIMETRY 1: CPT

## 2017-05-04 PROCEDURE — 99223 1ST HOSP IP/OBS HIGH 75: CPT | Mod: AI | Performed by: HOSPITALIST

## 2017-05-04 PROCEDURE — 85025 COMPLETE CBC W/AUTO DIFF WBC: CPT

## 2017-05-04 PROCEDURE — 82962 GLUCOSE BLOOD TEST: CPT

## 2017-05-04 PROCEDURE — 85730 THROMBOPLASTIN TIME PARTIAL: CPT

## 2017-05-04 PROCEDURE — 36415 COLL VENOUS BLD VENIPUNCTURE: CPT

## 2017-05-04 PROCEDURE — 84443 ASSAY THYROID STIM HORMONE: CPT

## 2017-05-04 PROCEDURE — 700105 HCHG RX REV CODE 258: Performed by: HOSPITALIST

## 2017-05-04 PROCEDURE — 96365 THER/PROPH/DIAG IV INF INIT: CPT

## 2017-05-04 PROCEDURE — 85610 PROTHROMBIN TIME: CPT

## 2017-05-04 PROCEDURE — 700102 HCHG RX REV CODE 250 W/ 637 OVERRIDE(OP): Performed by: HOSPITALIST

## 2017-05-04 PROCEDURE — 87040 BLOOD CULTURE FOR BACTERIA: CPT

## 2017-05-04 PROCEDURE — 93005 ELECTROCARDIOGRAM TRACING: CPT | Performed by: EMERGENCY MEDICINE

## 2017-05-04 PROCEDURE — 770006 HCHG ROOM/CARE - MED/SURG/GYN SEMI*

## 2017-05-04 PROCEDURE — 71010 DX-CHEST-PORTABLE (1 VIEW): CPT

## 2017-05-04 PROCEDURE — 84484 ASSAY OF TROPONIN QUANT: CPT

## 2017-05-04 PROCEDURE — 96367 TX/PROPH/DG ADDL SEQ IV INF: CPT

## 2017-05-04 PROCEDURE — 80053 COMPREHEN METABOLIC PANEL: CPT

## 2017-05-04 PROCEDURE — 700111 HCHG RX REV CODE 636 W/ 250 OVERRIDE (IP): Performed by: HOSPITALIST

## 2017-05-04 PROCEDURE — 99285 EMERGENCY DEPT VISIT HI MDM: CPT

## 2017-05-04 PROCEDURE — 700111 HCHG RX REV CODE 636 W/ 250 OVERRIDE (IP): Performed by: EMERGENCY MEDICINE

## 2017-05-04 RX ORDER — FUROSEMIDE 20 MG/1
20 TABLET ORAL DAILY
Status: ON HOLD | COMMUNITY
End: 2017-06-24

## 2017-05-04 RX ORDER — HEPARIN SODIUM 5000 [USP'U]/ML
5000 INJECTION, SOLUTION INTRAVENOUS; SUBCUTANEOUS EVERY 8 HOURS
Status: DISCONTINUED | OUTPATIENT
Start: 2017-05-05 | End: 2017-05-06 | Stop reason: HOSPADM

## 2017-05-04 RX ORDER — MONTELUKAST SODIUM 10 MG/1
10 TABLET ORAL DAILY
Status: DISCONTINUED | OUTPATIENT
Start: 2017-05-04 | End: 2017-05-04

## 2017-05-04 RX ORDER — ONDANSETRON 2 MG/ML
4 INJECTION INTRAMUSCULAR; INTRAVENOUS EVERY 4 HOURS PRN
Status: DISCONTINUED | OUTPATIENT
Start: 2017-05-04 | End: 2017-05-06 | Stop reason: HOSPADM

## 2017-05-04 RX ORDER — ALBUTEROL SULFATE 90 UG/1
2 AEROSOL, METERED RESPIRATORY (INHALATION) EVERY 6 HOURS PRN
Status: ON HOLD | COMMUNITY
End: 2017-06-24

## 2017-05-04 RX ORDER — FUROSEMIDE 40 MG/1
40 TABLET ORAL DAILY
Status: DISCONTINUED | OUTPATIENT
Start: 2017-05-05 | End: 2017-05-06 | Stop reason: HOSPADM

## 2017-05-04 RX ORDER — ATORVASTATIN CALCIUM 20 MG/1
20 TABLET, FILM COATED ORAL
Status: DISCONTINUED | OUTPATIENT
Start: 2017-05-04 | End: 2017-05-06 | Stop reason: HOSPADM

## 2017-05-04 RX ORDER — OMEPRAZOLE 20 MG/1
20 CAPSULE, DELAYED RELEASE ORAL DAILY
Status: DISCONTINUED | OUTPATIENT
Start: 2017-05-05 | End: 2017-05-06 | Stop reason: HOSPADM

## 2017-05-04 RX ORDER — GLIPIZIDE 2.5 MG/1
2.5 TABLET, EXTENDED RELEASE ORAL
Status: DISCONTINUED | OUTPATIENT
Start: 2017-05-05 | End: 2017-05-06 | Stop reason: HOSPADM

## 2017-05-04 RX ORDER — LISINOPRIL 10 MG/1
30 TABLET ORAL DAILY
Status: DISCONTINUED | OUTPATIENT
Start: 2017-05-05 | End: 2017-05-06 | Stop reason: HOSPADM

## 2017-05-04 RX ORDER — CARVEDILOL 6.25 MG/1
6.25 TABLET ORAL 2 TIMES DAILY WITH MEALS
Status: DISCONTINUED | OUTPATIENT
Start: 2017-05-04 | End: 2017-05-06 | Stop reason: HOSPADM

## 2017-05-04 RX ORDER — PAROXETINE HYDROCHLORIDE 20 MG/1
40 TABLET, FILM COATED ORAL DAILY
Status: DISCONTINUED | OUTPATIENT
Start: 2017-05-04 | End: 2017-05-04

## 2017-05-04 RX ORDER — ARIPIPRAZOLE 5 MG/1
5 TABLET ORAL
COMMUNITY

## 2017-05-04 RX ORDER — DEXTROSE MONOHYDRATE 25 G/50ML
25 INJECTION, SOLUTION INTRAVENOUS
Status: DISCONTINUED | OUTPATIENT
Start: 2017-05-04 | End: 2017-05-06 | Stop reason: HOSPADM

## 2017-05-04 RX ORDER — LISINOPRIL 30 MG/1
30 TABLET ORAL DAILY
Status: ON HOLD | COMMUNITY
End: 2017-06-24

## 2017-05-04 RX ORDER — POLYETHYLENE GLYCOL 3350 17 G/17G
1 POWDER, FOR SOLUTION ORAL
Status: DISCONTINUED | OUTPATIENT
Start: 2017-05-05 | End: 2017-05-06 | Stop reason: HOSPADM

## 2017-05-04 RX ORDER — FUROSEMIDE 10 MG/ML
40 INJECTION INTRAMUSCULAR; INTRAVENOUS ONCE
Status: COMPLETED | OUTPATIENT
Start: 2017-05-04 | End: 2017-05-04

## 2017-05-04 RX ORDER — NICOTINE 21 MG/24HR
14 PATCH, TRANSDERMAL 24 HOURS TRANSDERMAL
Status: DISCONTINUED | OUTPATIENT
Start: 2017-05-04 | End: 2017-05-06 | Stop reason: HOSPADM

## 2017-05-04 RX ORDER — AZITHROMYCIN 500 MG/1
500 INJECTION, POWDER, LYOPHILIZED, FOR SOLUTION INTRAVENOUS ONCE
Status: COMPLETED | OUTPATIENT
Start: 2017-05-04 | End: 2017-05-04

## 2017-05-04 RX ORDER — ERYTHROMYCIN 5 MG/G
OINTMENT OPHTHALMIC EVERY 6 HOURS
Status: DISCONTINUED | OUTPATIENT
Start: 2017-05-04 | End: 2017-05-06 | Stop reason: HOSPADM

## 2017-05-04 RX ORDER — POTASSIUM CHLORIDE 20 MEQ/1
20 TABLET, EXTENDED RELEASE ORAL DAILY
Status: DISCONTINUED | OUTPATIENT
Start: 2017-05-05 | End: 2017-05-06 | Stop reason: HOSPADM

## 2017-05-04 RX ORDER — AMPICILLIN AND SULBACTAM 2; 1 G/1; G/1
3 INJECTION, POWDER, FOR SOLUTION INTRAMUSCULAR; INTRAVENOUS ONCE
Status: COMPLETED | OUTPATIENT
Start: 2017-05-04 | End: 2017-05-04

## 2017-05-04 RX ORDER — ACETAMINOPHEN 325 MG/1
650 TABLET ORAL EVERY 6 HOURS PRN
Status: DISCONTINUED | OUTPATIENT
Start: 2017-05-04 | End: 2017-05-06 | Stop reason: HOSPADM

## 2017-05-04 RX ORDER — ONDANSETRON 4 MG/1
4 TABLET, ORALLY DISINTEGRATING ORAL EVERY 4 HOURS PRN
Status: DISCONTINUED | OUTPATIENT
Start: 2017-05-04 | End: 2017-05-06 | Stop reason: HOSPADM

## 2017-05-04 RX ORDER — HYDROXYZINE HYDROCHLORIDE 25 MG/1
25 TABLET, FILM COATED ORAL
Status: DISCONTINUED | OUTPATIENT
Start: 2017-05-04 | End: 2017-05-04

## 2017-05-04 RX ORDER — AMOXICILLIN 250 MG
2 CAPSULE ORAL 2 TIMES DAILY
Status: DISCONTINUED | OUTPATIENT
Start: 2017-05-05 | End: 2017-05-06 | Stop reason: HOSPADM

## 2017-05-04 RX ORDER — GABAPENTIN 600 MG/1
600 TABLET ORAL 3 TIMES DAILY
Status: ON HOLD | COMMUNITY
End: 2017-06-24

## 2017-05-04 RX ORDER — BISACODYL 10 MG
10 SUPPOSITORY, RECTAL RECTAL
Status: DISCONTINUED | OUTPATIENT
Start: 2017-05-05 | End: 2017-05-06 | Stop reason: HOSPADM

## 2017-05-04 RX ADMIN — AMPICILLIN SODIUM AND SULBACTAM SODIUM 3 G: 2; 1 INJECTION, POWDER, FOR SOLUTION INTRAMUSCULAR; INTRAVENOUS at 20:58

## 2017-05-04 RX ADMIN — CARVEDILOL 6.25 MG: 6.25 TABLET, FILM COATED ORAL at 20:59

## 2017-05-04 RX ADMIN — ATORVASTATIN CALCIUM 20 MG: 20 TABLET, FILM COATED ORAL at 20:59

## 2017-05-04 RX ADMIN — AMPICILLIN SODIUM AND SULBACTAM SODIUM 3 G: 2; 1 INJECTION, POWDER, FOR SOLUTION INTRAMUSCULAR; INTRAVENOUS at 15:51

## 2017-05-04 RX ADMIN — ASPIRIN 81 MG: 81 TABLET ORAL at 20:59

## 2017-05-04 RX ADMIN — INSULIN LISPRO 1 UNITS: 100 INJECTION, SOLUTION INTRAVENOUS; SUBCUTANEOUS at 20:53

## 2017-05-04 RX ADMIN — AZITHROMYCIN MONOHYDRATE 500 MG: 500 INJECTION, POWDER, LYOPHILIZED, FOR SOLUTION INTRAVENOUS at 16:49

## 2017-05-04 RX ADMIN — FUROSEMIDE 40 MG: 10 INJECTION, SOLUTION INTRAMUSCULAR; INTRAVENOUS at 14:53

## 2017-05-04 ASSESSMENT — LIFESTYLE VARIABLES: DO YOU DRINK ALCOHOL: NO

## 2017-05-04 ASSESSMENT — PAIN SCALES - GENERAL: PAINLEVEL_OUTOF10: 0

## 2017-05-04 NOTE — IP AVS SNAPSHOT
5/6/2017    Maxi Esposito  [address] [place] [address]    Dear Maxi:    [facility] wants to ensure your discharge home is safe and you or your loved ones have had all of your questions answered regarding your care after you leave the hospital.    Below is a list of resources and contact information should you have any questions regarding your hospital stay, follow-up instructions, or active medical symptoms.    Questions or Concerns Regarding… Contact   Medical Questions Related to Your Discharge  (7 days a week, 8am-5pm) Contact a Nurse Care Coordinator   [phone]   Medical Questions Not Related to Your Discharge  (24 hours a day / 7 days a week)  Contact the Nurse Health Line   [phone]    Medications or Discharge Instructions Refer to your discharge packet   or contact your [facility] Primary Care Provider   [phone]   Follow-up Appointment(s) Schedule your appointment via [employer]   or contact Scheduling [phone]   Billing Review your statement via [employer]  or contact Billing [phone]   Medical Records Review your records via [employer]   or contact Medical Records [phone]     You may receive a telephone call within two days of discharge. This call is to make certain you understand your discharge instructions and have the opportunity to have any questions answered. You can also easily access your medical information, test results and upcoming appointments via the [employer] free online health management tool. You can learn more and sign up at [email or web address]. For assistance setting up your [employer] account, please call [phone].    Once again, we want to ensure your discharge home is safe and that you have a clear understanding of any next steps in your care. If you have any questions or concerns, please do not hesitate to contact us, we are here for you. Thank you for choosing [facility] for your healthcare needs.    Sincerely,    Your [facility] Healthcare Team

## 2017-05-04 NOTE — IP AVS SNAPSHOT
" <p align=\"LEFT\"><IMG SRC=\"//[email or web address]$/[email or web address]\" alt=\"Image\" WIDTH=\"50%\" HEIGHT=\"200\" BORDER=\"\"></p>                   Name:Maxi Esposito  Medical Record Number:[id number]  CSN: [id number]    YOB: 1950   Age: 67 y.o.  Sex: male  HT:1.702 m (5' 7\") WT: 121.11 kg (267 lb)          Admit Date: 5/4/2017     Discharge Date:   Today's Date: 5/6/2017  Attending Doctor:  Cheyenne Lima M.D.                  Allergies:  Shellfish allergy          Follow-up Information     1. Follow up with Pcp Pt States None.    Specialty:  Family Medicine         Medication List      Take these Medications        Instructions    ABILIFY 5 MG tablet   Generic drug:  aripiprazole    Take 5 mg by mouth every bedtime.   Dose:  5 mg       albuterol 108 (90 BASE) MCG/ACT Aers inhalation aerosol    Inhale 2 Puffs by mouth every 6 hours as needed for Shortness of Breath.   Dose:  2 Puff       amoxicillin-clavulanate 875-125 MG Tabs   Commonly known as:  AUGMENTIN    Take 1 Tab by mouth 2 times a day for 7 days.   Dose:  1 Tab       aspirin EC 81 MG Tbec   Commonly known as:  ECOTRIN    Take 81 mg by mouth every day.   Dose:  81 mg       atorvastatin 20 MG Tabs   Commonly known as:  LIPITOR    Take 1 Tab by mouth every bedtime.   Dose:  20 mg       carvedilol 6.25 MG Tabs   Commonly known as:  COREG    Take 1 Tab by mouth 2 times a day, with meals.   Dose:  6.25 mg       erythromycin 5 MG/GM Oint    Place 1 Drop in both eyes every 6 hours.   Dose:  1 Drop       furosemide 20 MG Tabs   Commonly known as:  LASIX    Take 20 mg by mouth every day.   Dose:  20 mg       gabapentin 600 MG tablet   Commonly known as:  NEURONTIN    Take 600 mg by mouth 3 times a day.   Dose:  600 mg       glipiZIDE SR 2.5 MG Tb24   Commonly known as:  GLUCOTROL    Take 2.5 mg by mouth every day.   Dose:  2.5 mg       lisinopril 30 MG tablet   Commonly known as:  PRINIVIL, ZESTRIL    Take 30 mg by mouth every day.   Dose:  30 mg       omeprazole 20 MG " delayed-release capsule   Commonly known as:  PRILOSEC    Take 20 mg by mouth every day.   Dose:  20 mg       STIOLTO RESPIMAT 2.5-2.5 MCG/ACT Aers   Generic drug:  Tiotropium Bromide-Olodaterol    Inhale 2 Puffs by mouth every day.   Dose:  2 Puff       TRADJENTA 5 MG Tabs tablet   Generic drug:  linagliptin    Take 5 mg by mouth every day.   Dose:  5 mg

## 2017-05-04 NOTE — IP AVS SNAPSHOT
[employer] Access Code: [email or web address]  Expires: 5/9/2017 10:59 AM    Your email address is not on file at [email or web address].  Email Addresses are required for you to sign up for [employer], please contact [phone] to verify your personal information and to provide your email address prior to attempting to register for [employer].    Maxi Skinnynicole  [address] [facility] [place] [address]    [employer]  A secure, online tool to manage your health information     [email or web address]’s [employer]® is a secure, online tool that connects you to your personalized health information from the privacy of your home -- day or night - making it very easy for you to manage your healthcare. Once the activation process is completed, you can even access your medical information using the [employer] stephie, which is available for free in the Apple Stephie store or Google Play store.     To learn more about [employer], visit [email or web address]    There are two levels of access available (as shown below):   My Chart Features  [facility] Primary Care Doctor [facility]  Specialists [facility]  Urgent  Care Non-[facility] Primary Care Doctor   Email your healthcare team securely and privately 24/7 X X X    Manage appointments: schedule your next appointment; view details of past/upcoming appointments X      Request prescription refills. X      View recent personal medical records, including lab and immunizations X X X X   View health record, including health history, allergies, medications X X X X   Read reports about your outpatient visits, procedures, consult and ER notes X X X X   See your discharge summary, which is a recap of your hospital and/or ER visit that includes your diagnosis, lab results, and care plan X X  X     How to register for [employer]:  Once your e-mail address has been verified, follow the following steps to sign up for [employer].     1. Go to  [email or web address]  2. Click on the Sign Up Now box, which takes you to the New Member Sign Up page. You will need  to provide the following information:  a. Enter your [employer] Access Code exactly as it appears at the top of this page. (You will not need to use this code after you’ve completed the sign-up process. If you do not sign up before the expiration date, you must request a new code.)   b. Enter your date of birth.   c. Enter your home email address.   d. Click Submit, and follow the next screen’s instructions.  3. Create a [employer] ID. This will be your [employer] login ID and cannot be changed, so think of one that is secure and easy to remember.  4. Create a [employer] password. You can change your password at any time.  5. Enter your Password Reset Question and Answer. This can be used at a later time if you forget your password.   6. Enter your e-mail address. This allows you to receive e-mail notifications when new information is available in [employer].  7. Click Sign Up. You can now view your health information.    For assistance activating your [employer] account, call [phone]

## 2017-05-04 NOTE — ED NOTES
"Chief Complaint   Patient presents with   • Weakness     always feels weak but worsened x1 week   • Shortness of Breath     worsened x1 week, takes lasix daily, reports missing 4-5 doses in last week   • Sleep Problem     reports frequently nodding off     /83 mmHg  Pulse 90  Temp(Src) 37.1 °C (98.8 °F)  Resp 20  Ht 1.702 m (5' 7\")  Wt 121.11 kg (267 lb)  BMI 41.81 kg/m2  SpO2 92%    BIB EMS for above complaints, reports currently living at shelter  "

## 2017-05-04 NOTE — ED PROVIDER NOTES
ED Provider Note    Scribed for Dr. Daron Lance M.D. by Alesia Kuo. 5/4/2017  12:48 PM    Primary Care Provider: Pcp Pt States None  Means of arrival: Ambulance  History limited by: Difficulties staying awake.     CHIEF COMPLAINT  Chief Complaint   Patient presents with   • Weakness     always feels weak but worsened x1 week   • Shortness of Breath     worsened x1 week, takes lasix daily, reports missing 4-5 doses in last week   • Sleep Problem     reports frequently nodding off       HPI  Maxi Esposito is a 67 y.o. male who presents to the ED complaining of shortness of breath, generalized weakness, and fatigue onset 1 week ago. Per patient, he takes lasix daily but did not take it for the past 2 to 3 days. Denies chest pain but reports difficulties breathing. The patient also reports blurred vision, diarrhea, weight gain, and rashes to his bilateral feet but denies headaches, sore throats, or abdominal pain. The patient lives in a shelter and he last ate at the [employer] this morning. He is not on oxygen at home. He goes to the [facility] clinic.     Further history of present illness cannot be obtained since the patient continues to fall asleep.     REVIEW OF SYSTEMS    CONSTITUTIONAL: Generalized weakness, fatigue, weight gain.   EYES:  Blurred vision.   ENT:  Denies sore throat.  CARDIOVASCULAR:  Denies chest pain.  RESPIRATORY:  Difficulties breathing and shortness of breath.   GI: Diarrhea. Denies abdominal pain.  MUSCULOSKELETAL: Generalized weakness.   SKIN:  Rashes to bilateral feet.   NEUROLOGIC: Generalized weakness. Denies headache.  See HPI for further details. Further review of systems is unobtainable as noted above.   C    PAST MEDICAL HISTORY  Past Medical History   Diagnosis Date   • Hypertension    • Diabetes (CMS-[facility]ly [facility])    • Sepsis (CMS-Formerly [facility])    • Pneumonia    • Chronic obstructive pulmonary disease (CMS-Formerly [facility])    • Medical non-compliance    • Congestive heart failure (CMS-Formerly [facility])        FAMILY  "HISTORY  No family history noted    SOCIAL HISTORY   reports that he quit smoking about 9 months ago. He has never used smokeless tobacco. He reports that he does not drink alcohol or use illicit drugs.    SURGICAL HISTORY  Past Surgical History   Procedure Laterality Date   • Splenectomy  1980       CURRENT MEDICATIONS  No current facility-administered medications on file prior to encounter.     Current Outpatient Prescriptions on File Prior to Encounter   Medication Sig Dispense Refill   • glipiZIDE SR (GLUCOTROL) 2.5 MG TABLET SR 24 HR Take 2.5 mg by mouth every day.     • omeprazole (PRILOSEC) 20 MG delayed-release capsule Take 20 mg by mouth every day.     • carvedilol (COREG) 6.25 MG Tab Take 1 Tab by mouth 2 times a day, with meals. 60 Tab 3   • atorvastatin (LIPITOR) 20 MG Tab Take 1 Tab by mouth every bedtime. 30 Tab 11   • aspirin EC (ECOTRIN) 81 MG Tablet Delayed Response Take 81 mg by mouth every day.       ALLERGIES  Allergies   Allergen Reactions   • Shellfish Allergy Shortness of Breath and Swelling     \"Eyes closed up really tight and it was hard to breath\"       PHYSICAL EXAM  VITAL SIGNS: /83 mmHg  Pulse 95  Temp(Src) 37.1 °C (98.8 °F)  Resp 20  Ht 1.702 m (5' 7\")  Wt 121.11 kg (267 lb)  BMI 41.81 kg/m2  SpO2 93%     Constitutional: Very ill appearing, short of breath, talks in short sentences. Needs assistance to sit up.   HENT: Normocephalic, Atraumatic, Bilateral external ears normal, Oropharynx pink moist with no exudates, Nose patent.  Eyes: PERRLA at 3 mm, EOMI, Sclera and conjunctiva clear, No discharge.   Neck: Large amount of redundant skin in anterior neck. Supple no nuchal rigidity, no thyromegaly or mass. Non-tender  Lymphatic: No supraclavicular  lymph nodes.   Cardiovascular: Heart is tachycardic and irregular, no murmur, rub or thrill.   Thorax & Lungs: Decreased breath sounds with wet crackles in all lung fields. No wheezing. No chest tenderness.   Abdomen: Large, " Umbilical hernia, easily reducible. Well-healed scar from splenectomy, Soft, No tenderness no hepatosplenomegaly there is no guarding or rebound.   Skin: Bilateral lower extremities red, hot to touch. Left foot oozing clear yellow fluid. Dry, no petechia, purpura, or rash.   Back: Non tender with palpation, No CVA tenderness.   Extremities: Marked edema bilateral lower extremities, red, hot to touch. Left foot oozing. Non tender.   Musculoskeletal: Good range of motion to wrists, elbows, shoulders, hips, knees, and ankles. Pulses 2+ radially and femorally. No gross deformities noted.   Neurologic: Alert & oriented to person, time, and place. Strength is 4 over 5 and symmetric in bilateral lower extremities. Strength is 5 over 5 and symmetric in bilateral upper extremities.  Sensory is intact to light touch to face, arms, and legs.  DTRs are symmetrical in biceps brachioradialis, patella and Achilles.   Psychiatric: Flat affect sleepy    EKG  13:20- 13 Lead EKG interpreted by me shows normal sinus rhythm at 95.  . Axis QRS 73, No ST elevations. P waves biphasic in V1, V2, and V3. No old EKG for comparison.     LABS  Results for orders placed or performed during the hospital encounter of 05/04/17   CBC w/ Differential   Result Value Ref Range    WBC 12.6 (H) 4.8 - 10.8 K/uL    RBC 4.90 4.70 - 6.10 M/uL    Hemoglobin 14.4 14.0 - 18.0 g/dL    Hematocrit 44.7 42.0 - 52.0 %    MCV 91.2 81.4 - 97.8 fL    MCH 29.4 27.0 - 33.0 pg    MCHC 32.2 (L) 33.7 - 35.3 g/dL    RDW 52.1 (H) 35.9 - 50.0 fL    Platelet Count 329 164 - 446 K/uL    MPV 10.6 9.0 - 12.9 fL    Neutrophils-Polys 75.00 (H) 44.00 - 72.00 %    Lymphocytes 11.60 (L) 22.00 - 41.00 %    Monocytes 6.90 0.00 - 13.40 %    Eosinophils 5.40 0.00 - 6.90 %    Basophils 0.60 0.00 - 1.80 %    Immature Granulocytes 0.50 0.00 - 0.90 %    Nucleated RBC 0.00 /100 WBC    Neutrophils (Absolute) 9.42 (H) 1.82 - 7.42 K/uL    Lymphs (Absolute) 1.46 1.00 - 4.80 K/uL    Monos  (Absolute) 0.87 (H) 0.00 - 0.85 K/uL    Eos (Absolute) 0.68 (H) 0.00 - 0.51 K/uL    Baso (Absolute) 0.07 0.00 - 0.12 K/uL    Immature Granulocytes (abs) 0.06 0.00 - 0.11 K/uL    NRBC (Absolute) 0.00 K/uL   Complete Metabolic Panel (CMP)   Result Value Ref Range    Sodium 136 135 - 145 mmol/L    Potassium 4.0 3.6 - 5.5 mmol/L    Chloride 104 96 - 112 mmol/L    Co2 24 20 - 33 mmol/L    Anion Gap 8.0 0.0 - 11.9    Glucose 99 65 - 99 mg/dL    Bun 12 8 - 22 mg/dL    Creatinine 0.80 0.50 - 1.40 mg/dL    Calcium 9.0 8.5 - 10.5 mg/dL    AST(SGOT) 14 12 - 45 U/L    ALT(SGPT) 11 2 - 50 U/L    Alkaline Phosphatase 60 30 - 99 U/L    Total Bilirubin 0.4 0.1 - 1.5 mg/dL    Albumin 3.5 3.2 - 4.9 g/dL    Total Protein 6.6 6.0 - 8.2 g/dL    Globulin 3.1 1.9 - 3.5 g/dL    A-G Ratio 1.1 g/dL   Btype Natriuretic Peptide   Result Value Ref Range    B Natriuretic Peptide 130 (H) 0 - 100 pg/mL   Troponin STAT   Result Value Ref Range    Troponin I <0.01 0.00 - 0.04 ng/mL   Lipase   Result Value Ref Range    Lipase 30 11 - 82 U/L   LACTIC ACID   Result Value Ref Range    Lactic Acid 1.8 0.5 - 2.0 mmol/L   LACTIC ACID   Result Value Ref Range    Lactic Acid 1.4 0.5 - 2.0 mmol/L   PT/INR   Result Value Ref Range    PT 14.4 12.0 - 14.6 sec    INR 1.09 0.87 - 1.13   APTT   Result Value Ref Range    APTT 31.4 24.7 - 36.0 sec   TSH (for screening thyroid dysfunction)   Result Value Ref Range    TSH 0.690 0.300 - 3.700 uIU/mL   Free Thyroxine (add to TSH for patients with existing thyroid dysfunction)   Result Value Ref Range    Free T-4 0.80 0.53 - 1.43 ng/dL   ESTIMATED GFR   Result Value Ref Range    GFR If African American >60 >60 mL/min/1.73 m 2    GFR If Non African American >60 >60 mL/min/1.73 m 2     All labs reviewed by me.    RADIOLOGY/PROCEDURES  DX-CHEST-PORTABLE (1 VIEW)   Final Result      Interstitial prominence likely represents interstitial edema.      Atherosclerotic plaque.      LE Venous Duplex-DVT ([facility]  only)    (Results Pending)     The radiologist's interpretations of all radiological studies have been reviewed by me.     COURSE & MEDICAL DECISION MAKING  Pertinent Labs & Imaging studies reviewed. (See chart for details)    Differential diagnoses include but are not limited to congestive heart failure, myocardial infarction, medication noncompliance, sepsis, leg cellulitis,          12:48 PM - Patient seen and examined at bedside. Ordered EKG, troponin STAT, BNP, CMP, CBC with differential, free thyroxine, TSH, APTT, PT/INR, lactic acid, blood culture, lipase, lactic acid, and chest x-ray.  Patient will be medicated with lasix 40 mg IV since he has not been taking it and appears to have CHF.     2:10 PM- Ordered estimated GFR to further evaluate symptoms.     3:28 PM- Patient will be treated with ampicillin/sulbactam 3 g IV and zithromax 500 mg IV for his symptoms.     3:50 PM- I discussed the patient's case and the above findings with Dr. Odell (Hospitalist) who will see the patient.     Decision Making  Patient presents emergency Department with increasing shortness of breath. His bilateral leg cellulitis is. He is noncompliant with his medications. Appears to be again in congestive heart is cellulitis. I give him IV antibiotics. I given him IV Lasix. I rechecked him several times. He is resting much more comfortably at this time. [facility] and [facility]    DISPOSITION:  Patient will be admitted to Dr. Odell (Hospitalist) in guarded condition.    FINAL IMPRESSION  1. SOB (shortness of breath)    2. Acute on chronic congestive heart failure, unspecified congestive heart failure type (CMS-HCC)    3. Cellulitis of lower extremity, unspecified laterality     4. Medication noncompliance    PLAN  1. [facility]  2. Continued treatment of his congestive heart failure and cellulitis       IAlesia (Aditi), am scribing for, and in the  presence of, Daron Lance M.D..    Electronically signed by: Alesia Kuo (Scribe), 5/4/2017    I, Daron Lance M.D. personally performed the services described in this documentation, as scribed by Alesia Kuo in my presence, and it is both accurate and complete.      The note accurately reflects work and decisions made by me.  Daron Lance  5/4/2017  7:24 PM

## 2017-05-04 NOTE — IP AVS SNAPSHOT
" Home Care Instructions                                                                                                                  Name:Maxi Esposito  Medical Record Number:[id number]  CSN: [id number]    YOB: 1950   Age: 67 y.o.  Sex: male  HT:1.702 m (5' 7\") WT: 121.11 kg (267 lb)          Admit Date: 5/4/2017     Discharge Date:   Today's Date: 5/6/2017  Attending Doctor:  Cheyenne Lima M.D.                  Allergies:  Shellfish allergy            Discharge Instructions       Discharge Instructions    Discharged to other by taxi with escort. Discharged via wheelchair, hospital escort: Yes.  Special equipment needed: Not Applicable    Be sure to schedule a follow-up appointment with your primary care doctor or any specialists as instructed.     Discharge Plan:   Diet Plan: Discussed  Activity Level: Discussed  Smoking Cessation Offered: Patient Refused  Confirmed Follow up Appointment: Patient to Call and Schedule Appointment  Confirmed Symptoms Management: Discussed  Medication Reconciliation Updated: Yes  Influenza Vaccine Indication: Not indicated: Previously immunized this influenza season and > 8 years of age    I understand that a diet low in cholesterol, fat, and sodium is recommended for good health. Unless I have been given specific instructions below for another diet, I accept this instruction as my diet prescription.   Other diet: Diabetic diet     Special Instructions: None    · Is patient discharged on Warfarin / Coumadin?   No     · Is patient Post Blood Transfusion?  No    Depression / Suicide Risk    As you are discharged from this [facility] facility, it is important to learn how to keep safe from harming yourself.    Recognize the warning signs:  · Abrupt changes in personality, positive or negative- including increase in energy   · Giving away possessions  · Change in eating patterns- significant weight changes-  positive or negative  · Change in sleeping patterns- unable to " sleep or sleeping all the time   · Unwillingness or inability to communicate  · Depression  · Unusual sadness, discouragement and loneliness  · Talk of wanting to die  · Neglect of personal appearance   · Rebelliousness- reckless behavior  · Withdrawal from people/activities they love  · Confusion- inability to concentrate     If you or a loved one observes any of these behaviors or has concerns about self-harm, here's what you can do:  · Talk about it- your feelings and reasons for harming yourself  · Remove any means that you might use to hurt yourself (examples: pills, rope, extension cords, firearm)  · Get professional help from the community (Mental Health, Substance Abuse, psychological counseling)  · Do not be alone:Call your Safe Contact- someone whom you trust who will be there for you.  · Call your local CRISIS HOTLINE 863-6159 or 577-732-2546  · Call your local Children's Mobile Crisis Response Team [place] (502) 607-9005 or www.AboutMyStar  · Call the toll free National Suicide Prevention Hotlines   · National Suicide Prevention Lifeline 733-034-HQQI (6580)  · National Hope Line Network 800-SUICIDE (985-7919)        Follow-up Information     1. Follow up with Pcp Pt States None.    Specialty:  Family Medicine         Discharge Medication Instructions:    Below are the medications your physician expects you to take upon discharge:    Review all your home medications and newly ordered medications with your doctor and/or pharmacist. Follow medication instructions as directed by your doctor and/or pharmacist.    Please keep your medication list with you and share with your physician.               Medication List      START taking these medications        Instructions    Morning Afternoon Evening Bedtime    amoxicillin-clavulanate 875-125 MG Tabs   Commonly known as:  AUGMENTIN        Take 1 Tab by mouth 2 times a day for 7 days.   Dose:  1 Tab                        erythromycin 5 MG/GM Oint      Last time this was given:  5/6/2017 12:07 PM        Place 1 Drop in both eyes every 6 hours.   Dose:  1 Drop                          CONTINUE taking these medications        Instructions    Morning Afternoon Evening Bedtime    ABILIFY 5 MG tablet   Generic drug:  aripiprazole        Take 5 mg by mouth every bedtime.   Dose:  5 mg                        albuterol 108 (90 BASE) MCG/ACT Aers inhalation aerosol        Inhale 2 Puffs by mouth every 6 hours as needed for Shortness of Breath.   Dose:  2 Puff                        aspirin EC 81 MG Tbec   Last time this was given:  81 mg on 5/6/2017  8:10 AM   Commonly known as:  ECOTRIN        Take 81 mg by mouth every day.   Dose:  81 mg                        atorvastatin 20 MG Tabs   Last time this was given:  20 mg on 5/5/2017 10:00 PM   Commonly known as:  LIPITOR        Take 1 Tab by mouth every bedtime.   Dose:  20 mg                        carvedilol 6.25 MG Tabs   Last time this was given:  6.25 mg on 5/6/2017  8:10 AM   Commonly known as:  COREG        Take 1 Tab by mouth 2 times a day, with meals.   Dose:  6.25 mg                        furosemide 20 MG Tabs   Last time this was given:  40 mg on 5/6/2017  8:10 AM   Commonly known as:  LASIX        Take 20 mg by mouth every day.   Dose:  20 mg                        gabapentin 600 MG tablet   Commonly known as:  NEURONTIN        Take 600 mg by mouth 3 times a day.   Dose:  600 mg                        glipiZIDE SR 2.5 MG Tb24   Last time this was given:  2.5 mg on 5/6/2017  8:51 AM   Commonly known as:  GLUCOTROL        Take 2.5 mg by mouth every day.   Dose:  2.5 mg                        lisinopril 30 MG tablet   Last time this was given:  30 mg on 5/6/2017  8:10 AM   Commonly known as:  PRINIVIL, ZESTRIL        Take 30 mg by mouth every day.   Dose:  30 mg                        omeprazole 20 MG delayed-release capsule   Last time this was given:  20 mg on 5/6/2017  8:10 AM   Commonly known as:  PRILOSEC         Take 20 mg by mouth every day.   Dose:  20 mg                        STIOLTO RESPIMAT 2.5-2.5 MCG/ACT Aers   Generic drug:  Tiotropium Bromide-Olodaterol        Inhale 2 Puffs by mouth every day.   Dose:  2 Puff                        TRADJENTA 5 MG Tabs tablet   Generic drug:  linagliptin        Take 5 mg by mouth every day.   Dose:  5 mg                             Where to Get Your Medications      Information about where to get these medications is not yet available     ! Ask your nurse or doctor about these medications    - amoxicillin-clavulanate 875-125 MG Tabs  - erythromycin 5 MG/GM Oint            Instructions           Diet / Nutrition:    Follow any diet instructions given to you by your doctor or the dietician, including how much salt (sodium) you are allowed each day.    If you are overweight, talk to your doctor about a weight reduction plan.    Activity:    Remain physically active following your doctor's instructions about exercise and activity.    Rest often.     Any time you become even a little tired or short of breath, SIT DOWN and rest.    Worsening Symptoms:    Report any of the following signs and symptoms to the doctor's office immediately:    *Pain of jaw, arm, or neck  *Chest pain not relieved by medication                               *Dizziness or loss of consciousness  *Difficulty breathing even when at rest   *More tired than usual                                       *Bleeding drainage or swelling of surgical site  *Swelling of feet, ankles, legs or stomach                 *Fever (>100ºF)  *Pink or blood tinged sputum  *Weight gain (3lbs/day or 5lbs /week)           *Shock from internal defibrillator (if applicable)  *Palpitations or irregular heartbeats                *Cool and/or numb extremities    Stroke Awareness    Common Risk Factors for Stroke include:    Age  Atrial Fibrillation  Carotid Artery Stenosis  Diabetes Mellitus  Excessive alcohol consumption  High blood  pressure  Overweight   Physical inactivity  Smoking    Warning signs and symptoms of a stroke include:    *Sudden numbness or weakness of the face, arm or leg (especially on one side of the body).  *Sudden confusion, trouble speaking or understanding.  *Sudden trouble seeing in one or both eyes.  *Sudden trouble walking, dizziness, loss of balance or coordination.Sudden severe headache with no known cause.    It is very important to get treatment quickly when a stroke occurs. If you experience any of the above warning signs, call 911 immediately.                   Disclaimer         Quit Smoking / Tobacco Use:    I understand the use of any tobacco products increases my chance of suffering from future heart disease or stroke and could cause other illnesses which may shorten my life. Quitting the use of tobacco products is the single most important thing I can do to improve my health. For further information on smoking / tobacco cessation call a Toll Free Quit Line at 1-233.259.2866 (*National Cancer [place]) or 1-838.101.9248 (American Lung Association) or you can access the web based program at www.[employer]Tr[employer].org.    [place] Tobacco Users Help Line:  (896) 296-8385       Toll Free: 1-656.291.8855  Quit Tobacco Program [facility] (723)[phone]    Crisis Hotline:    [place] Crisis Hotline:  7-802-NYHRUTK or 1-605.121.7940    [place] Crisis Hotline:    1-719.427.6229 or 205-193-1458    Discharge Survey:   Thank you for choosing [facility]. We hope we did everything we could to make your hospital stay a pleasant one. You may be receiving a phone survey and we would appreciate your time and participation in answering the questions. Your input is very valuable to us in our efforts to improve our service to our patients and their families.        My signature on this form indicates that:    1. I have reviewed and understand the above information.  2. My questions regarding this information have  been answered to my satisfaction.  3. I have formulated a plan with my discharge nurse to obtain my prescribed medications for home.                  Disclaimer         __________________________________                     __________       ________                       Patient Signature                                                 Date                    Time

## 2017-05-05 PROBLEM — A41.9 SEPSIS, UNSPECIFIED: Status: ACTIVE | Noted: 2017-05-05

## 2017-05-05 LAB
ANION GAP SERPL CALC-SCNC: 9 MMOL/L (ref 0–11.9)
BASOPHILS # BLD AUTO: 0.5 % (ref 0–1.8)
BASOPHILS # BLD: 0.05 K/UL (ref 0–0.12)
BUN SERPL-MCNC: 11 MG/DL (ref 8–22)
CALCIUM SERPL-MCNC: 8.3 MG/DL (ref 8.5–10.5)
CHLORIDE SERPL-SCNC: 105 MMOL/L (ref 96–112)
CHOLEST SERPL-MCNC: 102 MG/DL (ref 100–199)
CO2 SERPL-SCNC: 26 MMOL/L (ref 20–33)
CREAT SERPL-MCNC: 0.77 MG/DL (ref 0.5–1.4)
EOSINOPHIL # BLD AUTO: 0.62 K/UL (ref 0–0.51)
EOSINOPHIL NFR BLD: 6.7 % (ref 0–6.9)
ERYTHROCYTE [DISTWIDTH] IN BLOOD BY AUTOMATED COUNT: 53.7 FL (ref 35.9–50)
EST. AVERAGE GLUCOSE BLD GHB EST-MCNC: 146 MG/DL
GFR SERPL CREATININE-BSD FRML MDRD: >60 ML/MIN/1.73 M 2
GLUCOSE BLD-MCNC: 117 MG/DL (ref 65–99)
GLUCOSE BLD-MCNC: 74 MG/DL (ref 65–99)
GLUCOSE BLD-MCNC: 97 MG/DL (ref 65–99)
GLUCOSE SERPL-MCNC: 118 MG/DL (ref 65–99)
HBA1C MFR BLD: 6.7 % (ref 0–5.6)
HCT VFR BLD AUTO: 44.7 % (ref 42–52)
HDLC SERPL-MCNC: 26 MG/DL
HGB BLD-MCNC: 14.2 G/DL (ref 14–18)
IMM GRANULOCYTES # BLD AUTO: 0.03 K/UL (ref 0–0.11)
IMM GRANULOCYTES NFR BLD AUTO: 0.3 % (ref 0–0.9)
LDLC SERPL CALC-MCNC: 52 MG/DL
LYMPHOCYTES # BLD AUTO: 2.76 K/UL (ref 1–4.8)
LYMPHOCYTES NFR BLD: 29.9 % (ref 22–41)
MCH RBC QN AUTO: 29.3 PG (ref 27–33)
MCHC RBC AUTO-ENTMCNC: 31.8 G/DL (ref 33.7–35.3)
MCV RBC AUTO: 92.2 FL (ref 81.4–97.8)
MONOCYTES # BLD AUTO: 1.22 K/UL (ref 0–0.85)
MONOCYTES NFR BLD AUTO: 13.2 % (ref 0–13.4)
NEUTROPHILS # BLD AUTO: 4.54 K/UL (ref 1.82–7.42)
NEUTROPHILS NFR BLD: 49.4 % (ref 44–72)
NRBC # BLD AUTO: 0 K/UL
NRBC BLD AUTO-RTO: 0 /100 WBC
PLATELET # BLD AUTO: 318 K/UL (ref 164–446)
PMV BLD AUTO: 11 FL (ref 9–12.9)
POTASSIUM SERPL-SCNC: 3.7 MMOL/L (ref 3.6–5.5)
RBC # BLD AUTO: 4.85 M/UL (ref 4.7–6.1)
SODIUM SERPL-SCNC: 140 MMOL/L (ref 135–145)
TRIGL SERPL-MCNC: 122 MG/DL (ref 0–149)
WBC # BLD AUTO: 9.2 K/UL (ref 4.8–10.8)

## 2017-05-05 PROCEDURE — 36415 COLL VENOUS BLD VENIPUNCTURE: CPT

## 2017-05-05 PROCEDURE — 770006 HCHG ROOM/CARE - MED/SURG/GYN SEMI*

## 2017-05-05 PROCEDURE — 97162 PT EVAL MOD COMPLEX 30 MIN: CPT

## 2017-05-05 PROCEDURE — G8989 SELF CARE D/C STATUS: HCPCS | Mod: CH

## 2017-05-05 PROCEDURE — 99233 SBSQ HOSP IP/OBS HIGH 50: CPT | Performed by: HOSPITALIST

## 2017-05-05 PROCEDURE — G8979 MOBILITY GOAL STATUS: HCPCS | Mod: CI

## 2017-05-05 PROCEDURE — 700111 HCHG RX REV CODE 636 W/ 250 OVERRIDE (IP): Performed by: HOSPITALIST

## 2017-05-05 PROCEDURE — 80061 LIPID PANEL: CPT

## 2017-05-05 PROCEDURE — A9270 NON-COVERED ITEM OR SERVICE: HCPCS | Performed by: HOSPITALIST

## 2017-05-05 PROCEDURE — 80048 BASIC METABOLIC PNL TOTAL CA: CPT

## 2017-05-05 PROCEDURE — 700105 HCHG RX REV CODE 258: Performed by: HOSPITALIST

## 2017-05-05 PROCEDURE — A9270 NON-COVERED ITEM OR SERVICE: HCPCS | Performed by: NURSE PRACTITIONER

## 2017-05-05 PROCEDURE — 700101 HCHG RX REV CODE 250: Performed by: HOSPITALIST

## 2017-05-05 PROCEDURE — G8988 SELF CARE GOAL STATUS: HCPCS | Mod: CH

## 2017-05-05 PROCEDURE — 700102 HCHG RX REV CODE 250 W/ 637 OVERRIDE(OP): Performed by: HOSPITALIST

## 2017-05-05 PROCEDURE — G8978 MOBILITY CURRENT STATUS: HCPCS | Mod: CJ

## 2017-05-05 PROCEDURE — 97165 OT EVAL LOW COMPLEX 30 MIN: CPT

## 2017-05-05 PROCEDURE — 700102 HCHG RX REV CODE 250 W/ 637 OVERRIDE(OP): Performed by: NURSE PRACTITIONER

## 2017-05-05 PROCEDURE — 82962 GLUCOSE BLOOD TEST: CPT

## 2017-05-05 PROCEDURE — 83036 HEMOGLOBIN GLYCOSYLATED A1C: CPT

## 2017-05-05 PROCEDURE — 85025 COMPLETE CBC W/AUTO DIFF WBC: CPT

## 2017-05-05 PROCEDURE — G8987 SELF CARE CURRENT STATUS: HCPCS | Mod: CH

## 2017-05-05 RX ORDER — OXYCODONE HYDROCHLORIDE 5 MG/1
5 TABLET ORAL EVERY 4 HOURS PRN
Status: DISCONTINUED | OUTPATIENT
Start: 2017-05-05 | End: 2017-05-06 | Stop reason: HOSPADM

## 2017-05-05 RX ADMIN — AMPICILLIN SODIUM AND SULBACTAM SODIUM 3 G: 2; 1 INJECTION, POWDER, FOR SOLUTION INTRAMUSCULAR; INTRAVENOUS at 09:58

## 2017-05-05 RX ADMIN — POTASSIUM CHLORIDE 20 MEQ: 1500 TABLET, EXTENDED RELEASE ORAL at 07:54

## 2017-05-05 RX ADMIN — LISINOPRIL 30 MG: 10 TABLET ORAL at 07:52

## 2017-05-05 RX ADMIN — ASPIRIN 81 MG: 81 TABLET ORAL at 07:54

## 2017-05-05 RX ADMIN — GLIPIZIDE 2.5 MG: 2.5 TABLET, FILM COATED, EXTENDED RELEASE ORAL at 07:52

## 2017-05-05 RX ADMIN — ERYTHROMYCIN: 5 OINTMENT OPHTHALMIC at 05:50

## 2017-05-05 RX ADMIN — OXYCODONE HYDROCHLORIDE 5 MG: 5 TABLET ORAL at 19:25

## 2017-05-05 RX ADMIN — STANDARDIZED SENNA CONCENTRATE AND DOCUSATE SODIUM 2 TABLET: 8.6; 5 TABLET, FILM COATED ORAL at 07:53

## 2017-05-05 RX ADMIN — CARVEDILOL 6.25 MG: 6.25 TABLET, FILM COATED ORAL at 05:50

## 2017-05-05 RX ADMIN — SITAGLIPTIN 100 MG: 100 TABLET, FILM COATED ORAL at 07:52

## 2017-05-05 RX ADMIN — STANDARDIZED SENNA CONCENTRATE AND DOCUSATE SODIUM 2 TABLET: 8.6; 5 TABLET, FILM COATED ORAL at 22:01

## 2017-05-05 RX ADMIN — ATORVASTATIN CALCIUM 20 MG: 20 TABLET, FILM COATED ORAL at 22:00

## 2017-05-05 RX ADMIN — ERYTHROMYCIN: 5 OINTMENT OPHTHALMIC at 00:46

## 2017-05-05 RX ADMIN — ERYTHROMYCIN: 5 OINTMENT OPHTHALMIC at 12:00

## 2017-05-05 RX ADMIN — OMEPRAZOLE 20 MG: 20 CAPSULE, DELAYED RELEASE ORAL at 07:52

## 2017-05-05 RX ADMIN — AMPICILLIN SODIUM AND SULBACTAM SODIUM 3 G: 2; 1 INJECTION, POWDER, FOR SOLUTION INTRAMUSCULAR; INTRAVENOUS at 14:52

## 2017-05-05 RX ADMIN — CARVEDILOL 6.25 MG: 6.25 TABLET, FILM COATED ORAL at 17:19

## 2017-05-05 RX ADMIN — ONDANSETRON 4 MG: 4 TABLET, ORALLY DISINTEGRATING ORAL at 22:01

## 2017-05-05 RX ADMIN — AMPICILLIN SODIUM AND SULBACTAM SODIUM 3 G: 2; 1 INJECTION, POWDER, FOR SOLUTION INTRAMUSCULAR; INTRAVENOUS at 22:00

## 2017-05-05 RX ADMIN — FUROSEMIDE 40 MG: 40 TABLET ORAL at 07:54

## 2017-05-05 RX ADMIN — ERYTHROMYCIN: 5 OINTMENT OPHTHALMIC at 18:00

## 2017-05-05 RX ADMIN — AMPICILLIN SODIUM AND SULBACTAM SODIUM 3 G: 2; 1 INJECTION, POWDER, FOR SOLUTION INTRAMUSCULAR; INTRAVENOUS at 04:59

## 2017-05-05 RX ADMIN — OXYCODONE HYDROCHLORIDE 5 MG: 5 TABLET ORAL at 03:08

## 2017-05-05 ASSESSMENT — PAIN SCALES - GENERAL
PAINLEVEL_OUTOF10: 6
PAINLEVEL_OUTOF10: 4

## 2017-05-05 ASSESSMENT — ENCOUNTER SYMPTOMS
COUGH: 0
FEVER: 0
BACK PAIN: 0
CHILLS: 0
MYALGIAS: 0
NAUSEA: 0
WEAKNESS: 1
SHORTNESS OF BREATH: 1
TINGLING: 0
VOMITING: 0
HEADACHES: 0
PALPITATIONS: 0
DEPRESSION: 0
DIZZINESS: 0

## 2017-05-05 ASSESSMENT — LIFESTYLE VARIABLES
DO YOU DRINK ALCOHOL: NO
EVER_SMOKED: YES
DO YOU DRINK ALCOHOL: NO
DO YOU DRINK ALCOHOL: NO

## 2017-05-05 ASSESSMENT — GAIT ASSESSMENTS
DISTANCE (FEET): 150
ASSISTIVE DEVICE: FRONT WHEEL WALKER
GAIT LEVEL OF ASSIST: STAND BY ASSIST

## 2017-05-05 ASSESSMENT — ACTIVITIES OF DAILY LIVING (ADL): TOILETING: INDEPENDENT

## 2017-05-05 NOTE — CARE PLAN
Problem: Safety  Goal: Will remain free from injury  Outcome: PROGRESSING AS EXPECTED    Problem: Infection  Goal: Will remain free from infection  Outcome: PROGRESSING SLOWER THAN EXPECTED

## 2017-05-05 NOTE — PROGRESS NOTES
Hospital Medicine Progress Note, Adult, Complex               Author: Cheyenne Lima Date & Time created: 5/5/2017  3:59 PM     Interval History:  68 yo male with hx DM and medication non compliance presents with vague complaints of sob, hand swelling, and redness in bilateral lower extremities    Afebrile overnight  Participated with therapies and did well  Redness on legs better  SOB persists although not requiring o2    Review of Systems:  Review of Systems   Constitutional: Positive for malaise/fatigue. Negative for fever and chills.   HENT: Negative for hearing loss.    Respiratory: Positive for shortness of breath. Negative for cough.    Cardiovascular: Positive for leg swelling. Negative for chest pain and palpitations.   Gastrointestinal: Negative for nausea and vomiting.   Genitourinary: Negative for dysuria and frequency.   Musculoskeletal: Negative for myalgias and back pain.   Skin: Positive for rash. Negative for itching.   Neurological: Positive for weakness. Negative for dizziness, tingling and headaches.   Psychiatric/Behavioral: Negative for depression and suicidal ideas.       Physical Exam:  Physical Exam   Constitutional: He is oriented to person, place, and time. He appears well-developed and well-nourished.   HENT:   Head: Normocephalic and atraumatic.   Eyes: Conjunctivae are normal. Pupils are equal, round, and reactive to light.   Neck: Normal range of motion. Neck supple.   Cardiovascular: Normal rate and regular rhythm.    Pulmonary/Chest: Effort normal and breath sounds normal. No respiratory distress.   Abdominal: Soft. Bowel sounds are normal. He exhibits no distension.   Musculoskeletal: Normal range of motion. He exhibits edema. He exhibits no tenderness.   Pitting edema B/L R>L   Neurological: He is alert and oriented to person, place, and time.   Skin: Skin is warm and dry. Rash noted. There is erythema.   Chronic venous stasis changes bilateral lower extremities  Bilateral lower  extremity erythema   Nursing note and vitals reviewed.      Labs:        Invalid input(s): [id number]  Recent Labs      17   1410   TROPONINI  <0.01   BNPBTYPENAT  130*     Recent Labs      17   1410  17   0326   SODIUM  136  140   POTASSIUM  4.0  3.7   CHLORIDE  104  105   CO2  24  26   BUN  12  11   CREATININE  0.80  0.77   CALCIUM  9.0  8.3*     Recent Labs      17   14117   0326   ALTSGPT  11   --    ASTSGOT  14   --    ALKPHOSPHAT  60   --    TBILIRUBIN  0.4   --    LIPASE  30   --    GLUCOSE  99  118*     Recent Labs      17   14117   0326   RBC  4.90  4.85   HEMOGLOBIN  14.4  14.2   HEMATOCRIT  44.7  44.7   PLATELETCT  329  318   PROTHROMBTM  14.4   --    APTT  31.4   --    INR  1.09   --      Recent Labs      17   0326   WBC  12.6*  9.2   NEUTSPOLYS  75.00*  49.40   LYMPHOCYTES  11.60*  29.90   MONOCYTES  6.90  13.20   EOSINOPHILS  5.40  6.70   BASOPHILS  0.60  0.50   ASTSGOT  14   --    ALTSGPT  11   --    ALKPHOSPHAT  60   --    TBILIRUBIN  0.4   --            Hemodynamics:  Temp (24hrs), Av.5 °C (97.7 °F), Min:36.3 °C (97.4 °F), Max:36.7 °C (98 °F)  Temperature: 36.6 °C (97.8 °F)  Pulse  Av.3  Min: 78  Max: 101Heart Rate (Monitored): 90  Blood Pressure : 130/76 mmHg, NIBP: 119/72 mmHg     Respiratory:    Respiration: 18, Pulse Oximetry: 90 %     Work Of Breathing / Effort: Mild  RUL Breath Sounds: Clear, RML Breath Sounds: Clear, RLL Breath Sounds: Clear;Crackles, REYNA Breath Sounds: Clear, LLL Breath Sounds: Diminished  Fluids:    Intake/Output Summary (Last 24 hours) at 17 1559  Last data filed at 17 0400   Gross per 24 hour   Intake   1400 ml   Output   1250 ml   Net    150 ml        GI/Nutrition:  Orders Placed This Encounter   Procedures   • Diet Order     Standing Status: Standing      Number of Occurrences: 1      Standing Expiration Date:      Order Specific Question:  Diet:     Answer:  Diabetic [3]      Order Specific Question:  Diet:     Answer:  Cardiac [6]     Medical Decision Making, by Problem:  Active Hospital Problems    Diagnosis   • *Sepsis (CMS-HCC) [A41.9]  - present at the time of admission  - resolved  - f/u culture results   - continue IV abx: Unasyn     • Generalized weakness [R53.1]     • Homelessness [Z59.0]     • Noncompliance with medications [Z91.14]     • Bilateral lower extremity edema [R60.0]  - chronic     • Diabetes 1.5, 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

## 2017-05-05 NOTE — DISCHARGE PLANNING
"Care Transition Team Assessment    IHD met with patient bedside. He lives at the shelter and his only social support is his sister. He has no services or DME at the shelter and uses the bus to get around. He has SSI and Food stamps income, but stated it's \"very modest\" and he needs more as well as housing resources. Resources were printed and given to the patient. He stated he is staying here until he is medically clear.     Information Source  Orientation : Oriented x 4  Information Given By: Patient  Informant's Name: Maxi  Who is responsible for making decisions for patient? : Patient         Elopement Risk  Legal Hold: No  Ambulatory or Self Mobile in Wheelchair: Yes  Disoriented: No  Psychiatric Symptoms: None  History of Wandering: No  Elopement this Admit: No  Vocalizing Wanting to Leave: No  Displays Behaviors, Body Language Wanting to Leave: No-Not at Risk for Elopement  Elopement Risk: Not at Risk for Elopement  Wanderguard On: No (See Comments)  Personal Belongings: Hospital Clothing Only    Interdisciplinary Discharge Planning  Does Admitting Nurse Feel This Could be a Complex Discharge?: No  Primary Care Physician: Miranda Osorio ([facility])  Lives with - Patient's Self Care Capacity: Alone and Able to Care For Self  Patient or legal guardian wants to designate a caregiver (see row info): No  Support Systems: Family Member(s) (Sister)  Housing / Facility: Homeless (Shelter)  Do You Take your Prescribed Medications Regularly: No  Able to Return to Previous ADL's: Yes  Mobility Issues: No  Prior Services: None  Patient Expects to be Discharged to:: Unsure  Assistance Needed: No  Durable Medical Equipment: Not Applicable    Discharge Preparedness  What is your plan after discharge?: Uncertain - pending medical team collaboration  What are your discharge supports?: Sibling  Prior Functional Level: Ambulatory, Independent with Activities of Daily Living  Difficulity with ADLs: None  Difficulity " with IADLs: None    Functional Assesment  Prior Functional Level: Ambulatory, Independent with Activities of Daily Living    Finances  Financial Barriers to Discharge: Yes  Source of Income: Social Security, Other (comment) (Food stamps)  Prescription Coverage: Yes ([facility])    Vision / Hearing Impairment  Vision Impairment : Yes  Right Eye Vision: Other (Comments) (DOUBLE VISION)  Left Eye Vision: Other (Comments) (DOUBLE VISION)  Hearing Impairment : Yes  Hearing Impairment: Both Ears, Hearing Device Not Available  Does Pt Need Special Equipment for the Hearing Impaired?: No    Values / Beliefs / Concerns  Values / Beliefs Concerns : No         Domestic Abuse  Have you ever been the victim of abuse or violence?: No  Physical Abuse or Sexual Abuse: No  Verbal Abuse or Emotional Abuse: No  Possible Abuse Reported to:: Not Applicable    Psychological Assessment  History of Substance Abuse: None  History of Psychiatric Problems: No  Newly Diagnosed Illness: No    Discharge Risks or Barriers  Discharge risks or barriers?: Homeless / couch surfing  Patient risk factors: Lives alone and no community support, Lack of outside supports, Multiple ED visits, Homeless    Anticipated Discharge Information  Anticipated discharge disposition: Discharge needs currently unknown  Discharge Address: [address] [place] [id number]  Discharge Contact Phone Number: [phone]

## 2017-05-05 NOTE — THERAPY
"Occupational Therapy Evaluation completed.   Functional Status:  Pt sitting EOB at start of session.  Pt able to don socks supervised.  Pt stood and walked unit without AD at a supervised level.  Pt reports he feels something is medically wrong with him that the doctors are not aware of and he does not want to \"be put back out on the street.\"  Pt stating, \"my knuckles have disappeared.\"  Pt educated on likelihood of muscle/fat changes and edema as possible cause.  Pt educated on importance of med compliance.  Pt currently appears at his baseline.  Plan of Care: Patient with no further skilled OT needs in the acute care setting at this time  Discharge Recommendations:  Equipment: No Equipment Needed. Post-acute therapy Currently anticipate no further skilled therapy needs once patient is discharged from the inpatient setting.    See \"Rehab Therapy-Acute\" Patient Summary Report for complete documentation.    "

## 2017-05-05 NOTE — ED NOTES
Med rec complete per pt and [facility] pharmacy @ [phone]  Allergies reviewed  No ABX in last month  Pt states he has not had his medications in over a week. States he  Does not take his medications consistently

## 2017-05-05 NOTE — THERAPY
"Physical Therapy Evaluation completed.   Bed Mobility:  Supine to Sit: Independent  Transfers: Sit to Stand: Modified Independent  Gait: Level Of Assist: Stand by Assist with Front-Wheel Walker     X 150 ft  Plan of Care: Will benefit from Physical Therapy 3 times per week  Discharge Recommendations: Equipment: Will Continue to Assess for Equipment Needs. Post-acute therapy Discharge to home with outpatient or home health for additional skilled therapy services.    See \"Rehab Therapy-Acute\" Patient Summary Report for complete documentation.     66 y/o male admitted on 5/4 with progressive weakness/ shortness of breath and LE cellulitis, acute CHF and pulmonary edema. Pt with signifincant PMH for DM, chronic CHF (intermittent non-compliance with meds), pneumonia, COPD, and HTN. Pt currently presents with generalized weakness/ debility,  limited activity tolerance and gait/ balance dysfunction. Pt was pleasant and cooperative with activity/ gait and receptive to education, should benefit from PT services until d/c  "

## 2017-05-05 NOTE — RESPIRATORY CARE
COPD EDUCATION by COPD CLINICAL EDUCATOR  5/5/2017 at 6:32 AM by Ree Estrella     Patient reviewed by COPD education team. Patient does not qualify for COPD program.

## 2017-05-05 NOTE — DIETARY
NUTRITION SERVICES: BMI - Pt with BMI >40 (=41.81 kg/m 2). Weight loss counseling not appropriate in acute care setting. RECOMMEND - Referral to outpatient nutrition services for weight management after D/C.

## 2017-05-05 NOTE — PROGRESS NOTES
Received report from nurse in ER. Pt arrived with transport and walked to bed. Pt has no c/o pain at this time. Bed alarm on. Vss. Will continue to monitor.

## 2017-05-05 NOTE — H&P
PRIMARY CARE PROVIDER:  [facility].    CHIEF COMPLAINT:  Weakness, shortness of breath, drowsiness.    HISTORY OF PRESENT ILLNESS:  This is a 67-year-old male with a history of   homelessness, repeat hospitalizations for noncompliance with medications,   diabetes mellitus type 2, history of pneumonia, generalized weakness, COPD,   presents to the emergency room via EMS from a shelter in [place] after missing   several doses of his medications with worsening shortness of breath.  He has   been complaining of generalized weakness since his last hospitalization in May   2016 in which he was discharged to [facility].  The patient states   he is unable to take care of himself and would prefer to be discharged to an   assisted living or even skilled nursing facility for his inability to take   care of himself and take his medications on a regular basis.  The patient last   ate at [facility] this morning with the wife who lives in the shelter.  He   denies nausea, vomiting, diarrhea.  He does have bilateral lower extremity   edema with new onset erythema for the last 2-3 days, increased fatigue,   generalized weakness.  Patient does not have home oxygen.    REVIEW OF SYSTEMS:  All systems reviewed and negative other than pertinent   positives and negatives in the HPI.    PAST MEDICAL HISTORY:  Diabetes mellitus type 2, history of health-acquired   pneumonia, April 16 with repeat admission _____, hypertension, generalized   weakness, homelessness, noncompliance with medications, B12 deficiency,   vitamin D deficiency, COPD, previous CVA.    PAST SURGICAL HISTORY:  Splenectomy in 1980.    FAMILY HISTORY:  Mother and father both had heart problems.    SOCIAL HISTORY:  He smokes 5-6 cigarettes daily.  No alcohol, no drug abuse,   homeless.  The patient does not have a walker or cane.    ALLERGIES:  TO SHELLFISH ALLERGY.    MEDICATIONS:  Glipizide SR, omeprazole, carvedilol, atorvastatin, aspirin 81   mg, albuterol  inhaler, Abilify, Coreg, Lasix 20 daily, Neurontin, glipizide,   Tradjenta, lisinopril, omeprazole, tiotropium bromide inhaler.    REVIEW OF SYSTEMS:  All systems reviewed and negative other than pertinent   positives and negatives in the HPI.    PHYSICAL EXAMINATION:  VITAL SIGNS:  Blood pressure 151/83, pulse 95, temperature 37.1, respirations   20, weight 121 kilograms, BMI 41, O2 sat 93% on room air.  GENERAL:  He is talking in full sentences.  Well-nourished, well-developed   male.  Alert and oriented x3.  He is resting quietly on the gurney without any   acute distress or difficulty in breathing.  He does not appear toxic.  HEENT:  Pupils are equal, round and reactive to light bilaterally.  EOMI   intact.  He has no icterus.  He does have right conjunctiva, yellow discharge   is seen.  HEENT:  Normocephalic, atraumatic.  He has moist mucous membranes.    Symmetrical smile and eyebrow raise.  LYMPHATICS:  He has no supraclavicular or anterior cervical lymphadenopathy.  NECK:  Supple, trachea midline.  No JVD or crepitus.  No nuchal rigidity.  HEART:  Regular rate and rhythm.  No murmur, rub or gallop.  LUNGS:  Clear to auscultation in all lung fields.  No crackles.  He does have   some rhonchi noted; however, he was given Lasix 40 mg IV prior to my exam.  I   did not see any respiratory distress.  ABDOMEN:  Soft, nontender, nondistended in all 4 quadrants.  He has an   umbilical hernia, easily reducible, well-healed scar from splenectomy.  No   rebound, guarding or distention.  SKIN:  He does have bilateral lower extremity edema as well as erythema, warm   to touch; right appears to be with increased erythema than the left, with   increased edema noted, right versus left.  He has no cuts or scratches.  BACK:  No CVA tenderness to palpation or obvious deformity.  EXTREMITIES:  Bilateral lower extremity edema noted.  He has no cyanosis or   clubbing noted.  MUSCULOSKELETAL:  Full range of motion in all 4  extremities.  He does have   bilateral lower extremity tenderness to palpation.  NEUROLOGIC:  Cranial nerves II-XII normal.  Motor intact.  Sensory intact; 5+   in all 4 extremities.  He has no focal neurological deficit noted.  PSYCHIATRIC:  Normal mood, affect, and judgment.  Alert and oriented x3.  SKIN:  Cellulitis seen at the lower extremities, right worse than left.    DIAGNOSTICS:  EKG 12-lead showing sinus rhythm, rate of 95, no ST elevations   or depressions.    LABORATORY DATA:  WBC 12.6, H and H 14 and 44, platelets 329.  He does have   left shift.  Sodium 136, potassium 4.0, chloride 104, carbon dioxide 24,   glucose 99, BUN 12, creatinine 0.80.  LFTs normal at 14 and 11 respectively,   alkaline phosphatase 60, total bilirubin of 0.4.  BNP of 130.  Troponin   negative.  Lipase 30.  Lactic acid 1.8.  INR of 1.09.  PTT normal.  TSH 0.69.    Free T4 normal.      Chest x-ray:  Interstitial prominence representing interstitial edema is   noted.  No pneumonia seen.  Previous echocardiogram on 03/05/2017 showing an   EF of 60% with an RVP of 40.    IMPRESSION:  1.  Acute pulmonary edema, does not appear to have an infectious etiology, did   improve with Lasix 40 mg IV.  He also is noncompliant with his Lasix 20 mg   daily.  He does have lower extremity edema as well.  Restart the patient back   on Lasix 40 mg p.o. daily, Coreg and lisinopril.  2.  Dependent edema with cellulitis, right worse than left.  I have ordered   ultrasound of the lower extremities to rule out deep venous thrombosis, Unasyn   IV q. 6 to cover for strep organisms.  3.  Diabetes mellitus type 2, Accu-Cheks q.a.c. and at bedtime with sliding   scale insulin, check an A1c.  4.  Homelessness with noncompliance with medications, multiple   hospitalizations, have  to evaluate for possible placement.    The patient is willing to go into a placement situation for inability to care   for herself.  5.  Generalized weakness.  It has  been ongoing for the past year.  No focal   neurological deficits, likely related to the acute cellulitis infection as   well as acute pulmonary edema, shortness of breath.  6.  Right conjunctivitis, erythromycin ophthalmic ointment b.i.d. to both eyes   q.  6 hours for 7 days.  7.  Leukocytosis with left shift secondary to cellulitis.  8.  Hypertension.  Continue with Coreg and lisinopril as well as aspirin 81 mg   daily.  9.  Anticipate greater than 2 midnights stay.  Deep venous thrombosis   prophylaxis, heparin t.i.d.    Admission time is 65 minutes.       ____________________________________     MD IRIS Hurtado / NTS    DD:  05/05/2017 03:55:36  DT:  05/05/2017 04:47:21    D#:  [id number]  Job#:  [id number]

## 2017-05-06 VITALS
WEIGHT: 267 LBS | HEIGHT: 67 IN | HEART RATE: 80 BPM | OXYGEN SATURATION: 93 % | SYSTOLIC BLOOD PRESSURE: 112 MMHG | TEMPERATURE: 98 F | RESPIRATION RATE: 17 BRPM | BODY MASS INDEX: 41.91 KG/M2 | DIASTOLIC BLOOD PRESSURE: 67 MMHG

## 2017-05-06 PROBLEM — A41.9 SEPSIS, UNSPECIFIED: Status: RESOLVED | Noted: 2017-05-05 | Resolved: 2017-05-06

## 2017-05-06 PROBLEM — R60.0 BILATERAL LOWER EXTREMITY EDEMA: Status: RESOLVED | Noted: 2017-05-04 | Resolved: 2017-05-06

## 2017-05-06 LAB
GLUCOSE BLD-MCNC: 100 MG/DL (ref 65–99)
GLUCOSE BLD-MCNC: 104 MG/DL (ref 65–99)
GLUCOSE BLD-MCNC: 106 MG/DL (ref 65–99)

## 2017-05-06 PROCEDURE — A9270 NON-COVERED ITEM OR SERVICE: HCPCS | Performed by: NURSE PRACTITIONER

## 2017-05-06 PROCEDURE — 99239 HOSP IP/OBS DSCHRG MGMT >30: CPT | Performed by: HOSPITALIST

## 2017-05-06 PROCEDURE — 93970 EXTREMITY STUDY: CPT | Mod: 26 | Performed by: SURGERY

## 2017-05-06 PROCEDURE — 700102 HCHG RX REV CODE 250 W/ 637 OVERRIDE(OP): Performed by: HOSPITALIST

## 2017-05-06 PROCEDURE — 700111 HCHG RX REV CODE 636 W/ 250 OVERRIDE (IP): Performed by: HOSPITALIST

## 2017-05-06 PROCEDURE — 700102 HCHG RX REV CODE 250 W/ 637 OVERRIDE(OP): Performed by: NURSE PRACTITIONER

## 2017-05-06 PROCEDURE — 93970 EXTREMITY STUDY: CPT

## 2017-05-06 PROCEDURE — A9270 NON-COVERED ITEM OR SERVICE: HCPCS | Performed by: HOSPITALIST

## 2017-05-06 PROCEDURE — 700105 HCHG RX REV CODE 258: Performed by: HOSPITALIST

## 2017-05-06 PROCEDURE — 82962 GLUCOSE BLOOD TEST: CPT | Mod: 91

## 2017-05-06 RX ORDER — ERYTHROMYCIN 5 MG/G
1 OINTMENT OPHTHALMIC EVERY 6 HOURS
Qty: 1 TUBE | Refills: 0 | Status: ON HOLD | OUTPATIENT
Start: 2017-05-06 | End: 2017-06-24

## 2017-05-06 RX ORDER — AMOXICILLIN AND CLAVULANATE POTASSIUM 875; 125 MG/1; MG/1
1 TABLET, FILM COATED ORAL 2 TIMES DAILY
Qty: 14 TAB | Refills: 0 | Status: SHIPPED | OUTPATIENT
Start: 2017-05-06 | End: 2017-05-13

## 2017-05-06 RX ADMIN — NICOTINE 14 MG: 14 PATCH, EXTENDED RELEASE TRANSDERMAL at 04:44

## 2017-05-06 RX ADMIN — FUROSEMIDE 40 MG: 40 TABLET ORAL at 08:10

## 2017-05-06 RX ADMIN — AMPICILLIN SODIUM AND SULBACTAM SODIUM 3 G: 2; 1 INJECTION, POWDER, FOR SOLUTION INTRAMUSCULAR; INTRAVENOUS at 04:44

## 2017-05-06 RX ADMIN — ACETAMINOPHEN 650 MG: 325 TABLET, FILM COATED ORAL at 08:51

## 2017-05-06 RX ADMIN — SITAGLIPTIN 100 MG: 100 TABLET, FILM COATED ORAL at 08:10

## 2017-05-06 RX ADMIN — OXYCODONE HYDROCHLORIDE 5 MG: 5 TABLET ORAL at 08:52

## 2017-05-06 RX ADMIN — OMEPRAZOLE 20 MG: 20 CAPSULE, DELAYED RELEASE ORAL at 08:10

## 2017-05-06 RX ADMIN — STANDARDIZED SENNA CONCENTRATE AND DOCUSATE SODIUM 2 TABLET: 8.6; 5 TABLET, FILM COATED ORAL at 08:09

## 2017-05-06 RX ADMIN — LISINOPRIL 30 MG: 10 TABLET ORAL at 08:10

## 2017-05-06 RX ADMIN — ERYTHROMYCIN: 5 OINTMENT OPHTHALMIC at 00:31

## 2017-05-06 RX ADMIN — ONDANSETRON 4 MG: 4 TABLET, ORALLY DISINTEGRATING ORAL at 04:44

## 2017-05-06 RX ADMIN — AMPICILLIN SODIUM AND SULBACTAM SODIUM 3 G: 2; 1 INJECTION, POWDER, FOR SOLUTION INTRAMUSCULAR; INTRAVENOUS at 08:59

## 2017-05-06 RX ADMIN — POTASSIUM CHLORIDE 20 MEQ: 1500 TABLET, EXTENDED RELEASE ORAL at 08:11

## 2017-05-06 RX ADMIN — ERYTHROMYCIN: 5 OINTMENT OPHTHALMIC at 12:07

## 2017-05-06 RX ADMIN — ASPIRIN 81 MG: 81 TABLET ORAL at 08:10

## 2017-05-06 RX ADMIN — OXYCODONE HYDROCHLORIDE 5 MG: 5 TABLET ORAL at 00:32

## 2017-05-06 RX ADMIN — OXYCODONE HYDROCHLORIDE 5 MG: 5 TABLET ORAL at 04:44

## 2017-05-06 RX ADMIN — CARVEDILOL 6.25 MG: 6.25 TABLET, FILM COATED ORAL at 08:10

## 2017-05-06 RX ADMIN — ERYTHROMYCIN: 5 OINTMENT OPHTHALMIC at 04:54

## 2017-05-06 RX ADMIN — GLIPIZIDE 2.5 MG: 2.5 TABLET, FILM COATED, EXTENDED RELEASE ORAL at 08:51

## 2017-05-06 ASSESSMENT — PAIN SCALES - GENERAL
PAINLEVEL_OUTOF10: 6
PAINLEVEL_OUTOF10: 4
PAINLEVEL_OUTOF10: 4
PAINLEVEL_OUTOF10: 7

## 2017-05-06 ASSESSMENT — COPD QUESTIONNAIRES
DURING THE PAST 4 WEEKS HOW MUCH DID YOU FEEL SHORT OF BREATH: MOST  OR ALL OF THE TIME
DO YOU EVER COUGH UP ANY MUCUS OR PHLEGM?: YES, A FEW DAYS A WEEK OR MONTH
COPD SCREENING SCORE: 8
HAVE YOU SMOKED AT LEAST 100 CIGARETTES IN YOUR ENTIRE LIFE: YES

## 2017-05-06 ASSESSMENT — LIFESTYLE VARIABLES: EVER_SMOKED: YES

## 2017-05-06 NOTE — PROGRESS NOTES
"Patient called attending nurse, requesting a snack. Attending nurse offered patient a healthy snack. Patient insisted on eating three containers of orange sherbet, three string cheese packs, and two containers of whole milk. Attending nurse reminded patient of earlier conversation about selecting healthy choices and portion control. Patient stated he would \"eat slow\" but insisted on requested items. Patient ate all items within ten minutes. Will continue to try and educated patient on making healthy choices.  "

## 2017-05-06 NOTE — PROGRESS NOTES
Patient is oriented to room, call light, and safety protocol. Patient is pleasant and appropriate in speech. Patient refuses bed alarm, despite education as to the benefits. Patient expressed confusion as to what made him have diabetes, and how it was negatively effecting him. Attending nurse educated patient as to the basic mechanisms of Diabetes, and how it could negatively affect the bodies tissues and organs, and how important keeping it closely monitored and treated was. Patient was able to repeat key education points.

## 2017-05-06 NOTE — DISCHARGE PLANNING
JUDITH spoke with [facility] RN. Pt medically cleared to discharge back to shelter. JUDITH contacted [facility] and pt's FFS Medicaid is not active.

## 2017-05-06 NOTE — PROGRESS NOTES
"Pt. Was discharged to homeless shelter. Pt. Refused discharge education and follow up. Patient stated \"i can read it on my own\". RN attempt to educated on new medication. Pt refused. Pt reported that he wanted to leave now. Pt ambulatted steady to bus stop.  gave bus pass . VSS   "

## 2017-05-06 NOTE — DISCHARGE INSTRUCTIONS
Discharge Instructions    Discharged to other by taxi with escort. Discharged via wheelchair, hospital escort: Yes.  Special equipment needed: Not Applicable    Be sure to schedule a follow-up appointment with your primary care doctor or any specialists as instructed.     Discharge Plan:   Diet Plan: Discussed  Activity Level: Discussed  Smoking Cessation Offered: Patient Refused  Confirmed Follow up Appointment: Patient to Call and Schedule Appointment  Confirmed Symptoms Management: Discussed  Medication Reconciliation Updated: Yes  Influenza Vaccine Indication: Not indicated: Previously immunized this influenza season and > 8 years of age    I understand that a diet low in cholesterol, fat, and sodium is recommended for good health. Unless I have been given specific instructions below for another diet, I accept this instruction as my diet prescription.   Other diet: Diabetic diet     Special Instructions: None    · Is patient discharged on Warfarin / Coumadin?   No     · Is patient Post Blood Transfusion?  No    Depression / Suicide Risk    As you are discharged from this [facility] facility, it is important to learn how to keep safe from harming yourself.    Recognize the warning signs:  · Abrupt changes in personality, positive or negative- including increase in energy   · Giving away possessions  · Change in eating patterns- significant weight changes-  positive or negative  · Change in sleeping patterns- unable to sleep or sleeping all the time   · Unwillingness or inability to communicate  · Depression  · Unusual sadness, discouragement and loneliness  · Talk of wanting to die  · Neglect of personal appearance   · Rebelliousness- reckless behavior  · Withdrawal from people/activities they love  · Confusion- inability to concentrate     If you or a loved one observes any of these behaviors or has concerns about self-harm, here's what you can do:  · Talk about it- your feelings and reasons for harming  yourself  · Remove any means that you might use to hurt yourself (examples: pills, rope, extension cords, firearm)  · Get professional help from the community (Mental Health, Substance Abuse, psychological counseling)  · Do not be alone:Call your Safe Contact- someone whom you trust who will be there for you.  · Call your local CRISIS HOTLINE 156-3565 or 243-955-8679  · Call your local Children's Mobile Crisis Response Team [place] (100) 008-5131 or www.[employer]  · Call the toll free National Suicide Prevention Hotlines   · National Suicide Prevention Lifeline 683-350-UGWZ (4805)  · National Hope Line Network 800-SUICIDE (587-5999)

## 2017-05-07 ENCOUNTER — PATIENT OUTREACH (OUTPATIENT)
Dept: HEALTH INFORMATION MANAGEMENT | Facility: OTHER | Age: 67
End: 2017-05-07

## 2017-05-07 NOTE — PROGRESS NOTES
Chart reviewed. Patient was discharged from [facility] 5/6/17. Patient does not have a contact phone number listed in Epic record. Unable to complete discharge outreach d/t no contact phone number listed in Epic record.

## 2017-05-07 NOTE — DISCHARGE SUMMARY
DATE OF ADMISSION:  05/04/2017    DATE OF DISCHARGE:  05/06/2017    ADMISSION DIAGNOSES:  1.  Dependent edema with cellulitis, right worse than left.  2.  Diabetes type 2.  3.  Homelessness.  4.  Medication noncompliance.  5.  Generalized weakness.  6.  Right conjunctivitis.  7.  Leukocytosis.  8.  Hypertension.    DISCHARGE DIAGNOSES:  1.  Cellulitis, resolving.  2.  Type 2 diabetes.  3.  Medication noncompliance.  4.  Generalized weakness.  5.  Right conjunctivitis.  6.  Leukocytosis, resolved.  7.  Hypertension.    CONSULTS:  None.    PROCEDURES:  None.    HOSPITAL COURSE:  Patient is a 67-year-old male who has a history of recurrent   hospitalizations for noncompliance with his medications.  He has a history of   type 2 diabetes and COPD.  He presented to [facility] from a shelter due to   generalized complaints including swelling in his lower extremities as well as   redness.  He also has other complaints including shortness of breath and   weakness.  Patient reports that he would like to stay in the hospital until   next week as he is unable to get into the shelter or his motel until about a   week when he has more money.  Nonetheless, he was admitted for IV antibiotics   and Lasix.  There were no events during the hospitalization.  Upon initial   evaluation, he was found to be tachycardic and had a leukocytosis.  His   diagnosis of having sepsis was present at the time of admission, but resolved   by discharge.  Cultures remained negative.  His vital signs remained stable.    His blood pressure was well controlled.  His blood glucose levels were well   controlled as well as hemoglobin A1c was slightly elevated.  He is going to be   discharged to home.  He will need to resume antibiotics for 7 days and   antibiotics for conjunctivitis as well.  I also stressed the importance of   tobacco cessation and medication compliance.    DISCHARGE ACTIVITY:  As tolerated.    DISCHARGE DIET:  Diabetic.    DISCHARGE FOLLOWUP:   With his PCP as soon as possible.    DISCHARGE MEDICATIONS:  1.  Albuterol inhaler as needed.  2.  Augmentin b.i.d. for 7 days.  3.  Abilify 5 mg at bedtime.  4.  Aspirin 81 mg daily.  5.  Lipitor 20 mg at bedtime.  6.  Carvedilol 6.25 mg b.i.d.  7.  Erythromycin _____, eye drops 1 drop in both eyes every 6 hours.  8.  Lasix 20 mg daily.  9.  Neurontin 600 mg t.i.d.  10.  Glipizide SR 2.5 mg daily.  11.  Linagliptin 5 mg daily.  12.  Lisinopril 30 mg daily.  13.  Prilosec 20 mg daily.  14.  Stiolto inhaler.    TOTAL TIME OF DISCHARGE:  42 minutes.       ____________________________________     MD CLAUDE Jason / REVA    DD:  05/06/2017 13:39:36  DT:  05/07/2017 11:26:00    D#:  [id number]  Job#:  [id number]

## 2017-05-09 LAB
BACTERIA BLD CULT: NORMAL
BACTERIA BLD CULT: NORMAL
SIGNIFICANT IND 70042: NORMAL
SIGNIFICANT IND 70042: NORMAL
SITE SITE: NORMAL
SITE SITE: NORMAL
SOURCE SOURCE: NORMAL
SOURCE SOURCE: NORMAL

## 2017-05-14 LAB — EKG IMPRESSION: NORMAL

## 2017-05-20 ENCOUNTER — HOSPITAL ENCOUNTER (EMERGENCY)
Facility: MEDICAL CENTER | Age: 67
End: 2017-05-21
Attending: EMERGENCY MEDICINE
Payer: MEDICARE

## 2017-05-20 DIAGNOSIS — L03.90 CELLULITIS, UNSPECIFIED CELLULITIS SITE: ICD-10-CM

## 2017-05-20 DIAGNOSIS — I87.8 VENOUS STASIS: ICD-10-CM

## 2017-05-20 LAB
BASOPHILS # BLD AUTO: 0.7 % (ref 0–1.8)
BASOPHILS # BLD: 0.08 K/UL (ref 0–0.12)
EOSINOPHIL # BLD AUTO: 0.77 K/UL (ref 0–0.51)
EOSINOPHIL NFR BLD: 6.7 % (ref 0–6.9)
ERYTHROCYTE [DISTWIDTH] IN BLOOD BY AUTOMATED COUNT: 53.1 FL (ref 35.9–50)
HCT VFR BLD AUTO: 46.5 % (ref 42–52)
HGB BLD-MCNC: 14.7 G/DL (ref 14–18)
IMM GRANULOCYTES # BLD AUTO: 0.03 K/UL (ref 0–0.11)
IMM GRANULOCYTES NFR BLD AUTO: 0.3 % (ref 0–0.9)
LYMPHOCYTES # BLD AUTO: 4.21 K/UL (ref 1–4.8)
LYMPHOCYTES NFR BLD: 36.6 % (ref 22–41)
MCH RBC QN AUTO: 29.3 PG (ref 27–33)
MCHC RBC AUTO-ENTMCNC: 31.6 G/DL (ref 33.7–35.3)
MCV RBC AUTO: 92.6 FL (ref 81.4–97.8)
MONOCYTES # BLD AUTO: 1.67 K/UL (ref 0–0.85)
MONOCYTES NFR BLD AUTO: 14.5 % (ref 0–13.4)
NEUTROPHILS # BLD AUTO: 4.74 K/UL (ref 1.82–7.42)
NEUTROPHILS NFR BLD: 41.2 % (ref 44–72)
NRBC # BLD AUTO: 0 K/UL
NRBC BLD AUTO-RTO: 0 /100 WBC
PLATELET # BLD AUTO: 323 K/UL (ref 164–446)
PMV BLD AUTO: 10.2 FL (ref 9–12.9)
RBC # BLD AUTO: 5.02 M/UL (ref 4.7–6.1)
WBC # BLD AUTO: 11.5 K/UL (ref 4.8–10.8)

## 2017-05-20 PROCEDURE — 80053 COMPREHEN METABOLIC PANEL: CPT

## 2017-05-20 PROCEDURE — 304562 HCHG STAT O2 MASK/CANNULA

## 2017-05-20 PROCEDURE — 83880 ASSAY OF NATRIURETIC PEPTIDE: CPT

## 2017-05-20 PROCEDURE — 304561 HCHG STAT O2

## 2017-05-20 PROCEDURE — 85025 COMPLETE CBC W/AUTO DIFF WBC: CPT

## 2017-05-20 PROCEDURE — 99284 EMERGENCY DEPT VISIT MOD MDM: CPT

## 2017-05-20 ASSESSMENT — PAIN SCALES - GENERAL: PAINLEVEL_OUTOF10: 8

## 2017-05-20 NOTE — ED AVS SNAPSHOT
Home Care Instructions                                                                                                                Maxi Esposito   MRN: [id number]    Department:  [facility], Emergency Dept   Date of Visit:  5/20/2017            [facility], Emergency Dept    [address] [address]    Phone:  [phone]      You were seen by     Scar Osorio M.D.      Your Diagnosis Was     Cellulitis, unspecified cellulitis site     L03.90       Follow-up Information     1. Follow up with Pcp Pt States None.    Specialty:  Family Medicine        2. Follow up with [facility].    Contact information    [address] [place] [address] [id number] [phone]      Medication Information     Review all of your home medications and newly ordered medications with your primary doctor and/or pharmacist as soon as possible. Follow medication instructions as directed by your doctor and/or pharmacist.     Please keep your complete medication list with you and share with your physician. Update the information when medications are discontinued, doses are changed, or new medications (including over-the-counter products) are added; and carry medication information at all times in the event of emergency situations.               Medication List      START taking these medications        Instructions    Morning Afternoon Evening Bedtime    * clindamycin 300 MG Caps   Commonly known as:  CLEOCIN        Take 1 Cap by mouth 3 times a day.   Dose:  300 mg                        * clindamycin 300 MG Caps   Commonly known as:  CLEOCIN        Take 1 Cap by mouth 3 times a day.   Dose:  300 mg                        * Notice:  This list has 2 medication(s) that are the same as other medications prescribed for you. Read the directions carefully, and ask your doctor or other care provider to review them with you.      ASK your doctor about these medications        Instructions    Morning Afternoon Evening Bedtime    ABILIFY 5 MG tablet   Generic drug:  aripiprazole        Take 5 mg by mouth every bedtime.   Dose:  5 mg                        albuterol 108 (90 BASE) MCG/ACT Aers inhalation aerosol        Inhale 2 Puffs by mouth every 6 hours as needed for Shortness of Breath.   Dose:  2 Puff                        aspirin EC 81 MG Tbec   Commonly known as:  ECOTRIN        Take 81 mg by mouth every day.   Dose:  81 mg                        atorvastatin 20 MG Tabs   Commonly known as:  LIPITOR        Take 1 Tab by mouth every bedtime.   Dose:  20 mg                        carvedilol 6.25 MG Tabs   Commonly known as:  COREG        Take 1 Tab by mouth 2 times a day, with meals.   Dose:  6.25 mg                        erythromycin 5 MG/GM Oint        Place 1 Drop in both eyes every 6 hours.   Dose:  1 Drop                        furosemide 20 MG Tabs   Commonly known as:  LASIX        Take 20 mg by mouth every day.   Dose:  20 mg                        gabapentin 600 MG tablet   Commonly known as:  NEURONTIN        Take 600 mg by mouth 3 times a day.   Dose:  600 mg                        glipiZIDE SR 2.5 MG Tb24   Commonly known as:  GLUCOTROL        Take 2.5 mg by mouth every day.   Dose:  2.5 mg                        lisinopril 30 MG tablet   Commonly known as:  PRINIVIL, ZESTRIL        Take 30 mg by mouth every day.   Dose:  30 mg                        omeprazole 20 MG delayed-release capsule   Commonly known as:  PRILOSEC        Take 20 mg by mouth every day.   Dose:  20 mg                        STIOLTO RESPIMAT 2.5-2.5 MCG/ACT Aers   Generic drug:  Tiotropium Bromide-Olodaterol        Inhale 2 Puffs by mouth every day.   Dose:  2 Puff                        TRADJENTA 5 MG Tabs tablet   Generic drug:  linagliptin        Take 5 mg by mouth every day.   Dose:  5 mg                             Where to Get Your Medications      These medications were sent to [facility] - [place], [place] - [address]  AT [facility]  [address] [place] [place] [address]    Hours:  24-hours Phone:  [phone]    - clindamycin 300 MG Caps      You can get these medications from any pharmacy     Bring a paper prescription for each of these medications    - clindamycin 300 MG Caps            Procedures and tests performed during your visit     BTYPE NATRIURETIC PEPTIDE    CBC WITH DIFFERENTIAL    COMP METABOLIC PANEL    ESTIMATED GFR    IV Saline Lock            Patient Information     Patient Information    Following emergency treatment: all patient requiring follow-up care must return either to a private physician or a clinic if your condition worsens before you are able to obtain further medical attention, please return to the emergency room.     Billing Information    At [facility], we work to make the billing process streamlined for our patients.  Our Representatives are here to answer any questions you may have regarding your hospital bill.  If you have insurance coverage and have supplied your insurance information to us, we will submit a claim to your insurer on your behalf.  Should you have any questions regarding your bill, we can be reached online or by phone as follows:  Online: You are able pay your bills online or live chat with our representatives about any billing questions you may have. We are here to help Monday - Friday from 8:00am to 7:30pm and 9:00am - 12:00pm on Saturdays.  Please visit [email or web address]  for more information.   Phone:  [phone] or [phone]    Please note that your emergency physician, surgeon, pathologist, radiologist, anesthesiologist, and other specialists are not employed by [facility] and will therefore bill separately for their services.  Please contact them directly for any questions concerning their bills at the numbers below:     Emergency Physician Services:  [phone]  [facility]:  [phone]  [facility]:  [phone]  [facility]:  [phone]    1. Your final bill may vary from the amount quoted upon discharge if all procedures are not complete at that time, or if your doctor has additional procedures of which we are not aware. You will receive an additional bill if you return to the Emergency Department at [facility] for suture removal regardless of the facility of which the sutures were placed.     2. Please arrange for settlement of this account at the emergency registration.    3. All self-pay accounts are due in full at the time of treatment.  If you are unable to meet this obligation then payment is expected within 4-5 days.     4. If you have had radiology studies (CT, X-ray, Ultrasound, MRI), you have received a preliminary result during your emergency department visit. Please contact the radiology department [phone] to receive a copy of your final result. Please discuss the Final result with your primary physician or with the follow up physician provided.     Crisis Hotline:  [place] Crisis Hotline:  2-731-PEPEUWJ or 1-287.196.5530  [place] Crisis Hotline:    1-809.165.3895 or 971-410-8724         ED Discharge Follow Up Questions    1. In order to provide you with very good care, we would like to follow up with a phone call in the next few days.  May we have your permission to contact you?     YES /  NO    2. What is the best phone number to call you? (       )_____-__________    3. What is the best time to call you?      Morning  /  Afternoon  /  Evening                   Patient Signature:  ____________________________________________________________    Date:  ____________________________________________________________      Your appointments     May 31, 2017  3:00 PM   New Patient with Awilda Cobian M.D.   [address]     [facility] [address]  [place] [address]      [phone]           Please bring Photo ID, Insurance Cards, All Medication Bottles and copies of any legal documents (such as Living Will, Power of ) If speaking a language besides English please bring an adult . Please arrive 30 minutes prior for check in and registration. You will be receiving a confirmation call a few days before your appointment from our automated call confirmation system.

## 2017-05-20 NOTE — ED AVS SNAPSHOT
5/21/2017    Maxi Esposito  [address] [place] [address]    Dear Maxi:    [facility] wants to ensure your discharge home is safe and you or your loved ones have had all of your questions answered regarding your care after you leave the hospital.    Below is a list of resources and contact information should you have any questions regarding your hospital stay, follow-up instructions, or active medical symptoms.    Questions or Concerns Regarding… Contact   Medical Questions Related to Your Discharge  (7 days a week, 8am-5pm) Contact a Nurse Care Coordinator   [phone]   Medical Questions Not Related to Your Discharge  (24 hours a day / 7 days a week)  Contact the Nurse Health Line   [phone]    Medications or Discharge Instructions Refer to your discharge packet   or contact your [facility] Primary Care Provider   [phone]   Follow-up Appointment(s) Schedule your appointment via [employer]   or contact Scheduling [phone]   Billing Review your statement via [employer]  or contact Billing [phone]   Medical Records Review your records via [employer]   or contact Medical Records [phone]     You may receive a telephone call within two days of discharge. This call is to make certain you understand your discharge instructions and have the opportunity to have any questions answered. You can also easily access your medical information, test results and upcoming appointments via the [employer] free online health management tool. You can learn more and sign up at [employer]. For assistance setting up your [employer] account, please call [phone].    Once again, we want to ensure your discharge home is safe and that you have a clear understanding of any next steps in your care. If you have any questions or concerns, please do not hesitate to contact us, we are here for you. Thank you for choosing [facility] for your healthcare needs.    Sincerely,    Your [facility] Healthcare Team

## 2017-05-20 NOTE — ED AVS SNAPSHOT
Zoondy Access Code: [email or web address]  Expires: 6/7/2017 12:50 PM    Your email address is not on file at [employer] [employer]  Email Addresses are required for you to sign up for Zoondy, please contact [phone] to verify your personal information and to provide your email address prior to attempting to register for Zoondy.    Maxi Michealtiffany  [address] [place] [address]    [employer]  A secure, online tool to manage your health information     [employer]s Zoondy® is a secure, online tool that connects you to your personalized health information from the privacy of your home -- day or night - making it very easy for you to manage your healthcare. Once the activation process is completed, you can even access your medical information using the Zoondy stephie, which is available for free in the Apple Stephie store or Google Play store.     To learn more about Zoondy, visit [email or web address]    There are two levels of access available (as shown below):   My Chart Features  [facility] Primary Care Doctor [facility]  Specialists [facility]  Urgent  Care Non-[facility] Primary Care Doctor   Email your healthcare team securely and privately 24/7 X X X    Manage appointments: schedule your next appointment; view details of past/upcoming appointments X      Request prescription refills. X      View recent personal medical records, including lab and immunizations X X X X   View health record, including health history, allergies, medications X X X X   Read reports about your outpatient visits, procedures, consult and ER notes X X X X   See your discharge summary, which is a recap of your hospital and/or ER visit that includes your diagnosis, lab results, and care plan X X  X     How to register for Zoondy:  Once your e-mail address has been verified, follow the following steps to sign up for Zoondy.     1. Go to  [email or web address]  2. Click on the Sign Up Now box, which takes you to the New Member Sign Up page. You will need  to provide the following information:  a. Enter your [employer] Access Code exactly as it appears at the top of this page. (You will not need to use this code after you’ve completed the sign-up process. If you do not sign up before the expiration date, you must request a new code.)   b. Enter your date of birth.   c. Enter your home email address.   d. Click Submit, and follow the next screen’s instructions.  3. Create a [employer] Interactive ID. This will be your [employer] login ID and cannot be changed, so think of one that is secure and easy to remember.  4. Create a [employer] password. You can change your password at any time.  5. Enter your Password Reset Question and Answer. This can be used at a later time if you forget your password.   6. Enter your e-mail address. This allows you to receive e-mail notifications when new information is available in [employer].  7. Click Sign Up. You can now view your health information.    For assistance activating your [employer] account, call [phone]

## 2017-05-21 VITALS
RESPIRATION RATE: 20 BRPM | SYSTOLIC BLOOD PRESSURE: 123 MMHG | HEART RATE: 73 BPM | OXYGEN SATURATION: 96 % | TEMPERATURE: 99.1 F | HEIGHT: 67 IN | DIASTOLIC BLOOD PRESSURE: 67 MMHG | WEIGHT: 266.76 LBS | BODY MASS INDEX: 41.87 KG/M2

## 2017-05-21 LAB
ALBUMIN SERPL BCP-MCNC: 3.5 G/DL (ref 3.2–4.9)
ALBUMIN/GLOB SERPL: 1.2 G/DL
ALP SERPL-CCNC: 57 U/L (ref 30–99)
ALT SERPL-CCNC: 8 U/L (ref 2–50)
ANION GAP SERPL CALC-SCNC: 8 MMOL/L (ref 0–11.9)
AST SERPL-CCNC: 13 U/L (ref 12–45)
BILIRUB SERPL-MCNC: 0.5 MG/DL (ref 0.1–1.5)
BNP SERPL-MCNC: 35 PG/ML (ref 0–100)
BUN SERPL-MCNC: 13 MG/DL (ref 8–22)
CALCIUM SERPL-MCNC: 8.7 MG/DL (ref 8.5–10.5)
CHLORIDE SERPL-SCNC: 106 MMOL/L (ref 96–112)
CO2 SERPL-SCNC: 25 MMOL/L (ref 20–33)
CREAT SERPL-MCNC: 0.81 MG/DL (ref 0.5–1.4)
GFR SERPL CREATININE-BSD FRML MDRD: >60 ML/MIN/1.73 M 2
GLOBULIN SER CALC-MCNC: 3 G/DL (ref 1.9–3.5)
GLUCOSE SERPL-MCNC: 96 MG/DL (ref 65–99)
POTASSIUM SERPL-SCNC: 4.1 MMOL/L (ref 3.6–5.5)
PROT SERPL-MCNC: 6.5 G/DL (ref 6–8.2)
SODIUM SERPL-SCNC: 139 MMOL/L (ref 135–145)

## 2017-05-21 RX ORDER — CLINDAMYCIN HYDROCHLORIDE 300 MG/1
300 CAPSULE ORAL 3 TIMES DAILY
Qty: 21 QUANTITY SUFFICIENT | Refills: 0 | Status: SHIPPED | OUTPATIENT
Start: 2017-05-21 | End: 2017-06-08

## 2017-05-21 NOTE — DISCHARGE PLANNING
TC to [facility] ([phone]), pt is welcome back as long as he has not been 86'd. Shelter checking 86'd list, SW waiting for call back.

## 2017-05-21 NOTE — DISCHARGE PLANNING
Received call back, pt has not been 86'd according to logs. If pt can be calm the shelter is happy to have him at this time.

## 2017-05-21 NOTE — DISCHARGE PLANNING
Pt brought himself to ER, pt to be d/c to lobby. Frequent flyer, resources given several admissions in the previous months. If pt does not pay for ride by morning SW will supply bus pass.

## 2017-05-21 NOTE — ED NOTES
Chief Complaint   Patient presents with   • Leg Swelling     Pt BIB self to triage for c/o bilat leg swelling redness/ pt reports hx of/ pt reports increased exhaustion/SOB upon exertion.    • Tired   • Weakness   • Shortness of Breath     Explained to pt triage process, made pt aware to tell this RN of any changes/concerns, pt verbalized understanding of process and instructions given. Pt to ER lobby.

## 2017-05-21 NOTE — ED PROVIDER NOTES
ED Provider Note    Scribed for Dr. Scar Osorio M.D. by Melody Benavides. 5/20/2017  11:00 PM    Primary care provider: Pcp Pt States None  Means of arrival: Walk-In  History obtained from: Patient  History limited by: None    CHIEF COMPLAINT  Chief Complaint   Patient presents with   • Leg Swelling     Pt BIB self to triage for c/o bilat leg swelling redness/ pt reports hx of/ pt reports increased exhaustion/SOB upon exertion.    • Tired   • Weakness   • Shortness of Breath       HPI  Maxi Esposito is a 67 y.o. male who presents to the Emergency Department for bilateral leg pain with associated swelling and redness. Patient states he was seen in the ED one week ago for these same symptoms and notes his legs were in the same condition. He was admitted last time he came to the ED. While admitted to the ED he states they tried to get his cellulitis down as well as tried lasix with no relief. Associated symptoms include fatigue. Confirms being compliant with his medications. Denies fever.     REVIEW OF SYSTEMS  Pertinent positives include leg pain, leg swelling, leg redness, fatigue. Pertinent negatives include no fever. As above, all other systems reviewed and are negative.   See HPI for further details.     PAST MEDICAL HISTORY   has a past medical history of Hypertension; Diabetes (CMS-[facility]); Sepsis (CMS-HCC); Pneumonia; Chronic obstructive pulmonary disease (CMS-Former[facility]); Medical non-compliance; and Congestive heart failure ([facility] [place]    SURGICAL HISTORY   has past surgical history that includes splenectomy (1980).    SOCIAL HISTORY  Social History   Substance Use Topics   • Smoking status: Former Smoker     Quit date: 07/16/2016   • Smokeless tobacco: Never Used   • Alcohol Use: No      History   Drug Use No       FAMILY HISTORY  History reviewed. No pertinent family history.    CURRENT MEDICATIONS  Home Medications     **Home medications have not yet been reviewed for this encounter**     "      ALLERGIES  Allergies   Allergen Reactions   • Shellfish Allergy Shortness of Breath and Swelling     \"Eyes closed up really tight and it was hard to breath\"       PHYSICAL EXAM  VITAL SIGNS: /67 mmHg  Pulse 70  Temp(Src) 37.3 °C (99.1 °F)  Resp 20  Ht 1.702 m (5' 7.01\")  Wt 121 kg (266 lb 12.1 oz)  BMI 41.77 kg/m2  SpO2 94%    Constitutional: Well developed, Well nourished, no distress, Non-toxic appearance.   HENT: Normocephalic, Atraumatic, Bilateral external ears normal, Oropharynx moist, No oral exudates.   Eyes: PERRLA, EOMI, Conjunctiva normal, No discharge.   Neck: No tenderness, Supple, No stridor.   Lymphatic: No lymphadenopathy noted.   Cardiovascular: Normal heart rate, Normal rhythm.   Thorax & Lungs: Clear to auscultation bilaterally, No respiratory distress, No wheezing, No crackles.   Abdomen: Soft, No tenderness, No masses, No pulsatile masses.   Skin: Severe lower extremity edema with redness and warmth.Dry, No rash.   Extremities: Severe lower extremity edema with redness and warmth, right lower leg is greater than left. No cyanosis.   Musculoskeletal: No tenderness to palpation or major deformities noted.  Intact distal pulses  Neurologic: Awake, alert. Moves all extremities spontaneously.  Psychiatric: Affect normal, Judgment normal, Mood normal.     LABS  Results for orders placed or performed during the hospital encounter of 05/20/17   CBC WITH DIFFERENTIAL   Result Value Ref Range    WBC 11.5 (H) 4.8 - 10.8 K/uL    RBC 5.02 4.70 - 6.10 M/uL    Hemoglobin 14.7 14.0 - 18.0 g/dL    Hematocrit 46.5 42.0 - 52.0 %    MCV 92.6 81.4 - 97.8 fL    MCH 29.3 27.0 - 33.0 pg    MCHC 31.6 (L) 33.7 - 35.3 g/dL    RDW 53.1 (H) 35.9 - 50.0 fL    Platelet Count 323 164 - 446 K/uL    MPV 10.2 9.0 - 12.9 fL    Neutrophils-Polys 41.20 (L) 44.00 - 72.00 %    Lymphocytes 36.60 22.00 - 41.00 %    Monocytes 14.50 (H) 0.00 - 13.40 %    Eosinophils 6.70 0.00 - 6.90 %    Basophils 0.70 0.00 - 1.80 %    " Immature Granulocytes 0.30 0.00 - 0.90 %    Nucleated RBC 0.00 /100 WBC    Neutrophils (Absolute) 4.74 1.82 - 7.42 K/uL    Lymphs (Absolute) 4.21 1.00 - 4.80 K/uL    Monos (Absolute) 1.67 (H) 0.00 - 0.85 K/uL    Eos (Absolute) 0.77 (H) 0.00 - 0.51 K/uL    Baso (Absolute) 0.08 0.00 - 0.12 K/uL    Immature Granulocytes (abs) 0.03 0.00 - 0.11 K/uL    NRBC (Absolute) 0.00 K/uL   COMP METABOLIC PANEL   Result Value Ref Range    Sodium 139 135 - 145 mmol/L    Potassium 4.1 3.6 - 5.5 mmol/L    Chloride 106 96 - 112 mmol/L    Co2 25 20 - 33 mmol/L    Anion Gap 8.0 0.0 - 11.9    Glucose 96 65 - 99 mg/dL    Bun 13 8 - 22 mg/dL    Creatinine 0.81 0.50 - 1.40 mg/dL    Calcium 8.7 8.5 - 10.5 mg/dL    AST(SGOT) 13 12 - 45 U/L    ALT(SGPT) 8 2 - 50 U/L    Alkaline Phosphatase 57 30 - 99 U/L    Total Bilirubin 0.5 0.1 - 1.5 mg/dL    Albumin 3.5 3.2 - 4.9 g/dL    Total Protein 6.5 6.0 - 8.2 g/dL    Globulin 3.0 1.9 - 3.5 g/dL    A-G Ratio 1.2 g/dL   BTYPE NATRIURETIC PEPTIDE   Result Value Ref Range    B Natriuretic Peptide 35 0 - 100 pg/mL   ESTIMATED GFR   Result Value Ref Range    GFR If African American >60 >60 mL/min/1.73 m 2    GFR If Non African American >60 >60 mL/min/1.73 m 2     All labs reviewed by me.      COURSE & MEDICAL DECISION MAKING  Pertinent Labs & Imaging studies reviewed. (See chart for details)    11:00 PM - Patient seen and examined at bedside. Ordered CBC with differential, CMP, BNP to evaluate his symptoms. The differential diagnoses include but are not limited to: cellulitis, congestive heart failure.     12:40 AM - Recheck. He will be discharged home at this time.     Decision Making:  Pressure with recurrent cellulitis, I don't think it is a as severe as it was on the previous visit when he was admitted. The patient is not febrile and does not have a white count. He does not seem to be particularly compliant with his discharge regimen he still has some antibiotics from the last hospitalization which  should be will completely finished. For his hand down his legs think he is sleeping sitting up which is contributing to the edema and cellulitis. At this point I think he'll be discharged all I will prescribe some oral antibiotics for area patient has had multiple DVTs studies in the past I don't think that needs repeated  DISPOSITION:  Patient will be discharged home in stable condition.    FOLLOW UP:  Pcp Pt States None          [facility]  [address] [address]  [phone]          OUTPATIENT MEDICATIONS:  New Prescriptions    CLINDAMYCIN (CLEOCIN) 300 MG CAP    Take 1 Cap by mouth 3 times a day.    CLINDAMYCIN (CLEOCIN) 300 MG CAP    Take 1 Cap by mouth 3 times a day.           FINAL IMPRESSION  1. Cellulitis, unspecified cellulitis site    2. Venous stasis          Melody LONG (Aditi), am scribing for, and in the presence of, Scar Osorio M.D..    Electronically signed by: Melody Benavides (Aditi), 5/20/2017    IScar M.D. personally performed the services described in this documentation, as scribed by Melody Benavides in my presence, and it is both accurate and complete.    The note accurately reflects work and decisions made by me.  Scar Osorio  5/21/2017  12:59 AM

## 2017-05-21 NOTE — DISCHARGE PLANNING
Received call regarding pt returning to the shelter.     TC to [facility], pts transport benefits  17 reason unknown.

## 2017-05-23 ENCOUNTER — TELEPHONE (OUTPATIENT)
Dept: MEDICAL GROUP | Facility: MEDICAL CENTER | Age: 67
End: 2017-05-23

## 2017-05-23 NOTE — TELEPHONE ENCOUNTER
Future Appointments       Provider Department Center    5/31/2017 3:00 PM Awilda Cobian M.D. [facility] [address]          Left message for patient to call back regarding new patient pre-visit planning. Please transfer call to [phone].

## 2017-06-08 ENCOUNTER — APPOINTMENT (OUTPATIENT)
Dept: RADIOLOGY | Facility: MEDICAL CENTER | Age: 67
DRG: 191 | End: 2017-06-08
Attending: STUDENT IN AN ORGANIZED HEALTH CARE EDUCATION/TRAINING PROGRAM
Payer: MEDICARE

## 2017-06-08 ENCOUNTER — RESOLUTE PROFESSIONAL BILLING HOSPITAL PROF FEE (OUTPATIENT)
Dept: HOSPITALIST | Facility: MEDICAL CENTER | Age: 67
End: 2017-06-08
Payer: MEDICARE

## 2017-06-08 ENCOUNTER — APPOINTMENT (OUTPATIENT)
Dept: RADIOLOGY | Facility: MEDICAL CENTER | Age: 67
DRG: 191 | End: 2017-06-08
Attending: EMERGENCY MEDICINE
Payer: MEDICARE

## 2017-06-08 ENCOUNTER — HOSPITAL ENCOUNTER (INPATIENT)
Facility: MEDICAL CENTER | Age: 67
LOS: 16 days | DRG: 191 | End: 2017-06-24
Attending: EMERGENCY MEDICINE | Admitting: INTERNAL MEDICINE
Payer: MEDICARE

## 2017-06-08 DIAGNOSIS — L03.90 CELLULITIS, UNSPECIFIED CELLULITIS SITE: ICD-10-CM

## 2017-06-08 DIAGNOSIS — G47.33 OBSTRUCTIVE SLEEP APNEA: ICD-10-CM

## 2017-06-08 DIAGNOSIS — L03.115 BILATERAL CELLULITIS OF LOWER LEG: ICD-10-CM

## 2017-06-08 DIAGNOSIS — A41.9 SEPSIS, DUE TO UNSPECIFIED ORGANISM: ICD-10-CM

## 2017-06-08 DIAGNOSIS — L03.116 BILATERAL CELLULITIS OF LOWER LEG: ICD-10-CM

## 2017-06-08 DIAGNOSIS — R53.1 WEAKNESS: Primary | ICD-10-CM

## 2017-06-08 DIAGNOSIS — J44.1 COPD EXACERBATION (HCC): ICD-10-CM

## 2017-06-08 LAB
ALBUMIN SERPL BCP-MCNC: 3.4 G/DL (ref 3.2–4.9)
ALBUMIN/GLOB SERPL: 1.1 G/DL
ALP SERPL-CCNC: 70 U/L (ref 30–99)
ALT SERPL-CCNC: 7 U/L (ref 2–50)
AMPHET UR QL SCN: NEGATIVE
ANION GAP SERPL CALC-SCNC: 10 MMOL/L (ref 0–11.9)
APPEARANCE UR: CLEAR
APTT PPP: 32 SEC (ref 24.7–36)
AST SERPL-CCNC: 10 U/L (ref 12–45)
BARBITURATES UR QL SCN: NEGATIVE
BASOPHILS # BLD AUTO: 0.6 % (ref 0–1.8)
BASOPHILS # BLD: 0.09 K/UL (ref 0–0.12)
BENZODIAZ UR QL SCN: NEGATIVE
BILIRUB SERPL-MCNC: 0.4 MG/DL (ref 0.1–1.5)
BILIRUB UR QL STRIP.AUTO: NEGATIVE
BNP SERPL-MCNC: 92 PG/ML (ref 0–100)
BUN SERPL-MCNC: 7 MG/DL (ref 8–22)
BZE UR QL SCN: NEGATIVE
CALCIUM SERPL-MCNC: 9.1 MG/DL (ref 8.5–10.5)
CANNABINOIDS UR QL SCN: POSITIVE
CHLORIDE SERPL-SCNC: 103 MMOL/L (ref 96–112)
CO2 SERPL-SCNC: 25 MMOL/L (ref 20–33)
COLOR UR: NORMAL
CREAT SERPL-MCNC: 0.64 MG/DL (ref 0.5–1.4)
EKG IMPRESSION: NORMAL
EOSINOPHIL # BLD AUTO: 0.76 K/UL (ref 0–0.51)
EOSINOPHIL NFR BLD: 5.4 % (ref 0–6.9)
ERYTHROCYTE [DISTWIDTH] IN BLOOD BY AUTOMATED COUNT: 51.1 FL (ref 35.9–50)
GFR SERPL CREATININE-BSD FRML MDRD: >60 ML/MIN/1.73 M 2
GLOBULIN SER CALC-MCNC: 3 G/DL (ref 1.9–3.5)
GLUCOSE BLD-MCNC: 89 MG/DL (ref 65–99)
GLUCOSE SERPL-MCNC: 98 MG/DL (ref 65–99)
GLUCOSE UR STRIP.AUTO-MCNC: NEGATIVE MG/DL
HCT VFR BLD AUTO: 46 % (ref 42–52)
HGB BLD-MCNC: 15 G/DL (ref 14–18)
IMM GRANULOCYTES # BLD AUTO: 0.04 K/UL (ref 0–0.11)
IMM GRANULOCYTES NFR BLD AUTO: 0.3 % (ref 0–0.9)
INR PPP: 1.03 (ref 0.87–1.13)
KETONES UR STRIP.AUTO-MCNC: NEGATIVE MG/DL
LACTATE BLD-SCNC: 2.7 MMOL/L (ref 0.5–2)
LEUKOCYTE ESTERASE UR QL STRIP.AUTO: NEGATIVE
LIPASE SERPL-CCNC: 24 U/L (ref 11–82)
LYMPHOCYTES # BLD AUTO: 3.01 K/UL (ref 1–4.8)
LYMPHOCYTES NFR BLD: 21.3 % (ref 22–41)
MAGNESIUM SERPL-MCNC: 1.7 MG/DL (ref 1.5–2.5)
MCH RBC QN AUTO: 29.4 PG (ref 27–33)
MCHC RBC AUTO-ENTMCNC: 32.6 G/DL (ref 33.7–35.3)
MCV RBC AUTO: 90 FL (ref 81.4–97.8)
MDMA UR QL SCN: NEGATIVE
METHADONE UR QL SCN: NEGATIVE
MICRO URNS: NORMAL
MONOCYTES # BLD AUTO: 1.45 K/UL (ref 0–0.85)
MONOCYTES NFR BLD AUTO: 10.3 % (ref 0–13.4)
NEUTROPHILS # BLD AUTO: 8.79 K/UL (ref 1.82–7.42)
NEUTROPHILS NFR BLD: 62.1 % (ref 44–72)
NITRITE UR QL STRIP.AUTO: NEGATIVE
NRBC # BLD AUTO: 0 K/UL
NRBC BLD AUTO-RTO: 0 /100 WBC
OPIATES UR QL SCN: NEGATIVE
OXYCODONE UR QL SCN: NEGATIVE
PCP UR QL SCN: NEGATIVE
PH UR STRIP.AUTO: 6 [PH]
PLATELET # BLD AUTO: 380 K/UL (ref 164–446)
PMV BLD AUTO: 10.3 FL (ref 9–12.9)
POTASSIUM SERPL-SCNC: 4.2 MMOL/L (ref 3.6–5.5)
PROCALCITONIN SERPL-MCNC: <0.05 NG/ML
PROPOXYPH UR QL SCN: NEGATIVE
PROT SERPL-MCNC: 6.4 G/DL (ref 6–8.2)
PROT UR QL STRIP: NEGATIVE MG/DL
PROTHROMBIN TIME: 13.8 SEC (ref 12–14.6)
RBC # BLD AUTO: 5.11 M/UL (ref 4.7–6.1)
RBC UR QL AUTO: NEGATIVE
SODIUM SERPL-SCNC: 138 MMOL/L (ref 135–145)
SP GR UR STRIP.AUTO: 1
TROPONIN I SERPL-MCNC: <0.01 NG/ML (ref 0–0.04)
TSH SERPL DL<=0.005 MIU/L-ACNC: 1.45 UIU/ML (ref 0.3–3.7)
WBC # BLD AUTO: 14.1 K/UL (ref 4.8–10.8)

## 2017-06-08 PROCEDURE — 93005 ELECTROCARDIOGRAM TRACING: CPT | Performed by: EMERGENCY MEDICINE

## 2017-06-08 PROCEDURE — 93005 ELECTROCARDIOGRAM TRACING: CPT

## 2017-06-08 PROCEDURE — 71010 DX-CHEST-LIMITED (1 VIEW): CPT

## 2017-06-08 PROCEDURE — 87502 INFLUENZA DNA AMP PROBE: CPT

## 2017-06-08 PROCEDURE — 85025 COMPLETE CBC W/AUTO DIFF WBC: CPT

## 2017-06-08 PROCEDURE — 94760 N-INVAS EAR/PLS OXIMETRY 1: CPT

## 2017-06-08 PROCEDURE — 84443 ASSAY THYROID STIM HORMONE: CPT

## 2017-06-08 PROCEDURE — 87503 INFLUENZA DNA AMP PROB ADDL: CPT

## 2017-06-08 PROCEDURE — 84484 ASSAY OF TROPONIN QUANT: CPT

## 2017-06-08 PROCEDURE — 80307 DRUG TEST PRSMV CHEM ANLYZR: CPT

## 2017-06-08 PROCEDURE — 96366 THER/PROPH/DIAG IV INF ADDON: CPT

## 2017-06-08 PROCEDURE — 96365 THER/PROPH/DIAG IV INF INIT: CPT

## 2017-06-08 PROCEDURE — 83880 ASSAY OF NATRIURETIC PEPTIDE: CPT

## 2017-06-08 PROCEDURE — 87040 BLOOD CULTURE FOR BACTERIA: CPT

## 2017-06-08 PROCEDURE — 82962 GLUCOSE BLOOD TEST: CPT

## 2017-06-08 PROCEDURE — 71020 DX-CHEST-2 VIEWS: CPT

## 2017-06-08 PROCEDURE — 83735 ASSAY OF MAGNESIUM: CPT

## 2017-06-08 PROCEDURE — 85730 THROMBOPLASTIN TIME PARTIAL: CPT

## 2017-06-08 PROCEDURE — 84145 PROCALCITONIN (PCT): CPT

## 2017-06-08 PROCEDURE — 700105 HCHG RX REV CODE 258

## 2017-06-08 PROCEDURE — 96368 THER/DIAG CONCURRENT INF: CPT

## 2017-06-08 PROCEDURE — 700111 HCHG RX REV CODE 636 W/ 250 OVERRIDE (IP)

## 2017-06-08 PROCEDURE — 700111 HCHG RX REV CODE 636 W/ 250 OVERRIDE (IP): Performed by: EMERGENCY MEDICINE

## 2017-06-08 PROCEDURE — 700105 HCHG RX REV CODE 258: Performed by: EMERGENCY MEDICINE

## 2017-06-08 PROCEDURE — 87070 CULTURE OTHR SPECIMN AEROBIC: CPT

## 2017-06-08 PROCEDURE — 99285 EMERGENCY DEPT VISIT HI MDM: CPT

## 2017-06-08 PROCEDURE — 83690 ASSAY OF LIPASE: CPT

## 2017-06-08 PROCEDURE — 83036 HEMOGLOBIN GLYCOSYLATED A1C: CPT

## 2017-06-08 PROCEDURE — 302128 INFUSION PUMP: Performed by: EMERGENCY MEDICINE

## 2017-06-08 PROCEDURE — 93005 ELECTROCARDIOGRAM TRACING: CPT | Performed by: STUDENT IN AN ORGANIZED HEALTH CARE EDUCATION/TRAINING PROGRAM

## 2017-06-08 PROCEDURE — 770020 HCHG ROOM/CARE - TELE (206)

## 2017-06-08 PROCEDURE — 81003 URINALYSIS AUTO W/O SCOPE: CPT

## 2017-06-08 PROCEDURE — 85610 PROTHROMBIN TIME: CPT

## 2017-06-08 PROCEDURE — 83605 ASSAY OF LACTIC ACID: CPT

## 2017-06-08 PROCEDURE — 80053 COMPREHEN METABOLIC PANEL: CPT

## 2017-06-08 RX ORDER — DEXTROSE MONOHYDRATE 25 G/50ML
25 INJECTION, SOLUTION INTRAVENOUS
Status: DISCONTINUED | OUTPATIENT
Start: 2017-06-08 | End: 2017-06-24 | Stop reason: HOSPADM

## 2017-06-08 RX ORDER — AMOXICILLIN 250 MG
2 CAPSULE ORAL 2 TIMES DAILY
Status: DISCONTINUED | OUTPATIENT
Start: 2017-06-08 | End: 2017-06-24 | Stop reason: HOSPADM

## 2017-06-08 RX ORDER — POLYETHYLENE GLYCOL 3350 17 G/17G
1 POWDER, FOR SOLUTION ORAL
Status: DISCONTINUED | OUTPATIENT
Start: 2017-06-08 | End: 2017-06-24 | Stop reason: HOSPADM

## 2017-06-08 RX ORDER — ACETAMINOPHEN 325 MG/1
650 TABLET ORAL EVERY 6 HOURS PRN
Status: DISCONTINUED | OUTPATIENT
Start: 2017-06-08 | End: 2017-06-24 | Stop reason: HOSPADM

## 2017-06-08 RX ORDER — LABETALOL HYDROCHLORIDE 5 MG/ML
10 INJECTION, SOLUTION INTRAVENOUS EVERY 4 HOURS PRN
Status: DISCONTINUED | OUTPATIENT
Start: 2017-06-08 | End: 2017-06-10

## 2017-06-08 RX ORDER — AMOXICILLIN AND CLAVULANATE POTASSIUM 875; 125 MG/1; MG/1
1 TABLET, FILM COATED ORAL 2 TIMES DAILY
Status: ON HOLD | COMMUNITY
End: 2017-06-24

## 2017-06-08 RX ORDER — SODIUM CHLORIDE 9 MG/ML
INJECTION, SOLUTION INTRAVENOUS CONTINUOUS
Status: DISCONTINUED | OUTPATIENT
Start: 2017-06-08 | End: 2017-06-10

## 2017-06-08 RX ORDER — BISACODYL 10 MG
10 SUPPOSITORY, RECTAL RECTAL
Status: DISCONTINUED | OUTPATIENT
Start: 2017-06-08 | End: 2017-06-24 | Stop reason: HOSPADM

## 2017-06-08 RX ORDER — SODIUM CHLORIDE 9 MG/ML
1000 INJECTION, SOLUTION INTRAVENOUS ONCE
Status: COMPLETED | OUTPATIENT
Start: 2017-06-08 | End: 2017-06-08

## 2017-06-08 RX ORDER — NICOTINE 21 MG/24HR
21 PATCH, TRANSDERMAL 24 HOURS TRANSDERMAL
Status: DISCONTINUED | OUTPATIENT
Start: 2017-06-09 | End: 2017-06-24 | Stop reason: HOSPADM

## 2017-06-08 RX ORDER — AMPICILLIN AND SULBACTAM 2; 1 G/1; G/1
3 INJECTION, POWDER, FOR SOLUTION INTRAMUSCULAR; INTRAVENOUS ONCE
Status: COMPLETED | OUTPATIENT
Start: 2017-06-08 | End: 2017-06-08

## 2017-06-08 RX ADMIN — SODIUM CHLORIDE 1000 ML: 9 INJECTION, SOLUTION INTRAVENOUS at 20:44

## 2017-06-08 RX ADMIN — VANCOMYCIN HYDROCHLORIDE 3000 MG: 100 INJECTION, POWDER, LYOPHILIZED, FOR SOLUTION INTRAVENOUS at 21:48

## 2017-06-08 RX ADMIN — AMPICILLIN SODIUM AND SULBACTAM SODIUM 3 G: 2; 1 INJECTION, POWDER, FOR SOLUTION INTRAMUSCULAR; INTRAVENOUS at 21:54

## 2017-06-08 ASSESSMENT — PAIN SCALES - GENERAL: PAINLEVEL_OUTOF10: 7

## 2017-06-08 NOTE — IP AVS SNAPSHOT
6/24/2017    Maxi Esposito  [address] [place] [address]    Dear Maxi:    [facility] wants to ensure your discharge home is safe and you or your loved ones have had all of your questions answered regarding your care after you leave the hospital.    Below is a list of resources and contact information should you have any questions regarding your hospital stay, follow-up instructions, or active medical symptoms.    Questions or Concerns Regarding… Contact   Medical Questions Related to Your Discharge  (7 days a week, 8am-5pm) Contact a Nurse Care Coordinator   [phone]   Medical Questions Not Related to Your Discharge  (24 hours a day / 7 days a week)  Contact the Nurse Health Line   [phone]    Medications or Discharge Instructions Refer to your discharge packet   or contact your [facility] Primary Care Provider   [phone]   Follow-up Appointment(s) Schedule your appointment via [employer]   or contact Scheduling [phone]   Billing Review your statement via [employer]  or contact Billing [phone]   Medical Records Review your records via [employer]   or contact Medical Records [phone]     You may receive a telephone call within two days of discharge. This call is to make certain you understand your discharge instructions and have the opportunity to have any questions answered. You can also easily access your medical information, test results and upcoming appointments via the [employer] free online health management tool. You can learn more and sign up at [employer]. For assistance setting up your [employer] account, please call [phone].    Once again, we want to ensure your discharge home is safe and that you have a clear understanding of any next steps in your care. If you have any questions or concerns, please do not hesitate to contact us, we are here for you. Thank you for choosing [facility] for your healthcare needs.    Sincerely,    Your [facility] Healthcare Team

## 2017-06-08 NOTE — IP AVS SNAPSHOT
" <p align=\"LEFT\"><IMG SRC=\"//EMRWB/blob$/[email or web address]\" alt=\"Image\" WIDTH=\"50%\" HEIGHT=\"200\" BORDER=\"\"></p>                   Name:Maxi Esposito  Medical Record Number:[id number]  CSN: [id number]    YOB: 1950   Age: 67 y.o.  Sex: male  HT:1.702 m (5' 7\") WT: 113.3 kg (249 lb 12.5 oz)          Admit Date: 6/8/2017     Discharge Date:   Today's Date: 6/24/2017  Attending Doctor:  Goldie Ratliff M.D.                  Allergies:  Shellfish allergy          Your appointments     Jul 20, 2017  4:15 PM   Heart Failure New with Carol Shaw M.D.   [facility] (--)    [address], [facility] [address]  [facility] [address]   [phone]                 Medication List      Take these Medications        Instructions    ABILIFY 5 MG tablet   Generic drug:  aripiprazole    Take 5 mg by mouth every bedtime.   Dose:  5 mg       acetaminophen 325 MG Tabs   Commonly known as:  TYLENOL    Take 2 Tabs by mouth every 6 hours as needed (Mild Pain; (Pain scale 1-3); Temp greater than 100.5 F).   Dose:  650 mg       * albuterol 2.5mg/0.5ml Nebu   Commonly known as:  PROVENTIL    0.5 mL by Nebulization route every 6 hours as needed for Shortness of Breath.   Dose:  2.5 mg       * albuterol 108 (90 BASE) MCG/ACT Aers inhalation aerosol    Inhale 2 Puffs by mouth every four hours as needed for Shortness of Breath.   Dose:  2 Puff       amlodipine 5 MG Tabs   Commonly known as:  NORVASC    Take 1 Tab by mouth every day.   Dose:  5 mg       artificial tears 1.4 % Soln    Place 1 Drop in both eyes 2 Times a Day.   Dose:  1 Drop       aspirin EC 81 MG Tbec   Commonly known as:  ECOTRIN    Take 81 mg by mouth every day.   Dose:  81 mg       atorvastatin 20 MG Tabs   Commonly known as:  LIPITOR    Take 1 Tab by mouth every bedtime.   Dose:  20 mg       budesonide-formoterol 160-4.5 MCG/ACT Aero   Commonly known as:  SYMBICORT    Inhale 2 Puffs by mouth 2 Times a Day.   Dose:  2 Puff       carvedilol " 6.25 MG Tabs   Commonly known as:  COREG    Take 1 Tab by mouth 2 times a day, with meals.   Dose:  6.25 mg       cholecalciferol 400 UNIT Tabs   Commonly known as:  VITAMIN D3    Take 2.5 Tabs by mouth every day.   Dose:  1000 Units       clotrimazole 1 % Crea   Commonly known as:  LOTRIMIN    Apply to feet       dextrose 50% 50 % Soln   Commonly known as:  D50W    25 mL by Intravenous route as needed (If FSBG is less than or equal to 70 mg/dL and If patient is NPO).   Dose:  25 mL       fluticasone 50 MCG/ACT nasal spray   Commonly known as:  FLONASE    Spray 2 Sprays in nose every day.   Dose:  2 Spray       furosemide 20 MG Tabs   Start taking on:  6/25/2017   Commonly known as:  LASIX    Take 1 Tab by mouth every day.   Dose:  20 mg       gabapentin 100 MG Caps   Commonly known as:  NEURONTIN    Take 2 Caps by mouth 3 times a day.   Dose:  200 mg       glipiZIDE SR 2.5 MG Tb24   Commonly known as:  GLUCOTROL    Take 2.5 mg by mouth every morning.   Dose:  2.5 mg       glucose 4 g 4 g Chew    Take 4 Tabs by mouth as needed for Low Blood Sugar (If FSBG is less than or equal to 70 mg/dL and patient able to eat or drink).   Dose:  16 g       guaifenesin DM sugar free  MG/5ML Liqd soln   Commonly known as:  DIABETIC TUSSIN DM    Take 10 mL by mouth every 6 hours as needed for Cough.   Dose:  10 mL       hydrALAZINE 20 MG/ML Soln   Commonly known as:  APRESOLINE    1 mL by Intravenous route every 6 hours as needed.   Dose:  20 mg       lisinopril 20 MG Tabs   Commonly known as:  PRINIVIL    Take 1 Tab by mouth every day.   Dose:  20 mg       nicotine 21 MG/24HR Pt24   Commonly known as:  NICODERM    Apply 1 Patch to skin as directed every 24 hours.   Dose:  1 Patch       nicotine polacrilex 2 MG Gum   Commonly known as:  NICORETTE    Take 1 Each by mouth every 1 hour as needed (For nicotine urge).   Dose:  2 mg       omeprazole 20 MG delayed-release capsule   Commonly known as:  PRILOSEC    Take 1 Cap by mouth  every day.   Dose:  20 mg       spironolactone 25 MG Tabs   Commonly known as:  ALDACTONE    Take 1 Tab by mouth every day.   Dose:  25 mg       * Notice:  This list has 2 medication(s) that are the same as other medications prescribed for you. Read the directions carefully, and ask your doctor or other care provider to review them with you.

## 2017-06-08 NOTE — IP AVS SNAPSHOT
CrossLoop Access Code: [email or web address]  Expires: 7/6/2017  4:16 AM    Your email address is not on file at [facility].  Email Addresses are required for you to sign up for CrossLoop, please contact [phone] to verify your personal information and to provide your email address prior to attempting to register for CrossLoop.    Maxi Babbnicole  [address] [place] [address]    [employer]  A secure, online tool to manage your health information     [facility]’s CrossLoop® is a secure, online tool that connects you to your personalized health information from the privacy of your home -- day or night - making it very easy for you to manage your healthcare. Once the activation process is completed, you can even access your medical information using the CrossLoop stephie, which is available for free in the Apple Stephie store or Google Play store.     To learn more about CrossLoop, visit [email or web address]    There are two levels of access available (as shown below):   My Chart Features  [facility] Primary Care Doctor [facility]  Specialists [facility]  Urgent  Care Non-[facility] Primary Care Doctor   Email your healthcare team securely and privately 24/7 X X X    Manage appointments: schedule your next appointment; view details of past/upcoming appointments X      Request prescription refills. X      View recent personal medical records, including lab and immunizations X X X X   View health record, including health history, allergies, medications X X X X   Read reports about your outpatient visits, procedures, consult and ER notes X X X X   See your discharge summary, which is a recap of your hospital and/or ER visit that includes your diagnosis, lab results, and care plan X X  X     How to register for CrossLoop:  Once your e-mail address has been verified, follow the following steps to sign up for CrossLoop.     1. Go to  [email or web address]  2. Click on the Sign Up Now box, which takes you to the New Member Sign Up page. You will need  to provide the following information:  a. Enter your [facility] Access Code exactly as it appears at the top of this page. (You will not need to use this code after you’ve completed the sign-up process. If you do not sign up before the expiration date, you must request a new code.)   b. Enter your date of birth.   c. Enter your home email address.   d. Click Submit, and follow the next screen’s instructions.  3. Create a [facility] ID. This will be your [facility] login ID and cannot be changed, so think of one that is secure and easy to remember.  4. Create a [facility] password. You can change your password at any time.  5. Enter your Password Reset Question and Answer. This can be used at a later time if you forget your password.   6. Enter your e-mail address. This allows you to receive e-mail notifications when new information is available in [facility].  7. Click Sign Up. You can now view your health information.    For assistance activating your [facility] account, call [phone]

## 2017-06-08 NOTE — IP AVS SNAPSHOT
" Home Care Instructions                                                                                                                  Name:Maxi Esposito  Medical Record Number:[id number]  CSN: [id number]    YOB: 1950   Age: 67 y.o.  Sex: male  HT:1.702 m (5' 7\") WT: 113.3 kg (249 lb 12.5 oz)          Admit Date: 6/8/2017     Discharge Date:   Today's Date: 6/24/2017  Attending Doctor:  Goldie Ratliff M.D.                  Allergies:  Shellfish allergy            Discharge Instructions       Discharge Instructions    Discharged to other by [facility] with escort. Discharged via wheelchair, hospital escort: Yes.  Special equipment needed: Not Applicable    Be sure to schedule a follow-up appointment with your primary care doctor or any specialists as instructed.     Discharge Plan:   Smoking Cessation Offered: Patient Refused  Influenza Vaccine Indication: Not indicated: Previously immunized this influenza season and > 8 years of age    I understand that a diet low in cholesterol, fat, and sodium is recommended for good health. Unless I have been given specific instructions below for another diet, I accept this instruction as my diet prescription.   Other diet: diabetic diet with 1800 ml fluid restriction     Special Instructions: None    · Is patient discharged on Warfarin / Coumadin?   No     · Is patient Post Blood Transfusion?  No    Depression / Suicide Risk    As you are discharged from this [facility] facility, it is important to learn how to keep safe from harming yourself.    Recognize the warning signs:  · Abrupt changes in personality, positive or negative- including increase in energy   · Giving away possessions  · Change in eating patterns- significant weight changes-  positive or negative  · Change in sleeping patterns- unable to sleep or sleeping all the time   · Unwillingness or inability to communicate  · Depression  · Unusual sadness, discouragement and loneliness  · Talk of wanting to " die  · Neglect of personal appearance   · Rebelliousness- reckless behavior  · Withdrawal from people/activities they love  · Confusion- inability to concentrate     If you or a loved one observes any of these behaviors or has concerns about self-harm, here's what you can do:  · Talk about it- your feelings and reasons for harming yourself  · Remove any means that you might use to hurt yourself (examples: pills, rope, extension cords, firearm)  · Get professional help from the community (Mental Health, Substance Abuse, psychological counseling)  · Do not be alone:Call your Safe Contact- someone whom you trust who will be there for you.  · Call your local CRISIS HOTLINE 290-8356 or 481-314-8973  · Call your local Children's Mobile Crisis Response Team [place] (305) 402-4501 or www.[employer]  · Call the toll free National Suicide Prevention Hotlines   · National Suicide Prevention Lifeline 593-547-GTZM (6778)  · [employer] 800-SUICIDE (674-0571)    Chronic Obstructive Pulmonary Disease  Chronic obstructive pulmonary disease (COPD) is a common lung condition in which airflow from the lungs is limited. COPD is a general term that can be used to describe many different lung problems that limit airflow, including both chronic bronchitis and emphysema. If you have COPD, your lung function will probably never return to normal, but there are measures you can take to improve lung function and make yourself feel better.  CAUSES   · Smoking (common).  · Exposure to secondhand smoke.  · Genetic problems.  · Chronic inflammatory lung diseases or recurrent infections.  SYMPTOMS  2. Shortness of breath, especially with physical activity.  3. Deep, persistent (chronic) cough with a large amount of thick mucus.  4. Wheezing.  5. Rapid breaths (tachypnea).  6. Gray or bluish discoloration (cyanosis) of the skin, especially in your fingers, toes, or lips.  7. Fatigue.  8. Weight loss.  9. Frequent  infections or episodes when breathing symptoms become much worse (exacerbations).  10. Chest tightness.  DIAGNOSIS  Your health care provider will take a medical history and perform a physical examination to diagnose COPD. Additional tests for COPD may include:  2. Lung (pulmonary) function tests.  3. Chest X-ray.  4. CT scan.  5. Blood tests.  TREATMENT   Treatment for COPD may include:  2. Inhaler and nebulizer medicines. These help manage the symptoms of COPD and make your breathing more comfortable.  3. Supplemental oxygen. Supplemental oxygen is only helpful if you have a low oxygen level in your blood.  4. Exercise and physical activity. These are beneficial for nearly all people with COPD.  5. Lung surgery or transplant.  6. Nutrition therapy to gain weight, if you are underweight.  7. Pulmonary rehabilitation. This may involve working with a team of health care providers and specialists, such as respiratory, occupational, and physical therapists.  HOME CARE INSTRUCTIONS  2. Take all medicines (inhaled or pills) as directed by your health care provider.  3. Avoid over-the-counter medicines or cough syrups that dry up your airway (such as antihistamines) and slow down the elimination of secretions unless instructed otherwise by your health care provider.  4. If you are a smoker, the most important thing that you can do is stop smoking. Continuing to smoke will cause further lung damage and breathing trouble. Ask your health care provider for help with quitting smoking. He or she can direct you to community resources or hospitals that provide support.  5. Avoid exposure to irritants such as smoke, chemicals, and fumes that aggravate your breathing.  6. Use oxygen therapy and pulmonary rehabilitation if directed by your health care provider. If you require home oxygen therapy, ask your health care provider whether you should purchase a pulse oximeter to measure your oxygen level at home.  7. Avoid contact with  individuals who have a contagious illness.  8. Avoid extreme temperature and humidity changes.  9. Eat healthy foods. Eating smaller, more frequent meals and resting before meals may help you maintain your strength.  10. Stay active, but balance activity with periods of rest. Exercise and physical activity will help you maintain your ability to do things you want to do.  11. Preventing infection and hospitalization is very important when you have COPD. Make sure to receive all the vaccines your health care provider recommends, especially the pneumococcal and influenza vaccines. Ask your health care provider whether you need a pneumonia vaccine.  12. Learn and use relaxation techniques to manage stress.  13. Learn and use controlled breathing techniques as directed by your health care provider. Controlled breathing techniques include:  1. Pursed lip breathing. Start by breathing in (inhaling) through your nose for 1 second. Then, purse your lips as if you were going to whistle and breathe out (exhale) through the pursed lips for 2 seconds.  2. Diaphragmatic breathing. Start by putting one hand on your abdomen just above your waist. Inhale slowly through your nose. The hand on your abdomen should move out. Then purse your lips and exhale slowly. You should be able to feel the hand on your abdomen moving in as you exhale.  14. Learn and use controlled coughing to clear mucus from your lungs. Controlled coughing is a series of short, progressive coughs. The steps of controlled coughing are:  1. Lean your head slightly forward.  2. Breathe in deeply using diaphragmatic breathing.  3. Try to hold your breath for 3 seconds.  4. Keep your mouth slightly open while coughing twice.  5. Spit any mucus out into a tissue.  6. Rest and repeat the steps once or twice as needed.  SEEK MEDICAL CARE IF:  · You are coughing up more mucus than usual.  · There is a change in the color or thickness of your mucus.  · Your breathing is  more labored than usual.  · Your breathing is faster than usual.  SEEK IMMEDIATE MEDICAL CARE IF:  · You have shortness of breath while you are resting.  · You have shortness of breath that prevents you from:  ¨ Being able to talk.  ¨ Performing your usual physical activities.  · You have chest pain lasting longer than 5 minutes.  · Your skin color is more cyanotic than usual.  · You measure low oxygen saturations for longer than 5 minutes with a pulse oximeter.  MAKE SURE YOU:  · Understand these instructions.  · Will watch your condition.  · Will get help right away if you are not doing well or get worse.     This information is not intended to replace advice given to you by your health care provider. Make sure you discuss any questions you have with your health care provider.     Document Released: 09/27/2006 Document Revised: 01/08/2016 Document Reviewed: 08/14/2014  [employer] Interactive Patient Education ©2016 [employer]  Cellulitis  Cellulitis is an infection of the skin and the tissue under the skin. The infected area is usually red and tender. This happens most often in the arms and lower legs.  HOME CARE   · Take your antibiotic medicine as told. Finish the medicine even if you start to feel better.  · Keep the infected arm or leg raised (elevated).  · Put a warm cloth on the area up to 4 times per day.  · Only take medicines as told by your doctor.  · Keep all doctor visits as told.  GET HELP IF:  11. You see red streaks on the skin coming from the infected area.  12. Your red area gets bigger or turns a dark color.  13. Your bone or joint under the infected area is painful after the skin heals.  14. Your infection comes back in the same area or different area.  15. You have a puffy (swollen) bump in the infected area.  16. You have new symptoms.  17. You have a fever.  GET HELP RIGHT AWAY IF:   6. You feel very sleepy.  7. You throw up (vomit) or have watery poop (diarrhea).  8. You feel sick and have  muscle aches and pains.  MAKE SURE YOU:   8. Understand these instructions.  9. Will watch your condition.  10. Will get help right away if you are not doing well or get worse.     This information is not intended to replace advice given to you by your health care provider. Make sure you discuss any questions you have with your health care provider.     Document Released: 06/05/2009 Document Revised: 01/08/2016 Document Reviewed: 03/04/2013  [employer] Interactive Patient Education ©2016 [employer]      Your appointments     Jul 20, 2017  4:15 PM   Heart Failure New with Carol Shaw M.D.   [facility] (--)    [address], [address]  [facility] [id number] [phone]                 Discharge Medication Instructions:    Below are the medications your physician expects you to take upon discharge:    Review all your home medications and newly ordered medications with your doctor and/or pharmacist. Follow medication instructions as directed by your doctor and/or pharmacist.    Please keep your medication list with you and share with your physician.               Medication List      START taking these medications        Instructions    Morning Afternoon Evening Bedtime    acetaminophen 325 MG Tabs   Last time this was given:  650 mg on 6/23/2017  7:42 AM   Commonly known as:  TYLENOL        Take 2 Tabs by mouth every 6 hours as needed (Mild Pain; (Pain scale 1-3); Temp greater than 100.5 F).   Dose:  650 mg                        * albuterol 2.5mg/0.5ml Nebu   Last time this was given:  2.5 mg on 6/20/2017  2:54 PM   Commonly known as:  PROVENTIL        0.5 mL by Nebulization route every 6 hours as needed for Shortness of Breath.   Dose:  2.5 mg                        * albuterol 108 (90 BASE) MCG/ACT Aers inhalation aerosol   Last time this was given:  2 Puffs on 6/20/2017  8:02 AM        Inhale 2 Puffs by mouth every four hours as needed for Shortness of Breath.   Dose:   2 Puff                        amlodipine 5 MG Tabs   Last time this was given:  5 mg on 6/24/2017  7:47 AM   Commonly known as:  NORVASC   Next Dose Due:  Take tomorrow morning.        Take 1 Tab by mouth every day.   Dose:  5 mg                        artificial tears 1.4 % Soln   Last time this was given:  1 Drop on 6/24/2017  7:48 AM   Next Dose Due:  Take tonight at bedtime.        Place 1 Drop in both eyes 2 Times a Day.   Dose:  1 Drop                        budesonide-formoterol 160-4.5 MCG/ACT Aero   Last time this was given:  2 Puffs on 6/24/2017  9:30 AM   Commonly known as:  SYMBICORT   Next Dose Due:  Take tonight at bedtime.        Inhale 2 Puffs by mouth 2 Times a Day.   Dose:  2 Puff                        carvedilol 6.25 MG Tabs   Last time this was given:  6.25 mg on 6/24/2017  7:46 AM   Commonly known as:  COREG   Next Dose Due:  Take today at supper.        Take 1 Tab by mouth 2 times a day, with meals.   Dose:  6.25 mg                        cholecalciferol 400 UNIT Tabs   Last time this was given:  1,000 Units on 6/24/2017  7:47 AM   Commonly known as:  VITAMIN D3   Next Dose Due:  Take tomorrow morning.        Take 2.5 Tabs by mouth every day.   Dose:  1000 Units                        clotrimazole 1 % Crea   Last time this was given:  6/24/2017  7:48 AM   Commonly known as:  LOTRIMIN        Apply to feet                        dextrose 50% 50 % Soln   Commonly known as:  D50W        25 mL by Intravenous route as needed (If FSBG is less than or equal to 70 mg/dL and If patient is NPO).   Dose:  25 mL                        fluticasone 50 MCG/ACT nasal spray   Last time this was given:  100 mcg on 6/24/2017  9:30 AM   Commonly known as:  FLONASE   Next Dose Due:  Take tomorrow morning.        Spray 2 Sprays in nose every day.   Dose:  2 Spray                        furosemide 20 MG Tabs   Start taking on:  6/25/2017   Last time this was given:  40 mg on 6/24/2017  7:47 AM   Commonly known as:   LASIX   Next Dose Due:  Take tomorrow morning.        Take 1 Tab by mouth every day.   Dose:  20 mg                        gabapentin 100 MG Caps   Last time this was given:  200 mg on 6/24/2017  3:47 PM   Commonly known as:  NEURONTIN   Next Dose Due:  Take tonight at  Bedtime.        Take 2 Caps by mouth 3 times a day.   Dose:  200 mg                        glucose 4 g 4 g Chew        Take 4 Tabs by mouth as needed for Low Blood Sugar (If FSBG is less than or equal to 70 mg/dL and patient able to eat or drink).   Dose:  16 g                        guaifenesin DM sugar free  MG/5ML Liqd soln   Last time this was given:  10 mL on 6/9/2017  8:27 AM   Commonly known as:  DIABETIC TUSSIN DM        Take 10 mL by mouth every 6 hours as needed for Cough.   Dose:  10 mL                        hydrALAZINE 20 MG/ML Soln   Last time this was given:  20 mg on 6/16/2017  3:55 AM   Commonly known as:  APRESOLINE        1 mL by Intravenous route every 6 hours as needed.   Dose:  20 mg                        lisinopril 20 MG Tabs   Last time this was given:  20 mg on 6/24/2017  7:47 AM   Commonly known as:  PRINIVIL   Next Dose Due:  Take tomorrow morning.        Take 1 Tab by mouth every day.   Dose:  20 mg                        nicotine 21 MG/24HR Pt24   Last time this was given:  21 mg on 6/24/2017  5:32 AM   Commonly known as:  NICODERM   Next Dose Due:  Take tomorrow morning.        Apply 1 Patch to skin as directed every 24 hours.   Dose:  1 Patch                        nicotine polacrilex 2 MG Gum   Commonly known as:  NICORETTE        Take 1 Each by mouth every 1 hour as needed (For nicotine urge).   Dose:  2 mg                        spironolactone 25 MG Tabs   Last time this was given:  25 mg on 6/24/2017  7:47 AM   Commonly known as:  ALDACTONE   Next Dose Due:  Take tomorrow morning.        Take 1 Tab by mouth every day.   Dose:  25 mg                        * Notice:  This list has 2 medication(s) that are the  same as other medications prescribed for you. Read the directions carefully, and ask your doctor or other care provider to review them with you.      CONTINUE taking these medications        Instructions    Morning Afternoon Evening Bedtime    ABILIFY 5 MG tablet   Last time this was given:  5 mg on 6/23/2017  8:28 PM   Generic drug:  aripiprazole   Next Dose Due:  Take tonight at bedtime.        Take 5 mg by mouth every bedtime.   Dose:  5 mg                        aspirin EC 81 MG Tbec   Last time this was given:  81 mg on 6/24/2017  7:46 AM   Commonly known as:  ECOTRIN   Next Dose Due:  Take tomorrow morning.        Take 81 mg by mouth every day.   Dose:  81 mg                        atorvastatin 20 MG Tabs   Last time this was given:  20 mg on 6/23/2017  8:28 PM   Commonly known as:  LIPITOR   Next Dose Due:  Take tonight at bedtime.        Take 1 Tab by mouth every bedtime.   Dose:  20 mg                        glipiZIDE SR 2.5 MG Tb24   Commonly known as:  GLUCOTROL   Next Dose Due:  Take tomorrow morning.        Take 2.5 mg by mouth every morning.   Dose:  2.5 mg                        omeprazole 20 MG delayed-release capsule   Last time this was given:  20 mg on 6/24/2017  7:47 AM   Commonly known as:  PRILOSEC   Next Dose Due:  Take tomorrow morning.        Take 1 Cap by mouth every day.   Dose:  20 mg                             Where to Get Your Medications      Information about where to get these medications is not yet available     ! Ask your nurse or doctor about these medications    - acetaminophen 325 MG Tabs  - albuterol 108 (90 BASE) MCG/ACT Aers inhalation aerosol  - albuterol 2.5mg/0.5ml Nebu  - amlodipine 5 MG Tabs  - artificial tears 1.4 % Soln  - budesonide-formoterol 160-4.5 MCG/ACT Aero  - carvedilol 6.25 MG Tabs  - cholecalciferol 400 UNIT Tabs  - clotrimazole 1 % Crea  - dextrose 50% 50 % Soln  - fluticasone 50 MCG/ACT nasal spray  - furosemide 20 MG Tabs  - gabapentin 100 MG Caps  -  glucose 4 g 4 g Chew  - guaifenesin DM sugar free  MG/5ML Liqd soln  - hydrALAZINE 20 MG/ML Soln  - lisinopril 20 MG Tabs  - nicotine 21 MG/24HR Pt24  - nicotine polacrilex 2 MG Gum  - omeprazole 20 MG delayed-release capsule  - spironolactone 25 MG Tabs            Orders for after discharge     REFERRAL TO SKILLED NURSING FACILITY    Complete by:  As directed    Patient with heart failure, COPD, generalized weakness, needs SNF             Instructions           Diet / Nutrition:    Follow any diet instructions given to you by your doctor or the dietician, including how much salt (sodium) you are allowed each day.    If you are overweight, talk to your doctor about a weight reduction plan.    Activity:    Remain physically active following your doctor's instructions about exercise and activity.    Rest often.     Any time you become even a little tired or short of breath, SIT DOWN and rest.    Worsening Symptoms:    Report any of the following signs and symptoms to the doctor's office immediately:    *Pain of jaw, arm, or neck  *Chest pain not relieved by medication                               *Dizziness or loss of consciousness  *Difficulty breathing even when at rest   *More tired than usual                                       *Bleeding drainage or swelling of surgical site  *Swelling of feet, ankles, legs or stomach                 *Fever (>100ºF)  *Pink or blood tinged sputum  *Weight gain (3lbs/day or 5lbs /week)           *Shock from internal defibrillator (if applicable)  *Palpitations or irregular heartbeats                *Cool and/or numb extremities    Stroke Awareness    Common Risk Factors for Stroke include:    Age  Atrial Fibrillation  Carotid Artery Stenosis  Diabetes Mellitus  Excessive alcohol consumption  High blood pressure  Overweight   Physical inactivity  Smoking    Warning signs and symptoms of a stroke include:    *Sudden numbness or weakness of the face, arm or leg (especially on  one side of the body).  *Sudden confusion, trouble speaking or understanding.  *Sudden trouble seeing in one or both eyes.  *Sudden trouble walking, dizziness, loss of balance or coordination.Sudden severe headache with no known cause.    It is very important to get treatment quickly when a stroke occurs. If you experience any of the above warning signs, call 911 immediately.                   Disclaimer         Quit Smoking / Tobacco Use:    I understand the use of any tobacco products increases my chance of suffering from future heart disease or stroke and could cause other illnesses which may shorten my life. Quitting the use of tobacco products is the single most important thing I can do to improve my health. For further information on smoking / tobacco cessation call a Toll Free Quit Line at 1-657.814.8194 (*[employer] [place]) or 1-601.792.1948 (American Lung Association) or you can access the web based program at www.lungusa.org.    [place] Tobacco Users Help Line:  (712) 620-2044       Toll Free: 1-422.726.7060  Quit Tobacco Program [facility] (291)672-2048    Crisis Hotline:    [place] Crisis Hotline:  4-366-ZTIDFMP or 1-179.638.1372    [place] Crisis Hotline:    1-390.605.7861 or 829-194-9537    Discharge Survey:   Thank you for choosing [facility]. We hope we did everything we could to make your hospital stay a pleasant one. You may be receiving a phone survey and we would appreciate your time and participation in answering the questions. Your input is very valuable to us in our efforts to improve our service to our patients and their families.        My signature on this form indicates that:    1. I have reviewed and understand the above information.  2. My questions regarding this information have been answered to my satisfaction.  3. I have formulated a plan with my discharge nurse to obtain my prescribed medications for home.                  Disclaimer              __________________________________                     __________       ________                       Patient Signature                                                 Date                    Time

## 2017-06-09 LAB
ALBUMIN SERPL BCP-MCNC: 3.3 G/DL (ref 3.2–4.9)
ALBUMIN/GLOB SERPL: 1.1 G/DL
ALP SERPL-CCNC: 70 U/L (ref 30–99)
ALT SERPL-CCNC: 6 U/L (ref 2–50)
ANION GAP SERPL CALC-SCNC: 9 MMOL/L (ref 0–11.9)
AST SERPL-CCNC: 11 U/L (ref 12–45)
BILIRUB SERPL-MCNC: 0.7 MG/DL (ref 0.1–1.5)
BUN SERPL-MCNC: 8 MG/DL (ref 8–22)
CALCIUM SERPL-MCNC: 8.6 MG/DL (ref 8.5–10.5)
CHLORIDE SERPL-SCNC: 104 MMOL/L (ref 96–112)
CHOLEST SERPL-MCNC: 136 MG/DL (ref 100–199)
CO2 SERPL-SCNC: 25 MMOL/L (ref 20–33)
CREAT SERPL-MCNC: 0.62 MG/DL (ref 0.5–1.4)
CRP SERPL HS-MCNC: 5.49 MG/DL (ref 0–0.75)
DEPRECATED D DIMER PPP IA-ACNC: 332 NG/ML(D-DU)
EKG IMPRESSION: NORMAL
EKG IMPRESSION: NORMAL
ERYTHROCYTE [SEDIMENTATION RATE] IN BLOOD BY WESTERGREN METHOD: 16 MM/HOUR (ref 0–20)
EST. AVERAGE GLUCOSE BLD GHB EST-MCNC: 140 MG/DL
FLUAV H1 2009 PAND RNA SPEC QL NAA+PROBE: NOT DETECTED
FLUAV RNA SPEC QL NAA+PROBE: NEGATIVE
FLUBV RNA SPEC QL NAA+PROBE: NEGATIVE
GFR SERPL CREATININE-BSD FRML MDRD: >60 ML/MIN/1.73 M 2
GLOBULIN SER CALC-MCNC: 2.9 G/DL (ref 1.9–3.5)
GLUCOSE BLD-MCNC: 140 MG/DL (ref 65–99)
GLUCOSE BLD-MCNC: 166 MG/DL (ref 65–99)
GLUCOSE SERPL-MCNC: 133 MG/DL (ref 65–99)
HBA1C MFR BLD: 6.5 % (ref 0–5.6)
HDLC SERPL-MCNC: 32 MG/DL
LACTATE BLD-SCNC: 2.5 MMOL/L (ref 0.5–2)
LACTATE BLD-SCNC: 3.9 MMOL/L (ref 0.5–2)
LDLC SERPL CALC-MCNC: 77 MG/DL
POTASSIUM SERPL-SCNC: 4.6 MMOL/L (ref 3.6–5.5)
PROCALCITONIN SERPL-MCNC: <0.05 NG/ML
PROT SERPL-MCNC: 6.2 G/DL (ref 6–8.2)
SODIUM SERPL-SCNC: 138 MMOL/L (ref 135–145)
TRIGL SERPL-MCNC: 137 MG/DL (ref 0–149)

## 2017-06-09 PROCEDURE — 36415 COLL VENOUS BLD VENIPUNCTURE: CPT

## 2017-06-09 PROCEDURE — 94640 AIRWAY INHALATION TREATMENT: CPT

## 2017-06-09 PROCEDURE — A9270 NON-COVERED ITEM OR SERVICE: HCPCS | Performed by: STUDENT IN AN ORGANIZED HEALTH CARE EDUCATION/TRAINING PROGRAM

## 2017-06-09 PROCEDURE — 700111 HCHG RX REV CODE 636 W/ 250 OVERRIDE (IP): Performed by: GENERAL ACUTE CARE HOSPITAL

## 2017-06-09 PROCEDURE — 700102 HCHG RX REV CODE 250 W/ 637 OVERRIDE(OP): Performed by: STUDENT IN AN ORGANIZED HEALTH CARE EDUCATION/TRAINING PROGRAM

## 2017-06-09 PROCEDURE — A9270 NON-COVERED ITEM OR SERVICE: HCPCS | Performed by: INTERNAL MEDICINE

## 2017-06-09 PROCEDURE — 700105 HCHG RX REV CODE 258: Performed by: STUDENT IN AN ORGANIZED HEALTH CARE EDUCATION/TRAINING PROGRAM

## 2017-06-09 PROCEDURE — 770020 HCHG ROOM/CARE - TELE (206)

## 2017-06-09 PROCEDURE — 80061 LIPID PANEL: CPT

## 2017-06-09 PROCEDURE — 96366 THER/PROPH/DIAG IV INF ADDON: CPT

## 2017-06-09 PROCEDURE — 80053 COMPREHEN METABOLIC PANEL: CPT

## 2017-06-09 PROCEDURE — 83605 ASSAY OF LACTIC ACID: CPT

## 2017-06-09 PROCEDURE — 96375 TX/PRO/DX INJ NEW DRUG ADDON: CPT

## 2017-06-09 PROCEDURE — 99223 1ST HOSP IP/OBS HIGH 75: CPT | Mod: AI,GC | Performed by: INTERNAL MEDICINE

## 2017-06-09 PROCEDURE — 700111 HCHG RX REV CODE 636 W/ 250 OVERRIDE (IP): Performed by: INTERNAL MEDICINE

## 2017-06-09 PROCEDURE — 93970 EXTREMITY STUDY: CPT

## 2017-06-09 PROCEDURE — 94760 N-INVAS EAR/PLS OXIMETRY 1: CPT

## 2017-06-09 PROCEDURE — 85379 FIBRIN DEGRADATION QUANT: CPT

## 2017-06-09 PROCEDURE — 93005 ELECTROCARDIOGRAM TRACING: CPT | Performed by: INTERNAL MEDICINE

## 2017-06-09 PROCEDURE — 700105 HCHG RX REV CODE 258: Performed by: GENERAL ACUTE CARE HOSPITAL

## 2017-06-09 PROCEDURE — A9270 NON-COVERED ITEM OR SERVICE: HCPCS | Performed by: GENERAL ACUTE CARE HOSPITAL

## 2017-06-09 PROCEDURE — 700101 HCHG RX REV CODE 250: Performed by: INTERNAL MEDICINE

## 2017-06-09 PROCEDURE — 86140 C-REACTIVE PROTEIN: CPT

## 2017-06-09 PROCEDURE — 84145 PROCALCITONIN (PCT): CPT

## 2017-06-09 PROCEDURE — 82962 GLUCOSE BLOOD TEST: CPT | Mod: 91

## 2017-06-09 PROCEDURE — 700102 HCHG RX REV CODE 250 W/ 637 OVERRIDE(OP): Performed by: GENERAL ACUTE CARE HOSPITAL

## 2017-06-09 PROCEDURE — 700102 HCHG RX REV CODE 250 W/ 637 OVERRIDE(OP): Performed by: INTERNAL MEDICINE

## 2017-06-09 PROCEDURE — 700111 HCHG RX REV CODE 636 W/ 250 OVERRIDE (IP): Performed by: STUDENT IN AN ORGANIZED HEALTH CARE EDUCATION/TRAINING PROGRAM

## 2017-06-09 PROCEDURE — 51798 US URINE CAPACITY MEASURE: CPT

## 2017-06-09 PROCEDURE — 85652 RBC SED RATE AUTOMATED: CPT

## 2017-06-09 RX ORDER — BUDESONIDE AND FORMOTEROL FUMARATE DIHYDRATE 160; 4.5 UG/1; UG/1
2 AEROSOL RESPIRATORY (INHALATION) 2 TIMES DAILY
Status: DISCONTINUED | OUTPATIENT
Start: 2017-06-09 | End: 2017-06-24 | Stop reason: HOSPADM

## 2017-06-09 RX ORDER — CLOTRIMAZOLE 1 %
CREAM (GRAM) TOPICAL 2 TIMES DAILY
Status: DISCONTINUED | OUTPATIENT
Start: 2017-06-09 | End: 2017-06-24 | Stop reason: HOSPADM

## 2017-06-09 RX ORDER — SODIUM CHLORIDE 9 MG/ML
1000 INJECTION, SOLUTION INTRAVENOUS ONCE
Status: COMPLETED | OUTPATIENT
Start: 2017-06-09 | End: 2017-06-09

## 2017-06-09 RX ORDER — FLUTICASONE PROPIONATE 50 MCG
2 SPRAY, SUSPENSION (ML) NASAL DAILY
Status: DISCONTINUED | OUTPATIENT
Start: 2017-06-09 | End: 2017-06-24 | Stop reason: HOSPADM

## 2017-06-09 RX ORDER — ARIPIPRAZOLE 10 MG/1
5 TABLET ORAL
Status: DISCONTINUED | OUTPATIENT
Start: 2017-06-09 | End: 2017-06-24 | Stop reason: HOSPADM

## 2017-06-09 RX ORDER — DOXYCYCLINE 100 MG/1
100 TABLET ORAL EVERY 12 HOURS
Status: DISCONTINUED | OUTPATIENT
Start: 2017-06-09 | End: 2017-06-11

## 2017-06-09 RX ORDER — CARVEDILOL 6.25 MG/1
6.25 TABLET ORAL 2 TIMES DAILY WITH MEALS
Status: DISCONTINUED | OUTPATIENT
Start: 2017-06-09 | End: 2017-06-09

## 2017-06-09 RX ORDER — IPRATROPIUM BROMIDE AND ALBUTEROL SULFATE 2.5; .5 MG/3ML; MG/3ML
3 SOLUTION RESPIRATORY (INHALATION)
Status: CANCELLED | OUTPATIENT
Start: 2017-06-09

## 2017-06-09 RX ORDER — OMEPRAZOLE 20 MG/1
20 CAPSULE, DELAYED RELEASE ORAL DAILY
Status: DISCONTINUED | OUTPATIENT
Start: 2017-06-09 | End: 2017-06-24 | Stop reason: HOSPADM

## 2017-06-09 RX ORDER — IPRATROPIUM BROMIDE AND ALBUTEROL SULFATE 2.5; .5 MG/3ML; MG/3ML
3 SOLUTION RESPIRATORY (INHALATION)
Status: DISCONTINUED | OUTPATIENT
Start: 2017-06-09 | End: 2017-06-10

## 2017-06-09 RX ORDER — DOXYCYCLINE 100 MG/1
100 TABLET ORAL EVERY 12 HOURS
Status: DISCONTINUED | OUTPATIENT
Start: 2017-06-09 | End: 2017-06-09

## 2017-06-09 RX ORDER — LISINOPRIL 5 MG/1
5 TABLET ORAL
Status: DISCONTINUED | OUTPATIENT
Start: 2017-06-09 | End: 2017-06-12

## 2017-06-09 RX ORDER — OXYCODONE HYDROCHLORIDE AND ACETAMINOPHEN 5; 325 MG/1; MG/1
1 TABLET ORAL
Status: COMPLETED | OUTPATIENT
Start: 2017-06-09 | End: 2017-06-09

## 2017-06-09 RX ORDER — THIAMINE MONONITRATE (VIT B1) 100 MG
100 TABLET ORAL DAILY
Status: DISCONTINUED | OUTPATIENT
Start: 2017-06-09 | End: 2017-06-24 | Stop reason: HOSPADM

## 2017-06-09 RX ORDER — ATORVASTATIN CALCIUM 20 MG/1
20 TABLET, FILM COATED ORAL
Status: DISCONTINUED | OUTPATIENT
Start: 2017-06-09 | End: 2017-06-24 | Stop reason: HOSPADM

## 2017-06-09 RX ORDER — DEXTROMETHORPHAN HBR. AND GUAIFENESIN 10; 100 MG/5ML; MG/5ML
10 SOLUTION ORAL EVERY 6 HOURS PRN
Status: DISCONTINUED | OUTPATIENT
Start: 2017-06-09 | End: 2017-06-24 | Stop reason: HOSPADM

## 2017-06-09 RX ORDER — PREDNISONE 20 MG/1
40 TABLET ORAL DAILY
Status: DISCONTINUED | OUTPATIENT
Start: 2017-06-09 | End: 2017-06-11

## 2017-06-09 RX ORDER — IPRATROPIUM BROMIDE AND ALBUTEROL SULFATE 2.5; .5 MG/3ML; MG/3ML
3 SOLUTION RESPIRATORY (INHALATION)
Status: DISCONTINUED | OUTPATIENT
Start: 2017-06-09 | End: 2017-06-09

## 2017-06-09 RX ADMIN — CARVEDILOL 6.25 MG: 6.25 TABLET, FILM COATED ORAL at 08:29

## 2017-06-09 RX ADMIN — SODIUM CHLORIDE 1000 ML: 9 INJECTION, SOLUTION INTRAVENOUS at 20:02

## 2017-06-09 RX ADMIN — MAGNESIUM SULFATE IN DEXTROSE 1 G: 10 INJECTION, SOLUTION INTRAVENOUS at 18:55

## 2017-06-09 RX ADMIN — OXYCODONE HYDROCHLORIDE AND ACETAMINOPHEN 1 TABLET: 5; 325 TABLET ORAL at 08:30

## 2017-06-09 RX ADMIN — INSULIN LISPRO 1 UNITS: 100 INJECTION, SOLUTION INTRAVENOUS; SUBCUTANEOUS at 11:46

## 2017-06-09 RX ADMIN — AMPICILLIN SODIUM AND SULBACTAM SODIUM 3 G: 2; 1 INJECTION, POWDER, FOR SOLUTION INTRAMUSCULAR; INTRAVENOUS at 20:36

## 2017-06-09 RX ADMIN — BUDESONIDE AND FORMOTEROL FUMARATE DIHYDRATE 2 PUFF: 160; 4.5 AEROSOL RESPIRATORY (INHALATION) at 03:34

## 2017-06-09 RX ADMIN — AMPICILLIN SODIUM AND SULBACTAM SODIUM 3 G: 2; 1 INJECTION, POWDER, FOR SOLUTION INTRAMUSCULAR; INTRAVENOUS at 03:07

## 2017-06-09 RX ADMIN — ACETAMINOPHEN 650 MG: 325 TABLET, FILM COATED ORAL at 20:33

## 2017-06-09 RX ADMIN — SODIUM CHLORIDE: 9 INJECTION, SOLUTION INTRAVENOUS at 02:20

## 2017-06-09 RX ADMIN — VANCOMYCIN HYDROCHLORIDE 125 MG: 10 INJECTION, POWDER, LYOPHILIZED, FOR SOLUTION INTRAVENOUS at 11:38

## 2017-06-09 RX ADMIN — VANCOMYCIN HYDROCHLORIDE 125 MG: 10 INJECTION, POWDER, LYOPHILIZED, FOR SOLUTION INTRAVENOUS at 20:33

## 2017-06-09 RX ADMIN — DOXYCYCLINE 100 MG: 100 TABLET ORAL at 08:29

## 2017-06-09 RX ADMIN — DEXTROMETHORPHAN HYDROBROMIDE AND GUAIFENESIN 10 ML: 10; 100 LIQUID ORAL at 08:27

## 2017-06-09 RX ADMIN — ACETAMINOPHEN 650 MG: 325 TABLET, FILM COATED ORAL at 05:50

## 2017-06-09 RX ADMIN — IPRATROPIUM BROMIDE AND ALBUTEROL SULFATE 3 ML: .5; 3 SOLUTION RESPIRATORY (INHALATION) at 14:57

## 2017-06-09 RX ADMIN — DOXYCYCLINE 100 MG: 100 TABLET ORAL at 20:33

## 2017-06-09 RX ADMIN — INSULIN LISPRO 1 UNITS: 100 INJECTION, SOLUTION INTRAVENOUS; SUBCUTANEOUS at 18:09

## 2017-06-09 RX ADMIN — IPRATROPIUM BROMIDE AND ALBUTEROL SULFATE 3 ML: .5; 3 SOLUTION RESPIRATORY (INHALATION) at 20:56

## 2017-06-09 RX ADMIN — CLOTRIMAZOLE: 10 CREAM TOPICAL at 21:36

## 2017-06-09 RX ADMIN — OMEPRAZOLE 20 MG: 20 CAPSULE, DELAYED RELEASE ORAL at 08:30

## 2017-06-09 RX ADMIN — FLUTICASONE PROPIONATE 100 MCG: 50 SPRAY, METERED NASAL at 08:28

## 2017-06-09 RX ADMIN — CLOTRIMAZOLE: 10 CREAM TOPICAL at 08:28

## 2017-06-09 RX ADMIN — BUDESONIDE AND FORMOTEROL FUMARATE DIHYDRATE 2 PUFF: 160; 4.5 AEROSOL RESPIRATORY (INHALATION) at 08:28

## 2017-06-09 RX ADMIN — ASPIRIN 81 MG: 81 TABLET ORAL at 08:30

## 2017-06-09 RX ADMIN — ATORVASTATIN CALCIUM 20 MG: 20 TABLET, FILM COATED ORAL at 20:33

## 2017-06-09 RX ADMIN — AMPICILLIN SODIUM AND SULBACTAM SODIUM 3 G: 2; 1 INJECTION, POWDER, FOR SOLUTION INTRAMUSCULAR; INTRAVENOUS at 16:12

## 2017-06-09 RX ADMIN — MAGNESIUM SULFATE HEPTAHYDRATE 1 G: 1 INJECTION, SOLUTION INTRAVENOUS at 08:27

## 2017-06-09 RX ADMIN — LISINOPRIL 5 MG: 5 TABLET ORAL at 08:30

## 2017-06-09 RX ADMIN — NICOTINE 21 MG: 21 PATCH, EXTENDED RELEASE TRANSDERMAL at 05:35

## 2017-06-09 RX ADMIN — Medication 100 MG: at 11:38

## 2017-06-09 RX ADMIN — AMPICILLIN SODIUM AND SULBACTAM SODIUM 3 G: 2; 1 INJECTION, POWDER, FOR SOLUTION INTRAMUSCULAR; INTRAVENOUS at 09:28

## 2017-06-09 RX ADMIN — SENNOSIDES AND DOCUSATE SODIUM 2 TABLET: 8.6; 5 TABLET ORAL at 20:33

## 2017-06-09 RX ADMIN — SODIUM CHLORIDE: 9 INJECTION, SOLUTION INTRAVENOUS at 16:12

## 2017-06-09 RX ADMIN — PREDNISONE 40 MG: 20 TABLET ORAL at 08:29

## 2017-06-09 ASSESSMENT — ENCOUNTER SYMPTOMS
SHORTNESS OF BREATH: 1
SORE THROAT: 1
BRUISES/BLEEDS EASILY: 0
COUGH: 1
HEADACHES: 1
CONSTIPATION: 0
DEPRESSION: 0
VOMITING: 0
HEADACHES: 0
DOUBLE VISION: 0
PALPITATIONS: 0
ABDOMINAL PAIN: 0
NAUSEA: 0
WEAKNESS: 0
DIZZINESS: 0
DIARRHEA: 0
WEIGHT LOSS: 0
CHILLS: 1
SENSORY CHANGE: 0
LOSS OF CONSCIOUSNESS: 0
ORTHOPNEA: 0
DEPRESSION: 1
SEIZURES: 0
NECK PAIN: 0
SPUTUM PRODUCTION: 0
HEARTBURN: 1
WHEEZING: 0
PND: 0
SPUTUM PRODUCTION: 1
BLURRED VISION: 0
FEVER: 0
MYALGIAS: 0
WHEEZING: 1

## 2017-06-09 ASSESSMENT — LIFESTYLE VARIABLES
EVER_SMOKED: YES
ALCOHOL_USE: NO
EVER_SMOKED: YES
ALCOHOL_USE: NO

## 2017-06-09 ASSESSMENT — PAIN SCALES - GENERAL
PAINLEVEL_OUTOF10: 0
PAINLEVEL_OUTOF10: 7
PAINLEVEL_OUTOF10: 0
PAINLEVEL_OUTOF10: 3

## 2017-06-09 ASSESSMENT — COPD QUESTIONNAIRES
HAVE YOU SMOKED AT LEAST 100 CIGARETTES IN YOUR ENTIRE LIFE: YES
COPD SCREENING SCORE: 5
DURING THE PAST 4 WEEKS HOW MUCH DID YOU FEEL SHORT OF BREATH: SOME OF THE TIME
DO YOU EVER COUGH UP ANY MUCUS OR PHLEGM?: NO/ONLY WITH OCCASIONAL COLDS OR INFECTIONS

## 2017-06-09 ASSESSMENT — PATIENT HEALTH QUESTIONNAIRE - PHQ9
SUM OF ALL RESPONSES TO PHQ QUESTIONS 1-9: 0
2. FEELING DOWN, DEPRESSED, IRRITABLE, OR HOPELESS: NOT AT ALL
1. LITTLE INTEREST OR PLEASURE IN DOING THINGS: NOT AT ALL
SUM OF ALL RESPONSES TO PHQ9 QUESTIONS 1 AND 2: 0

## 2017-06-09 NOTE — CARE PLAN
Problem: Safety  Goal: Will remain free from falls  Outcome: PROGRESSING AS EXPECTED  Intervention: Implement fall precautions    06/09/17 0330   OTHER   Environmental Precautions Treaded Slipper Socks on Patient;Personal Belongings, Wastebasket, Call Bell etc. in Easy Reach;Report Given to Other Health Care Providers Regarding Fall Risk;Bed in Low Position   IV Pole on Same Side of Bed as Bathroom Yes   Bedrails Bedrails Closest to Bathroom Down   Chair/Bed Strip Alarm Yes - Alarm On

## 2017-06-09 NOTE — ED NOTES
"Pt refusing to stay awake and answer questions for pharmacy tech, pt yelled \"I told ya already that I ain't had any of my meds for 2 weeks\", asked pt why and pt became very angry and yelled \"what the hell is wrong with you people, so I misplaced the bag with my meds, that happens sometimes, now go away and let me sleep!\"  "

## 2017-06-09 NOTE — PROGRESS NOTES
Report received from nightshift RN. Patient sitting up in bed, alert and oriented. Complains of pain in the low back and bilateral lower extremities. Requests several snacks. Bed low and in locked position, call light within reach. Will continue to monitor.

## 2017-06-09 NOTE — SENIOR ADMIT NOTE
"Mr. Esposito is a 67 y.o. male with PMHx of COPD, type 2 diabetes, hypertension, dyslipidemia, gastroesophageal reflux disease, cannabis and tobacco smoker but presents with chills, cough, shortness of breath, runny nose, nasal congestion as well as erythema, swelling, tenderness of bilateral legs.    He lives from the shelter. Per patient, he did not take his medication for one week. He still smokes cigarettes and marijuana.  He denies fever, chest pain, palpitations, heartburn, paroxysmal nocturnal dyspnea, orthopnea, nausea, vomiting, abdominal pain, diarrhea, constipation, hemoptysis, hematemesis, melena.  Exam:  /78 mmHg  Pulse 81  Temp(Src) 36.9 °C (98.4 °F)  Resp 16  Ht 1.702 m (5' 7.01\")  Wt 121.7 kg (268 lb 4.8 oz)  BMI 42.01 kg/m2  SpO2 95%    Physical Exam  Constitutional: Well developed, Well nourished, Mild distress, Non-toxic appearance.  Obesity.  HENT: Normocephalic, Atraumatic, Bilateral external ears normal, Oropharynx moist, No oral exudates.    Eyes: PERRLA, EOMI, Conjunctiva normal, No discharge.    Neck: No tenderness, Supple, No stridor.    Lymphatic: No lymphadenopathy noted.    Cardiovascular: Normal heart rate, Normal rhythm.    Thorax & Lungs: Decreased breath sounds bilaterally, No respiratory distress, No wheezing, No crackles.     Abdomen: Large, obese abdomen. No fluid wave. Soft, No tenderness, No masses, No pulsatile masses.    Skin: Erythematous, swelling, tenderness of bilateral lower extremity. Erythematous plaques on bilateral lower extremities  Extremities: Erythematous, swelling, tenderness of bilateral lower extremity. No fluctuation appreciated. Erythematous plaques on bilateral lower extremities  Musculoskeletal: No tenderness to palpation or major deformities noted.  Intact distal pulses   Neurologic: Awake, alert. Moves all extremities spontaneously.  Psychiatric: Affect normal, Judgment normal, Mood normal.     Labs:  Recent Results (from the past 24 " hour(s))   EKG (ER)    Collection Time: 17  1:43 PM   Result Value Ref Range    Report       [facility] Emergency Dept.    Test Date:  2017  Pt Name:    KATARINA BANKS             Department: ER  MRN:        [id number]                      Room:  Gender:     M                            Technician: [id number]  :        1950                   Requested By:ER TRIAGE PROTOCOL  Order #:    [id number]                    Reading MD:    Measurements  Intervals                                Axis  Rate:       86                           P:          58  NV:         120                          QRS:        69  QRSD:       90                           T:          57  QT:         368  QTc:        440    Interpretive Statements  SINUS RHYTHM  SUPRAVENTRICULAR BIGEMINY  Compared to ECG 2017 13:20:48  Atrial premature complex(es) now present     ACCU-CHEK GLUCOSE    Collection Time: 17  3:41 PM   Result Value Ref Range    Glucose - Accu-Ck 89 65 - 99 mg/dL   Troponin    Collection Time: 17  7:39 PM   Result Value Ref Range    Troponin I <0.01 0.00 - 0.04 ng/mL   Btype Natriuretic Peptide    Collection Time: 17  7:39 PM   Result Value Ref Range    B Natriuretic Peptide 92 0 - 100 pg/mL   CBC with Differential    Collection Time: 17  7:39 PM   Result Value Ref Range    WBC 14.1 (H) 4.8 - 10.8 K/uL    RBC 5.11 4.70 - 6.10 M/uL    Hemoglobin 15.0 14.0 - 18.0 g/dL    Hematocrit 46.0 42.0 - 52.0 %    MCV 90.0 81.4 - 97.8 fL    MCH 29.4 27.0 - 33.0 pg    MCHC 32.6 (L) 33.7 - 35.3 g/dL    RDW 51.1 (H) 35.9 - 50.0 fL    Platelet Count 380 164 - 446 K/uL    MPV 10.3 9.0 - 12.9 fL    Neutrophils-Polys 62.10 44.00 - 72.00 %    Lymphocytes 21.30 (L) 22.00 - 41.00 %    Monocytes 10.30 0.00 - 13.40 %    Eosinophils 5.40 0.00 - 6.90 %    Basophils 0.60 0.00 - 1.80 %    Immature Granulocytes 0.30 0.00 - 0.90 %    Nucleated RBC 0.00 /100 WBC    Neutrophils (Absolute) 8.79 (H) 1.82 -  7.42 K/uL    Lymphs (Absolute) 3.01 1.00 - 4.80 K/uL    Monos (Absolute) 1.45 (H) 0.00 - 0.85 K/uL    Eos (Absolute) 0.76 (H) 0.00 - 0.51 K/uL    Baso (Absolute) 0.09 0.00 - 0.12 K/uL    Immature Granulocytes (abs) 0.04 0.00 - 0.11 K/uL    NRBC (Absolute) 0.00 K/uL   Complete Metabolic Panel (CMP)    Collection Time: 06/08/17  7:39 PM   Result Value Ref Range    Sodium 138 135 - 145 mmol/L    Potassium 4.2 3.6 - 5.5 mmol/L    Chloride 103 96 - 112 mmol/L    Co2 25 20 - 33 mmol/L    Anion Gap 10.0 0.0 - 11.9    Glucose 98 65 - 99 mg/dL    Bun 7 (L) 8 - 22 mg/dL    Creatinine 0.64 0.50 - 1.40 mg/dL    Calcium 9.1 8.5 - 10.5 mg/dL    AST(SGOT) 10 (L) 12 - 45 U/L    ALT(SGPT) 7 2 - 50 U/L    Alkaline Phosphatase 70 30 - 99 U/L    Total Bilirubin 0.4 0.1 - 1.5 mg/dL    Albumin 3.4 3.2 - 4.9 g/dL    Total Protein 6.4 6.0 - 8.2 g/dL    Globulin 3.0 1.9 - 3.5 g/dL    A-G Ratio 1.1 g/dL   Prothrombin Time    Collection Time: 06/08/17  7:39 PM   Result Value Ref Range    PT 13.8 12.0 - 14.6 sec    INR 1.03 0.87 - 1.13   APTT    Collection Time: 06/08/17  7:39 PM   Result Value Ref Range    APTT 32.0 24.7 - 36.0 sec   Lipase    Collection Time: 06/08/17  7:39 PM   Result Value Ref Range    Lipase 24 11 - 82 U/L   LACTIC ACID    Collection Time: 06/08/17  7:39 PM   Result Value Ref Range    Lactic Acid 2.7 (H) 0.5 - 2.0 mmol/L   ESTIMATED GFR    Collection Time: 06/08/17  7:39 PM   Result Value Ref Range    GFR If African American >60 >60 mL/min/1.73 m 2    GFR If Non African American >60 >60 mL/min/1.73 m 2   PROCALCITONIN    Collection Time: 06/08/17  7:39 PM   Result Value Ref Range    Procalcitonin <0.05 <0.25 ng/mL   TSH (Thyroid Stimulating Hormone)    Collection Time: 06/08/17  7:39 PM   Result Value Ref Range    TSH 1.450 0.300 - 3.700 uIU/mL   Magnesium    Collection Time: 06/08/17  7:39 PM   Result Value Ref Range    Magnesium 1.7 1.5 - 2.5 mg/dL   URINALYSIS    Collection Time: 06/08/17 10:08 PM   Result Value Ref  Range    Color Lt. Yellow     Character Clear     Specific Gravity 1.005 <1.035    Ph 6.0 5.0-8.0    Glucose Negative Negative mg/dL    Ketones Negative Negative mg/dL    Protein Negative Negative mg/dL    Bilirubin Negative Negative    Nitrite Negative Negative    Leukocyte Esterase Negative Negative    Occult Blood Negative Negative    Micro Urine Req see below    URINE DRUG SCREEN    Collection Time: 06/08/17 10:08 PM   Result Value Ref Range    Amphetamines Urine Negative Negative    Barbiturates Negative Negative    Benzodiazepines Negative Negative    Cocaine Metabolite Negative Negative    Methadone Negative Negative    Ecstasy Negative Negative    Opiates Negative Negative    Oxycodone Negative Negative    Phencyclidine -Pcp Negative Negative    Propoxyphene Negative Negative    Cannabinoid Metab Positive (A) Negative       DX-CHEST-2 VIEWS   Final Result      1.  Redemonstrated pulmonary vascular congestion with slightly increased mild interstitial edema.   2.  Bilateral basilar atelectasis and/or consolidation. Underlying infection is possible.      DX-CHEST-LIMITED (1 VIEW)   Final Result      Pulmonary vascular congestion. Superimposed infection not excluded.      Atherosclerotic plaque.          Assessment and Plan:    Cellulitis superimposed on stasis dermatitis  - doxycycline and unasyn  - blood cultures  - wound care  - consider LE ultrasound    COPD exacerbation  - Prednisone 40 mg daily  - Duoneb Q6H   - Oxygen/RT protocol  - doxycycline  - symptomatic treatment for upper respiratory symptoms    Lactic acidosis: IV fluid and trend lactic acid    T2 DM: hold home PO med. Diabetic diet. ISS, hypoglycemic protocol, accuchecks  HTN: lisinopril, coreg, hold furosemide  DLD: statin  GERD: omeprazole  Tinea pedis: topical clotrimazole  Cannabis user: education and counseling to quit marijuana  Tobacco use:  education and counseling to quit smoking.    Mental health: abilify  Social: from shelter    Full  code  lovenox for DVT prophylaxis    For further details of Assessment & Plan, please refer to Dr. Phillips 's H&P.

## 2017-06-09 NOTE — PROGRESS NOTES
[facility] [place] [facility] Internal Medicine Interval Note    Name Maxi Esposito 1950   Age/Sex 67 y.o. male   MRN [id number]   Code Status Full     After 5PM or if no immediate response to page, please call for cross-coverage  Attending/Team: Dr. Lloyd Call [phone] to page   1st Call - Day Intern (R1):   Dr. Puneet Womack 2nd Call - Day Sr. Resident (R2/R3):   Dr. Carranza         Chief complaint/ reason for interval visit (Primary Diagnosis)   COPD exacerbation  Cellulitis    Interval Problem Daily Status Update    D-Dimer elevated-LE DVT negative  Procalcitonin and ESR negative. CRP elevated.  LA 2.7>2.5      Review of Systems   Constitutional: Positive for chills. Negative for fever.   Respiratory: Positive for cough and shortness of breath. Negative for sputum production and wheezing.    Cardiovascular: Positive for leg swelling. Negative for chest pain.   Gastrointestinal: Negative for nausea, vomiting and diarrhea.   Genitourinary: Negative for dysuria.   Musculoskeletal: Negative for myalgias.   Neurological: Negative for dizziness, weakness and headaches.   Psychiatric/Behavioral: Negative for depression.       Consultants/Specialty  None    Disposition  Inpatient for COPD exacerbation    Quality Measures  Labs reviewed, Medications reviewed and EKG reviewed  Arshad catheter: No Arshad      DVT Prophylaxis: Enoxaparin (Lovenox)                Physical Exam       Filed Vitals:    17 0339 17 0707 17 0730 17 1200   BP:   162/102 146/91   Pulse:  71 98 94   Temp:   36.2 °C (97.1 °F) 36.2 °C (97.2 °F)   Resp: 18 24 18 18   Height:       Weight:       SpO2:  97% 93% 91%     Body mass index is 42.01 kg/(m^2).    Oxygen Therapy:  Pulse Oximetry: 91 %, O2 (LPM): 1.5, O2 Delivery: Nasal Cannula    Physical Exam  Physical Exam   Constitutional: He is oriented to person, place, and time.   Obese  Cardiovascular: Normal rate and regular rhythm.    Pulmonary/Chest: Effort normal. He has wheezes. He  has no rales. He exhibits no tenderness.   Distant lung sounds    Abdominal: Soft. There is no tenderness. There is no guarding.   Abdomen obese   Musculoskeletal: He exhibits edema and tenderness.   Erythema both lower extremities with tenderness and pitting edema   Neurological: He is alert and oriented to person, place, and time. No cranial nerve deficit. GCS score is 15.   Skin: Skin is warm. Rash noted. There is erythema.       Lab Data Review:      6/9/2017  2:32 PM    Recent Labs      06/08/17 1939 06/09/17   0843   SODIUM  138  138   POTASSIUM  4.2  4.6   CHLORIDE  103  104   CO2  25  25   BUN  7*  8   CREATININE  0.64  0.62   MAGNESIUM  1.7   --    CALCIUM  9.1  8.6       Recent Labs      06/08/17 1939 06/09/17   0843   ALTSGPT  7  6   ASTSGOT  10*  11*   ALKPHOSPHAT  70  70   TBILIRUBIN  0.4  0.7   LIPASE  24   --    GLUCOSE  98  133*       Recent Labs      06/08/17 1939   RBC  5.11   HEMOGLOBIN  15.0   HEMATOCRIT  46.0   PLATELETCT  380   PROTHROMBTM  13.8   APTT  32.0   INR  1.03       Recent Labs      06/08/17 1939 06/09/17   0843   WBC  14.1*   --    NEUTSPOLYS  62.10   --    LYMPHOCYTES  21.30*   --    MONOCYTES  10.30   --    EOSINOPHILS  5.40   --    BASOPHILS  0.60   --    ASTSGOT  10*  11*   ALTSGPT  7  6   ALKPHOSPHAT  70  70   TBILIRUBIN  0.4  0.7           Assessment/Plan     BILATERAL LOWER EXTREMITY EDEMA: CELLULITIS WITH STASIS DERMATITIS  ---------------------------------------------------------------------------------------------------  Pt with hx of chr venous statsis of both LEs  Subjective feeling of chills    Pulse 82 , /81 mmHg Temp Normal, RR 16/min, BMI 42, SpO2 95% (Room Air)  WBC 14K, Lactate elevated SIRS1/4  Severe erythema and tenderness of both lower extremities with chr venous stasis derm changes    D-D elevated but DVT LE negative    Plan:  ----------   IV Fluids  Blood and sputum C/S pending  Trending LA  IV Abx to cover both Cellulitis and COPD  (Doxycycline and Unasyn)      COPD exacerbation  -------------------------------  Hx of cough cold feverish feeling and chills 1 week  Hx of postnasal drip and desiree maxillary pain  Pt ran out of his meds for >1 week  Chest wheeze occasionally heard, no crepts    Heart sounds distant due to obesity and chest wall body habitus  CXR: Gerald pulm congestion present    Plan:  RT Protocol  Prednisone 40 mg daily  Duoneb Q6H    IV Abx to cover both Cellulitis and COPD (Doxycycline and Unasyn)      T2 DM:  --------------  A1c 6.5  Accuchecks Q6 hr  hypoglycemic protocol     Bradycardia  -----------------  Tele showing pauses up to 2.5 seconds  Coreg stopped    HTN:  ----------  On Lisinopril      DLD:  ----------    Ct Statin    GERD:  ------------    Ct Omeprazole    Tinea pedis:  ---------------   topical clotrimazole    Substance Dependence:  ------------------------------  Smoking and Cannabis user:    Counseling to quit marijuana, and Smoking  Nicotine patch    Hx of Depression:  ----------------------  Pt lives in Shelter  On Aripiprazole  Currently No SI or HI

## 2017-06-09 NOTE — ED NOTES
Med Rec completed per calling [facility] on [address]  5-10 pt picked up Augment, Erythromycin, Abilify, Lipitor, carvedilol, lasix, gabapentin, glipizide, tradjenta, lisinopril, omeprazole and Tiotropium Bromide  4-29 pt picked up Ventolin and gabapentin  Clindamycin was not picked up    Pt unable to stay awake for interview and he stated his med bag was taken 2.5 wks ago.

## 2017-06-09 NOTE — ED PROVIDER NOTES
ED Provider Note    Scribed for Dr. Scar Osorio M.D. by Anatoly Aldana. 6/8/2017  6:05 PM    Primary care provider: Pcp Pt States None  Means of arrival: walk-in  History obtained from: patient  History limited by: none    CHIEF COMPLAINT  Chief Complaint   Patient presents with   • Leg Swelling       HPI  aMxi Esposito is a 67 y.o. male who presents to the Emergency Department for evaluation of bilateral lower leg swelling. He is experiencing associated redness and pain with his leg swelling, and shortness of breath. The patient was seen by me in the ED three weeks ago with similar symptoms, but his symptoms were milder. He has run out of his medications, and has not taken any for the past week. His leg swelling has been worsening since he ran out of his medications. He denies fever.     REVIEW OF SYSTEMS  Pertinent positives include bilateral leg swelling, leg redness, leg pain, shortness of breath. Pertinent negatives include no fever. As above, all other systems reviewed and are negative.   See HPI for further details.     C.    PAST MEDICAL HISTORY   has a past medical history of Hypertension; Diabetes (CMS-HCC); Sepsis (CMS-HCC); Pneumonia; Chronic obstructive pulmonary disease (CMS-HCC); Medical non-compliance; and [place] [place]    SURGICAL HISTORY   has past surgical history that includes splenectomy (1980).    SOCIAL HISTORY  Social History   Substance Use Topics   • Smoking status: Former Smoker     Quit date: 07/16/2016   • Smokeless tobacco: Never Used   • Alcohol Use: No      History   Drug Use No       FAMILY HISTORY  No family history on file.    CURRENT MEDICATIONS  No current facility-administered medications on file prior to encounter.     Current Outpatient Prescriptions on File Prior to Encounter   Medication Sig Dispense Refill   • clindamycin (CLEOCIN) 300 MG Cap Take 1 Cap by mouth 3 times a day. 21 Quantity Sufficient 0   • clindamycin (CLEOCIN) 300 MG Cap Take 1  "Cap by mouth 3 times a day. 21 Quantity Sufficient 0   • erythromycin 5 MG/GM Ointment Place 1 Drop in both eyes every 6 hours. 1 Tube 0   • aripiprazole (ABILIFY) 5 MG tablet Take 5 mg by mouth every bedtime.     • Tiotropium Bromide-Olodaterol (STIOLTO RESPIMAT) 2.5-2.5 MCG/ACT Aero Soln Inhale 2 Puffs by mouth every day.     • linagliptin (TRADJENTA) 5 MG Tab tablet Take 5 mg by mouth every day.     • albuterol 108 (90 BASE) MCG/ACT Aero Soln inhalation aerosol Inhale 2 Puffs by mouth every 6 hours as needed for Shortness of Breath.     • lisinopril (PRINIVIL, ZESTRIL) 30 MG tablet Take 30 mg by mouth every day.     • furosemide (LASIX) 20 MG Tab Take 20 mg by mouth every day.     • gabapentin (NEURONTIN) 600 MG tablet Take 600 mg by mouth 3 times a day.     • glipiZIDE SR (GLUCOTROL) 2.5 MG TABLET SR 24 HR Take 2.5 mg by mouth every day.     • omeprazole (PRILOSEC) 20 MG delayed-release capsule Take 20 mg by mouth every day.     • carvedilol (COREG) 6.25 MG Tab Take 1 Tab by mouth 2 times a day, with meals. 60 Tab 3   • atorvastatin (LIPITOR) 20 MG Tab Take 1 Tab by mouth every bedtime. 30 Tab 11   • aspirin EC (ECOTRIN) 81 MG Tablet Delayed Response Take 81 mg by mouth every day.        ALLERGIES  Allergies   Allergen Reactions   • Shellfish Allergy Shortness of Breath and Swelling     \"Eyes closed up really tight and it was hard to breath\"       PHYSICAL EXAM  VITAL SIGNS: /94 mmHg  Pulse 93  Temp(Src) 36.9 °C (98.4 °F)  Resp 20  Ht 1.702 m (5' 7.01\")  Wt 121.7 kg (268 lb 4.8 oz)  BMI 42.01 kg/m2  SpO2 95%    Constitutional: Well developed, Well nourished, Mild distress, Non-toxic appearance.   HENT: Normocephalic, Atraumatic, Bilateral external ears normal, Oropharynx moist, No oral exudates.   Eyes: PERRLA, EOMI, Conjunctiva normal, No discharge.   Neck: No tenderness, Supple, No stridor.   Lymphatic: No lymphadenopathy noted.   Cardiovascular: Normal heart rate, Normal rhythm.   Thorax & Lungs: " Clear to auscultation bilaterally, No respiratory distress, No wheezing, No crackles.   Abdomen: Large, obese abdomen. No fluid wave. Soft, No tenderness, No masses, No pulsatile masses.   Skin: Cellulitis to bilateral lower extremities.   Extremities:, Severe bilateral lower extremity edema with redness and apparent cellulitis.   Musculoskeletal: No tenderness to palpation or major deformities noted.  Intact distal pulses  Neurologic: Awake, alert. Moves all extremities spontaneously.  Psychiatric: Affect normal, Judgment normal, Mood normal.     LABS  Results for orders placed or performed during the hospital encounter of 06/08/17   Troponin   Result Value Ref Range    Troponin I <0.01 0.00 - 0.04 ng/mL   Btype Natriuretic Peptide   Result Value Ref Range    B Natriuretic Peptide 92 0 - 100 pg/mL   CBC with Differential   Result Value Ref Range    WBC 14.1 (H) 4.8 - 10.8 K/uL    RBC 5.11 4.70 - 6.10 M/uL    Hemoglobin 15.0 14.0 - 18.0 g/dL    Hematocrit 46.0 42.0 - 52.0 %    MCV 90.0 81.4 - 97.8 fL    MCH 29.4 27.0 - 33.0 pg    MCHC 32.6 (L) 33.7 - 35.3 g/dL    RDW 51.1 (H) 35.9 - 50.0 fL    Platelet Count 380 164 - 446 K/uL    MPV 10.3 9.0 - 12.9 fL    Neutrophils-Polys 62.10 44.00 - 72.00 %    Lymphocytes 21.30 (L) 22.00 - 41.00 %    Monocytes 10.30 0.00 - 13.40 %    Eosinophils 5.40 0.00 - 6.90 %    Basophils 0.60 0.00 - 1.80 %    Immature Granulocytes 0.30 0.00 - 0.90 %    Nucleated RBC 0.00 /100 WBC    Neutrophils (Absolute) 8.79 (H) 1.82 - 7.42 K/uL    Lymphs (Absolute) 3.01 1.00 - 4.80 K/uL    Monos (Absolute) 1.45 (H) 0.00 - 0.85 K/uL    Eos (Absolute) 0.76 (H) 0.00 - 0.51 K/uL    Baso (Absolute) 0.09 0.00 - 0.12 K/uL    Immature Granulocytes (abs) 0.04 0.00 - 0.11 K/uL    NRBC (Absolute) 0.00 K/uL   Complete Metabolic Panel (CMP)   Result Value Ref Range    Sodium 138 135 - 145 mmol/L    Potassium 4.2 3.6 - 5.5 mmol/L    Chloride 103 96 - 112 mmol/L    Co2 25 20 - 33 mmol/L    Anion Gap 10.0 0.0 - 11.9     Glucose 98 65 - 99 mg/dL    Bun 7 (L) 8 - 22 mg/dL    Creatinine 0.64 0.50 - 1.40 mg/dL    Calcium 9.1 8.5 - 10.5 mg/dL    AST(SGOT) 10 (L) 12 - 45 U/L    ALT(SGPT) 7 2 - 50 U/L    Alkaline Phosphatase 70 30 - 99 U/L    Total Bilirubin 0.4 0.1 - 1.5 mg/dL    Albumin 3.4 3.2 - 4.9 g/dL    Total Protein 6.4 6.0 - 8.2 g/dL    Globulin 3.0 1.9 - 3.5 g/dL    A-G Ratio 1.1 g/dL   Prothrombin Time   Result Value Ref Range    PT 13.8 12.0 - 14.6 sec    INR 1.03 0.87 - 1.13   APTT   Result Value Ref Range    APTT 32.0 24.7 - 36.0 sec   Lipase   Result Value Ref Range    Lipase 24 11 - 82 U/L   LACTIC ACID   Result Value Ref Range    Lactic Acid 2.7 (H) 0.5 - 2.0 mmol/L   ESTIMATED GFR   Result Value Ref Range    GFR If African American >60 >60 mL/min/1.73 m 2    GFR If Non African American >60 >60 mL/min/1.73 m 2   ACCU-CHEK GLUCOSE   Result Value Ref Range    Glucose - Accu-Ck 89 65 - 99 mg/dL   EKG (ER)   Result Value Ref Range    Report       [facility] Emergency Dept.    Test Date:  2017  Pt Name:    KATARINA BANKS             Department: ER  MRN:        [id number]                      Room:  Gender:                                 Technician: [id number]  :        1950                   Requested By:ER TRIAGE PROTOCOL  Order #:    [id number]                    Reading MD:    Measurements  Intervals                                Axis  Rate:       86                           P:          58  MO:         120                          QRS:        69  QRSD:       90                           T:          57  QT:         368  QTc:        440    Interpretive Statements  SINUS RHYTHM  SUPRAVENTRICULAR BIGEMINY  Compared to ECG 2017 13:20:48  Atrial premature complex(es) now present        All labs reviewed by me.    EKG  Interpreted by me    Rhythm:  Normal sinus rhythm   Rate: 86  Axis: normal  Ectopy: occasional PAC  Conduction: normal  ST Segments: no acute change  T Waves: no acute change  Q  Waves: none  Clinical Impression: Normal EKG without acute changes      RADIOLOGY  DX-CHEST-LIMITED (1 VIEW)   Final Result      Pulmonary vascular congestion. Superimposed infection not excluded.      Atherosclerotic plaque.        The radiologist's interpretation of all radiological studies have been reviewed by me.    COURSE & MEDICAL DECISION MAKING  Pertinent Labs & Imaging studies reviewed. (See chart for details)    6:00 PM - Patient seen and examined at bedside. Ordered chest x-ray, EKG Accu-check glucose, lactic acid, Blood culture x2, troponin, BNP, CBC with differential, CMP, PTT, APTT, Lipase to evaluate his symptoms. I discussed the treatment plan as above with the patient. He understood and verbalized agreement. The differential diagnoses include but are not limited to: CHF, cellulitis, sepsis, noncompliance with medication.     8:45 PM - Patient treated with 1000 mL NS infusion, 3 g Unasyn injection. He is given IV fluids secondary to an elevated lactic acid.     9:04 PM - Paged R Internal Medicine     9:12 PM - I discussed the patient's case and the above findings with [facility] Internal Medicine who agree to admit the patient under their care. Patient will be treated with 3000 mg Vancomycin in 500 mL NS.     Decision Making:  Patient is known to me from previous ER visit, with a much worse cellulitis and swelling of both lower extremities, he does appear to be developing sepsis with elevated lactic acid also worsening cellulitis. Is also some component of medication noncompliance and worsening CHF discussed with [facility] internal medicine resident for admission    DISPOSITION:  Patient will be admitted to [facility] Internal Medicine in guarded condition.     FINAL IMPRESSION  1. Cellulitis, unspecified cellulitis site    2. Sepsis, due to unspecified organism (CMS-[facility])          Anatoly LONG (Scribe), am scribing for, and in the presence of, Scar Osorio M.D..    Electronically signed by: Anatoly Aldana  (Scribe), 6/8/2017    Scar LONG M.D. personally performed the services described in this documentation, as scribed by Anatoly Aldana in my presence, and it is both accurate and complete.    The note accurately reflects work and decisions made by me.  Scar Osorio  6/9/2017  1:10 AM

## 2017-06-09 NOTE — PROGRESS NOTES
"Pharmacy Kinetics 67 y.o. male on vancomycin day # 1 2017    Vancomycin New Start    3000 mg iv loading dose administered  @ 2142      Indication for Treatment:   SSTI    Pertinent history per medical record:   Admitted on 2017 for complaints of bilateral LE edema and erythema. Pt has hx of cellulitis per RN notes. Pt also with history of CHF and noncompliance with medications. Pt with leukocytosis, elevated lactic acid on ED workup. Empiric abx initiated for presumed LE cellulitis.     Other antibiotics:   Unasyn 3 gm iv q6h     Allergies:  Shellfish allergy     List concerns for renal function:   BMI 42, CHF, hypermetabolic     Pertinent cultures to date:    bcxs - in process     Recent Labs      17   WBC  14.1*   NEUTSPOLYS  62.10     Recent Labs      17   BUN  7*   CREATININE  0.64   ALBUMIN  3.4     Blood pressure 132/78, pulse 81, temperature 36.9 °C (98.4 °F), resp. rate 16, height 1.702 m (5' 7.01\"), weight 121.7 kg (268 lb 4.8 oz), SpO2 95 %. Temp (24hrs), Av.9 °C (98.4 °F), Min:36.9 °C (98.4 °F), Max:36.9 °C (98.4 °F)      A/P   1. Vancomycin dose change: initiate 1800 mg iv q12h (, )    2. Next vancomycin level:  @ 0830 (not yet ordered)   3. Goal trough: 12-16  4. Comments: Patient is at risk for drug accumulation and associated nephrotoxicity, primarily due to body habitus. Would recommend obtaining wound cxs and de-escalating antibiotic therapy if MRSA can be ruled out. Limited information currently available in pt's chart. Will initiate 15 mg/kg q12h dosing regimen and recommend obtaining trough level prior to fourth total dose to ensure sufficient drug clearance.  Pharmacy will continue to monitor and adjust dosing or recommend de-escalation as appropriate.       Javan Hammonds, PharmD        "
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
## 2017-06-09 NOTE — DISCHARGE PLANNING
Medical Social Work    Update: Pt familiar to this pt from previous admissions. Pt will have to d/c to homeless shelter at d/c unless he is able to pay for his own motel room. Pt's prescriptions should be sent to [facility] so pt can  medications at d/c. Pt will have no co-pay for medications.

## 2017-06-09 NOTE — PROGRESS NOTES
"Patient requests to speak to RN and would like a urologist to see him while in the hospital. Asked patient why he felt he needed a urologist, patient states, \"I can't get an erection anymore and when I urinate sometimes I only dribble.\" Explained to patient that erectile dysfunction is not typically treated acutely in the hospital setting, and that he would need to be referred upon discharge to follow up with this issue. Called MD about dribbling complaint, will order bladder scan.   "

## 2017-06-09 NOTE — CARE PLAN
Problem: Pain Management  Goal: Pain level will decrease to patient’s comfort goal    06/08/17 1332 06/09/17 0330   OTHER   Nurse Pain Scale 0 - 10  7 --    Non Verbal Scale  --  Calm;Sleeping

## 2017-06-09 NOTE — RESPIRATORY CARE
COPD EDUCATION by COPD CLINICAL EDUCATOR  6/9/2017 at 6:32 AM by Adelaide Edmonds     Patient reviewed by COPD education team. Patient does not qualify for COPD program.

## 2017-06-09 NOTE — H&P
[facility] Admitting History and Physical    Name Maxi Esposito 1950   Age/Sex 67 y.o. male   MRN [id number]   Code Status Full     After 5PM or if no immediate response to page, please call for cross-coverage  Attending/Team: Dr Lloyd/Ketan Call [phone] to page   1st Call - Day Intern (R1):   Dr Womack 2nd Call - Day Sr. Resident (R2/R3):   Dr Carranza       Chief Complaint:  Gerald LE swelling w SOB    HPI:    This is a 66 yo  male with HTN, DM, COPD, CHF,  who comes to ED with hx of swelling both legs with shortness of breath.  He attended ED with similar symptoms milder 3 weeks ago but aggravated this time as he ran out of his meds for >1 week. He endorses  history of feeling chills, cold, occasional cough with the expectoration, fascio maxillary pain, with a postnasal drip. He is not on any home oxygen for his COPD.    He declined history of fever, chest pain, or any heart burn, orthopnea paroxysmal nocturnal dyspnea, nausea, vomiting, diarrhea, dysuria, hematemesis, hemoptysis, melena .    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 Had his Colonoscopy >5 yr back, and was told to have some abnormality but he did not go for follow up.     He endorses to history of excessive smoking and COPD, still smokes one pack per day for the past 5 years, occasionally uses MArihuana , denies alcohol abuse,He worked in past as Glassier, doing glass windows work , retired 10 yrs and lives alone, never , doesn't have kids, and  Lives alone in a shelter home.            Review of Systems   Constitutional: Positive for chills. Negative for fever and weight loss.   HENT: Positive for congestion and sore throat. Negative for hearing loss.    Eyes: Negative for blurred vision and double vision.   Respiratory: Positive for cough, sputum production, shortness of breath and wheezing.    Cardiovascular: Positive for leg swelling. Negative for chest pain, palpitations,  orthopnea and PND.   Gastrointestinal: Positive for heartburn. Negative for nausea, vomiting, abdominal pain, diarrhea and constipation.   Genitourinary: Negative for dysuria and urgency.   Musculoskeletal: Negative for myalgias and neck pain.   Skin: Positive for itching and rash.   Neurological: Positive for headaches. Negative for dizziness, sensory change, seizures, loss of consciousness and weakness.   Endo/Heme/Allergies: Does not bruise/bleed easily.   Psychiatric/Behavioral: Positive for depression.             Past Medical History:   Past Medical History   Diagnosis Date   • Hypertension    • Diabetes (CMS-HCC)    • Sepsis (CMS-[facility])    • Pneumonia    • Chronic obstructive pulmonary disease (CMS-[facility])    • Medical non-compliance    • Congestive heart failure (CMS-[facility])        Past Surgical History:  Past Surgical History   Procedure Laterality Date   • Splenectomy  1980       Current Outpatient Medications:  Home Medications     Reviewed by Mary Campo (Pharmacy Tech) on 06/08/17 at 2153  Med List Status: Not Addressed    Medication Last Dose Status    albuterol 108 (90 BASE) MCG/ACT Aero Soln inhalation aerosol >week Active    amoxicillin-clavulanate (AUGMENTIN) 875-125 MG Tab  Active    aripiprazole (ABILIFY) 5 MG tablet >week Active    aspirin EC (ECOTRIN) 81 MG Tablet Delayed Response >week Active    atorvastatin (LIPITOR) 20 MG Tab >week Active    carvedilol (COREG) 6.25 MG Tab >week Active    erythromycin 5 MG/GM Ointment  Active    furosemide (LASIX) 20 MG Tab >week Active    gabapentin (NEURONTIN) 600 MG tablet >week Active    glipiZIDE SR (GLUCOTROL) 2.5 MG TABLET SR 24 HR >week Active    linagliptin (TRADJENTA) 5 MG Tab tablet >week Active    lisinopril (PRINIVIL, ZESTRIL) 30 MG tablet >week Active    omeprazole (PRILOSEC) 20 MG delayed-release capsule >week Active    Tiotropium Bromide-Olodaterol (STIOLTO RESPIMAT) 2.5-2.5 MCG/ACT Aero Soln >week Active                Medication  "Allergy/Sensitivities:  Allergies   Allergen Reactions   • Shellfish Allergy Shortness of Breath and Swelling     \"Eyes closed up really tight and it was hard to breath\"         Family History:  No family history on file.    Social History:  Social History     Social History   • Marital Status: Single     Spouse Name: N/A   • Number of Children: N/A   • Years of Education: N/A     Occupational History   • Not on file.     Social History Main Topics   • Smoking status: Former Smoker     Quit date: 07/16/2016   • Smokeless tobacco: Never Used   • Alcohol Use: No   • Drug Use: No   • Sexual Activity: Not on file     Other Topics Concern   • Not on file     Social History Narrative     Living situation: shelter home  PCP : Pcp Pt States None        Physical Exam     Filed Vitals:    06/08/17 1332 06/08/17 1337 06/08/17 2030   BP: 165/94  137/81   Pulse: 93  82   Temp: 36.9 °C (98.4 °F)     Resp: 20  16   Height: 1.702 m (5' 7.01\")     Weight:  121.7 kg (268 lb 4.8 oz)    SpO2: 95%       Body mass index is 42.01 kg/(m^2).  /81 mmHg  Pulse 82  Temp(Src) 36.9 °C (98.4 °F)  Resp 16  Ht 1.702 m (5' 7.01\")  Wt 121.7 kg (268 lb 4.8 oz)  BMI 42.01 kg/m2  SpO2 95%  O2 therapy: Pulse Oximetry: 95 %, O2 Delivery: None (Room Air)    Physical Exam   Constitutional: He is oriented to person, place, and time.   Severely Obese male , afebrile, anicteric, not diaphoretic,    Cardiovascular: Normal rate and regular rhythm.    Pulmonary/Chest: Effort normal. He has wheezes. He has no rales. He exhibits no tenderness.   Distant lung sounds    Abdominal: Soft. There is no tenderness. There is no guarding.   Abdomen obese   Musculoskeletal: He exhibits edema and tenderness.   Erythema both lower extremities with tenderness and pitting wong   Neurological: He is alert and oriented to person, place, and time. No cranial nerve deficit. GCS score is 15.   Skin: Skin is warm. Rash noted. There is erythema.             Data Review     "   Old Records Request:   Completed  Current Records review and summary: Completed    Lab Data Review:  Recent Results (from the past 24 hour(s))   EKG (ER)    Collection Time: 17  1:43 PM   Result Value Ref Range    Report       [facility] Emergency [facility]    Test Date:  2017  Pt Name:    KATARINA BANKS             Department: ER  MRN:        [id number]                      Room:  Gender:                                 Technician: [id number]  :        1950                   Requested By:ER TRIAGE PROTOCOL  Order #:    [id number]                    Reading MD:    Measurements  Intervals                                Axis  Rate:       86                           P:          58  RI:         120                          QRS:        69  QRSD:       90                           T:          57  QT:         368  QTc:        440    Interpretive Statements  SINUS RHYTHM  SUPRAVENTRICULAR BIGEMINY  Compared to ECG 2017 13:20:48  Atrial premature complex(es) now present     ACCU-CHEK GLUCOSE    Collection Time: 17  3:41 PM   Result Value Ref Range    Glucose - Accu-Ck 89 65 - 99 mg/dL   Troponin    Collection Time: 17  7:39 PM   Result Value Ref Range    Troponin I <0.01 0.00 - 0.04 ng/mL   Btype Natriuretic Peptide    Collection Time: 17  7:39 PM   Result Value Ref Range    B Natriuretic Peptide 92 0 - 100 pg/mL   CBC with Differential    Collection Time: 17  7:39 PM   Result Value Ref Range    WBC 14.1 (H) 4.8 - 10.8 K/uL    RBC 5.11 4.70 - 6.10 M/uL    Hemoglobin 15.0 14.0 - 18.0 g/dL    Hematocrit 46.0 42.0 - 52.0 %    MCV 90.0 81.4 - 97.8 fL    MCH 29.4 27.0 - 33.0 pg    MCHC 32.6 (L) 33.7 - 35.3 g/dL    RDW 51.1 (H) 35.9 - 50.0 fL    Platelet Count 380 164 - 446 K/uL    MPV 10.3 9.0 - 12.9 fL    Neutrophils-Polys 62.10 44.00 - 72.00 %    Lymphocytes 21.30 (L) 22.00 - 41.00 %    Monocytes 10.30 0.00 - 13.40 %    Eosinophils 5.40 0.00 - 6.90 %    Basophils  0.60 0.00 - 1.80 %    Immature Granulocytes 0.30 0.00 - 0.90 %    Nucleated RBC 0.00 /100 WBC    Neutrophils (Absolute) 8.79 (H) 1.82 - 7.42 K/uL    Lymphs (Absolute) 3.01 1.00 - 4.80 K/uL    Monos (Absolute) 1.45 (H) 0.00 - 0.85 K/uL    Eos (Absolute) 0.76 (H) 0.00 - 0.51 K/uL    Baso (Absolute) 0.09 0.00 - 0.12 K/uL    Immature Granulocytes (abs) 0.04 0.00 - 0.11 K/uL    NRBC (Absolute) 0.00 K/uL   Complete Metabolic Panel (CMP)    Collection Time: 06/08/17  7:39 PM   Result Value Ref Range    Sodium 138 135 - 145 mmol/L    Potassium 4.2 3.6 - 5.5 mmol/L    Chloride 103 96 - 112 mmol/L    Co2 25 20 - 33 mmol/L    Anion Gap 10.0 0.0 - 11.9    Glucose 98 65 - 99 mg/dL    Bun 7 (L) 8 - 22 mg/dL    Creatinine 0.64 0.50 - 1.40 mg/dL    Calcium 9.1 8.5 - 10.5 mg/dL    AST(SGOT) 10 (L) 12 - 45 U/L    ALT(SGPT) 7 2 - 50 U/L    Alkaline Phosphatase 70 30 - 99 U/L    Total Bilirubin 0.4 0.1 - 1.5 mg/dL    Albumin 3.4 3.2 - 4.9 g/dL    Total Protein 6.4 6.0 - 8.2 g/dL    Globulin 3.0 1.9 - 3.5 g/dL    A-G Ratio 1.1 g/dL   Prothrombin Time    Collection Time: 06/08/17  7:39 PM   Result Value Ref Range    PT 13.8 12.0 - 14.6 sec    INR 1.03 0.87 - 1.13   APTT    Collection Time: 06/08/17  7:39 PM   Result Value Ref Range    APTT 32.0 24.7 - 36.0 sec   Lipase    Collection Time: 06/08/17  7:39 PM   Result Value Ref Range    Lipase 24 11 - 82 U/L   LACTIC ACID    Collection Time: 06/08/17  7:39 PM   Result Value Ref Range    Lactic Acid 2.7 (H) 0.5 - 2.0 mmol/L   ESTIMATED GFR    Collection Time: 06/08/17  7:39 PM   Result Value Ref Range    GFR If African American >60 >60 mL/min/1.73 m 2    GFR If Non African American >60 >60 mL/min/1.73 m 2       Imaging/Procedures Review:    ndependant Imaging Review: Completed  DX-CHEST-LIMITED (1 VIEW)   Final Result      Pulmonary vascular congestion. Superimposed infection not excluded.      Atherosclerotic plaque.               EKG:   EKG Independant Review: Completed  QTc:440, HR: 86,  Normal Sinus Rhythm, no ST/T changes     () Records reviewed and summarized in current documentation             Assessment/Plan     No new assessment & plan notes have been filed under this hospital service since the last note was generated.  Service: Hospital Medicine      BILATERAL LOWER EXTREMITY EDEMA: CELLULITIS WITH STASIS DERMATITIS  ---------------------------------------------------------------------------------------------------  Pt with hx of DM, COPD, CHF, with chr venous statsis of both LEs, now comes with hx of severe pain and redness and swelling increasing  Subjective feeling of chills  Pt is afebrile, Vitals stable:   Pulse 82 , /81 mmHg Temp Normal, RR 16/min, BMI 42, SpO2 95% (Room Air)  WBC 14K, Lactate elevated SIRS1/4  Severe erythema and tenderness of both lower extremities with chr venous stasis derm changes   No foot ulcers    Plan:  ----------  Admit to Tele  IV Fluids cautiously due to hx of CHF in past  Blood C/s,   Sputum c/s  UDS, and UA  Trend LActate  IV Abx to cover both Cellulitis and COPD (Doxycycline and Unasyn), Pt was given Vancomycin in ED after Blood C/S collected  Wound consult  Consider DVT study LEs      COPD exacerbation  -------------------------------  Hx of cough cold feverish feeling and chills 1 week  Hx of postnasal drip and desiree maxillary pain  Pt ran out of his meds for >1 week  Chest wheeze occasioanlly heard, no crepts   Heart sounds distant due to obesity and chest wall body habitus  CXR: Gerald pulm congestion present    Plan:  Admit to Tele  RT Protocol  Prednisone 40 mg daily  Duoneb Q6H    IV Abx to cover both Cellulitis and COPD (Doxycycline and Unasyn)  - symptomatic treatment for upper respiratory symptoms    Lactic acidosis:  ----------------------  Likely to infection in the soft tissues  Plan:  Trend lactate  Gentle hydration with NS due to CHF history, although currently patient is not hypervolemic       T2 DM:  --------------  Glyco Hb 6.7% in  5/2017  POC glucose 98  No feet ulcers  Chr statsis dermatitis w acute cellulitis    Plan:  Withhold oral meds  ISS  Accuchecks Q6 hr  hypoglycemic protoco  Abx for infection as above       CHF:  -----------  Currently pt is not hypervolemic  CXR: Gerald pul congestion likely to COPD exacerbation  BNP <100    Plan:  Ct Coreg,   Hold furosemide  Monitor Chem panel    HTN:  ----------  On Lisinopril,   Ct same      DLD:  ----------   Ct Statin    GERD:  ------------   Ct Omeprazole    Tinea pedis:  ---------------   topical clotrimazole    Substance Dependence:  ------------------------------  Smoking and Cannabis user:   Counseling to quit marijuana, and Smoking  Nicotine patch    Hx of Depression:  ----------------------  Pt lives in Shelter home  On Aripiprazole  Currently No Sis or HIs  Ct home meds      Anticipated Hospital stay:  >2 midnights        Quality Measures  Full code  EKG reviewed, Labs reviewed, Medications reviewed and Radiology images reviewed  Arshad catheter: No Arshad      DVT Prophylaxis: Enoxaparin (Lovenox)    Ulcer prophylaxis: Yes  Antibiotics: Treating active infection/contamination beyond 24 hours perioperative coverage

## 2017-06-09 NOTE — CARE PLAN
Problem: Nutritional:  Goal: Patient to verbalize or demonstrate understanding of diet  Outcome: MET Date Met:  06/09/17  Diabetes diet teaching refused, however, Pt accepted handout

## 2017-06-10 PROBLEM — I45.5 SINUS PAUSE: Status: ACTIVE | Noted: 2017-06-10

## 2017-06-10 LAB
ALBUMIN SERPL BCP-MCNC: 3.3 G/DL (ref 3.2–4.9)
ALBUMIN/GLOB SERPL: 1.2 G/DL
ALP SERPL-CCNC: 68 U/L (ref 30–99)
ALT SERPL-CCNC: 17 U/L (ref 2–50)
ANION GAP SERPL CALC-SCNC: 7 MMOL/L (ref 0–11.9)
AST SERPL-CCNC: 20 U/L (ref 12–45)
BASOPHILS # BLD AUTO: 0.5 % (ref 0–1.8)
BASOPHILS # BLD: 0.1 K/UL (ref 0–0.12)
BILIRUB SERPL-MCNC: 0.5 MG/DL (ref 0.1–1.5)
BUN SERPL-MCNC: 10 MG/DL (ref 8–22)
CALCIUM SERPL-MCNC: 8.6 MG/DL (ref 8.5–10.5)
CHLORIDE SERPL-SCNC: 105 MMOL/L (ref 96–112)
CO2 SERPL-SCNC: 27 MMOL/L (ref 20–33)
CREAT SERPL-MCNC: 0.62 MG/DL (ref 0.5–1.4)
EKG IMPRESSION: NORMAL
EOSINOPHIL # BLD AUTO: 0.29 K/UL (ref 0–0.51)
EOSINOPHIL NFR BLD: 1.5 % (ref 0–6.9)
ERYTHROCYTE [DISTWIDTH] IN BLOOD BY AUTOMATED COUNT: 52 FL (ref 35.9–50)
GFR SERPL CREATININE-BSD FRML MDRD: >60 ML/MIN/1.73 M 2
GLOBULIN SER CALC-MCNC: 2.8 G/DL (ref 1.9–3.5)
GLUCOSE BLD-MCNC: 164 MG/DL (ref 65–99)
GLUCOSE BLD-MCNC: 208 MG/DL (ref 65–99)
GLUCOSE BLD-MCNC: 217 MG/DL (ref 65–99)
GLUCOSE SERPL-MCNC: 126 MG/DL (ref 65–99)
HCT VFR BLD AUTO: 44.7 % (ref 42–52)
HGB BLD-MCNC: 14.5 G/DL (ref 14–18)
IMM GRANULOCYTES # BLD AUTO: 0.04 K/UL (ref 0–0.11)
IMM GRANULOCYTES NFR BLD AUTO: 0.2 % (ref 0–0.9)
LACTATE BLD-SCNC: 2.5 MMOL/L (ref 0.5–2)
LYMPHOCYTES # BLD AUTO: 3.63 K/UL (ref 1–4.8)
LYMPHOCYTES NFR BLD: 18.8 % (ref 22–41)
MAGNESIUM SERPL-MCNC: 2 MG/DL (ref 1.5–2.5)
MCH RBC QN AUTO: 29.3 PG (ref 27–33)
MCHC RBC AUTO-ENTMCNC: 32.4 G/DL (ref 33.7–35.3)
MCV RBC AUTO: 90.3 FL (ref 81.4–97.8)
MONOCYTES # BLD AUTO: 2.14 K/UL (ref 0–0.85)
MONOCYTES NFR BLD AUTO: 11.1 % (ref 0–13.4)
NEUTROPHILS # BLD AUTO: 13.06 K/UL (ref 1.82–7.42)
NEUTROPHILS NFR BLD: 67.9 % (ref 44–72)
NRBC # BLD AUTO: 0 K/UL
NRBC BLD AUTO-RTO: 0 /100 WBC
PLATELET # BLD AUTO: 327 K/UL (ref 164–446)
PMV BLD AUTO: 10.1 FL (ref 9–12.9)
POTASSIUM SERPL-SCNC: 3.8 MMOL/L (ref 3.6–5.5)
PROT SERPL-MCNC: 6.1 G/DL (ref 6–8.2)
RBC # BLD AUTO: 4.95 M/UL (ref 4.7–6.1)
SODIUM SERPL-SCNC: 139 MMOL/L (ref 135–145)
TROPONIN I SERPL-MCNC: <0.01 NG/ML (ref 0–0.04)
WBC # BLD AUTO: 19.3 K/UL (ref 4.8–10.8)

## 2017-06-10 PROCEDURE — 99232 SBSQ HOSP IP/OBS MODERATE 35: CPT | Mod: GC | Performed by: INTERNAL MEDICINE

## 2017-06-10 PROCEDURE — 700102 HCHG RX REV CODE 250 W/ 637 OVERRIDE(OP): Performed by: INTERNAL MEDICINE

## 2017-06-10 PROCEDURE — 700102 HCHG RX REV CODE 250 W/ 637 OVERRIDE(OP): Performed by: GENERAL ACUTE CARE HOSPITAL

## 2017-06-10 PROCEDURE — 93005 ELECTROCARDIOGRAM TRACING: CPT | Performed by: GENERAL ACUTE CARE HOSPITAL

## 2017-06-10 PROCEDURE — 700111 HCHG RX REV CODE 636 W/ 250 OVERRIDE (IP): Performed by: INTERNAL MEDICINE

## 2017-06-10 PROCEDURE — 700105 HCHG RX REV CODE 258: Performed by: STUDENT IN AN ORGANIZED HEALTH CARE EDUCATION/TRAINING PROGRAM

## 2017-06-10 PROCEDURE — 83735 ASSAY OF MAGNESIUM: CPT

## 2017-06-10 PROCEDURE — 94760 N-INVAS EAR/PLS OXIMETRY 1: CPT

## 2017-06-10 PROCEDURE — 770020 HCHG ROOM/CARE - TELE (206)

## 2017-06-10 PROCEDURE — 700111 HCHG RX REV CODE 636 W/ 250 OVERRIDE (IP): Performed by: STUDENT IN AN ORGANIZED HEALTH CARE EDUCATION/TRAINING PROGRAM

## 2017-06-10 PROCEDURE — 700102 HCHG RX REV CODE 250 W/ 637 OVERRIDE(OP): Performed by: STUDENT IN AN ORGANIZED HEALTH CARE EDUCATION/TRAINING PROGRAM

## 2017-06-10 PROCEDURE — 80053 COMPREHEN METABOLIC PANEL: CPT

## 2017-06-10 PROCEDURE — 84484 ASSAY OF TROPONIN QUANT: CPT

## 2017-06-10 PROCEDURE — A9270 NON-COVERED ITEM OR SERVICE: HCPCS | Performed by: STUDENT IN AN ORGANIZED HEALTH CARE EDUCATION/TRAINING PROGRAM

## 2017-06-10 PROCEDURE — 94640 AIRWAY INHALATION TREATMENT: CPT

## 2017-06-10 PROCEDURE — 93010 ELECTROCARDIOGRAM REPORT: CPT | Performed by: INTERNAL MEDICINE

## 2017-06-10 PROCEDURE — 700101 HCHG RX REV CODE 250: Performed by: INTERNAL MEDICINE

## 2017-06-10 PROCEDURE — 85025 COMPLETE CBC W/AUTO DIFF WBC: CPT

## 2017-06-10 PROCEDURE — A9270 NON-COVERED ITEM OR SERVICE: HCPCS | Performed by: INTERNAL MEDICINE

## 2017-06-10 PROCEDURE — 700105 HCHG RX REV CODE 258: Performed by: GENERAL ACUTE CARE HOSPITAL

## 2017-06-10 PROCEDURE — 36415 COLL VENOUS BLD VENIPUNCTURE: CPT

## 2017-06-10 PROCEDURE — 82962 GLUCOSE BLOOD TEST: CPT | Mod: 91

## 2017-06-10 PROCEDURE — A9270 NON-COVERED ITEM OR SERVICE: HCPCS | Performed by: GENERAL ACUTE CARE HOSPITAL

## 2017-06-10 PROCEDURE — 700101 HCHG RX REV CODE 250: Performed by: STUDENT IN AN ORGANIZED HEALTH CARE EDUCATION/TRAINING PROGRAM

## 2017-06-10 PROCEDURE — 93922 UPR/L XTREMITY ART 2 LEVELS: CPT

## 2017-06-10 PROCEDURE — 83605 ASSAY OF LACTIC ACID: CPT

## 2017-06-10 RX ORDER — FUROSEMIDE 10 MG/ML
20 INJECTION INTRAMUSCULAR; INTRAVENOUS ONCE
Status: COMPLETED | OUTPATIENT
Start: 2017-06-10 | End: 2017-06-10

## 2017-06-10 RX ORDER — GABAPENTIN 100 MG/1
100 CAPSULE ORAL 2 TIMES DAILY
Status: DISCONTINUED | OUTPATIENT
Start: 2017-06-10 | End: 2017-06-13

## 2017-06-10 RX ORDER — HYDRALAZINE HYDROCHLORIDE 20 MG/ML
20 INJECTION INTRAMUSCULAR; INTRAVENOUS EVERY 6 HOURS PRN
Status: DISCONTINUED | OUTPATIENT
Start: 2017-06-10 | End: 2017-06-24 | Stop reason: HOSPADM

## 2017-06-10 RX ORDER — IPRATROPIUM BROMIDE AND ALBUTEROL SULFATE 2.5; .5 MG/3ML; MG/3ML
3 SOLUTION RESPIRATORY (INHALATION)
Status: DISCONTINUED | OUTPATIENT
Start: 2017-06-11 | End: 2017-06-12

## 2017-06-10 RX ADMIN — LABETALOL HYDROCHLORIDE 10 MG: 5 INJECTION INTRAVENOUS at 15:40

## 2017-06-10 RX ADMIN — LISINOPRIL 5 MG: 5 TABLET ORAL at 08:27

## 2017-06-10 RX ADMIN — ASPIRIN 81 MG: 81 TABLET ORAL at 08:28

## 2017-06-10 RX ADMIN — BUDESONIDE AND FORMOTEROL FUMARATE DIHYDRATE 2 PUFF: 160; 4.5 AEROSOL RESPIRATORY (INHALATION) at 08:33

## 2017-06-10 RX ADMIN — IPRATROPIUM BROMIDE AND ALBUTEROL SULFATE 3 ML: .5; 3 SOLUTION RESPIRATORY (INHALATION) at 22:16

## 2017-06-10 RX ADMIN — BUDESONIDE AND FORMOTEROL FUMARATE DIHYDRATE 2 PUFF: 160; 4.5 AEROSOL RESPIRATORY (INHALATION) at 21:22

## 2017-06-10 RX ADMIN — DOXYCYCLINE 100 MG: 100 TABLET ORAL at 08:28

## 2017-06-10 RX ADMIN — OMEPRAZOLE 20 MG: 20 CAPSULE, DELAYED RELEASE ORAL at 08:28

## 2017-06-10 RX ADMIN — INSULIN LISPRO 2 UNITS: 100 INJECTION, SOLUTION INTRAVENOUS; SUBCUTANEOUS at 18:35

## 2017-06-10 RX ADMIN — ACETAMINOPHEN 650 MG: 325 TABLET, FILM COATED ORAL at 10:42

## 2017-06-10 RX ADMIN — CLOTRIMAZOLE: 10 CREAM TOPICAL at 08:28

## 2017-06-10 RX ADMIN — AMPICILLIN SODIUM AND SULBACTAM SODIUM 3 G: 2; 1 INJECTION, POWDER, FOR SOLUTION INTRAMUSCULAR; INTRAVENOUS at 08:27

## 2017-06-10 RX ADMIN — IPRATROPIUM BROMIDE AND ALBUTEROL SULFATE 3 ML: .5; 3 SOLUTION RESPIRATORY (INHALATION) at 19:23

## 2017-06-10 RX ADMIN — LABETALOL HYDROCHLORIDE 10 MG: 5 INJECTION INTRAVENOUS at 11:40

## 2017-06-10 RX ADMIN — CLOTRIMAZOLE: 10 CREAM TOPICAL at 21:20

## 2017-06-10 RX ADMIN — NICOTINE 21 MG: 21 PATCH, EXTENDED RELEASE TRANSDERMAL at 06:30

## 2017-06-10 RX ADMIN — GABAPENTIN 100 MG: 100 CAPSULE ORAL at 12:28

## 2017-06-10 RX ADMIN — PREDNISONE 40 MG: 20 TABLET ORAL at 08:28

## 2017-06-10 RX ADMIN — INSULIN LISPRO 2 UNITS: 100 INJECTION, SOLUTION INTRAVENOUS; SUBCUTANEOUS at 11:38

## 2017-06-10 RX ADMIN — VANCOMYCIN HYDROCHLORIDE 125 MG: 10 INJECTION, POWDER, LYOPHILIZED, FOR SOLUTION INTRAVENOUS at 21:20

## 2017-06-10 RX ADMIN — Medication 100 MG: at 08:28

## 2017-06-10 RX ADMIN — ATORVASTATIN CALCIUM 20 MG: 20 TABLET, FILM COATED ORAL at 21:19

## 2017-06-10 RX ADMIN — SENNOSIDES AND DOCUSATE SODIUM 2 TABLET: 8.6; 5 TABLET ORAL at 21:20

## 2017-06-10 RX ADMIN — SENNOSIDES AND DOCUSATE SODIUM 2 TABLET: 8.6; 5 TABLET ORAL at 08:33

## 2017-06-10 RX ADMIN — DOXYCYCLINE 100 MG: 100 TABLET ORAL at 21:20

## 2017-06-10 RX ADMIN — SODIUM CHLORIDE: 9 INJECTION, SOLUTION INTRAVENOUS at 04:40

## 2017-06-10 RX ADMIN — FUROSEMIDE 20 MG: 10 INJECTION, SOLUTION INTRAVENOUS at 08:33

## 2017-06-10 RX ADMIN — IPRATROPIUM BROMIDE AND ALBUTEROL SULFATE 3 ML: .5; 3 SOLUTION RESPIRATORY (INHALATION) at 10:26

## 2017-06-10 RX ADMIN — AMPICILLIN SODIUM AND SULBACTAM SODIUM 3 G: 2; 1 INJECTION, POWDER, FOR SOLUTION INTRAMUSCULAR; INTRAVENOUS at 03:59

## 2017-06-10 RX ADMIN — GABAPENTIN 100 MG: 100 CAPSULE ORAL at 21:20

## 2017-06-10 RX ADMIN — VANCOMYCIN HYDROCHLORIDE 125 MG: 10 INJECTION, POWDER, LYOPHILIZED, FOR SOLUTION INTRAVENOUS at 08:28

## 2017-06-10 ASSESSMENT — ENCOUNTER SYMPTOMS
VOMITING: 0
WEAKNESS: 0
FEVER: 0
WHEEZING: 0
DIZZINESS: 0
SHORTNESS OF BREATH: 1
HEADACHES: 0
COUGH: 1
CHILLS: 1
DIARRHEA: 0
SPUTUM PRODUCTION: 0
MYALGIAS: 0
NAUSEA: 0
DEPRESSION: 0

## 2017-06-10 ASSESSMENT — PAIN SCALES - GENERAL
PAINLEVEL_OUTOF10: 10
PAINLEVEL_OUTOF10: 0
PAINLEVEL_OUTOF10: 10

## 2017-06-10 NOTE — PROGRESS NOTES
Patient now having up to 3.3 sec pauses. Intermittently bradycardic to 40s. Coreg has been stopped. Cardiology called.

## 2017-06-10 NOTE — PROGRESS NOTES
Diabetes education: Met with patient this afternoon. Please see consult note.  Plan: CDE will continue to follow . Please call [phone] if needs change.

## 2017-06-10 NOTE — PROGRESS NOTES
Bedside report completed. Pt sitting up in bed talking on phone. Bed in low locked position, call light in reach, bed alarm on.

## 2017-06-10 NOTE — PROGRESS NOTES
Paged [facility] regarding pt bp, unable to be controlled using prn labetalol 10mg q4h, bp 176/97, awaiting call back.

## 2017-06-10 NOTE — CONSULTS
Cardiology Consult Note:    Luis Antonio Gupta  Date & Time note created:    6/9/2017   10:23 PM     Referring MD:  Dr. Guzman    Patient ID:   Name:             Maxi Esposito     YOB: 1950  Age:                 67 y.o.  male   MRN:               [id number]                                                             Reason for Consult:      Pauses on tele    History of Present Illness:    66 y/o M with COPD, obesity and T2DM.  He was admitted for SOB and B/L leg swelling which has been chronic.  He says he currently lives in a shelter because he was careless with his money.  He is also asking for a letter that we would write that says he cannot get kicked out of a casino because he keeps falling alseep.    He says he ran out of medications recently and this caused him to have progression of his underlying issues.  Today while sleeping during the days he says he was told numerous times to wake up because he was having pauses multiple times.  He does endorse snoring but has never been evaluated for VICENTE.      Review of Systems:      Constitutional: Denies fevers, Denies weight changes  Eyes: Denies changes in vision, no eye pain  Ears/Nose/Throat/Mouth: Denies nasal congestion or sore throat   Cardiovascular: no chest pain, no palpitations   Respiratory: no shortness of breath , Denies cough  Gastrointestinal/Hepatic: Denies abdominal pain, nausea, vomiting, diarrhea, constipation or GI bleeding   Genitourinary: Denies dysuria or frequency  Musculoskeletal/Rheum: Denies  joint pain and swelling, no edema  Skin: Denies rash  Neurological: Denies headache, confusion, memory loss or focal weakness/parasthesias  Psychiatric: denies mood disorder   Endocrine: Yana thyroid problems  Heme/Oncology/Lymph Nodes: Denies enlarged lymph nodes, denies brusing or known bleeding disorder  All other systems were reviewed and are negative (AMA/CMS criteria)                Past Medical History:   Past Medical  History   Diagnosis Date   • Hypertension    • Diabetes (CMS-HCC)    • Sepsis (CMS-HCC)    • Pneumonia    • Chronic obstructive pulmonary disease (CMS-HCC)    • Medical non-compliance    • Congestive [place]      Active Hospital Problems    Diagnosis   • COPD exacerbation (CMS-HCC) [J44.1]     Priority: High   • Bilateral cellulitis of lower leg [L03.116, L03.115]       Past Surgical History:  Past Surgical History   Procedure Laterality Date   • Splenectomy  1980       Hospital Medications:  Current Facility-Administered Medications   Medication Dose   • predniSONE (DELTASONE) tablet 40 mg  40 mg   • clotrimazole (LOTRIMIN) 1 % cream     • atorvastatin (LIPITOR) tablet 20 mg  20 mg   • aspirin EC (ECOTRIN) tablet 81 mg  81 mg   • omeprazole (PRILOSEC) capsule 20 mg  20 mg   • aripiprazole (ABILIFY) tablet 5 mg  5 mg   • lisinopril (PRINIVIL) 10 MG tablet 5 mg  5 mg   • budesonide-formoterol (SYMBICORT) 160-4.5 MCG/ACT inhaler 2 Puff  2 Puff   • doxycycline monohydrate (ADOXA) tablet 100 mg  100 mg   • fluticasone (FLONASE) nasal spray 100 mcg  2 Spray   • guaifenesin DM sugar free (DIABETIC TUSSIN DM)  MG/5ML soln 10 mL  10 mL   • albuterol (PROVENTIL) 2.5mg/0.5ml nebulizer solution 2.5 mg  2.5 mg   • vancomycin 50 mg/mL oral soln 125 mg  125 mg   • thiamine (THIAMINE) tablet 100 mg  100 mg   • ipratropium-albuterol (DUONEB) nebulizer solution 3 mL  3 mL   • ampicillin/sulbactam (UNASYN) 3 g in  mL IVPB  3 g   • senna-docusate (PERICOLACE or SENOKOT S) 8.6-50 MG per tablet 2 Tab  2 Tab    And   • polyethylene glycol/lytes (MIRALAX) PACKET 1 Packet  1 Packet    And   • magnesium hydroxide (MILK OF MAGNESIA) suspension 30 mL  30 mL    And   • bisacodyl (DULCOLAX) suppository 10 mg  10 mg   • Respiratory Care per Protocol     • NS infusion     • enoxaparin (LOVENOX) inj 40 mg  40 mg   • labetalol (NORMODYNE,TRANDATE) injection 10 mg  10 mg   • nicotine (NICODERM) 21 MG/24HR 21 mg  21 mg     And   • nicotine polacrilex (NICORETTE) 2 MG piece 2 mg  2 mg   • acetaminophen (TYLENOL) tablet 650 mg  650 mg   • insulin lispro (HUMALOG) injection 1-6 Units  1-6 Units   • glucose 4 g chewable tablet 16 g  16 g    And   • dextrose 50% (D50W) injection 25 mL  25 mL         Current Outpatient Medications:  Prescriptions prior to admission   Medication Sig Dispense Refill Last Dose   • amoxicillin-clavulanate (AUGMENTIN) 875-125 MG Tab Take 1 Tab by mouth 2 times a day.   2.5 wks at unknown   • erythromycin 5 MG/GM Ointment Place 1 Drop in both eyes every 6 hours. 1 Tube 0 2.5 wks at unknown   • aripiprazole (ABILIFY) 5 MG tablet Take 5 mg by mouth every bedtime.   2.5 wks at unknown   • Tiotropium Bromide-Olodaterol (STIOLTO RESPIMAT) 2.5-2.5 MCG/ACT Aero Soln Inhale 2 Puffs by mouth every day.   2.5 wks at unknown   • linagliptin (TRADJENTA) 5 MG Tab tablet Take 5 mg by mouth every day.   2.5 wks at unknown   • albuterol 108 (90 BASE) MCG/ACT Aero Soln inhalation aerosol Inhale 2 Puffs by mouth every 6 hours as needed for Shortness of Breath.   2.5 wks at unknown   • lisinopril (PRINIVIL, ZESTRIL) 30 MG tablet Take 30 mg by mouth every day.   2.5 wks at unknown   • furosemide (LASIX) 20 MG Tab Take 20 mg by mouth every day.   2.5 wks at unknown   • gabapentin (NEURONTIN) 600 MG tablet Take 600 mg by mouth 3 times a day.   2.5 wks at unknown   • glipiZIDE SR (GLUCOTROL) 2.5 MG TABLET SR 24 HR Take 2.5 mg by mouth every morning.   2.5 wks at unknown   • omeprazole (PRILOSEC) 20 MG delayed-release capsule Take 20 mg by mouth every day.   2.5 wks at unknown   • carvedilol (COREG) 6.25 MG Tab Take 1 Tab by mouth 2 times a day, with meals. 60 Tab 3 2.5 wks at unknown   • atorvastatin (LIPITOR) 20 MG Tab Take 1 Tab by mouth every bedtime. 30 Tab 11 2.5 wks at unknown   • aspirin EC (ECOTRIN) 81 MG Tablet Delayed Response Take 81 mg by mouth every day.   2.5 wks at unknown       Medication Allergy:  Allergies   Allergen  "Reactions   • Shellfish Allergy Shortness of Breath and Swelling     \"Eyes closed up really tight and it was hard to breath\"       Family History:  No family history on file.    Social History:  Social History     Social History   • Marital Status: Single     Spouse Name: N/A   • Number of Children: N/A   • Years of Education: N/A     Occupational History   • Not on file.     Social History Main Topics   • Smoking status: Former Smoker     Quit date: 07/16/2016   • Smokeless tobacco: Never Used   • Alcohol Use: No   • Drug Use: No   • Sexual Activity: Not on file     Other Topics Concern   • Not on file     Social History Narrative         Physical Exam:  Vitals/ General Appearance:   Weight/BMI: Body mass index is 41.7 kg/(m^2).  Blood pressure 139/56, pulse 75, temperature 36.6 °C (97.8 °F), resp. rate 18, height 1.702 m (5' 7.01\"), weight 120.8 kg (266 lb 5.1 oz), SpO2 95 %.  Filed Vitals:    06/09/17 1500 06/09/17 1522 06/09/17 2000 06/09/17 2058   BP:  151/82 139/56    Pulse:  98 75 75   Temp:  36.4 °C (97.5 °F) 36.6 °C (97.8 °F)    Resp:  18 18 18   Height:       Weight:   120.8 kg (266 lb 5.1 oz)    SpO2: 98% 95% 93% 95%     Oxygen Therapy:  Pulse Oximetry: 95 %, O2 (LPM): 1, O2 Delivery: None (Room Air)    Constitutional:   Well developed, Well nourished, No acute distress  HENMT:  Normocephalic, Atraumatic, Oropharynx moist mucous membranes, No oral exudates, Nose normal.  No thyromegaly.  Eyes:  EOMI, Conjunctiva normal, No discharge.  Neck:  Normal range of motion, No cervical tenderness,  no JVD.  Cardiovascular:  Normal heart rate, Normal rhythm, No murmurs, No rubs, No gallops.   Extremitites with intact distal pulses, no cyanosis, or edema.  Lungs:  Normal breath sounds, breath sounds clear to auscultation bilaterally,  no crackles, no wheezing.   Abdomen: Bowel sounds normal, Soft, No tenderness, No guarding, No rebound, No masses, No hepatosplenomegaly.  Skin: Warm, Dry, No erythema, No rash, no " induration.  Neurologic: Alert & oriented x 3, No focal deficits noted, cranial nerves II through X are grossly intact.  Psychiatric: Affect normal, Judgment normal, Mood normal.      MDM (Data Review):     Records reviewed and summarized in current documentation    Lab Data Review:  Recent Results (from the past 24 hour(s))   EKG    Collection Time: 17  5:28 AM   Result Value Ref Range    Report       [facility]    Test Date:  2017  Pt Name:    KATARINA BANKS             Department: Inpatient  MRN:        [id number]                      Room:       T714  Gender:     M                            Technician: iosil EnergyPALMIRA  :        1950                   Requested By:MD  Order #:    [id number]                    Reading MD: Alli Moseley MD    Measurements  Intervals                                Axis  Rate:       64                           P:          0  NE:         115                          QRS:        77  QRSD:       88                           T:          4  QT:         396  QTc:        409    Interpretive Statements  SINUS RHYTHM  ATRIAL PREMATURE COMPLEX  BORDERLINE SHORT NE INTERVAL  BASELINE WANDER IN LEAD(S) V4,V5  Compared to ECG 2017 13:43:09  No significant changes    Electronically Signed On 2017 8:17:56 PDT by Alli Moseley MD     EKG    Collection Time: 17  5:28 AM   Result Value Ref Range    Report       [facility]    Test Date:  2017  Pt Name:    KATARINA [facility]             Department: 171  MRN:        [id number]                      Room:       T714  Gender:     M                            Technician: Motobuykers  :        1950                   Requested By:BRIANNE SOLIZ  Order #:    [id number]                    Reading MD:    Measurements  Intervals                                Axis  Rate:       64                           P:          0  NE:         115                          QRS:        77  QRSD:       88                           T:           4  QT:         396  QTc:        409    Interpretive Statements  SINUS RHYTHM  ATRIAL PREMATURE COMPLEX  BORDERLINE SHORT DC INTERVAL  BASELINE WANDER IN LEAD(S) V4,V5  Compared to ECG 06/08/2017 13:43:09  No significant changes     ACCU-CHEK GLUCOSE    Collection Time: 06/09/17  5:40 AM   Result Value Ref Range    Glucose - Accu-Ck 140 (H) 65 - 99 mg/dL   Comp Metabolic Panel (CMP)    Collection Time: 06/09/17  8:43 AM   Result Value Ref Range    Sodium 138 135 - 145 mmol/L    Potassium 4.6 3.6 - 5.5 mmol/L    Chloride 104 96 - 112 mmol/L    Co2 25 20 - 33 mmol/L    Anion Gap 9.0 0.0 - 11.9    Glucose 133 (H) 65 - 99 mg/dL    Bun 8 8 - 22 mg/dL    Creatinine 0.62 0.50 - 1.40 mg/dL    Calcium 8.6 8.5 - 10.5 mg/dL    AST(SGOT) 11 (L) 12 - 45 U/L    ALT(SGPT) 6 2 - 50 U/L    Alkaline Phosphatase 70 30 - 99 U/L    Total Bilirubin 0.7 0.1 - 1.5 mg/dL    Albumin 3.3 3.2 - 4.9 g/dL    Total Protein 6.2 6.0 - 8.2 g/dL    Globulin 2.9 1.9 - 3.5 g/dL    A-G Ratio 1.1 g/dL   Lipid Profile (Lipid Panel) Fasting    Collection Time: 06/09/17  8:43 AM   Result Value Ref Range    Cholesterol,Tot 136 100 - 199 mg/dL    Triglycerides 137 0 - 149 mg/dL    HDL 32 (A) >=40 mg/dL    LDL 77 <100 mg/dL   ESTIMATED GFR    Collection Time: 06/09/17  8:43 AM   Result Value Ref Range    GFR If African American >60 >60 mL/min/1.73 m 2    GFR If Non African American >60 >60 mL/min/1.73 m 2   LACTIC ACID    Collection Time: 06/09/17  8:45 AM   Result Value Ref Range    Lactic Acid 2.5 (H) 0.5 - 2.0 mmol/L   WESTERGREN SED RATE    Collection Time: 06/09/17  9:59 AM   Result Value Ref Range    Sed Rate Westergren 16 0 - 20 mm/hour   D-DIMER    Collection Time: 06/09/17  9:59 AM   Result Value Ref Range    D-Dimer Screen 332 (H) <250 ng/mL(D-DU)   CRP QUANTITIVE (NON-CARDIAC)    Collection Time: 06/09/17 10:00 AM   Result Value Ref Range    Stat C-Reactive Protein 5.49 (H) 0.00 - 0.75 mg/dL   PROCALCITONIN    Collection Time: 06/09/17 10:00 AM    Result Value Ref Range    Procalcitonin <0.05 <0.25 ng/mL   ACCU-CHEK GLUCOSE    Collection Time: 06/09/17 11:35 AM   Result Value Ref Range    Glucose - Accu-Ck 166 (H) 65 - 99 mg/dL   LACTIC ACID    Collection Time: 06/09/17  5:47 PM   Result Value Ref Range    Lactic Acid 3.9 (H) 0.5 - 2.0 mmol/L       Imaging/Procedures Review:    Chest Xray:  Reviewed    EKG:   As in HPI.     ECHO:  N/A    MDM (Assessment and Plan):     Active Hospital Problems    Diagnosis   • COPD exacerbation (CMS-[facility]) [J44.1]     Priority: High   • Bilateral cellulitis of lower leg [L03.116, L03.115]     68 y/o M with asymptomatic pauses while sleeping, likely from undiagnosed VICENTE.  I recommended that we get an overnight sleep study to diagnose him with VICENTE.  Whether or not this can be treated is not known given his social issues.  I also recommend to not write a letter to the casino saying that his falling asleep is medical and that he should be able to stay in the casino as I think this can be mitigated by treating VICENTE.    Thank for you allowing me to take part in your patient's care, please call should you have any questions or would like to discuss this patient.

## 2017-06-10 NOTE — CONSULTS
"Diabetes education: Met with Maxi regarding diabetes management. Pt has a hx of diabetes and is known from previous admit. Pt currently resides in a shelter, eats at fast food, or what is given at the shelter. He currently does not take and medications, but was on Metformin but stopped/did not take as he was told by friends of \"the danger especially for his kidneys with kidney failure if taken\". Explained the dangers of blood sugars out of control without metformin include heart disease, kidney failure and infections.  His current Hg A1 c was 6.5%.  Pt is currently on Humalog sliding scale coverage. Current blood sugars are 140, 166 (1 unit). He was given his first dose of 40 mg Prednisone today at 0900.  Plan: CDE will continue to follow . Please call [phone] if needs change.  "

## 2017-06-10 NOTE — PROGRESS NOTES
"Pt had 4.9 sec pause, followed by return to NSR. Contacted on call physician for [facility] (Dr Phillips) and was given order for nocturnal CPAP. Contacted RT. Pt remains asymptomatic.     Addendum: Notified by RT that pt is refusing CPAP. Pt states that he may be willing to try it \"in a couple hours\". Educated pt regarding purpose of mask, pt continues to refuse.  "

## 2017-06-10 NOTE — CARE PLAN
Problem: Pain Management  Goal: Pain level will decrease to patient’s comfort goal  Outcome: PROGRESSING AS EXPECTED  Pt resting comfortably after Tylenol dose.    Problem: Infection  Goal: Will remain free from infection  Outcome: PROGRESSING AS EXPECTED  Pt remains on multiple ABX, as well as anti-fungal cream.

## 2017-06-10 NOTE — PROGRESS NOTES
rec'd call back from  from [facility], updated him on pts bp, said he would take a look and increase the labetelol doseage from 10 to 20mg Q4 prn. Pt also continues to drop heart rate during sleep with ayah episodes, (mid 50s-60s) does return back to 80-90s nsr, unable to place pt on cpap because there is no baseline settings as he has not had a sleep study yet. Informed MD if he wants bipap at night he would have to move to ICU. No further orders at this time.    I discontinued labetolol due to sinus pauses and bradycardia concern. Hydralazine ordered

## 2017-06-11 ENCOUNTER — APPOINTMENT (OUTPATIENT)
Dept: RADIOLOGY | Facility: MEDICAL CENTER | Age: 67
DRG: 191 | End: 2017-06-11
Attending: GENERAL ACUTE CARE HOSPITAL
Payer: MEDICARE

## 2017-06-11 ENCOUNTER — APPOINTMENT (OUTPATIENT)
Dept: RADIOLOGY | Facility: MEDICAL CENTER | Age: 67
DRG: 191 | End: 2017-06-11
Attending: INTERNAL MEDICINE
Payer: MEDICARE

## 2017-06-11 LAB
ALBUMIN SERPL BCP-MCNC: 3.3 G/DL (ref 3.2–4.9)
ALBUMIN/GLOB SERPL: 1.2 G/DL
ALP SERPL-CCNC: 70 U/L (ref 30–99)
ALT SERPL-CCNC: 18 U/L (ref 2–50)
ANION GAP SERPL CALC-SCNC: 8 MMOL/L (ref 0–11.9)
AST SERPL-CCNC: 18 U/L (ref 12–45)
BASOPHILS # BLD AUTO: 0.3 % (ref 0–1.8)
BASOPHILS # BLD: 0.06 K/UL (ref 0–0.12)
BILIRUB SERPL-MCNC: 0.4 MG/DL (ref 0.1–1.5)
BUN SERPL-MCNC: 12 MG/DL (ref 8–22)
CALCIUM SERPL-MCNC: 8.2 MG/DL (ref 8.5–10.5)
CHLORIDE SERPL-SCNC: 103 MMOL/L (ref 96–112)
CO2 SERPL-SCNC: 25 MMOL/L (ref 20–33)
CREAT SERPL-MCNC: 0.64 MG/DL (ref 0.5–1.4)
EOSINOPHIL # BLD AUTO: 0.21 K/UL (ref 0–0.51)
EOSINOPHIL NFR BLD: 1.2 % (ref 0–6.9)
ERYTHROCYTE [DISTWIDTH] IN BLOOD BY AUTOMATED COUNT: 53.4 FL (ref 35.9–50)
GFR SERPL CREATININE-BSD FRML MDRD: >60 ML/MIN/1.73 M 2
GLOBULIN SER CALC-MCNC: 2.7 G/DL (ref 1.9–3.5)
GLUCOSE BLD-MCNC: 110 MG/DL (ref 65–99)
GLUCOSE BLD-MCNC: 177 MG/DL (ref 65–99)
GLUCOSE BLD-MCNC: 207 MG/DL (ref 65–99)
GLUCOSE BLD-MCNC: 238 MG/DL (ref 65–99)
GLUCOSE SERPL-MCNC: 142 MG/DL (ref 65–99)
HCT VFR BLD AUTO: 42.6 % (ref 42–52)
HGB BLD-MCNC: 13.6 G/DL (ref 14–18)
IMM GRANULOCYTES # BLD AUTO: 0.09 K/UL (ref 0–0.11)
IMM GRANULOCYTES NFR BLD AUTO: 0.5 % (ref 0–0.9)
LYMPHOCYTES # BLD AUTO: 3.7 K/UL (ref 1–4.8)
LYMPHOCYTES NFR BLD: 20.3 % (ref 22–41)
MAGNESIUM SERPL-MCNC: 1.8 MG/DL (ref 1.5–2.5)
MCH RBC QN AUTO: 29.1 PG (ref 27–33)
MCHC RBC AUTO-ENTMCNC: 31.9 G/DL (ref 33.7–35.3)
MCV RBC AUTO: 91.2 FL (ref 81.4–97.8)
MONOCYTES # BLD AUTO: 1.93 K/UL (ref 0–0.85)
MONOCYTES NFR BLD AUTO: 10.6 % (ref 0–13.4)
NEUTROPHILS # BLD AUTO: 12.26 K/UL (ref 1.82–7.42)
NEUTROPHILS NFR BLD: 67.1 % (ref 44–72)
NRBC # BLD AUTO: 0 K/UL
NRBC BLD AUTO-RTO: 0 /100 WBC
PLATELET # BLD AUTO: 317 K/UL (ref 164–446)
PMV BLD AUTO: 11.2 FL (ref 9–12.9)
POTASSIUM SERPL-SCNC: 4 MMOL/L (ref 3.6–5.5)
PROT SERPL-MCNC: 6 G/DL (ref 6–8.2)
RBC # BLD AUTO: 4.67 M/UL (ref 4.7–6.1)
SODIUM SERPL-SCNC: 136 MMOL/L (ref 135–145)
WBC # BLD AUTO: 18.3 K/UL (ref 4.8–10.8)

## 2017-06-11 PROCEDURE — 94760 N-INVAS EAR/PLS OXIMETRY 1: CPT

## 2017-06-11 PROCEDURE — 700105 HCHG RX REV CODE 258

## 2017-06-11 PROCEDURE — 94640 AIRWAY INHALATION TREATMENT: CPT

## 2017-06-11 PROCEDURE — 700102 HCHG RX REV CODE 250 W/ 637 OVERRIDE(OP): Performed by: INTERNAL MEDICINE

## 2017-06-11 PROCEDURE — 99232 SBSQ HOSP IP/OBS MODERATE 35: CPT | Mod: GC | Performed by: INTERNAL MEDICINE

## 2017-06-11 PROCEDURE — 700102 HCHG RX REV CODE 250 W/ 637 OVERRIDE(OP): Performed by: STUDENT IN AN ORGANIZED HEALTH CARE EDUCATION/TRAINING PROGRAM

## 2017-06-11 PROCEDURE — 770020 HCHG ROOM/CARE - TELE (206)

## 2017-06-11 PROCEDURE — 36415 COLL VENOUS BLD VENIPUNCTURE: CPT

## 2017-06-11 PROCEDURE — 71250 CT THORAX DX C-: CPT

## 2017-06-11 PROCEDURE — 94762 N-INVAS EAR/PLS OXIMTRY CONT: CPT

## 2017-06-11 PROCEDURE — 94660 CPAP INITIATION&MGMT: CPT

## 2017-06-11 PROCEDURE — A9270 NON-COVERED ITEM OR SERVICE: HCPCS | Performed by: INTERNAL MEDICINE

## 2017-06-11 PROCEDURE — 80053 COMPREHEN METABOLIC PANEL: CPT

## 2017-06-11 PROCEDURE — A9270 NON-COVERED ITEM OR SERVICE: HCPCS | Performed by: GENERAL ACUTE CARE HOSPITAL

## 2017-06-11 PROCEDURE — 700111 HCHG RX REV CODE 636 W/ 250 OVERRIDE (IP): Performed by: INTERNAL MEDICINE

## 2017-06-11 PROCEDURE — 82962 GLUCOSE BLOOD TEST: CPT | Mod: 91

## 2017-06-11 PROCEDURE — 700101 HCHG RX REV CODE 250: Performed by: INTERNAL MEDICINE

## 2017-06-11 PROCEDURE — 85025 COMPLETE CBC W/AUTO DIFF WBC: CPT

## 2017-06-11 PROCEDURE — A9270 NON-COVERED ITEM OR SERVICE: HCPCS | Performed by: STUDENT IN AN ORGANIZED HEALTH CARE EDUCATION/TRAINING PROGRAM

## 2017-06-11 PROCEDURE — 700102 HCHG RX REV CODE 250 W/ 637 OVERRIDE(OP): Performed by: GENERAL ACUTE CARE HOSPITAL

## 2017-06-11 PROCEDURE — 83735 ASSAY OF MAGNESIUM: CPT

## 2017-06-11 PROCEDURE — 71020 DX-CHEST-2 VIEWS: CPT

## 2017-06-11 RX ORDER — DOXYCYCLINE 100 MG/1
100 TABLET ORAL EVERY 12 HOURS
Status: DISCONTINUED | OUTPATIENT
Start: 2017-06-11 | End: 2017-06-13

## 2017-06-11 RX ORDER — SODIUM CHLORIDE 9 MG/ML
INJECTION, SOLUTION INTRAVENOUS
Status: COMPLETED
Start: 2017-06-11 | End: 2017-06-11

## 2017-06-11 RX ORDER — PREDNISONE 20 MG/1
60 TABLET ORAL DAILY
Status: DISCONTINUED | OUTPATIENT
Start: 2017-06-12 | End: 2017-06-12

## 2017-06-11 RX ORDER — FUROSEMIDE 10 MG/ML
40 INJECTION INTRAMUSCULAR; INTRAVENOUS
Status: DISCONTINUED | OUTPATIENT
Start: 2017-06-11 | End: 2017-06-12

## 2017-06-11 RX ORDER — MAGNESIUM SULFATE HEPTAHYDRATE 40 MG/ML
2 INJECTION, SOLUTION INTRAVENOUS ONCE
Status: COMPLETED | OUTPATIENT
Start: 2017-06-11 | End: 2017-06-11

## 2017-06-11 RX ADMIN — PREDNISONE 40 MG: 20 TABLET ORAL at 09:36

## 2017-06-11 RX ADMIN — BUDESONIDE AND FORMOTEROL FUMARATE DIHYDRATE 2 PUFF: 160; 4.5 AEROSOL RESPIRATORY (INHALATION) at 18:26

## 2017-06-11 RX ADMIN — FLUTICASONE PROPIONATE 100 MCG: 50 SPRAY, METERED NASAL at 12:45

## 2017-06-11 RX ADMIN — FUROSEMIDE 40 MG: 10 INJECTION, SOLUTION INTRAMUSCULAR; INTRAVENOUS at 09:38

## 2017-06-11 RX ADMIN — CLOTRIMAZOLE: 10 CREAM TOPICAL at 09:38

## 2017-06-11 RX ADMIN — IPRATROPIUM BROMIDE AND ALBUTEROL SULFATE 3 ML: .5; 3 SOLUTION RESPIRATORY (INHALATION) at 16:16

## 2017-06-11 RX ADMIN — IPRATROPIUM BROMIDE AND ALBUTEROL SULFATE 3 ML: .5; 3 SOLUTION RESPIRATORY (INHALATION) at 12:04

## 2017-06-11 RX ADMIN — ACETAMINOPHEN 650 MG: 325 TABLET, FILM COATED ORAL at 05:26

## 2017-06-11 RX ADMIN — ASPIRIN 81 MG: 81 TABLET ORAL at 09:35

## 2017-06-11 RX ADMIN — MAGNESIUM SULFATE IN WATER 2 G: 40 INJECTION, SOLUTION INTRAVENOUS at 09:38

## 2017-06-11 RX ADMIN — DOXYCYCLINE 100 MG: 100 TABLET ORAL at 20:27

## 2017-06-11 RX ADMIN — SENNOSIDES AND DOCUSATE SODIUM 2 TABLET: 8.6; 5 TABLET ORAL at 20:27

## 2017-06-11 RX ADMIN — INSULIN LISPRO 2 UNITS: 100 INJECTION, SOLUTION INTRAVENOUS; SUBCUTANEOUS at 20:33

## 2017-06-11 RX ADMIN — GABAPENTIN 100 MG: 100 CAPSULE ORAL at 09:36

## 2017-06-11 RX ADMIN — SENNOSIDES AND DOCUSATE SODIUM 2 TABLET: 8.6; 5 TABLET ORAL at 09:37

## 2017-06-11 RX ADMIN — INSULIN LISPRO 2 UNITS: 100 INJECTION, SOLUTION INTRAVENOUS; SUBCUTANEOUS at 17:46

## 2017-06-11 RX ADMIN — VANCOMYCIN HYDROCHLORIDE 125 MG: 10 INJECTION, POWDER, LYOPHILIZED, FOR SOLUTION INTRAVENOUS at 20:28

## 2017-06-11 RX ADMIN — OMEPRAZOLE 20 MG: 20 CAPSULE, DELAYED RELEASE ORAL at 09:37

## 2017-06-11 RX ADMIN — CLOTRIMAZOLE: 10 CREAM TOPICAL at 20:36

## 2017-06-11 RX ADMIN — Medication 100 MG: at 09:37

## 2017-06-11 RX ADMIN — ATORVASTATIN CALCIUM 20 MG: 20 TABLET, FILM COATED ORAL at 20:27

## 2017-06-11 RX ADMIN — DOXYCYCLINE 100 MG: 100 TABLET ORAL at 09:35

## 2017-06-11 RX ADMIN — LISINOPRIL 5 MG: 5 TABLET ORAL at 09:36

## 2017-06-11 RX ADMIN — IPRATROPIUM BROMIDE AND ALBUTEROL SULFATE 3 ML: .5; 3 SOLUTION RESPIRATORY (INHALATION) at 07:43

## 2017-06-11 RX ADMIN — VANCOMYCIN HYDROCHLORIDE 125 MG: 10 INJECTION, POWDER, LYOPHILIZED, FOR SOLUTION INTRAVENOUS at 09:37

## 2017-06-11 RX ADMIN — SODIUM CHLORIDE: 9 INJECTION, SOLUTION INTRAVENOUS at 09:45

## 2017-06-11 RX ADMIN — NICOTINE 21 MG: 21 PATCH, EXTENDED RELEASE TRANSDERMAL at 05:20

## 2017-06-11 RX ADMIN — GABAPENTIN 100 MG: 100 CAPSULE ORAL at 20:27

## 2017-06-11 RX ADMIN — BUDESONIDE AND FORMOTEROL FUMARATE DIHYDRATE 2 PUFF: 160; 4.5 AEROSOL RESPIRATORY (INHALATION) at 07:43

## 2017-06-11 RX ADMIN — INSULIN LISPRO 1 UNITS: 100 INJECTION, SOLUTION INTRAVENOUS; SUBCUTANEOUS at 12:48

## 2017-06-11 ASSESSMENT — ENCOUNTER SYMPTOMS
TINGLING: 0
DIZZINESS: 0
DIARRHEA: 0
FALLS: 0
DEPRESSION: 0
HEADACHES: 0
WEAKNESS: 0
NAUSEA: 0
SHORTNESS OF BREATH: 1
COUGH: 1
FEVER: 0
DIAPHORESIS: 0
BLURRED VISION: 0
DOUBLE VISION: 0
NECK PAIN: 0
CHILLS: 1
PHOTOPHOBIA: 0
WHEEZING: 0
BRUISES/BLEEDS EASILY: 0
TREMORS: 0
VOMITING: 0
MYALGIAS: 0
SPUTUM PRODUCTION: 0

## 2017-06-11 ASSESSMENT — PAIN SCALES - GENERAL
PAINLEVEL_OUTOF10: 0
PAINLEVEL_OUTOF10: 4

## 2017-06-11 ASSESSMENT — LIFESTYLE VARIABLES: SUBSTANCE_ABUSE: 0

## 2017-06-11 NOTE — PROGRESS NOTES
Pt sleeping at this time. Has been requesting frequent snacks, is adamantly refusing bed alarm, as well as finger sticks and some medications despite education. Bed in low locked position, treaded socks on, frequent nursing rounds.

## 2017-06-11 NOTE — PROGRESS NOTES
[facility] Internal Medicine Interval Note    Name Maxi Esposito       1950   Age/Sex 67 y.o. male   MRN [id number]   Code Status Full     After 5PM or if no immediate response to page, please call for cross-coverage  Attending/Team: Dr. Lloyd Call [phone] to page   1st Call - Day Intern (R1):   Dr. Puneet Womack 2nd Call - Day Sr. Resident (R2/R3):   Dr. Carranza         Chief complaint/ reason for interval visit (Primary Diagnosis)   COPD exacerbation  Cellulitis    Interval Problem Daily Status Update      The patient developed frequent heart pauses up to 4.9 sec, asymptomatic   Cardiology consulted    Review of Systems   Constitutional: Positive for chills. Negative for fever.   Respiratory: Positive for cough and shortness of breath. Negative for sputum production and wheezing.    Cardiovascular: Positive for leg swelling. Negative for chest pain.   Gastrointestinal: Negative for nausea, vomiting and diarrhea.   Genitourinary: Negative for dysuria.   Musculoskeletal: Negative for myalgias.   Neurological: Negative for dizziness, weakness and headaches.   Psychiatric/Behavioral: Negative for depression.       Consultants/Specialty  None    Disposition  Inpatient for COPD exacerbation    Quality Measures  Labs reviewed, Medications reviewed and EKG reviewed  Arshad catheter: No Arshad      DVT Prophylaxis: Enoxaparin (Lovenox)                Physical Exam       Filed Vitals:    06/10/17 1230 06/10/17 1300 06/10/17 1700 06/10/17 1925   BP: 158/104 176/97 150/90    Pulse:  88 76 76   Temp:  36.8 °C (98.3 °F) 36.8 °C (98.2 °F)    Resp:  24 22 18   Height:       Weight:       SpO2:  94% 91% 92%     Body mass index is 41.7 kg/(m^2).    Oxygen Therapy:  Pulse Oximetry: 92 %, O2 (LPM): 2, O2 Delivery: Silicone Nasal Cannula    Physical Exam  Physical Exam   Constitutional: He is oriented to person, place, and time.   Obese  Cardiovascular: Normal rate and regular rhythm.    Pulmonary/Chest: Effort normal. He has  wheezes. He has no rales. He exhibits no tenderness.   Distant lung sounds    Abdominal: Soft. There is no tenderness. There is no guarding.   Abdomen obese   Musculoskeletal: He exhibits edema and tenderness.   Erythema both lower extremities with tenderness and pitting edema   Neurological: He is alert and oriented to person, place, and time. No cranial nerve deficit. GCS score is 15.   Skin: Skin is warm. Rash noted. There is erythema.       Lab Data Review:      6/9/2017  2:32 PM    Recent Labs      06/08/17   1939  06/09/17   0843  06/10/17   0617   SODIUM  138  138  139   POTASSIUM  4.2  4.6  3.8   CHLORIDE  103  104  105   CO2  25  25  27   BUN  7*  8  10   CREATININE  0.64  0.62  0.62   MAGNESIUM  1.7   --   2.0   CALCIUM  9.1  8.6  8.6       Recent Labs      06/08/17   1939  06/09/17   0843  06/10/17   0617   ALTSGPT  7  6  17   ASTSGOT  10*  11*  20   ALKPHOSPHAT  70  70  68   TBILIRUBIN  0.4  0.7  0.5   LIPASE  24   --    --    GLUCOSE  98  133*  126*       Recent Labs      06/08/17   1939  06/10/17   0617   RBC  5.11  4.95   HEMOGLOBIN  15.0  14.5   HEMATOCRIT  46.0  44.7   PLATELETCT  380  327   PROTHROMBTM  13.8   --    APTT  32.0   --    INR  1.03   --        Recent Labs      06/08/17   1939  06/09/17   0843  06/10/17   0617   WBC  14.1*   --   19.3*   NEUTSPOLYS  62.10   --   67.90   LYMPHOCYTES  21.30*   --   18.80*   MONOCYTES  10.30   --   11.10   EOSINOPHILS  5.40   --   1.50   BASOPHILS  0.60   --   0.50   ASTSGOT  10*  11*  20   ALTSGPT  7  6  17   ALKPHOSPHAT  70  70  68   TBILIRUBIN  0.4  0.7  0.5           Assessment/Plan       BILATERAL LOWER EXTREMITY EDEMA: CELLULITIS WITH STASIS DERMATITIS  ---------------------------------------------------------------------------------------------------  Pt with hx of chr venous statsis of both LEs  Subjective feeling of chills    Pulse 82 , /81 mmHg Temp Normal, RR 16/min, BMI 42, SpO2 95% (Room Air)  WBC 14K, Lactate elevated SIRS1/4  Severe  erythema and tenderness of both lower extremities with chr venous stasis derm changes    D-D elevated but DVT LE negative    Plan:  ----------   IV Fluids   Blood culture negative  IV Abx with doxycycline  To stop uanysn       # sinus pauses     Presented with sinus pauses up to 4.9 sec, seems asymptomatic  Cardiology consulted and thinks it is related to hypoxic event from his sleep apnea  cpap ordered   - labetalol also held    COPD exacerbation  -------------------------------  Hx of cough cold feverish feeling and chills 1 week  Hx of postnasal drip and desiree maxillary pain  Pt ran out of his meds for >1 week  Chest wheeze occasionally heard, no crepts    Heart sounds distant due to obesity and chest wall body habitus  CXR: Gerald pulm congestion present  Seems that it is right sided heart failure also    Plan:  RT Protocol  Prednisone 40 mg daily  Duoneb Q6H    IV Abx doxycycline  One dose of lasix 20 mg IV    # VICENTE  - clinically seems to have sleep apnea  - no sleep study done  - CPAP ordered    T2 DM:  --------------  A1c 6.5  Accuchecks Q6 hr  hypoglycemic protocol         HTN:  ----------  On Lisinopril      DLD:  ----------    Ct Statin    GERD:  ------------    Ct Omeprazole    Tinea pedis:  ---------------   topical clotrimazole    Substance Dependence:  ------------------------------  Smoking and Cannabis user:    Counseling to quit marijuana, and Smoking  Nicotine patch    Hx of Depression:  ----------------------  Pt lives in Shelter  On Aripiprazole  Currently No SI or HI

## 2017-06-11 NOTE — PROGRESS NOTES
[facility]val [facility]    Name Maxi Esposito       1950   Age/Sex 67 y.o. male   MRN [id number]   Code Status Full     After 5PM or if no immediate response to page, please call for cross-coverage  Attending/Team: Dr. Lloyd Call [phone] to page   1st Call - Day Intern (R1):   Dr. Puneet Womack 2nd Call - Day Sr. Resident (R2/R3):   Dr. Carranza         Chief complaint/ reason for interval visit (Primary Diagnosis)   COPD exacerbation      Interval Problem Daily Status Update      The patient still has some respiratory distress but no decrease in O2 saturation on room air  No pauses overnight  Sent for new CXR  Lasix added  Prednisolone increased to 60 mg    Review of Systems   Constitutional: Positive for chills. Negative for fever, malaise/fatigue and diaphoresis.   HENT: Negative for hearing loss and tinnitus.    Eyes: Negative for blurred vision, double vision and photophobia.   Respiratory: Positive for cough and shortness of breath. Negative for sputum production and wheezing.    Cardiovascular: Positive for leg swelling. Negative for chest pain.   Gastrointestinal: Negative for nausea, vomiting and diarrhea.   Genitourinary: Negative for dysuria and urgency.   Musculoskeletal: Negative for myalgias, falls and neck pain.   Skin: Negative for itching and rash.   Neurological: Negative for dizziness, tingling, tremors, weakness and headaches.   Endo/Heme/Allergies: Negative for environmental allergies. Does not bruise/bleed easily.   Psychiatric/Behavioral: Negative for depression and substance abuse.       Consultants/Specialty  None    Disposition  Inpatient for COPD exacerbation    Quality Measures  Labs reviewed, Medications reviewed and EKG reviewed  Arshad catheter: No Arshad      DVT Prophylaxis: Enoxaparin (Lovenox)                Physical Exam       Filed Vitals:    17 0745 17 0852 17 1133 17 1205   BP:  189/120 160/94    Pulse: 93 82  86   Temp:  36.4 °C  (97.5 °F)     Resp: 20 18  16   Height:       Weight:       SpO2: 93% 90%  98%     Body mass index is 42.67 kg/(m^2). Weight: 123.6 kg (272 lb 7.8 oz)  Oxygen Therapy:  Pulse Oximetry: 98 %, O2 (LPM): 2, O2 Delivery: Silicone Nasal Cannula    Physical Exam  Physical Exam   Constitutional: He is oriented to person, place, and time.   Obese  Cardiovascular: Normal rate and regular rhythm.    Pulmonary/Chest: Effort normal. He has scattered wheezes and basal coarse rales He exhibits no tenderness.   Distant lung sounds    Abdominal: Soft. There is no tenderness. There is no guarding.   Abdomen obese   Musculoskeletal: He exhibits edema and tenderness.   Erythema both lower extremities with tenderness and pitting edema   Neurological: He is alert and oriented to person, place, and time. No cranial nerve deficit. GCS score is 15.   Skin: Skin is warm. Rash noted. There is erythema.       Lab Data Review:      6/9/2017  2:32 PM    Recent Labs      06/08/17   1939  06/09/17   0843  06/10/17   0617  06/11/17   0208   SODIUM  138  138  139  136   POTASSIUM  4.2  4.6  3.8  4.0   CHLORIDE  103  104  105  103   CO2  25  25  27  25   BUN  7*  8  10  12   CREATININE  0.64  0.62  0.62  0.64   MAGNESIUM  1.7   --   2.0  1.8   CALCIUM  9.1  8.6  8.6  8.2*       Recent Labs      06/08/17   1939  06/09/17   0843  06/10/17   0617  06/11/17   0208   ALTSGPT  7  6  17  18   ASTSGOT  10*  11*  20  18   ALKPHOSPHAT  70  70  68  70   TBILIRUBIN  0.4  0.7  0.5  0.4   LIPASE  24   --    --    --    GLUCOSE  98  133*  126*  142*       Recent Labs      06/08/17   1939  06/10/17   0617  06/11/17   0208   RBC  5.11  4.95  4.67*   HEMOGLOBIN  15.0  14.5  13.6*   HEMATOCRIT  46.0  44.7  42.6   PLATELETCT  380  327  317   PROTHROMBTM  13.8   --    --    APTT  32.0   --    --    INR  1.03   --    --        Recent Labs      06/08/17   1939  06/09/17   0843  06/10/17   0617  06/11/17   0208   WBC  14.1*   --   19.3*  18.3*   NEUTSPOLYS  62.10   --    67.90  67.10   LYMPHOCYTES  21.30*   --   18.80*  20.30*   MONOCYTES  10.30   --   11.10  10.60   EOSINOPHILS  5.40   --   1.50  1.20   BASOPHILS  0.60   --   0.50  0.30   ASTSGOT  10*  11*  20  18   ALTSGPT  7  6  17  18   ALKPHOSPHAT  70  70  68  70   TBILIRUBIN  0.4  0.7  0.5  0.4           Assessment/Plan       BILATERAL LOWER EXTREMITY EDEMA: CELLULITIS WITH STASIS DERMATITIS  ---------------------------------------------------------------------------------------------------  Pt with hx of chr venous statsis of both LEs   Pulse 82 , /81 mmHg Temp Normal, RR 16/min, BMI 42, SpO2 95% (Room Air)  WBC 14K, Lactate elevated SIRS1/4 on admission  Severe erythema and tenderness of both lower extremities with chr venous stasis derm changes    D-D elevated but DVT LE negative  Blood culture negative  It seems that it chronic cellulitis due to venous stasis    Plan:  ----------   IV Abx with doxycycline    # sinus pauses     Presented with sinus pauses up to 4.9 sec, seems asymptomatic on day of admission  Cardiology consulted and thinks it is related to hypoxic event from his sleep apnea  No pauses overnight  Continue monitoring   labetalol also held    COPD exacerbation  -------------------------------  Hx of cough cold feverish feeling and chills 1 week  Hx of postnasal drip and desiree maxillary pain  Pt ran out of his meds for >1 week  Chest wheeze occasionally heard, coarse crepitations    Heart sounds distant due to obesity and chest wall body habitus  CXR: Gerald pulm congestion present  Seems that it is right sided heart failure also  New CXR still pulmonary congestion    Plan:  RT Protocol  Prednisone increased to 60 mg mg daily  Duoneb Q6H    IV Abx doxycycline  Lasix 40 mg iv added     # VICENTE  - clinically seems to have sleep apnea  - no sleep study done  - CPAP as outpatient    T2 DM:  --------------  A1c 6.5  Accuchecks Q6 hr  hypoglycemic protocol         HTN:  ----------  BP running on high side, up  180/120  On Lisinopril, continue  Hydralazine IV ordered      DLD:  ----------  stable    Ct Statin    GERD:  ------------    No alarming symptoms  Ct Omeprazole    Tinea pedis:  ---------------   topical clotrimazole    Substance Dependence:  ------------------------------  Smoking and Cannabis user:    Counseling to quit marijuana, and Smoking  Nicotine patch    Hx of Depression:  ----------------------  Pt lives in Shelter  On Aripiprazole  Currently No SI or HI

## 2017-06-11 NOTE — PROGRESS NOTES
Student RN ambulated patient in hallway with O2 sat monitor. RA at rest was 91%, RA during ambulation 86%. Up to 96% with 2L NC.

## 2017-06-11 NOTE — ASSESSMENT & PLAN NOTE
Presented with sinus pauses up to 4.9 sec, seems asymptomatic on day of admission  Cardiology consulted and thinks it is related to hypoxic event from his sleep apnea  No pauses overnight  Continue monitoring

## 2017-06-11 NOTE — CARE PLAN
Problem: Venous Thromboembolism (VTW)/Deep Vein Thrombosis (DVT) Prevention:  Goal: Patient will participate in Venous Thrombosis (VTE)/Deep Vein Thrombosis (DVT)Prevention Measures  Outcome: PROGRESSING SLOWER THAN EXPECTED  Pt refusing enoxaparin at this time. States that he does not feel he needs it because he has been ambulating to bathroom.    Problem: Knowledge Deficit  Goal: Knowledge of disease process/condition, treatment plan, diagnostic tests, and medications will improve  Outcome: PROGRESSING SLOWER THAN EXPECTED  Pt continues to refuse many treatments and medications despite education and encouragement.

## 2017-06-11 NOTE — PROGRESS NOTES
Bedside report completed. Pt sitting at edge of bed requesting coffee and oatmeal. Bed in low locked position, call light in reach, treaded socks on.

## 2017-06-11 NOTE — PROGRESS NOTES
Assumed care report received. Pt resting at edge of bed  No complaints of pain. /120 Gave scheduled medication see MAR. Plan of care discussed no other concerns at this time. Call light within reach. Fall precautions in place.

## 2017-06-12 PROBLEM — G47.33 OBSTRUCTIVE SLEEP APNEA: Status: ACTIVE | Noted: 2017-06-12

## 2017-06-12 PROBLEM — E11.9 DM II (DIABETES MELLITUS, TYPE II), CONTROLLED (HCC): Status: ACTIVE | Noted: 2017-06-12

## 2017-06-12 LAB
ALBUMIN SERPL BCP-MCNC: 3.3 G/DL (ref 3.2–4.9)
ALBUMIN/GLOB SERPL: 1.2 G/DL
ALP SERPL-CCNC: 67 U/L (ref 30–99)
ALT SERPL-CCNC: 18 U/L (ref 2–50)
ANION GAP SERPL CALC-SCNC: 8 MMOL/L (ref 0–11.9)
AST SERPL-CCNC: 13 U/L (ref 12–45)
BASOPHILS # BLD AUTO: 0.4 % (ref 0–1.8)
BASOPHILS # BLD: 0.07 K/UL (ref 0–0.12)
BILIRUB SERPL-MCNC: 0.4 MG/DL (ref 0.1–1.5)
BUN SERPL-MCNC: 14 MG/DL (ref 8–22)
CALCIUM SERPL-MCNC: 8.4 MG/DL (ref 8.5–10.5)
CHLORIDE SERPL-SCNC: 103 MMOL/L (ref 96–112)
CO2 SERPL-SCNC: 28 MMOL/L (ref 20–33)
CREAT SERPL-MCNC: 0.69 MG/DL (ref 0.5–1.4)
EOSINOPHIL # BLD AUTO: 0.1 K/UL (ref 0–0.51)
EOSINOPHIL NFR BLD: 0.6 % (ref 0–6.9)
ERYTHROCYTE [DISTWIDTH] IN BLOOD BY AUTOMATED COUNT: 51.8 FL (ref 35.9–50)
GFR SERPL CREATININE-BSD FRML MDRD: >60 ML/MIN/1.73 M 2
GLOBULIN SER CALC-MCNC: 2.8 G/DL (ref 1.9–3.5)
GLUCOSE BLD-MCNC: 114 MG/DL (ref 65–99)
GLUCOSE BLD-MCNC: 136 MG/DL (ref 65–99)
GLUCOSE BLD-MCNC: 231 MG/DL (ref 65–99)
GLUCOSE SERPL-MCNC: 181 MG/DL (ref 65–99)
HCT VFR BLD AUTO: 40.6 % (ref 42–52)
HGB BLD-MCNC: 13.2 G/DL (ref 14–18)
IMM GRANULOCYTES # BLD AUTO: 0.07 K/UL (ref 0–0.11)
IMM GRANULOCYTES NFR BLD AUTO: 0.4 % (ref 0–0.9)
LYMPHOCYTES # BLD AUTO: 3.45 K/UL (ref 1–4.8)
LYMPHOCYTES NFR BLD: 19.3 % (ref 22–41)
MAGNESIUM SERPL-MCNC: 1.8 MG/DL (ref 1.5–2.5)
MCH RBC QN AUTO: 29.1 PG (ref 27–33)
MCHC RBC AUTO-ENTMCNC: 32.5 G/DL (ref 33.7–35.3)
MCV RBC AUTO: 89.6 FL (ref 81.4–97.8)
MONOCYTES # BLD AUTO: 2.12 K/UL (ref 0–0.85)
MONOCYTES NFR BLD AUTO: 11.9 % (ref 0–13.4)
NEUTROPHILS # BLD AUTO: 12.03 K/UL (ref 1.82–7.42)
NEUTROPHILS NFR BLD: 67.4 % (ref 44–72)
NRBC # BLD AUTO: 0 K/UL
NRBC BLD AUTO-RTO: 0 /100 WBC
PLATELET # BLD AUTO: 347 K/UL (ref 164–446)
PMV BLD AUTO: 11.1 FL (ref 9–12.9)
POTASSIUM SERPL-SCNC: 4 MMOL/L (ref 3.6–5.5)
PROT SERPL-MCNC: 6.1 G/DL (ref 6–8.2)
RBC # BLD AUTO: 4.53 M/UL (ref 4.7–6.1)
SODIUM SERPL-SCNC: 139 MMOL/L (ref 135–145)
WBC # BLD AUTO: 17.8 K/UL (ref 4.8–10.8)

## 2017-06-12 PROCEDURE — A9270 NON-COVERED ITEM OR SERVICE: HCPCS | Performed by: GENERAL ACUTE CARE HOSPITAL

## 2017-06-12 PROCEDURE — 700102 HCHG RX REV CODE 250 W/ 637 OVERRIDE(OP): Performed by: INTERNAL MEDICINE

## 2017-06-12 PROCEDURE — 700102 HCHG RX REV CODE 250 W/ 637 OVERRIDE(OP): Performed by: GENERAL ACUTE CARE HOSPITAL

## 2017-06-12 PROCEDURE — 700111 HCHG RX REV CODE 636 W/ 250 OVERRIDE (IP): Performed by: INTERNAL MEDICINE

## 2017-06-12 PROCEDURE — 770020 HCHG ROOM/CARE - TELE (206)

## 2017-06-12 PROCEDURE — 700101 HCHG RX REV CODE 250: Performed by: INTERNAL MEDICINE

## 2017-06-12 PROCEDURE — A9270 NON-COVERED ITEM OR SERVICE: HCPCS | Performed by: STUDENT IN AN ORGANIZED HEALTH CARE EDUCATION/TRAINING PROGRAM

## 2017-06-12 PROCEDURE — 80053 COMPREHEN METABOLIC PANEL: CPT

## 2017-06-12 PROCEDURE — 700111 HCHG RX REV CODE 636 W/ 250 OVERRIDE (IP): Performed by: GENERAL ACUTE CARE HOSPITAL

## 2017-06-12 PROCEDURE — A9270 NON-COVERED ITEM OR SERVICE: HCPCS | Performed by: INTERNAL MEDICINE

## 2017-06-12 PROCEDURE — G8979 MOBILITY GOAL STATUS: HCPCS | Mod: CI

## 2017-06-12 PROCEDURE — 94640 AIRWAY INHALATION TREATMENT: CPT

## 2017-06-12 PROCEDURE — 97161 PT EVAL LOW COMPLEX 20 MIN: CPT

## 2017-06-12 PROCEDURE — 36415 COLL VENOUS BLD VENIPUNCTURE: CPT

## 2017-06-12 PROCEDURE — 83735 ASSAY OF MAGNESIUM: CPT

## 2017-06-12 PROCEDURE — G8978 MOBILITY CURRENT STATUS: HCPCS | Mod: CJ

## 2017-06-12 PROCEDURE — 99232 SBSQ HOSP IP/OBS MODERATE 35: CPT | Mod: GC | Performed by: INTERNAL MEDICINE

## 2017-06-12 PROCEDURE — 85025 COMPLETE CBC W/AUTO DIFF WBC: CPT

## 2017-06-12 PROCEDURE — 82962 GLUCOSE BLOOD TEST: CPT

## 2017-06-12 PROCEDURE — 700102 HCHG RX REV CODE 250 W/ 637 OVERRIDE(OP): Performed by: STUDENT IN AN ORGANIZED HEALTH CARE EDUCATION/TRAINING PROGRAM

## 2017-06-12 RX ORDER — FUROSEMIDE 40 MG/1
40 TABLET ORAL
Status: DISCONTINUED | OUTPATIENT
Start: 2017-06-13 | End: 2017-06-13

## 2017-06-12 RX ORDER — IBUPROFEN 400 MG/1
200 TABLET ORAL 3 TIMES DAILY PRN
Status: DISCONTINUED | OUTPATIENT
Start: 2017-06-12 | End: 2017-06-24 | Stop reason: HOSPADM

## 2017-06-12 RX ORDER — FUROSEMIDE 40 MG/1
40 TABLET ORAL
Status: DISCONTINUED | OUTPATIENT
Start: 2017-06-12 | End: 2017-06-12

## 2017-06-12 RX ORDER — SODIUM CHLORIDE 9 MG/ML
INJECTION, SOLUTION INTRAVENOUS
Status: ACTIVE
Start: 2017-06-12 | End: 2017-06-12

## 2017-06-12 RX ORDER — MAGNESIUM SULFATE HEPTAHYDRATE 40 MG/ML
2 INJECTION, SOLUTION INTRAVENOUS ONCE
Status: COMPLETED | OUTPATIENT
Start: 2017-06-12 | End: 2017-06-12

## 2017-06-12 RX ORDER — LISINOPRIL 10 MG/1
10 TABLET ORAL
Status: DISCONTINUED | OUTPATIENT
Start: 2017-06-12 | End: 2017-06-15

## 2017-06-12 RX ORDER — PREDNISONE 20 MG/1
40 TABLET ORAL DAILY
Status: COMPLETED | OUTPATIENT
Start: 2017-06-13 | End: 2017-06-15

## 2017-06-12 RX ADMIN — FLUTICASONE PROPIONATE 100 MCG: 50 SPRAY, METERED NASAL at 09:42

## 2017-06-12 RX ADMIN — LISINOPRIL 10 MG: 10 TABLET ORAL at 09:44

## 2017-06-12 RX ADMIN — VANCOMYCIN HYDROCHLORIDE 125 MG: 10 INJECTION, POWDER, LYOPHILIZED, FOR SOLUTION INTRAVENOUS at 21:02

## 2017-06-12 RX ADMIN — OMEPRAZOLE 20 MG: 20 CAPSULE, DELAYED RELEASE ORAL at 09:43

## 2017-06-12 RX ADMIN — FUROSEMIDE 40 MG: 10 INJECTION, SOLUTION INTRAMUSCULAR; INTRAVENOUS at 09:42

## 2017-06-12 RX ADMIN — SENNOSIDES AND DOCUSATE SODIUM 2 TABLET: 8.6; 5 TABLET ORAL at 09:44

## 2017-06-12 RX ADMIN — DOXYCYCLINE 100 MG: 100 TABLET ORAL at 09:43

## 2017-06-12 RX ADMIN — HYDRALAZINE HYDROCHLORIDE 20 MG: 20 INJECTION INTRAMUSCULAR; INTRAVENOUS at 13:39

## 2017-06-12 RX ADMIN — ASPIRIN 81 MG: 81 TABLET ORAL at 09:43

## 2017-06-12 RX ADMIN — Medication 100 MG: at 09:43

## 2017-06-12 RX ADMIN — CLOTRIMAZOLE: 10 CREAM TOPICAL at 09:44

## 2017-06-12 RX ADMIN — PREDNISONE 60 MG: 20 TABLET ORAL at 09:43

## 2017-06-12 RX ADMIN — GABAPENTIN 100 MG: 100 CAPSULE ORAL at 21:00

## 2017-06-12 RX ADMIN — VANCOMYCIN HYDROCHLORIDE 125 MG: 10 INJECTION, POWDER, LYOPHILIZED, FOR SOLUTION INTRAVENOUS at 09:43

## 2017-06-12 RX ADMIN — BUDESONIDE AND FORMOTEROL FUMARATE DIHYDRATE 2 PUFF: 160; 4.5 AEROSOL RESPIRATORY (INHALATION) at 09:42

## 2017-06-12 RX ADMIN — ATORVASTATIN CALCIUM 20 MG: 20 TABLET, FILM COATED ORAL at 20:59

## 2017-06-12 RX ADMIN — SENNOSIDES AND DOCUSATE SODIUM 2 TABLET: 8.6; 5 TABLET ORAL at 21:02

## 2017-06-12 RX ADMIN — NICOTINE 21 MG: 21 PATCH, EXTENDED RELEASE TRANSDERMAL at 05:41

## 2017-06-12 RX ADMIN — INSULIN LISPRO 2 UNITS: 100 INJECTION, SOLUTION INTRAVENOUS; SUBCUTANEOUS at 16:49

## 2017-06-12 RX ADMIN — IPRATROPIUM BROMIDE AND ALBUTEROL SULFATE 3 ML: .5; 3 SOLUTION RESPIRATORY (INHALATION) at 06:59

## 2017-06-12 RX ADMIN — BUDESONIDE AND FORMOTEROL FUMARATE DIHYDRATE 2 PUFF: 160; 4.5 AEROSOL RESPIRATORY (INHALATION) at 21:02

## 2017-06-12 RX ADMIN — ACETAMINOPHEN 650 MG: 325 TABLET, FILM COATED ORAL at 09:58

## 2017-06-12 RX ADMIN — DOXYCYCLINE 100 MG: 100 TABLET ORAL at 21:00

## 2017-06-12 RX ADMIN — MAGNESIUM SULFATE IN WATER 2 G: 40 INJECTION, SOLUTION INTRAVENOUS at 07:31

## 2017-06-12 RX ADMIN — GABAPENTIN 100 MG: 100 CAPSULE ORAL at 09:43

## 2017-06-12 ASSESSMENT — COGNITIVE AND FUNCTIONAL STATUS - GENERAL
MOBILITY SCORE: 23
CLIMB 3 TO 5 STEPS WITH RAILING: A LITTLE
SUGGESTED CMS G CODE MODIFIER MOBILITY: CI

## 2017-06-12 ASSESSMENT — PAIN SCALES - GENERAL
PAINLEVEL_OUTOF10: 0
PAINLEVEL_OUTOF10: 0
PAINLEVEL_OUTOF10: 5
PAINLEVEL_OUTOF10: 0
PAINLEVEL_OUTOF10: 7
PAINLEVEL_OUTOF10: 7

## 2017-06-12 ASSESSMENT — ENCOUNTER SYMPTOMS
BLURRED VISION: 0
DIAPHORESIS: 0
BRUISES/BLEEDS EASILY: 0
TINGLING: 0
NECK PAIN: 0
COUGH: 1
FALLS: 0
PHOTOPHOBIA: 0
DOUBLE VISION: 0
NAUSEA: 0
DEPRESSION: 0
DIARRHEA: 0
HEADACHES: 0
TREMORS: 0
SPUTUM PRODUCTION: 0
FEVER: 0
MYALGIAS: 0
VOMITING: 0
WHEEZING: 0
DIZZINESS: 0
WEAKNESS: 0
CHILLS: 1
SHORTNESS OF BREATH: 1

## 2017-06-12 ASSESSMENT — GAIT ASSESSMENTS
DISTANCE (FEET): 100
GAIT LEVEL OF ASSIST: STAND BY ASSIST
DEVIATION: BRADYKINETIC
ASSISTIVE DEVICE: FRONT WHEEL WALKER

## 2017-06-12 ASSESSMENT — LIFESTYLE VARIABLES: SUBSTANCE_ABUSE: 0

## 2017-06-12 NOTE — PROGRESS NOTES
Pt refuses bed alarm. Two RN's educated pt on fall risk and benefits of bed alarm. Pt is a&ox4. Verbalizes understanding and continues to refuse. Pt in non slip socks, bed locked and in lowest position, call light within reach. Hourly rounding in place.

## 2017-06-12 NOTE — THERAPY
"Physical Therapy Evaluation completed.   Bed Mobility:  Supine to Sit: Supervised  Transfers: Sit to Stand: Stand by Assist  Gait: Level Of Assist: Stand by Assist with Front-Wheel Walker       Plan of Care: Will benefit from Physical Therapy 3 times per week  Discharge Recommendations: Equipment: Front-Wheel Walker. Post-acute therapy Discharge to a transitional care facility for continued skilled therapy services.    See \"Rehab Therapy-Acute\" Patient Summary Report for complete documentation.     "

## 2017-06-12 NOTE — PROGRESS NOTES
Report received at the bed side. No family at bedside. Pt A&O x 4.No complains of pain or SOB. Call light and belongings within reach. Bed alarm in place. Bed in lowest position.

## 2017-06-12 NOTE — CARE PLAN
Problem: Knowledge Deficit  Goal: Knowledge of disease process/condition, treatment plan, diagnostic tests, and medications will improve  Educated pt on disease process, treatment plan, and reason for medications he is on.

## 2017-06-12 NOTE — RESPIRATORY CARE
Pt. On RA between 1724-4246 for night Ox study.  Pt. Found to be in mid 80's so placed back on 2 lpm

## 2017-06-12 NOTE — PROGRESS NOTES
Pt resting in bed, no apparent signs of distress, no complains of SOB, call light in place, bed in lowest locked position.

## 2017-06-12 NOTE — PROGRESS NOTES
[facility] ATTENDING NOTE:      Date & Time note created:   6/12/2017   3:25 PM     Visit Time:   Attending/resident bedside rounds 9-11:30 AM     The patient was evaluated with the resident staff.  I reviewed the resident's note and agree with the resident's findings and plan as documented in the resident's note except as documented in the attending note. Please reference resident daily note for complete information.The chart was reviewed and summarized.  Available labs, imaging, O2 sats, EKGs were reviewed. Available nursing, consultant, and resident notes were reviewed. I am actively involved in the patient's care.                                                                BRIEF DISCUSSION:                                                         67 y/m presenting with increasing SOB and swelling in the feet    // WOLFE. Chronic respiratory Failure: CXR s/o Pulmonary Edema, CT showing the same, D Dimer 332, BNP 92,trop negative, EKG no ischemia. Patient does have a h/o of COPD but his clinical picture suggest CHF mostly Right sided. Continue diuretics. And treat line AECOPD. May be worthwhile to repeat Echo to get a better picture.   Patient is not on home O2, has risk factors for VICENTE/OHA. Check nocturnal Oximetry. Will need outpatient sleep study.  Start tapering off Prednisone and continue BD and Abx.  // B/L LE swelling and stasis: Suspect chronic Cor pulmonale secondary to COPD/VICENTE/OHA.  No evidence of infection. Continue ACE wrapping and leg elevation.   // Sinus Pauses: Not symptomatic  // Generalized weakness: Continue to manage electrolytes, PT/OT. May benefit from resp rehab.  PT/OT  // Type 2 DM:   //  HTN  // Tobacco, Marijuana Usage    Dispo: Patient feel she is lethargic and not able to ambulate well. ? SNF    CT chest 6/11/2017: 1.  Mild anterior peripheral interstitial prominence in the lung apices suggesting mild interstitial lung disease or pulmonary edema.2.Trace effusions and  bibasilar atelectasis.3.  Coronary artery calcifications.  Echo 3/2017: Technically difficult study adequate information is obtained. Left ventricular ejection fraction is visually estimated to be 60%.No evidence of valvular abnormality based on Doppler evaluation. Right ventricular systolic pressure is estimated to be 40 mmHg.  Normal right atrial pressure.No prior study is available for comparison.   MPI 3/2017:No evidence of significant jeopardized viable myocardium or prior myocardial infarction.Normal left ventricular size, ejection fraction, and wall motion.ECG INTERPRETATION:Negative stress ECG for ischemia.   ----------------------------------------------------------------------------------------------------------------------  Filed Vitals:    06/12/17 0400 06/12/17 0659 06/12/17 0754 06/12/17 1223   BP: 137/94  155/93 164/102   Pulse: 86 78 74 89   Temp: 36.4 °C (97.6 °F)  36.4 °C (97.5 °F) 36.6 °C (97.8 °F)   Resp: 18 17 22 18   Height:       Weight:       SpO2: 93% 95% 90% 90%     Weight/BMI: Body mass index is 41.74 kg/(m^2).  Pulse Oximetry: 90 %, O2 (LPM): 1, O2 Delivery: Silicone Nasal Cannula    Intake/Output Summary (Last 24 hours) at 06/12/17 1525  Last data filed at 06/12/17 1200   Gross per 24 hour   Intake    240 ml   Output   2550 ml   Net  -2310 ml       Miguel Yates MD   [employer]

## 2017-06-12 NOTE — PROGRESS NOTES
[facility] Interval Note    Name Maxi Esposito       1950   Age/Sex 67 y.o. male   MRN [id number]   Code Status Full     After 5PM or if no immediate response to page, please call for cross-coverage  Attending/Team: Dr. Milan US Call [phone] to page   1st Call - Day Intern (R1):   Dr. Puneet Womack 2nd Call - Day Sr. Resident (R2/R3):   Dr. Carranza         Chief complaint/ reason for interval visit (Primary Diagnosis)   COPD exacerbation      Interval Problem Daily Status Update      The patient still has some respiratory distress but no decrease in O2 saturation on room air  No pauses overnight  Sent for new CXR  Lasix added  Prednisolone increased to 60 mg    Review of Systems   Constitutional: Positive for chills. Negative for fever, malaise/fatigue and diaphoresis.   HENT: Negative for hearing loss and tinnitus.    Eyes: Negative for blurred vision, double vision and photophobia.   Respiratory: Positive for cough and shortness of breath. Negative for sputum production and wheezing.    Cardiovascular: Positive for leg swelling. Negative for chest pain.   Gastrointestinal: Negative for nausea, vomiting and diarrhea.   Genitourinary: Negative for dysuria and urgency.   Musculoskeletal: Negative for myalgias, falls and neck pain.   Skin: Negative for itching and rash.   Neurological: Negative for dizziness, tingling, tremors, weakness and headaches.   Endo/Heme/Allergies: Negative for environmental allergies. Does not bruise/bleed easily.   Psychiatric/Behavioral: Negative for depression and substance abuse.       Consultants/Specialty  None    Disposition  Inpatient for COPD exacerbation    Quality Measures  Labs reviewed, Medications reviewed and EKG reviewed  Arshad catheter: No Arshad      DVT Prophylaxis: Enoxaparin (Lovenox)                Physical Exam       Filed Vitals:    17 0400 17 0659 17 0754 17 1223   BP: 137/94  155/93 164/102   Pulse: 86 78 74 89    Temp: 36.4 °C (97.6 °F)  36.4 °C (97.5 °F) 36.6 °C (97.8 °F)   Resp: 18 17 22 18   Height:       Weight:       SpO2: 93% 95% 90% 90%     Body mass index is 41.74 kg/(m^2). Weight: 120.9 kg (266 lb 8.6 oz)  Oxygen Therapy:  Pulse Oximetry: 90 %, O2 (LPM): 1, O2 Delivery: Silicone Nasal Cannula    Physical Exam  Physical Exam   Constitutional: He is oriented to person, place, and time.   Obese  Cardiovascular: Normal rate and regular rhythm.    Pulmonary/Chest: Effort normal. He has few scattered wheezes and basal coarse rales He exhibits no tenderness.   Distant lung sounds    Abdominal: Soft. There is no tenderness. There is no guarding.   Abdomen obese   Musculoskeletal: He exhibits edema and tenderness.   Erythema both lower extremities with tenderness and pitting edema   Neurological: He is alert and oriented to person, place, and time. No cranial nerve deficit. GCS score is 15.   Skin: Skin is warm. Rash noted. There is erythema.       Lab Data Review:      6/9/2017  2:32 PM    Recent Labs      06/10/17   0617  06/11/17   0208  06/12/17   0227   SODIUM  139  136  139   POTASSIUM  3.8  4.0  4.0   CHLORIDE  105  103  103   CO2  27  25  28   BUN  10  12  14   CREATININE  0.62  0.64  0.69   MAGNESIUM  2.0  1.8  1.8   CALCIUM  8.6  8.2*  8.4*       Recent Labs      06/10/17   0617  06/11/17   0208  06/12/17   0227   ALTSGPT  17  18  18   ASTSGOT  20  18  13   ALKPHOSPHAT  68  70  67   TBILIRUBIN  0.5  0.4  0.4   GLUCOSE  126*  142*  181*       Recent Labs      06/10/17   0617  06/11/17   0208  06/12/17 0227   RBC  4.95  4.67*  4.53*   HEMOGLOBIN  14.5  13.6*  13.2*   HEMATOCRIT  44.7  42.6  40.6*   PLATELETCT  327  317  347       Recent Labs      06/10/17   0617  06/11/17   0208  06/12/17   0227   WBC  19.3*  18.3*  17.8*   NEUTSPOLYS  67.90  67.10  67.40   LYMPHOCYTES  18.80*  20.30*  19.30*   MONOCYTES  11.10  10.60  11.90   EOSINOPHILS  1.50  1.20  0.60   BASOPHILS  0.50  0.30  0.40   ASTSGOT  20  18  13    ALTSGPT  17  18  18   ALKPHOSPHAT  68  70  67   TBILIRUBIN  0.5  0.4  0.4           Assessment/Plan       COPD exacerbation and possible right sided heart failure  -------------------------------  Hx of smoking,  COPD not on O2, presented with SOB, cough. Basal crepitations, scattered wheezes on examination, leg swelling on admission. He has features of sleep apnea  Desaturated at night to 80s and on walking also, needed 2 liters, Still has respiratory distress. Chest basal crepitations  CXR showed features of COPD and pulmonary congestion  Echocardiogram was Technically difficult .   Left ventricular ejection fraction is visually estimated to be 60%.  Right ventricular systolic pressure is estimated to be 40 mmHg.   Although the echocardiogram was non conclusive regarding R sided HF, but clinically he seems having that        Plan:  RT Protocol  Prednisone decreased to 40 mg  Duoneb Q6H    PO Abx doxycycline  Lasix switched to PO 40 mg  nocturnal pulse oxymetry  ordered      BILATERAL LOWER EXTREMITY EDEMA: CELLULITIS WITH STASIS DERMATITIS  ---------------------------------------------------------------------------------------------------  Pt with hx of chr venous statsis of both LEs   WBC 14K, Lactate elevated SIRS1/4 on admission  Severe erythema and tenderness of both lower extremities with chr venous stasis derm changes    D-D elevated but DVT LE negative  Blood culture negative  It seems that it chronic cellulitis due to venous stasis    Plan:  ----------   Abx with doxycycline    # sinus pauses     Presented with sinus pauses up to 4.9 sec, seems asymptomatic on day of admission  Cardiology consulted and thinks it is related to hypoxic event from his sleep apnea  No pauses overnight  Continue monitoring       # VICENTE  - clinically seems to have sleep apnea  - no sleep study done  - nocturnal oxymetry ordered  - CPAP as outpatient    T2 DM:  --------------  A1c 6.5  Accuchecks Q6 hr  hypoglycemic  protocol         HTN:  ----------  BP running on high side, up 180/120  On Lisinopril, continue  Hydralazine IV ordered      DLD:  ----------  stable    Ct Statin    GERD:  ------------    No alarming symptoms  Ct Omeprazole    Tinea pedis:  ---------------   topical clotrimazole    Substance Dependence:  ------------------------------  Smoking and Cannabis user:    Counseling to quit marijuana, and Smoking  Nicotine patch    Hx of Depression:  ----------------------  Pt lives in Shelter  On Aripiprazole  Currently No SI or HI

## 2017-06-12 NOTE — CARE PLAN
Problem: Safety  Goal: Will remain free from injury  Outcome: PROGRESSING AS EXPECTED  Fall precautions in place. Bed wheels locked, bed is in the lowest position. Call light in reach. Pt refuses bed alarm. Educated on benefits of alarm. Non-skid socks provided. Patient provides successful verbalization of fall and safety precautions and demonstration of use of call bell. Upper side rails are up. Hourly rounding in progress.         Problem: Knowledge Deficit  Goal: Knowledge of disease process/condition, treatment plan, diagnostic tests, and medications will improve  Outcome: PROGRESSING SLOWER THAN EXPECTED  POC discussed with the patient. All questions and concerns addressed and answered. Patient is involved in POC and verbalizes the understanding of the disease process, medications, tests and treatments. Questions on POC encouraged throughout care.

## 2017-06-12 NOTE — PROGRESS NOTES
Pt sitting at edge of bed. Denies any needs at this time. Bed locked and in lowest position. Call light within reach. Hourly rounding in place.

## 2017-06-12 NOTE — PROGRESS NOTES
Bedside report received from day RN. Assumed pt care. Pt a&ox4. POC discussed. Pt updated on plan for CT around 2200. Pt agrees and all questions answered. Pt denies any other needs at this time. Bed locked and in lowest position. Call light within reach. Hourly rounding in place.

## 2017-06-12 NOTE — CARE PLAN
Problem: Safety  Goal: Will remain free from injury  Pt mobility assessed at the beginning of the shift, pt is one person assist.. Fall precautions in place, non-slip socks on, bed in lowest, locked position, and call light is within reach. Pt educated to call for assistance, and verbalizes understanding.

## 2017-06-13 LAB
ALBUMIN SERPL BCP-MCNC: 3.3 G/DL (ref 3.2–4.9)
ALBUMIN/GLOB SERPL: 1.2 G/DL
ALP SERPL-CCNC: 68 U/L (ref 30–99)
ALT SERPL-CCNC: 19 U/L (ref 2–50)
ANION GAP SERPL CALC-SCNC: 10 MMOL/L (ref 0–11.9)
AST SERPL-CCNC: 10 U/L (ref 12–45)
BACTERIA BLD CULT: NORMAL
BACTERIA BLD CULT: NORMAL
BASOPHILS # BLD AUTO: 0.4 % (ref 0–1.8)
BASOPHILS # BLD: 0.07 K/UL (ref 0–0.12)
BILIRUB SERPL-MCNC: 0.3 MG/DL (ref 0.1–1.5)
BNP SERPL-MCNC: 232 PG/ML (ref 0–100)
BUN SERPL-MCNC: 23 MG/DL (ref 8–22)
CALCIUM SERPL-MCNC: 8.9 MG/DL (ref 8.5–10.5)
CHLORIDE SERPL-SCNC: 100 MMOL/L (ref 96–112)
CO2 SERPL-SCNC: 28 MMOL/L (ref 20–33)
CREAT SERPL-MCNC: 0.85 MG/DL (ref 0.5–1.4)
EOSINOPHIL # BLD AUTO: 0.16 K/UL (ref 0–0.51)
EOSINOPHIL NFR BLD: 0.9 % (ref 0–6.9)
ERYTHROCYTE [DISTWIDTH] IN BLOOD BY AUTOMATED COUNT: 49.8 FL (ref 35.9–50)
GFR SERPL CREATININE-BSD FRML MDRD: >60 ML/MIN/1.73 M 2
GLOBULIN SER CALC-MCNC: 2.7 G/DL (ref 1.9–3.5)
GLUCOSE BLD-MCNC: 148 MG/DL (ref 65–99)
GLUCOSE BLD-MCNC: 172 MG/DL (ref 65–99)
GLUCOSE SERPL-MCNC: 163 MG/DL (ref 65–99)
HCT VFR BLD AUTO: 42.4 % (ref 42–52)
HGB BLD-MCNC: 13.7 G/DL (ref 14–18)
IMM GRANULOCYTES # BLD AUTO: 0.12 K/UL (ref 0–0.11)
IMM GRANULOCYTES NFR BLD AUTO: 0.7 % (ref 0–0.9)
LYMPHOCYTES # BLD AUTO: 4.22 K/UL (ref 1–4.8)
LYMPHOCYTES NFR BLD: 23.6 % (ref 22–41)
MAGNESIUM SERPL-MCNC: 1.7 MG/DL (ref 1.5–2.5)
MCH RBC QN AUTO: 28.8 PG (ref 27–33)
MCHC RBC AUTO-ENTMCNC: 32.3 G/DL (ref 33.7–35.3)
MCV RBC AUTO: 89.1 FL (ref 81.4–97.8)
MONOCYTES # BLD AUTO: 1.89 K/UL (ref 0–0.85)
MONOCYTES NFR BLD AUTO: 10.6 % (ref 0–13.4)
NEUTROPHILS # BLD AUTO: 11.39 K/UL (ref 1.82–7.42)
NEUTROPHILS NFR BLD: 63.8 % (ref 44–72)
NRBC # BLD AUTO: 0 K/UL
NRBC BLD AUTO-RTO: 0 /100 WBC
PLATELET # BLD AUTO: 348 K/UL (ref 164–446)
PMV BLD AUTO: 10.8 FL (ref 9–12.9)
POTASSIUM SERPL-SCNC: 4 MMOL/L (ref 3.6–5.5)
PROT SERPL-MCNC: 6 G/DL (ref 6–8.2)
RBC # BLD AUTO: 4.76 M/UL (ref 4.7–6.1)
SIGNIFICANT IND 70042: NORMAL
SIGNIFICANT IND 70042: NORMAL
SITE SITE: NORMAL
SITE SITE: NORMAL
SODIUM SERPL-SCNC: 138 MMOL/L (ref 135–145)
SOURCE SOURCE: NORMAL
SOURCE SOURCE: NORMAL
WBC # BLD AUTO: 17.9 K/UL (ref 4.8–10.8)

## 2017-06-13 PROCEDURE — A9270 NON-COVERED ITEM OR SERVICE: HCPCS | Performed by: INTERNAL MEDICINE

## 2017-06-13 PROCEDURE — 700102 HCHG RX REV CODE 250 W/ 637 OVERRIDE(OP): Performed by: INTERNAL MEDICINE

## 2017-06-13 PROCEDURE — 99232 SBSQ HOSP IP/OBS MODERATE 35: CPT | Mod: GC | Performed by: INTERNAL MEDICINE

## 2017-06-13 PROCEDURE — 83880 ASSAY OF NATRIURETIC PEPTIDE: CPT

## 2017-06-13 PROCEDURE — 700111 HCHG RX REV CODE 636 W/ 250 OVERRIDE (IP): Performed by: GENERAL ACUTE CARE HOSPITAL

## 2017-06-13 PROCEDURE — 700111 HCHG RX REV CODE 636 W/ 250 OVERRIDE (IP): Performed by: INTERNAL MEDICINE

## 2017-06-13 PROCEDURE — 93306 TTE W/DOPPLER COMPLETE: CPT | Mod: 26 | Performed by: INTERNAL MEDICINE

## 2017-06-13 PROCEDURE — 700102 HCHG RX REV CODE 250 W/ 637 OVERRIDE(OP): Performed by: GENERAL ACUTE CARE HOSPITAL

## 2017-06-13 PROCEDURE — 770020 HCHG ROOM/CARE - TELE (206)

## 2017-06-13 PROCEDURE — G8988 SELF CARE GOAL STATUS: HCPCS | Mod: CI

## 2017-06-13 PROCEDURE — A9270 NON-COVERED ITEM OR SERVICE: HCPCS | Performed by: GENERAL ACUTE CARE HOSPITAL

## 2017-06-13 PROCEDURE — G8987 SELF CARE CURRENT STATUS: HCPCS | Mod: CI

## 2017-06-13 PROCEDURE — 700102 HCHG RX REV CODE 250 W/ 637 OVERRIDE(OP): Performed by: STUDENT IN AN ORGANIZED HEALTH CARE EDUCATION/TRAINING PROGRAM

## 2017-06-13 PROCEDURE — 36415 COLL VENOUS BLD VENIPUNCTURE: CPT

## 2017-06-13 PROCEDURE — 83735 ASSAY OF MAGNESIUM: CPT

## 2017-06-13 PROCEDURE — 85025 COMPLETE CBC W/AUTO DIFF WBC: CPT

## 2017-06-13 PROCEDURE — A9270 NON-COVERED ITEM OR SERVICE: HCPCS | Performed by: STUDENT IN AN ORGANIZED HEALTH CARE EDUCATION/TRAINING PROGRAM

## 2017-06-13 PROCEDURE — 80053 COMPREHEN METABOLIC PANEL: CPT

## 2017-06-13 PROCEDURE — 82962 GLUCOSE BLOOD TEST: CPT

## 2017-06-13 PROCEDURE — 97165 OT EVAL LOW COMPLEX 30 MIN: CPT

## 2017-06-13 RX ORDER — TRAMADOL HYDROCHLORIDE 50 MG/1
50 TABLET ORAL EVERY 8 HOURS PRN
Status: DISCONTINUED | OUTPATIENT
Start: 2017-06-13 | End: 2017-06-24 | Stop reason: HOSPADM

## 2017-06-13 RX ORDER — DOXYCYCLINE 100 MG/1
100 TABLET ORAL EVERY 12 HOURS
Status: DISCONTINUED | OUTPATIENT
Start: 2017-06-13 | End: 2017-06-18

## 2017-06-13 RX ORDER — FUROSEMIDE 40 MG/1
40 TABLET ORAL
Status: DISCONTINUED | OUTPATIENT
Start: 2017-06-13 | End: 2017-06-16

## 2017-06-13 RX ORDER — GABAPENTIN 100 MG/1
100 CAPSULE ORAL 2 TIMES DAILY
Status: DISCONTINUED | OUTPATIENT
Start: 2017-06-13 | End: 2017-06-14

## 2017-06-13 RX ORDER — MAGNESIUM SULFATE HEPTAHYDRATE 40 MG/ML
2 INJECTION, SOLUTION INTRAVENOUS ONCE
Status: COMPLETED | OUTPATIENT
Start: 2017-06-13 | End: 2017-06-13

## 2017-06-13 RX ORDER — GABAPENTIN 100 MG/1
100 CAPSULE ORAL 3 TIMES DAILY
Status: DISCONTINUED | OUTPATIENT
Start: 2017-06-13 | End: 2017-06-13

## 2017-06-13 RX ADMIN — FUROSEMIDE 40 MG: 40 TABLET ORAL at 17:17

## 2017-06-13 RX ADMIN — FLUTICASONE PROPIONATE 100 MCG: 50 SPRAY, METERED NASAL at 08:00

## 2017-06-13 RX ADMIN — VANCOMYCIN HYDROCHLORIDE 125 MG: 10 INJECTION, POWDER, LYOPHILIZED, FOR SOLUTION INTRAVENOUS at 07:59

## 2017-06-13 RX ADMIN — BUDESONIDE AND FORMOTEROL FUMARATE DIHYDRATE 2 PUFF: 160; 4.5 AEROSOL RESPIRATORY (INHALATION) at 20:10

## 2017-06-13 RX ADMIN — GABAPENTIN 100 MG: 100 CAPSULE ORAL at 07:57

## 2017-06-13 RX ADMIN — INSULIN LISPRO 1 UNITS: 100 INJECTION, SOLUTION INTRAVENOUS; SUBCUTANEOUS at 17:23

## 2017-06-13 RX ADMIN — MAGNESIUM SULFATE IN WATER 2 G: 40 INJECTION, SOLUTION INTRAVENOUS at 08:01

## 2017-06-13 RX ADMIN — LISINOPRIL 10 MG: 10 TABLET ORAL at 07:57

## 2017-06-13 RX ADMIN — NICOTINE 21 MG: 21 PATCH, EXTENDED RELEASE TRANSDERMAL at 05:56

## 2017-06-13 RX ADMIN — GABAPENTIN 100 MG: 100 CAPSULE ORAL at 20:09

## 2017-06-13 RX ADMIN — Medication 100 MG: at 07:57

## 2017-06-13 RX ADMIN — DOXYCYCLINE 100 MG: 100 TABLET ORAL at 07:57

## 2017-06-13 RX ADMIN — ACETAMINOPHEN 650 MG: 325 TABLET, FILM COATED ORAL at 05:56

## 2017-06-13 RX ADMIN — ASPIRIN 81 MG: 81 TABLET ORAL at 07:57

## 2017-06-13 RX ADMIN — DOXYCYCLINE 100 MG: 100 TABLET ORAL at 20:10

## 2017-06-13 RX ADMIN — CLOTRIMAZOLE: 10 CREAM TOPICAL at 23:03

## 2017-06-13 RX ADMIN — ATORVASTATIN CALCIUM 20 MG: 20 TABLET, FILM COATED ORAL at 20:09

## 2017-06-13 RX ADMIN — VANCOMYCIN HYDROCHLORIDE 125 MG: 10 INJECTION, POWDER, LYOPHILIZED, FOR SOLUTION INTRAVENOUS at 20:14

## 2017-06-13 RX ADMIN — SENNOSIDES AND DOCUSATE SODIUM 2 TABLET: 8.6; 5 TABLET ORAL at 20:14

## 2017-06-13 RX ADMIN — SENNOSIDES AND DOCUSATE SODIUM 2 TABLET: 8.6; 5 TABLET ORAL at 07:58

## 2017-06-13 RX ADMIN — CLOTRIMAZOLE: 10 CREAM TOPICAL at 08:00

## 2017-06-13 RX ADMIN — FUROSEMIDE 40 MG: 40 TABLET ORAL at 07:57

## 2017-06-13 RX ADMIN — BUDESONIDE AND FORMOTEROL FUMARATE DIHYDRATE 2 PUFF: 160; 4.5 AEROSOL RESPIRATORY (INHALATION) at 08:00

## 2017-06-13 RX ADMIN — TRAMADOL HYDROCHLORIDE 50 MG: 50 TABLET, COATED ORAL at 22:33

## 2017-06-13 RX ADMIN — IBUPROFEN 200 MG: 200 TABLET, FILM COATED ORAL at 00:03

## 2017-06-13 RX ADMIN — OMEPRAZOLE 20 MG: 20 CAPSULE, DELAYED RELEASE ORAL at 07:57

## 2017-06-13 RX ADMIN — PREDNISONE 40 MG: 20 TABLET ORAL at 07:57

## 2017-06-13 ASSESSMENT — COGNITIVE AND FUNCTIONAL STATUS - GENERAL
DRESSING REGULAR LOWER BODY CLOTHING: A LITTLE
DAILY ACTIVITIY SCORE: 20
PERSONAL GROOMING: A LITTLE
TOILETING: A LITTLE
SUGGESTED CMS G CODE MODIFIER DAILY ACTIVITY: CJ
HELP NEEDED FOR BATHING: A LITTLE

## 2017-06-13 ASSESSMENT — ENCOUNTER SYMPTOMS
FALLS: 0
DEPRESSION: 0
WHEEZING: 0
BRUISES/BLEEDS EASILY: 0
FEVER: 0
CHILLS: 0
TINGLING: 0
PHOTOPHOBIA: 0
BLURRED VISION: 0
DOUBLE VISION: 0
NAUSEA: 0
VOMITING: 0
MYALGIAS: 0
NECK PAIN: 0
DIAPHORESIS: 0
DIARRHEA: 0
WEAKNESS: 0
DIZZINESS: 0
TREMORS: 0
COUGH: 1
HEADACHES: 0
SHORTNESS OF BREATH: 1
SPUTUM PRODUCTION: 0

## 2017-06-13 ASSESSMENT — PAIN SCALES - GENERAL
PAINLEVEL_OUTOF10: 0
PAINLEVEL_OUTOF10: 3
PAINLEVEL_OUTOF10: 8
PAINLEVEL_OUTOF10: 0
PAINLEVEL_OUTOF10: 7
PAINLEVEL_OUTOF10: 0

## 2017-06-13 ASSESSMENT — ACTIVITIES OF DAILY LIVING (ADL): TOILETING: INDEPENDENT

## 2017-06-13 ASSESSMENT — LIFESTYLE VARIABLES: SUBSTANCE_ABUSE: 0
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
## 2017-06-13 NOTE — PROGRESS NOTES
"Diabetes education: Met with Maxi regarding blood sugars, insulin and finger sticks. Pt states he does not feel well/\"right\" secondary \"to medication given to him for his elevated blood pressure\".  Pt has never given insulin nor done finger sticks.   CDE can teach both when pt more appropriate.   Pt's Hg A1c was 6.5%, but he is currently on Humalog ss coverage ac and hs. Current blood sugars are:  114, 136, and 231 (2 units). He was on 60 mg of Prednisone daily but now decreasing to 40 mg daily, starting tomorrow.  Plan: CDE will continue to follow and assess needs for insulin instruct and /or meter. Please call [phone] when discharge plan known.  "

## 2017-06-13 NOTE — CARE PLAN
Problem: Pain Management  Goal: Pain level will decrease to patient’s comfort goal  Outcome: PROGRESSING SLOWER THAN EXPECTED  Pain is assessed throughout the shift. Pharmacological and non-pharmacological options to treat pain are offered to pt. Pt is medicated per MAR.         Problem: Knowledge Deficit  Goal: Knowledge of disease process/condition, treatment plan, diagnostic tests, and medications will improve  Outcome: PROGRESSING SLOWER THAN EXPECTED  POC discussed with the patient. All questions and concerns addressed and answered as needed. Pt needs frequent education and reinforcement.

## 2017-06-13 NOTE — ASSESSMENT & PLAN NOTE
Pt with hx of chr venous statsis of both LEs   WBC 14K, Lactate elevated SIRS1/4 on admission  Severe erythema and tenderness of both lower extremities with chr venous stasis derm changes    D-D elevated but DVT LE negative  Blood culture negative  It seems that it chronic cellulitis due to venous stasis    Plan:  ----------    Abx with doxycycline

## 2017-06-13 NOTE — PROGRESS NOTES
2227: Pt requesting pain medications for pain 7/10 in his legs. Per pt tylenol is not enough for him. [employer] MD Camarillo paged. Waiting for call back.   2253: Second page sent out to MD Camarillo  2305: MD Camarillo paged back with new orders.

## 2017-06-13 NOTE — PROGRESS NOTES
[facility] Medicine Interval Note    Name Maxi Esposito 1950   Age/Sex 67 y.o. male   MRN [id number]   Code Status Full     After 5PM or if no immediate response to page, please call for cross-coverage  Attending/Team: Dr. Milan US Call [phone] to page   1st Call - Day Intern (R1):   Dr. Puneet Womack 2nd Call - Day Sr. Resident (R2/R3):   Dr. Carranza         Chief complaint/ reason for interval visit (Primary Diagnosis)   COPD exacerbation with SOB and leg swelling      Interval Problem Daily Status Update      The patient still has some respiratory distress and desaturated to low 80s on sleep and on walking, corrected with 2 LPM O2  No pauses overnight  New echocardiogram ordered  Lasix increased to 40 mg BID po    Review of Systems   Constitutional: Negative for fever, chills, malaise/fatigue and diaphoresis.   HENT: Negative for hearing loss and tinnitus.    Eyes: Negative for blurred vision, double vision and photophobia.   Respiratory: Positive for cough and shortness of breath. Negative for sputum production and wheezing.    Cardiovascular: Positive for leg swelling. Negative for chest pain.   Gastrointestinal: Negative for nausea, vomiting and diarrhea.   Genitourinary: Negative for dysuria and urgency.   Musculoskeletal: Negative for myalgias, falls and neck pain.   Skin: Negative for itching and rash.   Neurological: Negative for dizziness, tingling, tremors, weakness and headaches.   Endo/Heme/Allergies: Negative for environmental allergies. Does not bruise/bleed easily.   Psychiatric/Behavioral: Negative for depression and substance abuse.       Consultants/Specialty  None    Disposition  Inpatient for hypoxic respiratory failure    Quality Measures  Labs reviewed, Medications reviewed and EKG reviewed  Arshad catheter: No Arshad      DVT Prophylaxis: Enoxaparin (Lovenox)                Physical Exam       Filed Vitals:    17 0225 17 0434 17 0735 17 1128    BP:  137/68 147/99 135/85   Pulse:  78 69 88   Temp:  36.8 °C (98.2 °F) 36.6 °C (97.8 °F) 36.4 °C (97.6 °F)   Resp:  18 20 18   Height:       Weight:       SpO2: 93% 90% 92% 93%     Body mass index is 41.74 kg/(m^2). Weight:  (pt refused weight)  Oxygen Therapy:  Pulse Oximetry: 93 %, O2 (LPM): 1, O2 Delivery: Silicone Nasal Cannula    Physical Exam  Physical Exam   Constitutional: He is oriented to person, place, and time.   Obese  Cardiovascular: Normal rate and regular rhythm.    Pulmonary/Chest: Effort normal. He has few scattered wheezes and basal coarse rales He exhibits no tenderness.   Distant lung sounds    Abdominal: Soft. There is no tenderness. There is no guarding.   Abdomen obese   Musculoskeletal: He exhibits edema and tenderness.   Erythema both lower extremities with tenderness and pitting edema   Neurological: He is alert and oriented to person, place, and time. No cranial nerve deficit. GCS score is 15.   Skin: Skin is warm. Rash noted. There is erythema.       Lab Data Review:      6/9/2017  2:32 PM    Recent Labs      06/11/17 0208 06/12/17 0227 06/13/17   0607   SODIUM  136  139  138   POTASSIUM  4.0  4.0  4.0   CHLORIDE  103  103  100   CO2  25  28  28   BUN  12  14  23*   CREATININE  0.64  0.69  0.85   MAGNESIUM  1.8  1.8  1.7   CALCIUM  8.2*  8.4*  8.9       Recent Labs      06/11/17   0208 06/12/17 0227 06/13/17   0607   ALTSGPT  18  18  19   ASTSGOT  18  13  10*   ALKPHOSPHAT  70  67  68   TBILIRUBIN  0.4  0.4  0.3   GLUCOSE  142*  181*  163*       Recent Labs      06/11/17   0208  06/12/17 0227 06/13/17   0606   RBC  4.67*  4.53*  4.76   HEMOGLOBIN  13.6*  13.2*  13.7*   HEMATOCRIT  42.6  40.6*  42.4   PLATELETCT  317  347  348       Recent Labs      06/11/17   0208  06/12/17 0227 06/13/17   0606  06/13/17   0607   WBC  18.3*  17.8*  17.9*   --    NEUTSPOLYS  67.10  67.40  63.80   --    LYMPHOCYTES  20.30*  19.30*  23.60   --    MONOCYTES  10.60  11.90  10.60   --     EOSINOPHILS  1.20  0.60  0.90   --    BASOPHILS  0.30  0.40  0.40   --    ASTSGOT  18  13   --   10*   ALTSGPT  18  18   --   19   ALKPHOSPHAT  70  67   --   68   TBILIRUBIN  0.4  0.4   --   0.3           Assessment/Plan       COPD exacerbation and possible right sided heart failure, hypoxic respiratory failure  -------------------------------  Hx of smoking,  COPD not on O2, presented with SOB, cough. Basal crepitations, scattered wheezes on examination, leg swelling on admission. He has features of sleep apnea. No previous PFT, no sleep study  Desaturated at night to 80s and on walking also, needed 2 liters, Still has respiratory distress.   CXR showed features of COPD and pulmonary congestion  Echocardiogram done on 3/5/2017 was non conclusive regarding HF  Left ventricular ejection fraction is visually estimated to be 60%.  Right ventricular systolic pressure is estimated to be 40 mmHg.   Although the echocardiogram was non conclusive regarding R sided HF, but clinically he seems having that   His HCO3 wnl 28, no features of chronic CO2 retention  Most likely hypoxic respiratory failure, sleep apnea with HF     Plan:  Sent for new echocardiogram for better assessment   RT Protocol  Prednisone  40 mg  Duoneb Q6H    PO Abx doxycycline to continue for total 10 days  Lasix increased to 40 mg BID po        BILATERAL LOWER EXTREMITY EDEMA: CELLULITIS WITH STASIS DERMATITIS  ---------------------------------------------------------------------------------------------------  Patient has bilateral leg edema with venous stasis  Wbc 14 on admission, increased today to 17.9, steroid effect possibly   Severe erythema and tenderness of both lower extremities with chr venous stasis derm changes    DVT ruled out  Blood culture negative  It seems that it chronic cellulitis due to venous stasis on the top of interstitial fluid accumulation due to heart failure    Plan:  ----------   Abx with doxycycline  Elastic Stocking     #  sinus pauses     Presented with sinus pauses up to 4.9 sec, seems asymptomatic on day of admission  Cardiology consulted and thinks it is related to hypoxic event from his sleep apnea  No pauses overnight  Continue monitoring       # VICENTE  - clinically seems to have sleep apnea, risk factor for HF  - no sleep study done  - nocturnal oxymetry showed desaturation down to low 80s with O2 need up to 2 LPM  - CPAP as outpatient    T2 DM:  --------------  A1c 6.5  Accuchecks Q6 hr  hypoglycemic protocol  ISS       HTN:  ----------  BP controlled  On Lisinopril, continue  Hydralazine IV ordered PRN      DLD:  ----------  stable    Ct Statin    GERD:  ------------    No alarming symptoms  Ct Omeprazole    Tinea pedis:  ---------------   topical clotrimazole    Substance Dependence:  ------------------------------  Smoking and Cannabis user:    Counseling to quit marijuana, and Smoking  Nicotine patch    Hx of Depression:  ----------------------  Pt lives in Shelter  On Aripiprazole  Currently No SI or HI

## 2017-06-13 NOTE — PROGRESS NOTES
Pt refuses bed alarm and non slip socks. Per pt none of the socks fit him and they are uncomfortable. Two RN's educated pt on fall risk and benefits of bed alarm and non slip socks. Pt is a&ox4. Verbalizes understanding and continues to refuse. Bed locked and in lowest position, call light within reach. Hourly rounding in place.

## 2017-06-13 NOTE — PROGRESS NOTES
"Pt medicated per mar for pain. Pt verbalizing concerns of leaving hospital and going to a shelter with oxygen and possible cpap in the future. Per pt he \"will not be compliant with oxygen if I'm at a shelter and I will be right back in the hospital.\"   "

## 2017-06-13 NOTE — CARE PLAN
Problem: Safety  Goal: Will remain free from injury  Intervention: Provide assistance with mobility  Patient steady on feet, ambulating around room independently. Treaded slipper socks on. Bed in low/locked position. Call light within patient's reach, hourly rounding in place.       Problem: Respiratory:  Goal: Respiratory status will improve  Patient currently on room air, spO2 90-92% will continue to monitor.

## 2017-06-13 NOTE — THERAPY
"Occupational Therapy Evaluation completed.   Functional Status: Pt seen today for OT evaluation. Pt currently at supervision level for donning socks, toilet txf, grooming in stance at sink. SBA for functional mobility with FWW. Pt on RA, maintaining between 89-93% during activity. Pt does report dizziness when up and ambulating. Pt reports moderate fatigue after session as compared to his baseline, and is very worried about returning to shelter and being able to take care of himself as he walks frequently and has nowhere to sit. Pt Pt also concerned about his multiple readmits, \"8 times in the past 10 months\". Pt appears to be near his baseline.  Plan of Care: Will not actively follow while in house but will remain on for one more treatment to assist with d/c planning as needed at request of CM/MD.  Discharge Recommendations:  Equipment: FWW or 4ww. Pt may benefit from having 4ww with a seat for resting, would need official PT evaluation however..     See \"Rehab Therapy-Acute\" Patient Summary Report for complete documentation.    "

## 2017-06-13 NOTE — ASSESSMENT & PLAN NOTE
hx of smoking, COPD on inhalars, no PFT in chart   no wheezes now, less respiratory distress, cough is better. improving    Plan:  Continue RT Protocol  To stop prednisolone  Duoneb Q6H    PO Abx doxycycline to continue for total 10 days

## 2017-06-13 NOTE — ASSESSMENT & PLAN NOTE
- clinically seems to have sleep apnea  - no sleep study done  - nocturnal oxymetry ordered  - CPAP as outpatient

## 2017-06-14 LAB
ALBUMIN SERPL BCP-MCNC: 3.5 G/DL (ref 3.2–4.9)
ALBUMIN/GLOB SERPL: 1.2 G/DL
ALP SERPL-CCNC: 74 U/L (ref 30–99)
ALT SERPL-CCNC: 16 U/L (ref 2–50)
ANION GAP SERPL CALC-SCNC: 12 MMOL/L (ref 0–11.9)
AST SERPL-CCNC: 12 U/L (ref 12–45)
BASOPHILS # BLD AUTO: 0.5 % (ref 0–1.8)
BASOPHILS # BLD: 0.08 K/UL (ref 0–0.12)
BILIRUB SERPL-MCNC: 0.3 MG/DL (ref 0.1–1.5)
BUN SERPL-MCNC: 26 MG/DL (ref 8–22)
CALCIUM SERPL-MCNC: 9.2 MG/DL (ref 8.5–10.5)
CHLORIDE SERPL-SCNC: 100 MMOL/L (ref 96–112)
CO2 SERPL-SCNC: 27 MMOL/L (ref 20–33)
COMMENT 1642: NORMAL
CREAT SERPL-MCNC: 0.98 MG/DL (ref 0.5–1.4)
EOSINOPHIL # BLD AUTO: 0.17 K/UL (ref 0–0.51)
EOSINOPHIL NFR BLD: 1 % (ref 0–6.9)
ERYTHROCYTE [DISTWIDTH] IN BLOOD BY AUTOMATED COUNT: 50.4 FL (ref 35.9–50)
GFR SERPL CREATININE-BSD FRML MDRD: >60 ML/MIN/1.73 M 2
GLOBULIN SER CALC-MCNC: 2.9 G/DL (ref 1.9–3.5)
GLUCOSE BLD-MCNC: 104 MG/DL (ref 65–99)
GLUCOSE BLD-MCNC: 115 MG/DL (ref 65–99)
GLUCOSE BLD-MCNC: 132 MG/DL (ref 65–99)
GLUCOSE BLD-MCNC: 169 MG/DL (ref 65–99)
GLUCOSE SERPL-MCNC: 160 MG/DL (ref 65–99)
HCT VFR BLD AUTO: 42.6 % (ref 42–52)
HGB BLD-MCNC: 13.9 G/DL (ref 14–18)
IMM GRANULOCYTES # BLD AUTO: 0.1 K/UL (ref 0–0.11)
IMM GRANULOCYTES NFR BLD AUTO: 0.6 % (ref 0–0.9)
LV EJECT FRACT  99904: 60
LV EJECT FRACT MOD 2C 99903: 66.05
LV EJECT FRACT MOD 4C 99902: 56.87
LV EJECT FRACT MOD BP 99901: 62.29
LYMPHOCYTES # BLD AUTO: 4.82 K/UL (ref 1–4.8)
LYMPHOCYTES NFR BLD: 27.3 % (ref 22–41)
MAGNESIUM SERPL-MCNC: 1.7 MG/DL (ref 1.5–2.5)
MCH RBC QN AUTO: 29.1 PG (ref 27–33)
MCHC RBC AUTO-ENTMCNC: 32.6 G/DL (ref 33.7–35.3)
MCV RBC AUTO: 89.1 FL (ref 81.4–97.8)
MONOCYTES # BLD AUTO: 2.12 K/UL (ref 0–0.85)
MONOCYTES NFR BLD AUTO: 12 % (ref 0–13.4)
MORPHOLOGY BLD-IMP: NORMAL
NEUTROPHILS # BLD AUTO: 10.35 K/UL (ref 1.82–7.42)
NEUTROPHILS NFR BLD: 58.6 % (ref 44–72)
NRBC # BLD AUTO: 0 K/UL
NRBC BLD AUTO-RTO: 0 /100 WBC
PLATELET # BLD AUTO: 304 K/UL (ref 164–446)
PMV BLD AUTO: 11.9 FL (ref 9–12.9)
POTASSIUM SERPL-SCNC: 4 MMOL/L (ref 3.6–5.5)
PROT SERPL-MCNC: 6.4 G/DL (ref 6–8.2)
RBC # BLD AUTO: 4.78 M/UL (ref 4.7–6.1)
SODIUM SERPL-SCNC: 139 MMOL/L (ref 135–145)
WBC # BLD AUTO: 17.6 K/UL (ref 4.8–10.8)

## 2017-06-14 PROCEDURE — A9270 NON-COVERED ITEM OR SERVICE: HCPCS | Performed by: INTERNAL MEDICINE

## 2017-06-14 PROCEDURE — 700102 HCHG RX REV CODE 250 W/ 637 OVERRIDE(OP): Performed by: INTERNAL MEDICINE

## 2017-06-14 PROCEDURE — 85025 COMPLETE CBC W/AUTO DIFF WBC: CPT

## 2017-06-14 PROCEDURE — 36415 COLL VENOUS BLD VENIPUNCTURE: CPT

## 2017-06-14 PROCEDURE — 700111 HCHG RX REV CODE 636 W/ 250 OVERRIDE (IP): Performed by: GENERAL ACUTE CARE HOSPITAL

## 2017-06-14 PROCEDURE — 93306 TTE W/DOPPLER COMPLETE: CPT

## 2017-06-14 PROCEDURE — 700111 HCHG RX REV CODE 636 W/ 250 OVERRIDE (IP): Performed by: STUDENT IN AN ORGANIZED HEALTH CARE EDUCATION/TRAINING PROGRAM

## 2017-06-14 PROCEDURE — 99232 SBSQ HOSP IP/OBS MODERATE 35: CPT | Mod: GC | Performed by: INTERNAL MEDICINE

## 2017-06-14 PROCEDURE — 83735 ASSAY OF MAGNESIUM: CPT

## 2017-06-14 PROCEDURE — 700102 HCHG RX REV CODE 250 W/ 637 OVERRIDE(OP): Performed by: GENERAL ACUTE CARE HOSPITAL

## 2017-06-14 PROCEDURE — 700111 HCHG RX REV CODE 636 W/ 250 OVERRIDE (IP): Performed by: INTERNAL MEDICINE

## 2017-06-14 PROCEDURE — 770020 HCHG ROOM/CARE - TELE (206)

## 2017-06-14 PROCEDURE — 82962 GLUCOSE BLOOD TEST: CPT

## 2017-06-14 PROCEDURE — A9270 NON-COVERED ITEM OR SERVICE: HCPCS | Performed by: GENERAL ACUTE CARE HOSPITAL

## 2017-06-14 PROCEDURE — A9270 NON-COVERED ITEM OR SERVICE: HCPCS | Performed by: STUDENT IN AN ORGANIZED HEALTH CARE EDUCATION/TRAINING PROGRAM

## 2017-06-14 PROCEDURE — 80053 COMPREHEN METABOLIC PANEL: CPT

## 2017-06-14 PROCEDURE — 700102 HCHG RX REV CODE 250 W/ 637 OVERRIDE(OP): Performed by: STUDENT IN AN ORGANIZED HEALTH CARE EDUCATION/TRAINING PROGRAM

## 2017-06-14 RX ORDER — GABAPENTIN 100 MG/1
100 CAPSULE ORAL 3 TIMES DAILY
Status: DISCONTINUED | OUTPATIENT
Start: 2017-06-14 | End: 2017-06-24

## 2017-06-14 RX ORDER — SPIRONOLACTONE 25 MG/1
25 TABLET ORAL
Status: DISCONTINUED | OUTPATIENT
Start: 2017-06-14 | End: 2017-06-24 | Stop reason: HOSPADM

## 2017-06-14 RX ORDER — MAGNESIUM SULFATE HEPTAHYDRATE 40 MG/ML
2 INJECTION, SOLUTION INTRAVENOUS ONCE
Status: COMPLETED | OUTPATIENT
Start: 2017-06-14 | End: 2017-06-14

## 2017-06-14 RX ADMIN — BUDESONIDE AND FORMOTEROL FUMARATE DIHYDRATE 2 PUFF: 160; 4.5 AEROSOL RESPIRATORY (INHALATION) at 21:02

## 2017-06-14 RX ADMIN — CLOTRIMAZOLE: 10 CREAM TOPICAL at 21:02

## 2017-06-14 RX ADMIN — ASPIRIN 81 MG: 81 TABLET ORAL at 08:20

## 2017-06-14 RX ADMIN — INSULIN LISPRO 1 UNITS: 100 INJECTION, SOLUTION INTRAVENOUS; SUBCUTANEOUS at 11:38

## 2017-06-14 RX ADMIN — TRAMADOL HYDROCHLORIDE 50 MG: 50 TABLET, COATED ORAL at 17:02

## 2017-06-14 RX ADMIN — SPIRONOLACTONE 25 MG: 25 TABLET, FILM COATED ORAL at 15:56

## 2017-06-14 RX ADMIN — VANCOMYCIN HYDROCHLORIDE 125 MG: 10 INJECTION, POWDER, LYOPHILIZED, FOR SOLUTION INTRAVENOUS at 08:22

## 2017-06-14 RX ADMIN — OMEPRAZOLE 20 MG: 20 CAPSULE, DELAYED RELEASE ORAL at 08:21

## 2017-06-14 RX ADMIN — FLUTICASONE PROPIONATE 100 MCG: 50 SPRAY, METERED NASAL at 10:03

## 2017-06-14 RX ADMIN — GABAPENTIN 100 MG: 100 CAPSULE ORAL at 08:20

## 2017-06-14 RX ADMIN — BUDESONIDE AND FORMOTEROL FUMARATE DIHYDRATE 2 PUFF: 160; 4.5 AEROSOL RESPIRATORY (INHALATION) at 08:25

## 2017-06-14 RX ADMIN — DOXYCYCLINE 100 MG: 100 TABLET ORAL at 08:20

## 2017-06-14 RX ADMIN — VANCOMYCIN HYDROCHLORIDE 125 MG: 10 INJECTION, POWDER, LYOPHILIZED, FOR SOLUTION INTRAVENOUS at 21:02

## 2017-06-14 RX ADMIN — LISINOPRIL 10 MG: 10 TABLET ORAL at 08:21

## 2017-06-14 RX ADMIN — NICOTINE 21 MG: 21 PATCH, EXTENDED RELEASE TRANSDERMAL at 06:16

## 2017-06-14 RX ADMIN — DOXYCYCLINE 100 MG: 100 TABLET ORAL at 21:01

## 2017-06-14 RX ADMIN — MAGNESIUM SULFATE IN WATER 2 G: 40 INJECTION, SOLUTION INTRAVENOUS at 10:03

## 2017-06-14 RX ADMIN — PREDNISONE 40 MG: 20 TABLET ORAL at 08:21

## 2017-06-14 RX ADMIN — GABAPENTIN 100 MG: 100 CAPSULE ORAL at 21:01

## 2017-06-14 RX ADMIN — HYDRALAZINE HYDROCHLORIDE 20 MG: 20 INJECTION INTRAMUSCULAR; INTRAVENOUS at 21:30

## 2017-06-14 RX ADMIN — CLOTRIMAZOLE 1 APPLICATION: 10 CREAM TOPICAL at 09:00

## 2017-06-14 RX ADMIN — FUROSEMIDE 40 MG: 40 TABLET ORAL at 16:59

## 2017-06-14 RX ADMIN — Medication 100 MG: at 08:21

## 2017-06-14 RX ADMIN — FUROSEMIDE 40 MG: 40 TABLET ORAL at 06:16

## 2017-06-14 RX ADMIN — ATORVASTATIN CALCIUM 20 MG: 20 TABLET, FILM COATED ORAL at 21:01

## 2017-06-14 RX ADMIN — GABAPENTIN 100 MG: 100 CAPSULE ORAL at 15:56

## 2017-06-14 ASSESSMENT — ENCOUNTER SYMPTOMS
DEPRESSION: 0
DIARRHEA: 0
NECK PAIN: 0
NAUSEA: 0
BRUISES/BLEEDS EASILY: 0
DIZZINESS: 0
VOMITING: 0
BLURRED VISION: 0
MYALGIAS: 0
SPUTUM PRODUCTION: 0
CHILLS: 0
TINGLING: 0
PHOTOPHOBIA: 0
DIAPHORESIS: 0
HEADACHES: 0
DOUBLE VISION: 0
FALLS: 0
TREMORS: 0
FEVER: 0
WEAKNESS: 0
COUGH: 0
SHORTNESS OF BREATH: 0
WHEEZING: 0

## 2017-06-14 ASSESSMENT — LIFESTYLE VARIABLES: SUBSTANCE_ABUSE: 0

## 2017-06-14 ASSESSMENT — PAIN SCALES - GENERAL
PAINLEVEL_OUTOF10: 8
PAINLEVEL_OUTOF10: 3
PAINLEVEL_OUTOF10: 6
PAINLEVEL_OUTOF10: 4
PAINLEVEL_OUTOF10: 6
PAINLEVEL_OUTOF10: 3
PAINLEVEL_OUTOF10: 3

## 2017-06-14 NOTE — PROGRESS NOTES
[facility] ATTENDING NOTE:      Date & Time note created:   6/14/2017   2:25 PM     Visit Time:   Attending/resident bedside rounds 9-11:30 AM     The patient was evaluated with the resident staff.  I reviewed the resident's note and agree with the resident's findings and plan as documented in the resident's note except as documented in the attending note. Please reference resident daily note for complete information.The chart was reviewed and summarized.  Available labs, imaging, O2 sats, EKGs were reviewed. Available nursing, consultant, and resident notes were reviewed. I am actively involved in the patient's care.                                                                BRIEF DISCUSSION:                                                         67 y/m presenting with increasing SOB and swelling in the feet    // WOLFE. Chronic respiratory Failure: CXR s/o Pulmonary Edema, CT showing the same, D Dimer 332, BNP 92,trop negative, EKG no ischemia. Patient does have a h/o of COPD but his clinical picture suggest CHF mostly Right sided. Continue diuretics. And treat line AECOPD. Repeat Echo c/w Grade 3 Diastolic Dysfunction.   Patient is not on home O2, has risk factors for VICENTE/OHA. O2 sats down to 80s during sleep. Will need outpatient sleep study.  Start tapering off Prednisone and continue BD and Abx.  // Chronic Diastolic Heart Failure( CHF p EF), Mild acute component: Continue diuretics, On ACE I, will add small dose BB once Fluid status better. May add Spironolactone.   Wt 119.8<< 121.7, 3 L UO.  // B/L LE swelling and stasis: Suspect chronic Cor pulmonale secondary to COPD/VICENTE/OHA.  No evidence of infection. Continue ACE wrapping and leg elevation.   // Sinus Pauses: Not symptomatic  // Generalized weakness: Continue to manage electrolytes, PT/OT. May benefit from resp rehab.  PT/OT  // Type 2 DM:   //  HTN  // Tobacco, Marijuana Usage    Dispo: Patient feel she is lethargic and not able to  ambulate well. ? SNF    Echo: Mild concentric left ventricular hypertrophy.Left ventricular ejection fraction is visually estimated to be 60%.Grade II-III diastolic dysfunction.Estimated right ventricular systolic pressure  is 35 mmHg.Dilated inferior vena cava without inspiratory collapse.    CT chest 6/11/2017: 1.  Mild anterior peripheral interstitial prominence in the lung apices suggesting mild interstitial lung disease or pulmonary edema.2.Trace effusions and bibasilar atelectasis.3.  Coronary artery calcifications.  Echo 3/2017: Technically difficult study adequate information is obtained. Left ventricular ejection fraction is visually estimated to be 60%.No evidence of valvular abnormality based on Doppler evaluation. Right ventricular systolic pressure is estimated to be 40 mmHg.  Normal right atrial pressure.No prior study is available for comparison.   MPI 3/2017:No evidence of significant jeopardized viable myocardium or prior myocardial infarction.Normal left ventricular size, ejection fraction, and wall motion.ECG INTERPRETATION:Negative stress ECG for ischemia.   ----------------------------------------------------------------------------------------------------------------------  Filed Vitals:    06/14/17 0035 06/14/17 0425 06/14/17 0759 06/14/17 1134   BP: 150/92 143/82 186/108 156/84   Pulse: 72 95 68 62   Temp: 36.3 °C (97.4 °F) 36.2 °C (97.2 °F) 36.3 °C (97.4 °F) 36.7 °C (98.1 °F)   Resp: 18 16 18 16   Height:       Weight:       SpO2: 90% 92% 91% 90%     Weight/BMI: Body mass index is 41.36 kg/(m^2).  Pulse Oximetry: 90 %, O2 (LPM): 0, O2 Delivery: None (Room Air)    Intake/Output Summary (Last 24 hours) at 06/12/17 1525  Last data filed at 06/12/17 1200   Gross per 24 hour   Intake    240 ml   Output   2550 ml   Net  -2310 ml       Miguel Yates MD   [employer]

## 2017-06-14 NOTE — CARE PLAN
Problem: Safety  Goal: Will remain free from injury  Outcome: PROGRESSING AS EXPECTED  Fall precautions in place. Treaded socks on pt. Appropriate signs on doorway. IV pole on same side as bathroom. Bedrails up. Bed in lowest position and locked.  Call light and phone within reach. Patient educated on importance of calling nurses before getting out of bed, verbalizes understanding. \    Problem: Knowledge Deficit  Goal: Knowledge of disease process/condition, treatment plan, diagnostic tests, and medications will improve  Outcome: PROGRESSING AS EXPECTED  Patient educated about POC.  All questions answered in regards to disease process, treatment, and diet.  Patient verbalized understanding and voiced no further questions at this time.

## 2017-06-14 NOTE — PROGRESS NOTES
Bedside report completed.  Assumed pt care.  Pt AAO x4, resting in bed comfortably with no signs of labored breathing.  Pt tele monitor in place, cardiac rhythm being monitored.  Pt call light within reach, bed in low position, non skid socks in place.  Pt denies any pain or other distress at this time.

## 2017-06-14 NOTE — CARE PLAN
Problem: Pain Management  Goal: Pain level will decrease to patient’s comfort goal  Intervention: Follow pain managment plan developed in collaboration with patient and Interdisciplinary Team  RN educated patient regarding the use of the numeric pain scale and new pain medication.  Patient verbalized understanding of education and denies any questions at this time.        Problem: Venous Thromboembolism (VTW)/Deep Vein Thrombosis (DVT) Prevention:  Goal: Patient will participate in Venous Thrombosis (VTE)/Deep Vein Thrombosis (DVT)Prevention Measures  Intervention: Ensure patient wears graduated elastic stockings (JAI hose) and/or SCDs, if ordered, when in bed or chair (Remove at least once per shift for skin check)    06/14/17 0315   OTHER   Mechanical Prophylaxis AV Foot Pumps with JAI Hose (Graduated Compression Stockings);JAI Hose     RN educated patient regarding the importance of maintaining JAI hose.  Patient verbalized understanding of education and denies any questions at this time.

## 2017-06-14 NOTE — PROGRESS NOTES
[facility] Medicine Interval Note    Name Maxi Esposito       1950   Age/Sex 67 y.o. male   MRN [id number]   Code Status Full     After 5PM or if no immediate response to page, please call for cross-coverage  Attending/Team: Dr. Milan US Call [phone] to page   1st Call - Day Intern (R1):   Dr. Puneet Womack 2nd Call - Day Sr. Resident (R2/R3):   Dr. Carranza         Chief complaint/ reason for interval visit (Primary Diagnosis)   COPD exacerbation with SOB and leg swelling      Interval Problem Daily Status Update      Repeat ECHO-LVH, G II-III diastolic dysfunction, RVSP 35. Likely CHFpEF. Will add spironolactone 25 mg.   Not much improvement in crackles since yesterday.   Increased gabapentin from BID to TID due to complaints of neuropathic pain.  Room changed as patient found to have bed bugs    Review of Systems   Constitutional: Positive for malaise/fatigue. Negative for fever, chills and diaphoresis.   HENT: Negative for hearing loss and tinnitus.    Eyes: Negative for blurred vision, double vision and photophobia.   Respiratory: Negative for cough, sputum production, shortness of breath and wheezing.    Cardiovascular: Negative for chest pain and leg swelling.   Gastrointestinal: Negative for nausea, vomiting and diarrhea.   Genitourinary: Negative for dysuria and urgency.   Musculoskeletal: Negative for myalgias, falls and neck pain.   Skin: Negative for itching and rash.   Neurological: Negative for dizziness, tingling, tremors, weakness and headaches.   Endo/Heme/Allergies: Negative for environmental allergies. Does not bruise/bleed easily.   Psychiatric/Behavioral: Negative for depression and substance abuse.       Consultants/Specialty  None    Disposition  Inpatient for hypoxic respiratory failure    Quality Measures  Labs reviewed, Medications reviewed and EKG reviewed  Arshad catheter: No Arshad      DVT Prophylaxis: Enoxaparin (Lovenox)              Physical Exam       Filed Vitals:     06/14/17 0035 06/14/17 0425 06/14/17 0759 06/14/17 1134   BP: 150/92 143/82 186/108 156/84   Pulse: 72 95 68 62   Temp: 36.3 °C (97.4 °F) 36.2 °C (97.2 °F) 36.3 °C (97.4 °F) 36.7 °C (98.1 °F)   Resp: 18 16 18 16   Height:       Weight:       SpO2: 90% 92% 91% 90%     Body mass index is 41.36 kg/(m^2). Weight: 119.8 kg (264 lb 1.8 oz)  Oxygen Therapy:  Pulse Oximetry: 90 %, O2 (LPM): 0, O2 Delivery: None (Room Air)    Physical Exam  Physical Exam   Constitutional: He is oriented to person, place, and time.   Obese  Cardiovascular: Normal rate and regular rhythm.    Pulmonary/Chest: Effort normal. He has crackles. He exhibits no tenderness.   Distant lung sounds    Abdominal: Soft. There is no tenderness. There is no guarding.   Abdomen obese   Musculoskeletal: He exhibits edema and tenderness.   Erythema both lower extremities with tenderness and pitting edema-improved since admission  Neurological: He is alert and oriented to person, place, and time. No cranial nerve deficit. GCS score is 15.   Skin: Skin is warm. Rash noted. There is erythema-improving      Lab Data Review:      6/9/2017  2:32 PM    Recent Labs      06/12/17 0227 06/13/17   0607  06/14/17   0231   SODIUM  139  138  139   POTASSIUM  4.0  4.0  4.0   CHLORIDE  103  100  100   CO2  28  28  27   BUN  14  23*  26*   CREATININE  0.69  0.85  0.98   MAGNESIUM  1.8  1.7  1.7   CALCIUM  8.4*  8.9  9.2       Recent Labs      06/12/17 0227 06/13/17   0607  06/14/17   0231   ALTSGPT  18  19  16   ASTSGOT  13  10*  12   ALKPHOSPHAT  67  68  74   TBILIRUBIN  0.4  0.3  0.3   GLUCOSE  181*  163*  160*       Recent Labs      06/12/17 0227 06/13/17   0606  06/14/17   0231   RBC  4.53*  4.76  4.78   HEMOGLOBIN  13.2*  13.7*  13.9*   HEMATOCRIT  40.6*  42.4  42.6   PLATELETCT  347  348  304       Recent Labs      06/12/17   0227  06/13/17   0606  06/13/17   0607  06/14/17   0231   WBC  17.8*  17.9*   --   17.6*   NEUTSPOLYS  67.40  63.80   --   58.60    LYMPHOCYTES  19.30*  23.60   --   27.30   MONOCYTES  11.90  10.60   --   12.00   EOSINOPHILS  0.60  0.90   --   1.00   BASOPHILS  0.40  0.40   --   0.50   ASTSGOT  13   --   10*  12   ALTSGPT  18   --   19  16   ALKPHOSPHAT  67   --   68  74   TBILIRUBIN  0.4   --   0.3  0.3           Assessment/Plan       COPD exacerbation and possible right sided heart failure, hypoxic respiratory failure  -------------------------------  Hx of smoking,  COPD not on O2, presented with SOB, cough. Basal crepitations, scattered wheezes on examination, leg swelling on admission. He has features of sleep apnea. No previous PFT, no sleep study  Desaturated at night to 80s and on walking also, needed 2 liters, Still has respiratory distress.   CXR showed features of COPD and pulmonary congestion  Echocardiogram done on 3/5/2017 was non conclusive regarding HF  Left ventricular ejection fraction is visually estimated to be 60%.  Right ventricular systolic pressure is estimated to be 40 mmHg.   Although the echocardiogram was non conclusive regarding R sided HF, but clinically he seems having that   His HCO3 wnl 28, no features of chronic CO2 retention  Most likely hypoxic respiratory failure, sleep apnea with HF     Plan:  Repeat ECHO-LVH, G II-III diastolic dysfunction, RVSP 35. Likely CHFpEF. Will add spironolactone 25 mg.   RT Protocol  Prednisone  40 mg for total of 7 days  Duoneb Q6H    PO Abx doxycycline to continue for total 10 days  Lasix increased yesterday to 40 mg BID po        BILATERAL LOWER EXTREMITY EDEMA: CELLULITIS WITH STASIS DERMATITIS  ---------------------------------------------------------------------------------------------------  Patient has bilateral leg edema with venous stasis  Wbc 14 on admission, increased today to 17.9, steroid effect possibly   Severe erythema and tenderness of both lower extremities with chr venous stasis derm changes    DVT ruled out  Blood culture negative  It seems that it chronic  cellulitis due to venous stasis on the top of interstitial fluid accumulation due to heart failure    Plan:  ----------   Abx with doxycycline  Elastic Stocking     # Sinus pauses     Presented with sinus pauses up to 4.9 sec, seems asymptomatic on day of admission  Cardiology consulted and thinks it is related to hypoxic event from his sleep apnea  No pauses overnight  Continue monitoring       # VICENTE  - clinically seems to have sleep apnea, risk factor for HF  - no sleep study done  - nocturnal oximetry showed desaturation down to low 80s with O2 need up to 2 LPM  - CPAP as outpatient    T2 DM:  --------------  A1c 6.5  Accuchecks Q6 hr  hypoglycemic protocol  ISS       HTN:  ----------  BP controlled  On Lisinopril, continue  Hydralazine IV ordered PRN      DLD:  ----------  stable    Ct Statin    GERD:  ------------    No alarming symptoms  Ct Omeprazole    Tinea pedis:  ---------------  Topical clotrimazole    Substance Dependence:  ------------------------------  Smoking and Cannabis user:    Counseling to quit marijuana, and Smoking  Nicotine patch    Hx of Depression:  ----------------------  Pt lives in Shelter  On Aripiprazole  Currently No SI or HI

## 2017-06-14 NOTE — PROGRESS NOTES
Home medications found in patient's room.  Medications brought down to pharmacy to be checked in.  Meds in drawer number [phone]

## 2017-06-15 LAB
ALBUMIN SERPL BCP-MCNC: 3.5 G/DL (ref 3.2–4.9)
ALBUMIN/GLOB SERPL: 1.1 G/DL
ALP SERPL-CCNC: 72 U/L (ref 30–99)
ALT SERPL-CCNC: 16 U/L (ref 2–50)
ANION GAP SERPL CALC-SCNC: 11 MMOL/L (ref 0–11.9)
ANISOCYTOSIS BLD QL SMEAR: ABNORMAL
AST SERPL-CCNC: 12 U/L (ref 12–45)
BASOPHILS # BLD AUTO: 0 % (ref 0–1.8)
BASOPHILS # BLD: 0 K/UL (ref 0–0.12)
BILIRUB SERPL-MCNC: 0.3 MG/DL (ref 0.1–1.5)
BUN SERPL-MCNC: 24 MG/DL (ref 8–22)
CALCIUM SERPL-MCNC: 9.2 MG/DL (ref 8.5–10.5)
CHLORIDE SERPL-SCNC: 99 MMOL/L (ref 96–112)
CO2 SERPL-SCNC: 31 MMOL/L (ref 20–33)
CREAT SERPL-MCNC: 0.95 MG/DL (ref 0.5–1.4)
EOSINOPHIL # BLD AUTO: 0.17 K/UL (ref 0–0.51)
EOSINOPHIL NFR BLD: 0.9 % (ref 0–6.9)
ERYTHROCYTE [DISTWIDTH] IN BLOOD BY AUTOMATED COUNT: 47.9 FL (ref 35.9–50)
GFR SERPL CREATININE-BSD FRML MDRD: >60 ML/MIN/1.73 M 2
GLOBULIN SER CALC-MCNC: 3.3 G/DL (ref 1.9–3.5)
GLUCOSE BLD-MCNC: 102 MG/DL (ref 65–99)
GLUCOSE BLD-MCNC: 135 MG/DL (ref 65–99)
GLUCOSE BLD-MCNC: 240 MG/DL (ref 65–99)
GLUCOSE SERPL-MCNC: 120 MG/DL (ref 65–99)
HCT VFR BLD AUTO: 45.4 % (ref 42–52)
HGB BLD-MCNC: 15.2 G/DL (ref 14–18)
LYMPHOCYTES # BLD AUTO: 6.82 K/UL (ref 1–4.8)
LYMPHOCYTES NFR BLD: 36.3 % (ref 22–41)
MACROCYTES BLD QL SMEAR: ABNORMAL
MAGNESIUM SERPL-MCNC: 1.9 MG/DL (ref 1.5–2.5)
MANUAL DIFF BLD: NORMAL
MCH RBC QN AUTO: 29.1 PG (ref 27–33)
MCHC RBC AUTO-ENTMCNC: 33.5 G/DL (ref 33.7–35.3)
MCV RBC AUTO: 87 FL (ref 81.4–97.8)
METAMYELOCYTES NFR BLD MANUAL: 0.9 %
MONOCYTES # BLD AUTO: 1.82 K/UL (ref 0–0.85)
MONOCYTES NFR BLD AUTO: 9.7 % (ref 0–13.4)
MORPHOLOGY BLD-IMP: NORMAL
NEUTROPHILS # BLD AUTO: 9.81 K/UL (ref 1.82–7.42)
NEUTROPHILS NFR BLD: 52.2 % (ref 44–72)
NRBC # BLD AUTO: 0 K/UL
NRBC BLD AUTO-RTO: 0 /100 WBC
PLATELET # BLD AUTO: 387 K/UL (ref 164–446)
PLATELET BLD QL SMEAR: NORMAL
PMV BLD AUTO: 11.4 FL (ref 9–12.9)
POTASSIUM SERPL-SCNC: 3.9 MMOL/L (ref 3.6–5.5)
PROT SERPL-MCNC: 6.8 G/DL (ref 6–8.2)
RBC # BLD AUTO: 5.22 M/UL (ref 4.7–6.1)
RBC BLD AUTO: PRESENT
SODIUM SERPL-SCNC: 141 MMOL/L (ref 135–145)
WBC # BLD AUTO: 18.8 K/UL (ref 4.8–10.8)

## 2017-06-15 PROCEDURE — A9270 NON-COVERED ITEM OR SERVICE: HCPCS | Performed by: GENERAL ACUTE CARE HOSPITAL

## 2017-06-15 PROCEDURE — A9270 NON-COVERED ITEM OR SERVICE: HCPCS | Performed by: INTERNAL MEDICINE

## 2017-06-15 PROCEDURE — 83735 ASSAY OF MAGNESIUM: CPT

## 2017-06-15 PROCEDURE — 94760 N-INVAS EAR/PLS OXIMETRY 1: CPT

## 2017-06-15 PROCEDURE — 700105 HCHG RX REV CODE 258

## 2017-06-15 PROCEDURE — 700102 HCHG RX REV CODE 250 W/ 637 OVERRIDE(OP): Performed by: GENERAL ACUTE CARE HOSPITAL

## 2017-06-15 PROCEDURE — 700102 HCHG RX REV CODE 250 W/ 637 OVERRIDE(OP): Performed by: INTERNAL MEDICINE

## 2017-06-15 PROCEDURE — 11721 DEBRIDE NAIL 6 OR MORE: CPT

## 2017-06-15 PROCEDURE — 85027 COMPLETE CBC AUTOMATED: CPT

## 2017-06-15 PROCEDURE — 770020 HCHG ROOM/CARE - TELE (206)

## 2017-06-15 PROCEDURE — 82962 GLUCOSE BLOOD TEST: CPT | Mod: 91

## 2017-06-15 PROCEDURE — 11719 TRIM NAIL(S) ANY NUMBER: CPT

## 2017-06-15 PROCEDURE — 99232 SBSQ HOSP IP/OBS MODERATE 35: CPT | Mod: GC | Performed by: INTERNAL MEDICINE

## 2017-06-15 PROCEDURE — 36415 COLL VENOUS BLD VENIPUNCTURE: CPT

## 2017-06-15 PROCEDURE — 80053 COMPREHEN METABOLIC PANEL: CPT

## 2017-06-15 PROCEDURE — 700111 HCHG RX REV CODE 636 W/ 250 OVERRIDE (IP): Performed by: INTERNAL MEDICINE

## 2017-06-15 PROCEDURE — 700111 HCHG RX REV CODE 636 W/ 250 OVERRIDE (IP): Performed by: GENERAL ACUTE CARE HOSPITAL

## 2017-06-15 PROCEDURE — 85007 BL SMEAR W/DIFF WBC COUNT: CPT

## 2017-06-15 PROCEDURE — 700102 HCHG RX REV CODE 250 W/ 637 OVERRIDE(OP): Performed by: STUDENT IN AN ORGANIZED HEALTH CARE EDUCATION/TRAINING PROGRAM

## 2017-06-15 PROCEDURE — 11720 DEBRIDE NAIL 1-5: CPT

## 2017-06-15 PROCEDURE — A9270 NON-COVERED ITEM OR SERVICE: HCPCS | Performed by: STUDENT IN AN ORGANIZED HEALTH CARE EDUCATION/TRAINING PROGRAM

## 2017-06-15 RX ORDER — LISINOPRIL 20 MG/1
20 TABLET ORAL
Status: DISCONTINUED | OUTPATIENT
Start: 2017-06-16 | End: 2017-06-24 | Stop reason: HOSPADM

## 2017-06-15 RX ORDER — CARVEDILOL 3.12 MG/1
3.12 TABLET ORAL 2 TIMES DAILY WITH MEALS
Status: DISCONTINUED | OUTPATIENT
Start: 2017-06-16 | End: 2017-06-17

## 2017-06-15 RX ORDER — MAGNESIUM SULFATE HEPTAHYDRATE 40 MG/ML
2 INJECTION, SOLUTION INTRAVENOUS ONCE
Status: COMPLETED | OUTPATIENT
Start: 2017-06-15 | End: 2017-06-15

## 2017-06-15 RX ORDER — POLYVINYL ALCOHOL 14 MG/ML
1 SOLUTION/ DROPS OPHTHALMIC TWICE DAILY
Status: DISCONTINUED | OUTPATIENT
Start: 2017-06-15 | End: 2017-06-24 | Stop reason: HOSPADM

## 2017-06-15 RX ORDER — SODIUM CHLORIDE 9 MG/ML
INJECTION, SOLUTION INTRAVENOUS
Status: COMPLETED
Start: 2017-06-15 | End: 2017-06-15

## 2017-06-15 RX ADMIN — FUROSEMIDE 40 MG: 40 TABLET ORAL at 06:06

## 2017-06-15 RX ADMIN — GABAPENTIN 100 MG: 100 CAPSULE ORAL at 21:49

## 2017-06-15 RX ADMIN — BUDESONIDE AND FORMOTEROL FUMARATE DIHYDRATE 2 PUFF: 160; 4.5 AEROSOL RESPIRATORY (INHALATION) at 09:39

## 2017-06-15 RX ADMIN — SODIUM CHLORIDE 250 ML: 9 INJECTION, SOLUTION INTRAVENOUS at 06:45

## 2017-06-15 RX ADMIN — INSULIN LISPRO 2 UNITS: 100 INJECTION, SOLUTION INTRAVENOUS; SUBCUTANEOUS at 17:19

## 2017-06-15 RX ADMIN — SPIRONOLACTONE 25 MG: 25 TABLET, FILM COATED ORAL at 09:38

## 2017-06-15 RX ADMIN — NICOTINE 21 MG: 21 PATCH, EXTENDED RELEASE TRANSDERMAL at 06:06

## 2017-06-15 RX ADMIN — ATORVASTATIN CALCIUM 20 MG: 20 TABLET, FILM COATED ORAL at 21:49

## 2017-06-15 RX ADMIN — DOXYCYCLINE 100 MG: 100 TABLET ORAL at 09:37

## 2017-06-15 RX ADMIN — Medication 100 MG: at 09:38

## 2017-06-15 RX ADMIN — FUROSEMIDE 40 MG: 40 TABLET ORAL at 16:38

## 2017-06-15 RX ADMIN — ASPIRIN 81 MG: 81 TABLET ORAL at 09:37

## 2017-06-15 RX ADMIN — HYDRALAZINE HYDROCHLORIDE 20 MG: 20 INJECTION INTRAMUSCULAR; INTRAVENOUS at 13:46

## 2017-06-15 RX ADMIN — MAGNESIUM SULFATE IN WATER 2 G: 40 INJECTION, SOLUTION INTRAVENOUS at 07:25

## 2017-06-15 RX ADMIN — GABAPENTIN 100 MG: 100 CAPSULE ORAL at 09:37

## 2017-06-15 RX ADMIN — OMEPRAZOLE 20 MG: 20 CAPSULE, DELAYED RELEASE ORAL at 11:10

## 2017-06-15 RX ADMIN — POLYVINYL ALCOHOL 1 DROP: 14 SOLUTION/ DROPS OPHTHALMIC at 13:46

## 2017-06-15 RX ADMIN — FLUTICASONE PROPIONATE 100 MCG: 50 SPRAY, METERED NASAL at 09:39

## 2017-06-15 RX ADMIN — VANCOMYCIN HYDROCHLORIDE 125 MG: 10 INJECTION, POWDER, LYOPHILIZED, FOR SOLUTION INTRAVENOUS at 21:50

## 2017-06-15 RX ADMIN — DOXYCYCLINE 100 MG: 100 TABLET ORAL at 21:48

## 2017-06-15 RX ADMIN — LISINOPRIL 10 MG: 10 TABLET ORAL at 09:38

## 2017-06-15 RX ADMIN — VANCOMYCIN HYDROCHLORIDE 125 MG: 10 INJECTION, POWDER, LYOPHILIZED, FOR SOLUTION INTRAVENOUS at 09:38

## 2017-06-15 RX ADMIN — PREDNISONE 40 MG: 20 TABLET ORAL at 09:38

## 2017-06-15 RX ADMIN — CLOTRIMAZOLE: 10 CREAM TOPICAL at 09:39

## 2017-06-15 RX ADMIN — GABAPENTIN 100 MG: 100 CAPSULE ORAL at 16:38

## 2017-06-15 ASSESSMENT — ENCOUNTER SYMPTOMS
DEPRESSION: 0
TINGLING: 0
WHEEZING: 0
COUGH: 0
NECK PAIN: 0
FALLS: 0
DIARRHEA: 0
FEVER: 0
DIAPHORESIS: 0
BRUISES/BLEEDS EASILY: 0
VOMITING: 0
MYALGIAS: 0
NAUSEA: 0
SPUTUM PRODUCTION: 0
SHORTNESS OF BREATH: 0
BLURRED VISION: 0
TREMORS: 0
WEAKNESS: 0
HEADACHES: 0
DIZZINESS: 0
CHILLS: 0
PHOTOPHOBIA: 0
DOUBLE VISION: 0

## 2017-06-15 ASSESSMENT — PAIN SCALES - GENERAL
PAINLEVEL_OUTOF10: 0
PAINLEVEL_OUTOF10: 0
PAINLEVEL_OUTOF10: 3
PAINLEVEL_OUTOF10: 0
PAINLEVEL_OUTOF10: 0

## 2017-06-15 ASSESSMENT — LIFESTYLE VARIABLES: SUBSTANCE_ABUSE: 0

## 2017-06-15 NOTE — WOUND TEAM
"Patient soaked feet in warm water x 5 minutes and assisted to bed.  Trimmed and filed all toenails without incident noting all toes thick and/or mycotic.  Instructed on importance of not going barefoot and obtaining orthotic footwear as both second toenail beds are discolored and patient reports \"shoes too short\".  Lotion applied to both feet and legs and JAI hose reapplied.  Discussed with staff RN.  "

## 2017-06-15 NOTE — CARE PLAN
Problem: Knowledge Deficit  Goal: Knowledge of disease process/condition, treatment plan, diagnostic tests, and medications will improve  Intervention: Explain information regarding disease process/condition, treatment plan, diagnostic tests, and medications and document in education  Pt educated on POC, all questions answered in regards to disease process, treatment and diet. Pt  verbalize understanding and voice no further questions at this time.           Problem: Respiratory:  Goal: Respiratory status will improve  Intervention: Assess and monitor pulmonary status    06/15/17 0300 06/15/17 0800 06/15/17 1325   OTHER   Respiration --  --  18   Pulse Oximetry --  --  91 %   O2 (LPM) --  --  0   Work Of Breathing / Effort --  Mild --    Pre/Post Intervention Pre Intervention Assessment --  --    RUL Breath Sounds --  Clear;Diminished --    RML Breath Sounds --  Clear;Diminished --    RLL Breath Sounds --  Diminished --    REYNA Breath Sounds --  Clear;Diminished --    LLL Breath Sounds --  Diminished --    Respiratory   Cough --  Non Productive --

## 2017-06-15 NOTE — PROGRESS NOTES
[facility] [place] [facility] [place]    Name Maxi Esposito       1950   Age/Sex 67 y.o. male   MRN [id number]   Code Status Full     After 5PM or if no immediate response to page, please call for cross-coverage  Attending/Team: Dr. Milan US Call [phone] to page   1st Call - Day Intern (R1):   Dr. Puneet Womack 2nd Call - Day Sr. Resident (R2/R3):   Dr. Carranza         Chief complaint/ reason for interval visit (Primary Diagnosis)    SOB and leg swelling      Interval Problem Daily Status Update      Repeat ECHO-LVH, G II-III diastolic dysfunction, RVSP 35. Likely CHFpEF. added spironolactone 25 mg.   Improvement in his SOB   Stopped prednisolone   O2 reqiurement between 0 and 1 LPM      Review of Systems   Constitutional: Positive for malaise/fatigue. Negative for fever, chills and diaphoresis.   HENT: Negative for hearing loss and tinnitus.    Eyes: Negative for blurred vision, double vision and photophobia.   Respiratory: Negative for cough, sputum production, shortness of breath and wheezing.    Cardiovascular: Negative for chest pain and leg swelling.   Gastrointestinal: Negative for nausea, vomiting and diarrhea.   Genitourinary: Negative for dysuria and urgency.   Musculoskeletal: Negative for myalgias, falls and neck pain.   Skin: Negative for itching and rash.   Neurological: Negative for dizziness, tingling, tremors, weakness and headaches.   Endo/Heme/Allergies: Negative for environmental allergies. Does not bruise/bleed easily.   Psychiatric/Behavioral: Negative for depression and substance abuse.       Consultants/Specialty  Social service for SNF    Disposition  SNF after stabilization of heart condition    Quality Measures  Labs reviewed, Medications reviewed and EKG reviewed  Arshad catheter: No Arshad      DVT Prophylaxis: Enoxaparin (Lovenox)              Physical Exam       Filed Vitals:    06/15/17 0414 06/15/17 0644 06/15/17 0838 06/15/17 1325   BP: 140/75  155/100 164/112    Pulse: 78 79 68 97   Temp: 36.4 °C (97.5 °F)  36.1 °C (96.9 °F) 36.3 °C (97.4 °F)   Resp: 18 20 18 18   Height:       Weight:       SpO2: 94% 97% 91% 91%     Body mass index is 40.35 kg/(m^2). Weight: 116.9 kg (257 lb 11.5 oz)  Oxygen Therapy:  Pulse Oximetry: 91 %, O2 (LPM): 0, O2 Delivery: None (Room Air)    Physical Exam  Physical Exam   Constitutional: He is oriented to person, place, and time.   Obese  Cardiovascular: Normal rate and regular rhythm.    Pulmonary/Chest: Effort normal. No crackles today He exhibits no tenderness.   Distant lung sounds    Abdominal: Soft. There is no tenderness. There is no guarding.   Abdomen obese   Musculoskeletal: He exhibits edema which is decreasing  and tenderness.   Erythema both lower extremities with tenderness and pitting edema-improved since admission  Neurological: He is alert and oriented to person, place, and time. No cranial nerve deficit. GCS score is 15.   Skin: Skin is warm. Rash noted. There is erythema-improving      Lab Data Review:      6/9/2017  2:32 PM    Recent Labs      06/13/17   0607  06/14/17   0231  06/15/17   0148   SODIUM  138  139  141   POTASSIUM  4.0  4.0  3.9   CHLORIDE  100  100  99   CO2  28  27  31   BUN  23*  26*  24*   CREATININE  0.85  0.98  0.95   MAGNESIUM  1.7  1.7  1.9   CALCIUM  8.9  9.2  9.2       Recent Labs      06/13/17   0607  06/14/17   0231  06/15/17   0148   ALTSGPT  19  16  16   ASTSGOT  10*  12  12   ALKPHOSPHAT  68  74  72   TBILIRUBIN  0.3  0.3  0.3   GLUCOSE  163*  160*  120*       Recent Labs      06/13/17   0606  06/14/17   0231  06/15/17   0148   RBC  4.76  4.78  5.22   HEMOGLOBIN  13.7*  13.9*  15.2   HEMATOCRIT  42.4  42.6  45.4   PLATELETCT  348  304  387       Recent Labs      06/13/17   0606  06/13/17   0607  06/14/17   0231  06/15/17   0148   WBC  17.9*   --   17.6*  18.8*   NEUTSPOLYS  63.80   --   58.60  52.20   LYMPHOCYTES  23.60   --   27.30  36.30   MONOCYTES  10.60   --   12.00  9.70   EOSINOPHILS  0.90    --   1.00  0.90   BASOPHILS  0.40   --   0.50  0.00   ASTSGOT   --   10*  12  12   ALTSGPT   --   19  16  16   ALKPHOSPHAT   --   68  74  72   TBILIRUBIN   --   0.3  0.3  0.3           Assessment/Plan       Heart failure with preserved EF  -------------------------------   presented with SOB, cough. Basal crepitations, scattered wheezes on examination, leg swelling on admission. He has features of sleep apnea. No previous PFT, no sleep study  Desaturated at night to 80s and on walking also, needed 2 liters.  CXR showed features of COPD and pulmonary congestion  Repeat showed ECHO-LVH, G II-III diastolic dysfunction, RVSP 35. Likely CHFpEFF  Most likely heart failure with preserved EF  Responded to diuretics, lasix and aldactone  Less crepitations on examination today. No respiratory distress.  Continue on lisinopril and diuretics, lasix 40 mg BID po  To add carvedilol tomorrow  Daily weight and I and O follow up, water restriction      # COPD   hx of smoking, COPD on inhalars, no PFT in chart   no wheezes now, less respiratory distress, cough is better. improving    Plan:  Continue RT Protocol  To stop prednisolone  Duoneb Q6H    PO Abx doxycycline to continue for total 10 days        BILATERAL LOWER EXTREMITY EDEMA: CELLULITIS WITH STASIS DERMATITIS  ---------------------------------------------------------------------------------------------------  Patient has bilateral leg edema with venous stasis  Wbc 14 on admission, increased today to 17.9, steroid effect possibly   Severe erythema and tenderness of both lower extremities with chr venous stasis derm changes    DVT ruled out  Blood culture negative  It seems that it chronic cellulitis due to venous stasis on the top of interstitial fluid accumulation due to heart failure    Plan:  ----------   Abx with doxycycline  Elastic Stocking     # Sinus pauses     Presented with sinus pauses up to 4.9 sec, seems asymptomatic on day of admission  Cardiology consulted on  day of admission and thinks it is related to hypoxic event from his sleep apnea  No pauses now  Continue monitoring       # VICENTE  - clinically seems to have sleep apnea, risk factor for HF  - no sleep study done  - nocturnal oximetry showed desaturation down to low 80s with O2 need up to 2 LPM  - CPAP as outpatient    T2 DM:  --------------  A1c 6.5  Accuchecks Q6 hr  hypoglycemic protocol  ISS       HTN:  ----------  BP elevated yesterday to 173/ 110  On Lisinopril, increased to 20 mg   Hydralazine IV ordered PRN      DLD:  ----------  stable    Ct Statin    GERD:  ------------    No alarming symptoms  Ct Omeprazole    Tinea pedis:  ---------------  Topical clotrimazole    Substance Dependence:  ------------------------------  Smoking and Cannabis user:    Counseling to quit marijuana, and Smoking  Nicotine patch    Hx of Depression:  ----------------------  Pt lives in Shelter  On Aripiprazole  Currently No SI or HI

## 2017-06-15 NOTE — PROGRESS NOTES
Pt not compliant with fluid restriction. Seen going to kitchen to get coffee. Educated on importance of following diet and fluid restriction.  Still refuses.

## 2017-06-15 NOTE — PROGRESS NOTES
Report received by NOC RN. Assumed care of pt. Assessment complete. Dr. Womack at bedside. Pt A&O x 4. Pt c/o dry eyes-Dr. Womack notified-orders artificial tears, has bilat LE swelling and redness-passed antibiotics and diuretics as ordered, and no c/o dyspnea. Pt in no apparent signs of distress. Plan of care discussed. Call light in reach,  bed in lowest position, and pt has no further questions at this time.

## 2017-06-16 LAB
ALBUMIN SERPL BCP-MCNC: 3.6 G/DL (ref 3.2–4.9)
ALBUMIN/GLOB SERPL: 1.2 G/DL
ALP SERPL-CCNC: 63 U/L (ref 30–99)
ALT SERPL-CCNC: 16 U/L (ref 2–50)
ANION GAP SERPL CALC-SCNC: 11 MMOL/L (ref 0–11.9)
AST SERPL-CCNC: 12 U/L (ref 12–45)
BASOPHILS # BLD AUTO: 0.4 % (ref 0–1.8)
BASOPHILS # BLD: 0.09 K/UL (ref 0–0.12)
BILIRUB SERPL-MCNC: 0.4 MG/DL (ref 0.1–1.5)
BUN SERPL-MCNC: 26 MG/DL (ref 8–22)
CALCIUM SERPL-MCNC: 9.2 MG/DL (ref 8.5–10.5)
CHLORIDE SERPL-SCNC: 101 MMOL/L (ref 96–112)
CO2 SERPL-SCNC: 28 MMOL/L (ref 20–33)
COMMENT 1642: NORMAL
CREAT SERPL-MCNC: 0.86 MG/DL (ref 0.5–1.4)
EOSINOPHIL # BLD AUTO: 0.09 K/UL (ref 0–0.51)
EOSINOPHIL NFR BLD: 0.4 % (ref 0–6.9)
ERYTHROCYTE [DISTWIDTH] IN BLOOD BY AUTOMATED COUNT: 48.9 FL (ref 35.9–50)
GFR SERPL CREATININE-BSD FRML MDRD: >60 ML/MIN/1.73 M 2
GLOBULIN SER CALC-MCNC: 3 G/DL (ref 1.9–3.5)
GLUCOSE BLD-MCNC: 121 MG/DL (ref 65–99)
GLUCOSE BLD-MCNC: 129 MG/DL (ref 65–99)
GLUCOSE BLD-MCNC: 141 MG/DL (ref 65–99)
GLUCOSE SERPL-MCNC: 105 MG/DL (ref 65–99)
HCT VFR BLD AUTO: 48.8 % (ref 42–52)
HGB BLD-MCNC: 16.3 G/DL (ref 14–18)
IMM GRANULOCYTES # BLD AUTO: 0.16 K/UL (ref 0–0.11)
IMM GRANULOCYTES NFR BLD AUTO: 0.8 % (ref 0–0.9)
LYMPHOCYTES # BLD AUTO: 5.1 K/UL (ref 1–4.8)
LYMPHOCYTES NFR BLD: 24.5 % (ref 22–41)
MAGNESIUM SERPL-MCNC: 2.1 MG/DL (ref 1.5–2.5)
MCH RBC QN AUTO: 29.3 PG (ref 27–33)
MCHC RBC AUTO-ENTMCNC: 33.4 G/DL (ref 33.7–35.3)
MCV RBC AUTO: 87.8 FL (ref 81.4–97.8)
MONOCYTES # BLD AUTO: 2.54 K/UL (ref 0–0.85)
MONOCYTES NFR BLD AUTO: 12.2 % (ref 0–13.4)
MORPHOLOGY BLD-IMP: NORMAL
NEUTROPHILS # BLD AUTO: 12.83 K/UL (ref 1.82–7.42)
NEUTROPHILS NFR BLD: 61.7 % (ref 44–72)
NRBC # BLD AUTO: 0 K/UL
NRBC BLD AUTO-RTO: 0 /100 WBC
PLATELET # BLD AUTO: 413 K/UL (ref 164–446)
PMV BLD AUTO: 11.5 FL (ref 9–12.9)
POTASSIUM SERPL-SCNC: 4.1 MMOL/L (ref 3.6–5.5)
PROT SERPL-MCNC: 6.6 G/DL (ref 6–8.2)
RBC # BLD AUTO: 5.56 M/UL (ref 4.7–6.1)
SODIUM SERPL-SCNC: 140 MMOL/L (ref 135–145)
WBC # BLD AUTO: 20.8 K/UL (ref 4.8–10.8)

## 2017-06-16 PROCEDURE — A9270 NON-COVERED ITEM OR SERVICE: HCPCS | Performed by: INTERNAL MEDICINE

## 2017-06-16 PROCEDURE — 700102 HCHG RX REV CODE 250 W/ 637 OVERRIDE(OP): Performed by: INTERNAL MEDICINE

## 2017-06-16 PROCEDURE — 83735 ASSAY OF MAGNESIUM: CPT

## 2017-06-16 PROCEDURE — 36415 COLL VENOUS BLD VENIPUNCTURE: CPT

## 2017-06-16 PROCEDURE — A9270 NON-COVERED ITEM OR SERVICE: HCPCS | Performed by: GENERAL ACUTE CARE HOSPITAL

## 2017-06-16 PROCEDURE — 700102 HCHG RX REV CODE 250 W/ 637 OVERRIDE(OP): Performed by: GENERAL ACUTE CARE HOSPITAL

## 2017-06-16 PROCEDURE — 80053 COMPREHEN METABOLIC PANEL: CPT

## 2017-06-16 PROCEDURE — 700102 HCHG RX REV CODE 250 W/ 637 OVERRIDE(OP): Performed by: STUDENT IN AN ORGANIZED HEALTH CARE EDUCATION/TRAINING PROGRAM

## 2017-06-16 PROCEDURE — 700111 HCHG RX REV CODE 636 W/ 250 OVERRIDE (IP): Performed by: INTERNAL MEDICINE

## 2017-06-16 PROCEDURE — 82962 GLUCOSE BLOOD TEST: CPT

## 2017-06-16 PROCEDURE — 99232 SBSQ HOSP IP/OBS MODERATE 35: CPT | Mod: GC | Performed by: INTERNAL MEDICINE

## 2017-06-16 PROCEDURE — 770020 HCHG ROOM/CARE - TELE (206)

## 2017-06-16 PROCEDURE — 85025 COMPLETE CBC W/AUTO DIFF WBC: CPT

## 2017-06-16 PROCEDURE — A9270 NON-COVERED ITEM OR SERVICE: HCPCS | Performed by: STUDENT IN AN ORGANIZED HEALTH CARE EDUCATION/TRAINING PROGRAM

## 2017-06-16 RX ORDER — FUROSEMIDE 40 MG/1
40 TABLET ORAL
Status: DISCONTINUED | OUTPATIENT
Start: 2017-06-17 | End: 2017-06-17

## 2017-06-16 RX ADMIN — POLYVINYL ALCOHOL 1 DROP: 14 SOLUTION/ DROPS OPHTHALMIC at 08:21

## 2017-06-16 RX ADMIN — CARVEDILOL 3.12 MG: 3.12 TABLET, FILM COATED ORAL at 16:13

## 2017-06-16 RX ADMIN — GABAPENTIN 100 MG: 100 CAPSULE ORAL at 08:21

## 2017-06-16 RX ADMIN — BUDESONIDE AND FORMOTEROL FUMARATE DIHYDRATE 2 PUFF: 160; 4.5 AEROSOL RESPIRATORY (INHALATION) at 08:21

## 2017-06-16 RX ADMIN — SPIRONOLACTONE 25 MG: 25 TABLET, FILM COATED ORAL at 08:22

## 2017-06-16 RX ADMIN — CARVEDILOL 3.12 MG: 3.12 TABLET, FILM COATED ORAL at 08:22

## 2017-06-16 RX ADMIN — VANCOMYCIN HYDROCHLORIDE 125 MG: 10 INJECTION, POWDER, LYOPHILIZED, FOR SOLUTION INTRAVENOUS at 23:01

## 2017-06-16 RX ADMIN — POLYVINYL ALCOHOL 1 DROP: 14 SOLUTION/ DROPS OPHTHALMIC at 22:58

## 2017-06-16 RX ADMIN — VANCOMYCIN HYDROCHLORIDE 125 MG: 10 INJECTION, POWDER, LYOPHILIZED, FOR SOLUTION INTRAVENOUS at 08:21

## 2017-06-16 RX ADMIN — CLOTRIMAZOLE: 10 CREAM TOPICAL at 08:22

## 2017-06-16 RX ADMIN — GABAPENTIN 100 MG: 100 CAPSULE ORAL at 16:13

## 2017-06-16 RX ADMIN — Medication 100 MG: at 08:21

## 2017-06-16 RX ADMIN — DOXYCYCLINE 100 MG: 100 TABLET ORAL at 22:59

## 2017-06-16 RX ADMIN — FLUTICASONE PROPIONATE 100 MCG: 50 SPRAY, METERED NASAL at 08:22

## 2017-06-16 RX ADMIN — OMEPRAZOLE 20 MG: 20 CAPSULE, DELAYED RELEASE ORAL at 08:21

## 2017-06-16 RX ADMIN — ACETAMINOPHEN 650 MG: 325 TABLET, FILM COATED ORAL at 05:52

## 2017-06-16 RX ADMIN — ASPIRIN 81 MG: 81 TABLET ORAL at 08:21

## 2017-06-16 RX ADMIN — LISINOPRIL 20 MG: 20 TABLET ORAL at 08:22

## 2017-06-16 RX ADMIN — ATORVASTATIN CALCIUM 20 MG: 20 TABLET, FILM COATED ORAL at 22:58

## 2017-06-16 RX ADMIN — HYDRALAZINE HYDROCHLORIDE 20 MG: 20 INJECTION INTRAMUSCULAR; INTRAVENOUS at 03:55

## 2017-06-16 RX ADMIN — GABAPENTIN 100 MG: 100 CAPSULE ORAL at 22:59

## 2017-06-16 RX ADMIN — DOXYCYCLINE 100 MG: 100 TABLET ORAL at 08:21

## 2017-06-16 RX ADMIN — FUROSEMIDE 40 MG: 40 TABLET ORAL at 05:52

## 2017-06-16 RX ADMIN — NICOTINE 21 MG: 21 PATCH, EXTENDED RELEASE TRANSDERMAL at 05:52

## 2017-06-16 ASSESSMENT — ENCOUNTER SYMPTOMS
BLURRED VISION: 0
SHORTNESS OF BREATH: 0
DIZZINESS: 0
HEADACHES: 0
BRUISES/BLEEDS EASILY: 0
WEAKNESS: 0
WHEEZING: 0
MYALGIAS: 0
DEPRESSION: 0
SPUTUM PRODUCTION: 0
TREMORS: 0
FALLS: 0
COUGH: 0
NECK PAIN: 0
VOMITING: 0
DOUBLE VISION: 0
DIAPHORESIS: 0
CHILLS: 0
DIARRHEA: 0
PHOTOPHOBIA: 0
FEVER: 0
NAUSEA: 0
TINGLING: 0

## 2017-06-16 ASSESSMENT — PAIN SCALES - GENERAL
PAINLEVEL_OUTOF10: 0

## 2017-06-16 ASSESSMENT — LIFESTYLE VARIABLES: SUBSTANCE_ABUSE: 0

## 2017-06-16 NOTE — PROGRESS NOTES
Pt resting comfortably in bed. VSS. No signs of distress. Bed locked and in lowest position. Call light within reach. Hourly rounding in place.

## 2017-06-16 NOTE — DISCHARGE PLANNING
SNF Referrals right-faxed to [facility], and [facility] [place] [facility] [place].

## 2017-06-16 NOTE — PROGRESS NOTES
Pt refusing bed alarm. Two RN's educated pt on fall risk. Pt a&ox4. Verbalizes understanding and continues to refuse. Non slip socks in place. Bed locked and in lowest position.

## 2017-06-16 NOTE — DISCHARGE PLANNING
Received message from Francisca at [facility], referral has been declined due to lack of skilled need.

## 2017-06-16 NOTE — PROGRESS NOTES
Report received by NOC RN. Assumed care of pt. Assessment complete. Dr. Baron at bedside. Pt A&O x 4. Pt has bilat LE edema-dago hose in place, was educated on importance on fluid restriction-not compliant, and c/o slight dyspnea-encouraged IS use-states improved breathing. Pt in no apparent signs of distress. Plan of care discussed. Call light in reach,  bed in lowest position, and pt has no further questions at this time.

## 2017-06-16 NOTE — PROGRESS NOTES
Bedside report received from day RN. Assumed pt care. Pt a&ox4. POC discussed. Pt asking questions about if he still has an infection or not. All questions answered. Pt denies any other needs at this time. Bed locked and in lowest position. Call light within reach. Hourly rounding in place.

## 2017-06-16 NOTE — CARE PLAN
Problem: Safety  Goal: Will remain free from injury  Outcome: PROGRESSING AS EXPECTED  Fall precautions in place. Bed wheels locked, bed is in the lowest position. Call light in reach. Pt refuses bed alarm. Non-skid socks provided. Patient provides successful verbalization of fall and safety precautions and demonstration of use of call bell. Upper side rails are up. Hourly rounding in progress.         Problem: Knowledge Deficit  Goal: Knowledge of disease process/condition, treatment plan, diagnostic tests, and medications will improve  Outcome: PROGRESSING SLOWER THAN EXPECTED  POC discussed with the patient. All questions and concerns addressed and answered. Patient is involved in POC and verbalizes the understanding of the disease process, medications, tests and treatments. Questions on POC encouraged throughout care.

## 2017-06-16 NOTE — DISCHARGE PLANNING
Received call from Blanka at [facility], referral has been declined due to pt's behavior the last time he was there.

## 2017-06-16 NOTE — CARE PLAN
Problem: Infection  Goal: Will remain free from infection  Pt lab values monitored, VS monitored. No new s/s infection.         Problem: Knowledge Deficit  Goal: Knowledge of disease process/condition, treatment plan, diagnostic tests, and medications will improve  Intervention: Explain information regarding disease process/condition, treatment plan, diagnostic tests, and medications and document in education  Pt  educated on POC, all questions answered in regards to disease process, treatment and diet. Pt verbalize understanding and voice no further questions at this time.

## 2017-06-17 PROBLEM — D75.0 ESSENTIAL HYPERERYTHROPOIETINEMIA: Chronic | Status: ACTIVE | Noted: 2017-06-17

## 2017-06-17 PROBLEM — I10 ESSENTIAL HYPERTENSION: Chronic | Status: ACTIVE | Noted: 2017-06-17

## 2017-06-17 PROBLEM — I50.30 HEART FAILURE WITH PRESERVED EJECTION FRACTION (HCC): Chronic | Status: ACTIVE | Noted: 2017-06-17

## 2017-06-17 LAB
ALBUMIN SERPL BCP-MCNC: 3.4 G/DL (ref 3.2–4.9)
ALBUMIN/GLOB SERPL: 1.1 G/DL
ALP SERPL-CCNC: 60 U/L (ref 30–99)
ALT SERPL-CCNC: 17 U/L (ref 2–50)
ANION GAP SERPL CALC-SCNC: 9 MMOL/L (ref 0–11.9)
ANISOCYTOSIS BLD QL SMEAR: ABNORMAL
AST SERPL-CCNC: 12 U/L (ref 12–45)
BASOPHILS # BLD AUTO: 0 % (ref 0–1.8)
BASOPHILS # BLD: 0 K/UL (ref 0–0.12)
BILIRUB SERPL-MCNC: 0.4 MG/DL (ref 0.1–1.5)
BUN SERPL-MCNC: 30 MG/DL (ref 8–22)
CALCIUM SERPL-MCNC: 9 MG/DL (ref 8.5–10.5)
CHLORIDE SERPL-SCNC: 102 MMOL/L (ref 96–112)
CO2 SERPL-SCNC: 27 MMOL/L (ref 20–33)
CREAT SERPL-MCNC: 0.96 MG/DL (ref 0.5–1.4)
EOSINOPHIL # BLD AUTO: 0.79 K/UL (ref 0–0.51)
EOSINOPHIL NFR BLD: 4.4 % (ref 0–6.9)
ERYTHROCYTE [DISTWIDTH] IN BLOOD BY AUTOMATED COUNT: 49.1 FL (ref 35.9–50)
GFR SERPL CREATININE-BSD FRML MDRD: >60 ML/MIN/1.73 M 2
GLOBULIN SER CALC-MCNC: 3 G/DL (ref 1.9–3.5)
GLUCOSE BLD-MCNC: 110 MG/DL (ref 65–99)
GLUCOSE BLD-MCNC: 155 MG/DL (ref 65–99)
GLUCOSE BLD-MCNC: 161 MG/DL (ref 65–99)
GLUCOSE BLD-MCNC: 170 MG/DL (ref 65–99)
GLUCOSE SERPL-MCNC: 121 MG/DL (ref 65–99)
HCT VFR BLD AUTO: 49.8 % (ref 42–52)
HGB BLD-MCNC: 16.2 G/DL (ref 14–18)
LYMPHOCYTES # BLD AUTO: 6.07 K/UL (ref 1–4.8)
LYMPHOCYTES NFR BLD: 33.7 % (ref 22–41)
MACROCYTES BLD QL SMEAR: ABNORMAL
MAGNESIUM SERPL-MCNC: 2 MG/DL (ref 1.5–2.5)
MANUAL DIFF BLD: NORMAL
MCH RBC QN AUTO: 28.5 PG (ref 27–33)
MCHC RBC AUTO-ENTMCNC: 32.5 G/DL (ref 33.7–35.3)
MCV RBC AUTO: 87.5 FL (ref 81.4–97.8)
MONOCYTES # BLD AUTO: 1.91 K/UL (ref 0–0.85)
MONOCYTES NFR BLD AUTO: 10.6 % (ref 0–13.4)
MORPHOLOGY BLD-IMP: NORMAL
NEUTROPHILS # BLD AUTO: 9.23 K/UL (ref 1.82–7.42)
NEUTROPHILS NFR BLD: 51.3 % (ref 44–72)
NRBC # BLD AUTO: 0 K/UL
NRBC BLD AUTO-RTO: 0 /100 WBC
PLATELET # BLD AUTO: 414 K/UL (ref 164–446)
PLATELET BLD QL SMEAR: NORMAL
PMV BLD AUTO: 11.4 FL (ref 9–12.9)
POLYCHROMASIA BLD QL SMEAR: NORMAL
POTASSIUM SERPL-SCNC: 4.1 MMOL/L (ref 3.6–5.5)
PROT SERPL-MCNC: 6.4 G/DL (ref 6–8.2)
RBC # BLD AUTO: 5.69 M/UL (ref 4.7–6.1)
RBC BLD AUTO: PRESENT
SODIUM SERPL-SCNC: 138 MMOL/L (ref 135–145)
WBC # BLD AUTO: 18 K/UL (ref 4.8–10.8)

## 2017-06-17 PROCEDURE — 700111 HCHG RX REV CODE 636 W/ 250 OVERRIDE (IP): Performed by: STUDENT IN AN ORGANIZED HEALTH CARE EDUCATION/TRAINING PROGRAM

## 2017-06-17 PROCEDURE — A9270 NON-COVERED ITEM OR SERVICE: HCPCS | Performed by: STUDENT IN AN ORGANIZED HEALTH CARE EDUCATION/TRAINING PROGRAM

## 2017-06-17 PROCEDURE — A9270 NON-COVERED ITEM OR SERVICE: HCPCS | Performed by: GENERAL ACUTE CARE HOSPITAL

## 2017-06-17 PROCEDURE — 700102 HCHG RX REV CODE 250 W/ 637 OVERRIDE(OP): Performed by: STUDENT IN AN ORGANIZED HEALTH CARE EDUCATION/TRAINING PROGRAM

## 2017-06-17 PROCEDURE — A9270 NON-COVERED ITEM OR SERVICE: HCPCS | Performed by: INTERNAL MEDICINE

## 2017-06-17 PROCEDURE — 83735 ASSAY OF MAGNESIUM: CPT

## 2017-06-17 PROCEDURE — 700102 HCHG RX REV CODE 250 W/ 637 OVERRIDE(OP): Performed by: GENERAL ACUTE CARE HOSPITAL

## 2017-06-17 PROCEDURE — 770006 HCHG ROOM/CARE - MED/SURG/GYN SEMI*

## 2017-06-17 PROCEDURE — 80053 COMPREHEN METABOLIC PANEL: CPT

## 2017-06-17 PROCEDURE — 82962 GLUCOSE BLOOD TEST: CPT | Mod: 91

## 2017-06-17 PROCEDURE — 99232 SBSQ HOSP IP/OBS MODERATE 35: CPT | Mod: GC | Performed by: INTERNAL MEDICINE

## 2017-06-17 PROCEDURE — 85007 BL SMEAR W/DIFF WBC COUNT: CPT

## 2017-06-17 PROCEDURE — 36415 COLL VENOUS BLD VENIPUNCTURE: CPT

## 2017-06-17 PROCEDURE — 700102 HCHG RX REV CODE 250 W/ 637 OVERRIDE(OP): Performed by: INTERNAL MEDICINE

## 2017-06-17 PROCEDURE — 85027 COMPLETE CBC AUTOMATED: CPT

## 2017-06-17 RX ORDER — FUROSEMIDE 20 MG/1
20 TABLET ORAL
Status: DISCONTINUED | OUTPATIENT
Start: 2017-06-17 | End: 2017-06-17

## 2017-06-17 RX ORDER — AMLODIPINE BESYLATE 5 MG/1
5 TABLET ORAL
Status: DISCONTINUED | OUTPATIENT
Start: 2017-06-17 | End: 2017-06-24 | Stop reason: HOSPADM

## 2017-06-17 RX ORDER — CARVEDILOL 6.25 MG/1
6.25 TABLET ORAL 2 TIMES DAILY WITH MEALS
Status: DISCONTINUED | OUTPATIENT
Start: 2017-06-17 | End: 2017-06-24 | Stop reason: HOSPADM

## 2017-06-17 RX ADMIN — CLOTRIMAZOLE: 10 CREAM TOPICAL at 08:13

## 2017-06-17 RX ADMIN — GABAPENTIN 100 MG: 100 CAPSULE ORAL at 20:10

## 2017-06-17 RX ADMIN — BUDESONIDE AND FORMOTEROL FUMARATE DIHYDRATE 2 PUFF: 160; 4.5 AEROSOL RESPIRATORY (INHALATION) at 20:10

## 2017-06-17 RX ADMIN — NICOTINE 21 MG: 21 PATCH, EXTENDED RELEASE TRANSDERMAL at 06:19

## 2017-06-17 RX ADMIN — LISINOPRIL 20 MG: 20 TABLET ORAL at 08:15

## 2017-06-17 RX ADMIN — CLOTRIMAZOLE: 10 CREAM TOPICAL at 20:28

## 2017-06-17 RX ADMIN — VANCOMYCIN HYDROCHLORIDE 125 MG: 10 INJECTION, POWDER, LYOPHILIZED, FOR SOLUTION INTRAVENOUS at 20:17

## 2017-06-17 RX ADMIN — GABAPENTIN 100 MG: 100 CAPSULE ORAL at 08:15

## 2017-06-17 RX ADMIN — OMEPRAZOLE 20 MG: 20 CAPSULE, DELAYED RELEASE ORAL at 08:15

## 2017-06-17 RX ADMIN — DOXYCYCLINE 100 MG: 100 TABLET ORAL at 08:15

## 2017-06-17 RX ADMIN — GABAPENTIN 100 MG: 100 CAPSULE ORAL at 17:14

## 2017-06-17 RX ADMIN — TRAMADOL HYDROCHLORIDE 50 MG: 50 TABLET, COATED ORAL at 18:39

## 2017-06-17 RX ADMIN — ASPIRIN 81 MG: 81 TABLET ORAL at 08:15

## 2017-06-17 RX ADMIN — AMLODIPINE BESYLATE 5 MG: 5 TABLET ORAL at 11:52

## 2017-06-17 RX ADMIN — CARVEDILOL 6.25 MG: 6.25 TABLET, FILM COATED ORAL at 17:14

## 2017-06-17 RX ADMIN — Medication 100 MG: at 08:14

## 2017-06-17 RX ADMIN — POLYVINYL ALCOHOL 1 DROP: 14 SOLUTION/ DROPS OPHTHALMIC at 08:13

## 2017-06-17 RX ADMIN — DOXYCYCLINE 100 MG: 100 TABLET ORAL at 20:09

## 2017-06-17 RX ADMIN — CARVEDILOL 3.12 MG: 3.12 TABLET, FILM COATED ORAL at 08:15

## 2017-06-17 RX ADMIN — INSULIN LISPRO 1 UNITS: 100 INJECTION, SOLUTION INTRAVENOUS; SUBCUTANEOUS at 06:22

## 2017-06-17 RX ADMIN — SPIRONOLACTONE 25 MG: 25 TABLET, FILM COATED ORAL at 08:15

## 2017-06-17 RX ADMIN — INSULIN LISPRO 1 UNITS: 100 INJECTION, SOLUTION INTRAVENOUS; SUBCUTANEOUS at 17:17

## 2017-06-17 RX ADMIN — INSULIN LISPRO 1 UNITS: 100 INJECTION, SOLUTION INTRAVENOUS; SUBCUTANEOUS at 11:48

## 2017-06-17 RX ADMIN — VANCOMYCIN HYDROCHLORIDE 125 MG: 10 INJECTION, POWDER, LYOPHILIZED, FOR SOLUTION INTRAVENOUS at 08:14

## 2017-06-17 RX ADMIN — FLUTICASONE PROPIONATE 100 MCG: 50 SPRAY, METERED NASAL at 08:13

## 2017-06-17 RX ADMIN — ENOXAPARIN SODIUM 40 MG: 100 INJECTION SUBCUTANEOUS at 20:09

## 2017-06-17 RX ADMIN — ATORVASTATIN CALCIUM 20 MG: 20 TABLET, FILM COATED ORAL at 20:09

## 2017-06-17 RX ADMIN — BUDESONIDE AND FORMOTEROL FUMARATE DIHYDRATE 2 PUFF: 160; 4.5 AEROSOL RESPIRATORY (INHALATION) at 08:13

## 2017-06-17 RX ADMIN — POLYVINYL ALCOHOL 1 DROP: 14 SOLUTION/ DROPS OPHTHALMIC at 20:10

## 2017-06-17 ASSESSMENT — ENCOUNTER SYMPTOMS
BLURRED VISION: 0
WEAKNESS: 0
DEPRESSION: 0
WHEEZING: 0
DOUBLE VISION: 0
FEVER: 0
DIAPHORESIS: 0
HEADACHES: 0
SHORTNESS OF BREATH: 0
VOMITING: 0
SPUTUM PRODUCTION: 0
NECK PAIN: 0
COUGH: 0
FALLS: 0
TREMORS: 0
NAUSEA: 0
MYALGIAS: 0
DIZZINESS: 0
CHILLS: 0
PHOTOPHOBIA: 0
TINGLING: 0
BRUISES/BLEEDS EASILY: 0
DIARRHEA: 0

## 2017-06-17 ASSESSMENT — LIFESTYLE VARIABLES: SUBSTANCE_ABUSE: 0

## 2017-06-17 ASSESSMENT — PAIN SCALES - GENERAL
PAINLEVEL_OUTOF10: 7
PAINLEVEL_OUTOF10: 0

## 2017-06-17 NOTE — CARE PLAN
Problem: Knowledge Deficit  Goal: Knowledge of disease process/condition, treatment plan, diagnostic tests, and medications will improve  Outcome: PROGRESSING SLOWER THAN EXPECTED  POC discussed with the patient. All questions and concerns addressed and answered. Patient is involved in POC and frequent questions asked of the disease process, medications, tests and treatments. Questions on POC encouraged throughout care.         Problem: Psychosocial Needs:  Goal: Level of anxiety will decrease  Outcome: PROGRESSING SLOWER THAN EXPECTED  Pt irritable and agitated easily. Pt encouraged to ask questions as needed about POC and disease process.

## 2017-06-17 NOTE — PROGRESS NOTES
Pt resting comfortably in bed. No signs of distress. Bed locked and in lowest position. Call light within reach. Hourly rounding in place.

## 2017-06-17 NOTE — ASSESSMENT & PLAN NOTE
presented with SOB, cough. Basal crepitations, scattered wheezes on examination, leg swelling on admission. He has features of sleep apnea. No previous PFT, no sleep study  No desaturation on RA, 92 %  Early CXR showed features of COPD and pulmonary congestion  ECHO showed LVH, G II-III diastolic dysfunction, RVSP 35. Likely CHFpEFF  Most likely heart failure with preserved EF  Responded to diuretics, lasix and aldactone but  BUN elevated up to 30  Less crepitations on examination today. No respiratory distress.    Plan:    To hold lasix today, continue aldactone  Continue on lisinopril   Carvedilol increased to 6.5 mg BID   Daily weight and I and O follow up, water restriction

## 2017-06-17 NOTE — PROGRESS NOTES
[facility] [place] [facility] Medicine Interval Note    Name Maxi Esposito       1950   Age/Sex 67 y.o. male   MRN [id number]   Code Status Full     After 5PM or if no immediate response to page, please call for cross-coverage  Attending/Team: Dr. Milan US Call [phone] to page   1st Call - Day Intern (R1):   Dr. Puneet Womack 2nd Call - Day Sr. Resident (R2/R3):   Dr. Carranza         Chief complaint/ reason for interval visit (Primary Diagnosis)    SOB and leg swelling      Interval Problem Daily Status Update    Clinically improved, less respiratory distress, O2 saturation 92 on RA. Elevated BP, BUN elevated up to 30      Review of Systems   Constitutional: Positive for malaise/fatigue. Negative for fever, chills and diaphoresis.   HENT: Negative for hearing loss and tinnitus.    Eyes: Negative for blurred vision, double vision and photophobia.   Respiratory: Negative for cough, sputum production, shortness of breath and wheezing.    Cardiovascular: Negative for chest pain and leg swelling.   Gastrointestinal: Negative for nausea, vomiting and diarrhea.   Genitourinary: Negative for dysuria and urgency.   Musculoskeletal: Negative for myalgias, falls and neck pain.   Skin: Negative for itching and rash.   Neurological: Negative for dizziness, tingling, tremors, weakness and headaches.   Endo/Heme/Allergies: Negative for environmental allergies. Does not bruise/bleed easily.   Psychiatric/Behavioral: Negative for depression and substance abuse.       Consultants/Specialty  Social service for SNF    Disposition  SNF. Refused many times    Quality Measures  Labs reviewed, Medications reviewed and EKG reviewed  Arshad catheter: No Arshad      DVT Prophylaxis: Enoxaparin (Lovenox)              Physical Exam       Filed Vitals:    17 0100 17 0436 17 0751 17 1201   BP: 126/72 153/91 148/103 105/60   Pulse: 93 78 82 84   Temp: 36.3 °C (97.3 °F) 36.1 °C (97 °F) 36.3 °C (97.4 °F) 36.6 °C (97.8 °F)    Resp: 18 18 16 18   Height:       Weight:       SpO2: 96% 95% 92% 93%     Body mass index is 39.35 kg/(m^2). Weight: 114 kg (251 lb 5.2 oz)  Oxygen Therapy:  Pulse Oximetry: 93 %, O2 (LPM): 0, O2 Delivery: None (Room Air)    Physical Exam  Physical Exam   Constitutional: He is oriented to person, place, and time.   Obese  Cardiovascular: Normal rate and regular rhythm.    Pulmonary/Chest: Effort normal. No crackles today He exhibits no tenderness.   Distant lung sounds    Abdominal: Soft. There is no tenderness. There is no guarding.   Abdomen obese   Musculoskeletal: He exhibits edema which is decreasing  and tenderness.   Erythema both lower extremities with tenderness and pitting edema-improved since admission  Neurological: He is alert and oriented to person, place, and time. No cranial nerve deficit. GCS score is 15.   Skin: Skin is warm. Rash noted. There is erythema-improving      Lab Data Review:      6/9/2017  2:32 PM    Recent Labs      06/15/17   0148  06/16/17   0252  06/17/17   0259   SODIUM  141  140  138   POTASSIUM  3.9  4.1  4.1   CHLORIDE  99  101  102   CO2  31  28  27   BUN  24*  26*  30*   CREATININE  0.95  0.86  0.96   MAGNESIUM  1.9  2.1  2.0   CALCIUM  9.2  9.2  9.0       Recent Labs      06/15/17   0148  06/16/17   0252  06/17/17   0259   ALTSGPT  16 16  17   ASTSGOT  12  12  12   ALKPHOSPHAT  72  63  60   TBILIRUBIN  0.3  0.4  0.4   GLUCOSE  120*  105*  121*       Recent Labs      06/15/17   0148  06/16/17   0252  06/17/17   0259   RBC  5.22  5.56  5.69   HEMOGLOBIN  15.2  16.3  16.2   HEMATOCRIT  45.4  48.8  49.8   PLATELETCT  387  413  414       Recent Labs      06/15/17   0148  06/16/17   0252  06/17/17   0259   WBC  18.8*  20.8*  18.0*   NEUTSPOLYS  52.20  61.70  51.30   LYMPHOCYTES  36.30  24.50  33.70   MONOCYTES  9.70  12.20  10.60   EOSINOPHILS  0.90  0.40  4.40   BASOPHILS  0.00  0.40  0.00   ASTSGOT  12  12  12   ALTSGPT  16  16  17   ALKPHOSPHAT  72  63  60   TBILIRUBIN  0.3   0.4  0.4           Assessment/Plan       Heart failure with preserved EF  -------------------------------   presented with SOB, cough. Basal crepitations, scattered wheezes on examination, leg swelling on admission. He has features of sleep apnea. No previous PFT, no sleep study  No desaturation on RA, 92 %  Early CXR showed features of COPD and pulmonary congestion  ECHO showed LVH, G II-III diastolic dysfunction, RVSP 35. Likely CHFpEFF  Most likely heart failure with preserved EF  Responded to diuretics, lasix and aldactone but  BUN elevated up to 30  Less crepitations on examination today. No respiratory distress.    Plan:    To hold lasix today, continue aldactone  Continue on lisinopril   Carvedilol increased to 6.5 mg BID   Daily weight and I and O follow up, water restriction      # COPD   hx of smoking, COPD on inhalars, no PFT in chart   no wheezes now, less respiratory distress, cough is better. improving    Plan:  Continue RT Protocol  To stop prednisolone  Duoneb Q6H    PO Abx doxycycline to continue for total 10 days        BILATERAL LOWER EXTREMITY EDEMA: CELLULITIS WITH STASIS DERMATITIS  ---------------------------------------------------------------------------------------------------  Patient has bilateral leg edema with venous stasis  Wbc 14 on admission, increased today to 17.9, steroid effect possibly   Severe erythema and tenderness of both lower extremities with chr venous stasis derm changes    DVT ruled out  Blood culture negative  It seems that it chronic cellulitis due to venous stasis on the top of interstitial fluid accumulation due to heart failure    Plan:  ----------   Abx with doxycycline  Elastic Stocking     # Sinus pauses     Presented with sinus pauses up to 4.9 sec, seems asymptomatic on day of admission  Cardiology consulted on day of admission and thinks it is related to hypoxic event from his sleep apnea  No pauses now  Continue monitoring       # VICENTE  - clinically seems to  have sleep apnea, risk factor for HF  - no sleep study done  - nocturnal oximetry showed desaturation down to low 80s with O2 need up to 2 LPM  - CPAP as outpatient    T2 DM:  --------------  A1c 6.5  Blood glucose controlled   Accuchecks Q6 hr  hypoglycemic protocol  ISS       HTN:  ----------  BP elevated yesterday to 185/110  On Lisinopril, increased to 20 mg  Amlodipine 5 mg added   Hydralazine IV ordered PRN      DLD:  ----------  stable    Ct Statin    GERD:  ------------    No alarming symptoms  Ct Omeprazole    Tinea pedis:  ---------------  Topical clotrimazole    Substance Dependence:  ------------------------------  Smoking and Cannabis user:    Counseling to quit marijuana, and Smoking  Nicotine patch    Hx of Depression:  ----------------------  Pt lives in Shelter  On Aripiprazole  Currently No SI or HI

## 2017-06-17 NOTE — ASSESSMENT & PLAN NOTE
BP elevated yesterday to 185/110  On Lisinopril, increased to 20 mg  Amlodipine 5 mg added   Hydralazine IV ordered PRN

## 2017-06-17 NOTE — PROGRESS NOTES
[facility] [place] Internal Medicine Interval Note    Name Maxi Esposito       1950   Age/Sex 67 y.o. male   MRN [id number]   Code Status Full     After 5PM or if no immediate response to page, please call for cross-coverage  Attending/Team: Dr. Milan US Call [phone] to page   1st Call - Day Intern (R1):   Dr. Puneet Womack 2nd Call - Day Sr. Resident (R2/R3):   Dr. Carranza         Chief complaint/ reason for interval visit (Primary Diagnosis)    SOB and leg swelling      Interval Problem Daily Status Update    Clinically improved, less respiratory distress, O2 saturation 92 on RA      Review of Systems   Constitutional: Positive for malaise/fatigue. Negative for fever, chills and diaphoresis.   HENT: Negative for hearing loss and tinnitus.    Eyes: Negative for blurred vision, double vision and photophobia.   Respiratory: Negative for cough, sputum production, shortness of breath and wheezing.    Cardiovascular: Negative for chest pain and leg swelling.   Gastrointestinal: Negative for nausea, vomiting and diarrhea.   Genitourinary: Negative for dysuria and urgency.   Musculoskeletal: Negative for myalgias, falls and neck pain.   Skin: Negative for itching and rash.   Neurological: Negative for dizziness, tingling, tremors, weakness and headaches.   Endo/Heme/Allergies: Negative for environmental allergies. Does not bruise/bleed easily.   Psychiatric/Behavioral: Negative for depression and substance abuse.       Consultants/Specialty  Social service for SNF    Disposition  SNF after stabilization of heart condition    Quality Measures  Labs reviewed, Medications reviewed and EKG reviewed  Arshad catheter: No Arshad      DVT Prophylaxis: Enoxaparin (Lovenox)              Physical Exam       Filed Vitals:    17 0728 17 0824 17 1120 17   BP:  115/72 136/88 118/74   Pulse:  110 95 84   Temp:  36.4 °C (97.6 °F) 36.7 °C (98 °F) 36.6 °C (97.8 °F)   Resp:  18 18 18   Height:       Weight:     114 kg (251 lb 5.2 oz)   SpO2: 92% 94% 91% 94%     Body mass index is 39.35 kg/(m^2). Weight: 114 kg (251 lb 5.2 oz)  Oxygen Therapy:  Pulse Oximetry: 94 %, O2 (LPM): 0, O2 Delivery: None (Room Air)    Physical Exam  Physical Exam   Constitutional: He is oriented to person, place, and time.   Obese  Cardiovascular: Normal rate and regular rhythm.    Pulmonary/Chest: Effort normal. No crackles today He exhibits no tenderness.   Distant lung sounds    Abdominal: Soft. There is no tenderness. There is no guarding.   Abdomen obese   Musculoskeletal: He exhibits edema which is decreasing  and tenderness.   Erythema both lower extremities with tenderness and pitting edema-improved since admission  Neurological: He is alert and oriented to person, place, and time. No cranial nerve deficit. GCS score is 15.   Skin: Skin is warm. Rash noted. There is erythema-improving      Lab Data Review:      6/9/2017  2:32 PM    Recent Labs      06/14/17   0231  06/15/17   0148  06/16/17   0252   SODIUM  139  141  140   POTASSIUM  4.0  3.9  4.1   CHLORIDE  100  99  101   CO2  27  31  28   BUN  26*  24*  26*   CREATININE  0.98  0.95  0.86   MAGNESIUM  1.7  1.9  2.1   CALCIUM  9.2  9.2  9.2       Recent Labs      06/14/17   0231  06/15/17   0148  06/16/17   0252   ALTSGPT  16  16  16   ASTSGOT  12  12  12   ALKPHOSPHAT  74  72  63   TBILIRUBIN  0.3  0.3  0.4   GLUCOSE  160*  120*  105*       Recent Labs      06/14/17   0231  06/15/17   0148  06/16/17   0252   RBC  4.78  5.22  5.56   HEMOGLOBIN  13.9*  15.2  16.3   HEMATOCRIT  42.6  45.4  48.8   PLATELETCT  304  387  413       Recent Labs      06/14/17   0231  06/15/17   0148  06/16/17   0252   WBC  17.6*  18.8*  20.8*   NEUTSPOLYS  58.60  52.20  61.70   LYMPHOCYTES  27.30  36.30  24.50   MONOCYTES  12.00  9.70  12.20   EOSINOPHILS  1.00  0.90  0.40   BASOPHILS  0.50  0.00  0.40   ASTSGOT  12  12  12   ALTSGPT  16  16  16   ALKPHOSPHAT  74  72  63   TBILIRUBIN  0.3  0.3  0.4            Assessment/Plan       Heart failure with preserved EF  -------------------------------   presented with SOB, cough. Basal crepitations, scattered wheezes on examination, leg swelling on admission. He has features of sleep apnea. No previous PFT, no sleep study  Desaturated at night to 80s and on walking also, needed 2 liters.  CXR showed features of COPD and pulmonary congestion  Repeat showed ECHO-LVH, G II-III diastolic dysfunction, RVSP 35. Likely CHFpEFF  Most likely heart failure with preserved EF  Responded to diuretics, lasix and aldactone  Less crepitations on examination today. No respiratory distress.  Continue on lisinopril and diuretics, lasix 40 mg BID po  Carvedilol added  Daily weight and I and O follow up, water restriction      # COPD   hx of smoking, COPD on inhalars, no PFT in chart   no wheezes now, less respiratory distress, cough is better. improving    Plan:  Continue RT Protocol  To stop prednisolone  Duoneb Q6H    PO Abx doxycycline to continue for total 10 days        BILATERAL LOWER EXTREMITY EDEMA: CELLULITIS WITH STASIS DERMATITIS  ---------------------------------------------------------------------------------------------------  Patient has bilateral leg edema with venous stasis  Wbc 14 on admission, increased today to 17.9, steroid effect possibly   Severe erythema and tenderness of both lower extremities with chr venous stasis derm changes    DVT ruled out  Blood culture negative  It seems that it chronic cellulitis due to venous stasis on the top of interstitial fluid accumulation due to heart failure    Plan:  ----------   Abx with doxycycline  Elastic Stocking     # Sinus pauses     Presented with sinus pauses up to 4.9 sec, seems asymptomatic on day of admission  Cardiology consulted on day of admission and thinks it is related to hypoxic event from his sleep apnea  No pauses now  Continue monitoring       # VICENTE  - clinically seems to have sleep apnea, risk factor for  HF  - no sleep study done  - nocturnal oximetry showed desaturation down to low 80s with O2 need up to 2 LPM  - CPAP as outpatient    T2 DM:  --------------  A1c 6.5  Accuchecks Q6 hr  hypoglycemic protocol  ISS       HTN:  ----------  BP elevated yesterday to 173/ 110  On Lisinopril, increased to 20 mg   Hydralazine IV ordered PRN      DLD:  ----------  stable    Ct Statin    GERD:  ------------    No alarming symptoms  Ct Omeprazole    Tinea pedis:  ---------------  Topical clotrimazole    Substance Dependence:  ------------------------------  Smoking and Cannabis user:    Counseling to quit marijuana, and Smoking  Nicotine patch    Hx of Depression:  ----------------------  Pt lives in Shelter  On Aripiprazole  Currently No SI or HI

## 2017-06-17 NOTE — PROGRESS NOTES
Report received by NOC RN. Assumed care of pt. Assessment complete. Personal items at bedside. Pt A&O x 4, anxious. Pt c/o hunger-snacks offered, has trace swelling and redness bilat LE-JAI hose in place-will pass antibiotics, and c/o slight dyspnea-IS used, states helps. Pt in no apparent signs of distress. Plan of care discussed. Call light in reach,  bed in lowest position, and pt has no further questions at this time.

## 2017-06-17 NOTE — PROGRESS NOTES
Bedside report received from day RN. Assumed pt care. Pt a&ox4. POC discussed. Pt denies any needs at this time. Bed locked and in lowest position. Call light within reach. Hourly rounding in place.

## 2017-06-17 NOTE — PROGRESS NOTES
Pt refuses bed alarm. Two RN's educated pt on fall risk. Pt verbalizes understanding and continues to refuse. Non slip socks in place. Bed locked and in lowest position. Call light within reach.

## 2017-06-18 LAB
ALBUMIN SERPL BCP-MCNC: 3.3 G/DL (ref 3.2–4.9)
ALBUMIN/GLOB SERPL: 1.2 G/DL
ALP SERPL-CCNC: 56 U/L (ref 30–99)
ALT SERPL-CCNC: 16 U/L (ref 2–50)
ANION GAP SERPL CALC-SCNC: 8 MMOL/L (ref 0–11.9)
AST SERPL-CCNC: 13 U/L (ref 12–45)
BASOPHILS # BLD AUTO: 0.7 % (ref 0–1.8)
BASOPHILS # BLD: 0.12 K/UL (ref 0–0.12)
BILIRUB SERPL-MCNC: 0.5 MG/DL (ref 0.1–1.5)
BUN SERPL-MCNC: 29 MG/DL (ref 8–22)
CALCIUM SERPL-MCNC: 8.8 MG/DL (ref 8.5–10.5)
CHLORIDE SERPL-SCNC: 103 MMOL/L (ref 96–112)
CO2 SERPL-SCNC: 27 MMOL/L (ref 20–33)
CREAT SERPL-MCNC: 0.93 MG/DL (ref 0.5–1.4)
EOSINOPHIL # BLD AUTO: 0.73 K/UL (ref 0–0.51)
EOSINOPHIL NFR BLD: 4.1 % (ref 0–6.9)
ERYTHROCYTE [DISTWIDTH] IN BLOOD BY AUTOMATED COUNT: 50 FL (ref 35.9–50)
GFR SERPL CREATININE-BSD FRML MDRD: >60 ML/MIN/1.73 M 2
GLOBULIN SER CALC-MCNC: 2.8 G/DL (ref 1.9–3.5)
GLUCOSE BLD-MCNC: 106 MG/DL (ref 65–99)
GLUCOSE BLD-MCNC: 115 MG/DL (ref 65–99)
GLUCOSE BLD-MCNC: 128 MG/DL (ref 65–99)
GLUCOSE BLD-MCNC: 130 MG/DL (ref 65–99)
GLUCOSE SERPL-MCNC: 112 MG/DL (ref 65–99)
HCT VFR BLD AUTO: 48.6 % (ref 42–52)
HGB BLD-MCNC: 15.6 G/DL (ref 14–18)
IMM GRANULOCYTES # BLD AUTO: 0.17 K/UL (ref 0–0.11)
IMM GRANULOCYTES NFR BLD AUTO: 1 % (ref 0–0.9)
LYMPHOCYTES # BLD AUTO: 4.74 K/UL (ref 1–4.8)
LYMPHOCYTES NFR BLD: 26.7 % (ref 22–41)
MAGNESIUM SERPL-MCNC: 1.9 MG/DL (ref 1.5–2.5)
MCH RBC QN AUTO: 28.6 PG (ref 27–33)
MCHC RBC AUTO-ENTMCNC: 32.1 G/DL (ref 33.7–35.3)
MCV RBC AUTO: 89.2 FL (ref 81.4–97.8)
MONOCYTES # BLD AUTO: 2.01 K/UL (ref 0–0.85)
MONOCYTES NFR BLD AUTO: 11.3 % (ref 0–13.4)
NEUTROPHILS # BLD AUTO: 10 K/UL (ref 1.82–7.42)
NEUTROPHILS NFR BLD: 56.2 % (ref 44–72)
NRBC # BLD AUTO: 0 K/UL
NRBC BLD AUTO-RTO: 0 /100 WBC
PLATELET # BLD AUTO: 401 K/UL (ref 164–446)
PMV BLD AUTO: 11.2 FL (ref 9–12.9)
POTASSIUM SERPL-SCNC: 4.4 MMOL/L (ref 3.6–5.5)
PROT SERPL-MCNC: 6.1 G/DL (ref 6–8.2)
RBC # BLD AUTO: 5.45 M/UL (ref 4.7–6.1)
SODIUM SERPL-SCNC: 138 MMOL/L (ref 135–145)
WBC # BLD AUTO: 17.8 K/UL (ref 4.8–10.8)

## 2017-06-18 PROCEDURE — 80053 COMPREHEN METABOLIC PANEL: CPT

## 2017-06-18 PROCEDURE — 770006 HCHG ROOM/CARE - MED/SURG/GYN SEMI*

## 2017-06-18 PROCEDURE — 36415 COLL VENOUS BLD VENIPUNCTURE: CPT

## 2017-06-18 PROCEDURE — A9270 NON-COVERED ITEM OR SERVICE: HCPCS | Performed by: INTERNAL MEDICINE

## 2017-06-18 PROCEDURE — 99232 SBSQ HOSP IP/OBS MODERATE 35: CPT | Mod: GC | Performed by: INTERNAL MEDICINE

## 2017-06-18 PROCEDURE — 700102 HCHG RX REV CODE 250 W/ 637 OVERRIDE(OP): Performed by: GENERAL ACUTE CARE HOSPITAL

## 2017-06-18 PROCEDURE — 85025 COMPLETE CBC W/AUTO DIFF WBC: CPT

## 2017-06-18 PROCEDURE — A9270 NON-COVERED ITEM OR SERVICE: HCPCS | Performed by: GENERAL ACUTE CARE HOSPITAL

## 2017-06-18 PROCEDURE — 94640 AIRWAY INHALATION TREATMENT: CPT

## 2017-06-18 PROCEDURE — A9270 NON-COVERED ITEM OR SERVICE: HCPCS | Performed by: STUDENT IN AN ORGANIZED HEALTH CARE EDUCATION/TRAINING PROGRAM

## 2017-06-18 PROCEDURE — 700101 HCHG RX REV CODE 250: Performed by: GENERAL ACUTE CARE HOSPITAL

## 2017-06-18 PROCEDURE — 82962 GLUCOSE BLOOD TEST: CPT

## 2017-06-18 PROCEDURE — 700102 HCHG RX REV CODE 250 W/ 637 OVERRIDE(OP): Performed by: INTERNAL MEDICINE

## 2017-06-18 PROCEDURE — 83735 ASSAY OF MAGNESIUM: CPT

## 2017-06-18 PROCEDURE — 700102 HCHG RX REV CODE 250 W/ 637 OVERRIDE(OP): Performed by: STUDENT IN AN ORGANIZED HEALTH CARE EDUCATION/TRAINING PROGRAM

## 2017-06-18 PROCEDURE — 94760 N-INVAS EAR/PLS OXIMETRY 1: CPT

## 2017-06-18 RX ORDER — FUROSEMIDE 40 MG/1
40 TABLET ORAL
Status: DISCONTINUED | OUTPATIENT
Start: 2017-06-18 | End: 2017-06-24

## 2017-06-18 RX ORDER — ALBUTEROL SULFATE 90 UG/1
2 AEROSOL, METERED RESPIRATORY (INHALATION) EVERY 4 HOURS PRN
Status: DISCONTINUED | OUTPATIENT
Start: 2017-06-18 | End: 2017-06-24 | Stop reason: HOSPADM

## 2017-06-18 RX ADMIN — SPIRONOLACTONE 25 MG: 25 TABLET, FILM COATED ORAL at 08:39

## 2017-06-18 RX ADMIN — BUDESONIDE AND FORMOTEROL FUMARATE DIHYDRATE 2 PUFF: 160; 4.5 AEROSOL RESPIRATORY (INHALATION) at 08:36

## 2017-06-18 RX ADMIN — GABAPENTIN 100 MG: 100 CAPSULE ORAL at 08:38

## 2017-06-18 RX ADMIN — FUROSEMIDE 40 MG: 40 TABLET ORAL at 08:37

## 2017-06-18 RX ADMIN — VANCOMYCIN HYDROCHLORIDE 125 MG: 10 INJECTION, POWDER, LYOPHILIZED, FOR SOLUTION INTRAVENOUS at 21:25

## 2017-06-18 RX ADMIN — POLYVINYL ALCOHOL 1 DROP: 14 SOLUTION/ DROPS OPHTHALMIC at 09:00

## 2017-06-18 RX ADMIN — CARVEDILOL 6.25 MG: 6.25 TABLET, FILM COATED ORAL at 08:36

## 2017-06-18 RX ADMIN — LISINOPRIL 20 MG: 20 TABLET ORAL at 08:38

## 2017-06-18 RX ADMIN — ASPIRIN 81 MG: 81 TABLET ORAL at 08:35

## 2017-06-18 RX ADMIN — Medication 100 MG: at 08:39

## 2017-06-18 RX ADMIN — POLYVINYL ALCOHOL 1 DROP: 14 SOLUTION/ DROPS OPHTHALMIC at 21:25

## 2017-06-18 RX ADMIN — BUDESONIDE AND FORMOTEROL FUMARATE DIHYDRATE 2 PUFF: 160; 4.5 AEROSOL RESPIRATORY (INHALATION) at 20:20

## 2017-06-18 RX ADMIN — CLOTRIMAZOLE: 10 CREAM TOPICAL at 09:00

## 2017-06-18 RX ADMIN — NICOTINE 21 MG: 21 PATCH, EXTENDED RELEASE TRANSDERMAL at 05:59

## 2017-06-18 RX ADMIN — GABAPENTIN 100 MG: 100 CAPSULE ORAL at 13:56

## 2017-06-18 RX ADMIN — OMEPRAZOLE 20 MG: 20 CAPSULE, DELAYED RELEASE ORAL at 08:38

## 2017-06-18 RX ADMIN — ALBUTEROL SULFATE 2.5 MG: 2.5 SOLUTION RESPIRATORY (INHALATION) at 23:14

## 2017-06-18 RX ADMIN — CARVEDILOL 6.25 MG: 6.25 TABLET, FILM COATED ORAL at 16:57

## 2017-06-18 RX ADMIN — SENNOSIDES AND DOCUSATE SODIUM 2 TABLET: 8.6; 5 TABLET ORAL at 08:38

## 2017-06-18 RX ADMIN — VANCOMYCIN HYDROCHLORIDE 125 MG: 10 INJECTION, POWDER, LYOPHILIZED, FOR SOLUTION INTRAVENOUS at 09:00

## 2017-06-18 RX ADMIN — TRAMADOL HYDROCHLORIDE 50 MG: 50 TABLET, COATED ORAL at 23:30

## 2017-06-18 RX ADMIN — ATORVASTATIN CALCIUM 20 MG: 20 TABLET, FILM COATED ORAL at 20:19

## 2017-06-18 RX ADMIN — DOXYCYCLINE 100 MG: 100 TABLET ORAL at 08:37

## 2017-06-18 RX ADMIN — FLUTICASONE PROPIONATE 100 MCG: 50 SPRAY, METERED NASAL at 09:00

## 2017-06-18 RX ADMIN — AMLODIPINE BESYLATE 5 MG: 5 TABLET ORAL at 08:35

## 2017-06-18 RX ADMIN — CLOTRIMAZOLE: 10 CREAM TOPICAL at 21:25

## 2017-06-18 RX ADMIN — GABAPENTIN 100 MG: 100 CAPSULE ORAL at 20:19

## 2017-06-18 ASSESSMENT — ENCOUNTER SYMPTOMS
BLURRED VISION: 0
SHORTNESS OF BREATH: 0
NECK PAIN: 0
NAUSEA: 0
TREMORS: 0
HEADACHES: 0
DIZZINESS: 0
CHILLS: 0
PHOTOPHOBIA: 0
FEVER: 0
DEPRESSION: 0
WEAKNESS: 0
VOMITING: 0
DIARRHEA: 0
TINGLING: 0
WHEEZING: 0
BRUISES/BLEEDS EASILY: 0
DIAPHORESIS: 0
FALLS: 0
MYALGIAS: 0
COUGH: 0
DOUBLE VISION: 0
SPUTUM PRODUCTION: 0

## 2017-06-18 ASSESSMENT — PAIN SCALES - GENERAL
PAINLEVEL_OUTOF10: 6
PAINLEVEL_OUTOF10: 0

## 2017-06-18 ASSESSMENT — LIFESTYLE VARIABLES: SUBSTANCE_ABUSE: 0

## 2017-06-18 NOTE — DISCHARGE PLANNING
Received acceptance from [facility] also want to know about IV abx at time of DC. Notified JUDITH Arizmendi via phone.

## 2017-06-18 NOTE — PROGRESS NOTES
[facility] Internal Medicine Interval Note    Name Maxi Esposito       1950   Age/Sex 67 y.o. male   MRN [id number]   Code Status Full     After 5PM or if no immediate response to page, please call for cross-coverage  Attending/Team: Dr. Milan US Call [phone] to page   1st Call - Day Intern (R1):   Dr. Puneet Womack 2nd Call - Day Sr. Resident (R2/R3):   Dr. Carranza         Chief complaint/ reason for interval visit (Primary Diagnosis)    SOB and leg swelling      Interval Problem Daily Status Update    Clinically improved, less respiratory distress, O2 saturation 92 on RA. BUN came down to 29. Restarted lasix, doxycycline stopped. The patient is not compliant with diet and water restriction      Review of Systems   Constitutional: Positive for malaise/fatigue. Negative for fever, chills and diaphoresis.   HENT: Negative for hearing loss and tinnitus.    Eyes: Negative for blurred vision, double vision and photophobia.   Respiratory: Negative for cough, sputum production, shortness of breath and wheezing.    Cardiovascular: Negative for chest pain and leg swelling.   Gastrointestinal: Negative for nausea, vomiting and diarrhea.   Genitourinary: Negative for dysuria and urgency.   Musculoskeletal: Negative for myalgias, falls and neck pain.   Skin: Negative for itching and rash.   Neurological: Negative for dizziness, tingling, tremors, weakness and headaches.   Endo/Heme/Allergies: Negative for environmental allergies. Does not bruise/bleed easily.   Psychiatric/Behavioral: Negative for depression and substance abuse.       Consultants/Specialty  Social service for SNF    Disposition  Waiting SNF. Refused many times    Quality Measures  Labs reviewed, Medications reviewed and EKG reviewed  Arshad catheter: No Arshad      DVT Prophylaxis: Enoxaparin (Lovenox)              Physical Exam       Filed Vitals:    17 2220 17 0400 17 0750   BP: 131/80 146/94 121/78 134/77   Pulse:  72 79 79 73   Temp: 36.3 °C (97.4 °F) 37.2 °C (98.9 °F) 36.5 °C (97.7 °F) 36.9 °C (98.4 °F)   Resp: 20 18 18 18   Height:       Weight:       SpO2: 92% 95% 94% 92%     Body mass index is 39.35 kg/(m^2).    Oxygen Therapy:  Pulse Oximetry: 92 %, O2 (LPM): 0, O2 Delivery: None (Room Air)    Physical Exam  Physical Exam   Constitutional: He is oriented to person, place, and time.   Obese  Cardiovascular: Normal rate and regular rhythm.    Pulmonary/Chest: Effort normal. No crackles today He exhibits no tenderness.   Distant lung sounds    Abdominal: Soft. There is no tenderness. There is no guarding.   Abdomen obese   Musculoskeletal: He exhibits edema which is decreasing  and tenderness.   Erythema both lower extremities with tenderness and pitting edema-improved since admission  Neurological: He is alert and oriented to person, place, and time. No cranial nerve deficit. GCS score is 15.   Skin: Skin is warm. Rash noted. There is erythema-improving      Lab Data Review:      6/9/2017  2:32 PM    Recent Labs      06/16/17   0252  06/17/17 0259 06/18/17 0313   SODIUM  140  138  138   POTASSIUM  4.1  4.1  4.4   CHLORIDE  101  102  103   CO2  28  27  27   BUN  26*  30*  29*   CREATININE  0.86  0.96  0.93   MAGNESIUM  2.1  2.0  1.9   CALCIUM  9.2  9.0  8.8       Recent Labs      06/16/17   0252  06/17/17 0259 06/18/17 0313   ALTSGPT  16  17  16   ASTSGOT  12  12  13   ALKPHOSPHAT  63  60  56   TBILIRUBIN  0.4  0.4  0.5   GLUCOSE  105*  121*  112*       Recent Labs      06/16/17   0252  06/17/17   0259  06/18/17   0313   RBC  5.56  5.69  5.45   HEMOGLOBIN  16.3  16.2  15.6   HEMATOCRIT  48.8  49.8  48.6   PLATELETCT  413  414  401       Recent Labs      06/16/17   0252  06/17/17   0259 06/18/17 0313   WBC  20.8*  18.0*  17.8*   NEUTSPOLYS  61.70  51.30  56.20   LYMPHOCYTES  24.50  33.70  26.70   MONOCYTES  12.20  10.60  11.30   EOSINOPHILS  0.40  4.40  4.10   BASOPHILS  0.40  0.00  0.70   ASTSGOT  12  12  13    ALTSGPT  16  17  16   ALKPHOSPHAT  63  60  56   TBILIRUBIN  0.4  0.4  0.5           Assessment/Plan       Heart failure with preserved EF  -------------------------------   presented with SOB, cough. Basal crepitations, scattered wheezes on examination, leg swelling on admission.   No desaturation on RA, 92 % since yesterday   Early CXR showed features of COPD and pulmonary congestion  ECHO showed LVH, G II-III diastolic dysfunction, RVSP 35. Likely CHF with preserved EF  Most likely heart failure with preserved EF  Responded to diuretics, lasix and aldactone.  in his WT to more than 20 Ibs since admission  Chest clear on examination today. No respiratory distress.    Plan:    To restart lasix 40 mg q day, continue aldactone  Continue on lisinopril   Carvedilol increased to 6.5 mg BID yesterday  Daily weight and I and O follow up, water restriction      # COPD   hx of smoking, COPD on inhalars, no PFT in chart   no wheezes now, less respiratory distress, cough is better. improving    Plan:  Continue RT Protocol  Albuterol PRN  To stop doxycycline, received 8 days  Cough suppressant         BILATERAL LOWER EXTREMITY EDEMA: CELLULITIS WITH STASIS DERMATITIS  ---------------------------------------------------------------------------------------------------  Patient has bilateral leg edema with venous stasis  Wbc 14 on admission, wbc still high 17.8   Severe erythema and tenderness of both lower extremities with chr venous stasis derm changes    DVT ruled out  Blood culture negative  It seems that it chronic cellulitis due to venous stasis on the top of interstitial fluid accumulation due to heart failure    Plan:  ----------   Elastic Stocking   To stop antibiotic, doxycycline    # Sinus pauses     Presented with sinus pauses up to 4.9 sec, seems asymptomatic on day of admission  Cardiology consulted on day of admission and thinks it is related to hypoxic event from his sleep apnea  No pauses now  Continue  monitoring    # leukocytosis   - wbc 17.8 , mainly neutrophils, possibly due to COPD exacerbation, leg cellulitis in addition to stress reaction.   - blood smear looks normal  - follow up       # VICENTE  - clinically seems to have sleep apnea, risk factor for HF  - no sleep study done  - nocturnal oximetry showed desaturation down to low 80s with O2 need up to 2 LPM  - CPAP as outpatient    T2 DM:  --------------  A1c 6.5  Blood glucose controlled   Accuchecks Q6 hr  hypoglycemic protocol  ISS       HTN:  ----------  BP elevated yesterday to 185/110  On Lisinopril, increased to 20 mg  Amlodipine 5 mg added   Hydralazine IV ordered PRN      DLD:  ----------  stable    Ct Statin    GERD:  ------------    No alarming symptoms  Ct Omeprazole    Tinea pedis:  ---------------  Topical clotrimazole    Substance Dependence:  ------------------------------  Smoking and Cannabis user:    Counseling to quit marijuana, and Smoking  Nicotine patch    Hx of Depression:  ----------------------  Pt lives in Shelter  Continue On Aripiprazole  Currently No SI or HI

## 2017-06-18 NOTE — PROGRESS NOTES
The pt c/o fatigue, and hunger. He has been given time to rest, and food that is consistent with his diet. His LE have trace swelling, and are pink.  He c/o dyspnea, but can pull 1250 on the IS, and sat's approprietly.

## 2017-06-18 NOTE — DISCHARGE PLANNING
Medical Social Work   Talked to patient about being denied at local skilled facilities and why.  Patient stated he does cooperate with the facility he was at before and he can't understand why no one is taking him.  I explained it is also that he doesn't have a solid discharge plan.  Patient stated he gets almost $800.00 a month in SSI-D and would save up enough to get a room/motel when he is d/c from a facility.  When I asked about sending out [facility] referrals which includes [place].  Patient stated that he has thought of that. He had lived in [place] several years ago. Feels the rents are cheaper, better health care and it is lower [place].  His only concern about going, is how he would get there.  Patient stressed if he can't go to a skilled he is not going to the shelter.    Faxed [facility] to [facility] requesting a state wide referral.

## 2017-06-18 NOTE — PROGRESS NOTES
Arrived from tele 7.  Angry that he got moved middle of night.  Tried to explain to pt that he is cleared from heart that he moved to medical floor.  Pt said that he has good insurance but they treated him like no insurance.  C/o TV that he can't see.  No movie channel nor internet like the other floor.  Refused bed alarm, assessment and skin check at this time.  Will try to do them later.

## 2017-06-18 NOTE — PROGRESS NOTES
Continues to refused 2 RN skin assessment.  Pt has dago hose to BLE.  Perineal area redness noted.

## 2017-06-18 NOTE — PROGRESS NOTES
Report received at bedside, assumed care. Pt sitting up for dinner. A&O x 4. No other concerns, complains or distress. Chart reviewed. Bed in lowest position, treaded slipper sock on, and call light within reach.

## 2017-06-19 LAB
ALBUMIN SERPL BCP-MCNC: 3.4 G/DL (ref 3.2–4.9)
ALBUMIN/GLOB SERPL: 1.2 G/DL
ALP SERPL-CCNC: 63 U/L (ref 30–99)
ALT SERPL-CCNC: 16 U/L (ref 2–50)
ANION GAP SERPL CALC-SCNC: 8 MMOL/L (ref 0–11.9)
APTT PPP: 28.1 SEC (ref 24.7–36)
AST SERPL-CCNC: 13 U/L (ref 12–45)
BASOPHILS # BLD AUTO: 0 % (ref 0–1.8)
BASOPHILS # BLD: 0 K/UL (ref 0–0.12)
BILIRUB SERPL-MCNC: 0.4 MG/DL (ref 0.1–1.5)
BUN SERPL-MCNC: 30 MG/DL (ref 8–22)
CALCIUM SERPL-MCNC: 8.9 MG/DL (ref 8.5–10.5)
CHLORIDE SERPL-SCNC: 100 MMOL/L (ref 96–112)
CO2 SERPL-SCNC: 30 MMOL/L (ref 20–33)
CREAT SERPL-MCNC: 0.97 MG/DL (ref 0.5–1.4)
EOSINOPHIL # BLD AUTO: 1.06 K/UL (ref 0–0.51)
EOSINOPHIL NFR BLD: 6.1 % (ref 0–6.9)
ERYTHROCYTE [DISTWIDTH] IN BLOOD BY AUTOMATED COUNT: 50 FL (ref 35.9–50)
GFR SERPL CREATININE-BSD FRML MDRD: >60 ML/MIN/1.73 M 2
GLOBULIN SER CALC-MCNC: 2.9 G/DL (ref 1.9–3.5)
GLUCOSE BLD-MCNC: 119 MG/DL (ref 65–99)
GLUCOSE BLD-MCNC: 122 MG/DL (ref 65–99)
GLUCOSE BLD-MCNC: 126 MG/DL (ref 65–99)
GLUCOSE BLD-MCNC: 130 MG/DL (ref 65–99)
GLUCOSE SERPL-MCNC: 117 MG/DL (ref 65–99)
HCT VFR BLD AUTO: 48.2 % (ref 42–52)
HGB BLD-MCNC: 15.6 G/DL (ref 14–18)
IMM GRANULOCYTES # BLD AUTO: 0.12 K/UL (ref 0–0.11)
IMM GRANULOCYTES NFR BLD AUTO: 0.7 % (ref 0–0.9)
INR PPP: 1.01 (ref 0.87–1.13)
LYMPHOCYTES # BLD AUTO: 4.42 K/UL (ref 1–4.8)
LYMPHOCYTES NFR BLD: 25.4 % (ref 22–41)
MAGNESIUM SERPL-MCNC: 1.9 MG/DL (ref 1.5–2.5)
MANUAL DIFF BLD: NORMAL
MCH RBC QN AUTO: 28.7 PG (ref 27–33)
MCHC RBC AUTO-ENTMCNC: 32.4 G/DL (ref 33.7–35.3)
MCV RBC AUTO: 88.6 FL (ref 81.4–97.8)
MONOCYTES # BLD AUTO: 2.3 K/UL (ref 0–0.85)
MONOCYTES NFR BLD AUTO: 13.2 % (ref 0–13.4)
NEUTROPHILS # BLD AUTO: 9.68 K/UL (ref 1.82–7.42)
NEUTROPHILS NFR BLD: 55.3 % (ref 44–72)
NRBC # BLD AUTO: 0 K/UL
NRBC BLD AUTO-RTO: 0 /100 WBC
PLATELET # BLD AUTO: 379 K/UL (ref 164–446)
PMV BLD AUTO: 11.3 FL (ref 9–12.9)
POTASSIUM SERPL-SCNC: 4.3 MMOL/L (ref 3.6–5.5)
PROT SERPL-MCNC: 6.3 G/DL (ref 6–8.2)
PROTHROMBIN TIME: 13.6 SEC (ref 12–14.6)
RBC # BLD AUTO: 5.44 M/UL (ref 4.7–6.1)
SODIUM SERPL-SCNC: 138 MMOL/L (ref 135–145)
WBC # BLD AUTO: 17.4 K/UL (ref 4.8–10.8)

## 2017-06-19 PROCEDURE — A9270 NON-COVERED ITEM OR SERVICE: HCPCS | Performed by: INTERNAL MEDICINE

## 2017-06-19 PROCEDURE — A9270 NON-COVERED ITEM OR SERVICE: HCPCS | Performed by: GENERAL ACUTE CARE HOSPITAL

## 2017-06-19 PROCEDURE — 80053 COMPREHEN METABOLIC PANEL: CPT

## 2017-06-19 PROCEDURE — 700102 HCHG RX REV CODE 250 W/ 637 OVERRIDE(OP): Performed by: INTERNAL MEDICINE

## 2017-06-19 PROCEDURE — 85610 PROTHROMBIN TIME: CPT

## 2017-06-19 PROCEDURE — 99232 SBSQ HOSP IP/OBS MODERATE 35: CPT | Mod: GC | Performed by: INTERNAL MEDICINE

## 2017-06-19 PROCEDURE — 82962 GLUCOSE BLOOD TEST: CPT | Mod: 91

## 2017-06-19 PROCEDURE — 700111 HCHG RX REV CODE 636 W/ 250 OVERRIDE (IP): Performed by: INTERNAL MEDICINE

## 2017-06-19 PROCEDURE — 85007 BL SMEAR W/DIFF WBC COUNT: CPT

## 2017-06-19 PROCEDURE — 770006 HCHG ROOM/CARE - MED/SURG/GYN SEMI*

## 2017-06-19 PROCEDURE — 700102 HCHG RX REV CODE 250 W/ 637 OVERRIDE(OP): Performed by: GENERAL ACUTE CARE HOSPITAL

## 2017-06-19 PROCEDURE — A9270 NON-COVERED ITEM OR SERVICE: HCPCS | Performed by: STUDENT IN AN ORGANIZED HEALTH CARE EDUCATION/TRAINING PROGRAM

## 2017-06-19 PROCEDURE — 85027 COMPLETE CBC AUTOMATED: CPT

## 2017-06-19 PROCEDURE — 36415 COLL VENOUS BLD VENIPUNCTURE: CPT

## 2017-06-19 PROCEDURE — 83735 ASSAY OF MAGNESIUM: CPT

## 2017-06-19 PROCEDURE — 700102 HCHG RX REV CODE 250 W/ 637 OVERRIDE(OP): Performed by: STUDENT IN AN ORGANIZED HEALTH CARE EDUCATION/TRAINING PROGRAM

## 2017-06-19 PROCEDURE — 85730 THROMBOPLASTIN TIME PARTIAL: CPT

## 2017-06-19 RX ORDER — MAGNESIUM SULFATE HEPTAHYDRATE 40 MG/ML
2 INJECTION, SOLUTION INTRAVENOUS ONCE
Status: COMPLETED | OUTPATIENT
Start: 2017-06-19 | End: 2017-06-19

## 2017-06-19 RX ADMIN — ARIPIPRAZOLE 5 MG: 10 TABLET ORAL at 22:10

## 2017-06-19 RX ADMIN — CARVEDILOL 6.25 MG: 6.25 TABLET, FILM COATED ORAL at 17:24

## 2017-06-19 RX ADMIN — ALBUTEROL SULFATE 2 PUFF: 90 AEROSOL, METERED RESPIRATORY (INHALATION) at 09:57

## 2017-06-19 RX ADMIN — AMLODIPINE BESYLATE 5 MG: 5 TABLET ORAL at 07:56

## 2017-06-19 RX ADMIN — BUDESONIDE AND FORMOTEROL FUMARATE DIHYDRATE 2 PUFF: 160; 4.5 AEROSOL RESPIRATORY (INHALATION) at 08:05

## 2017-06-19 RX ADMIN — POLYVINYL ALCOHOL 1 DROP: 14 SOLUTION/ DROPS OPHTHALMIC at 08:00

## 2017-06-19 RX ADMIN — GABAPENTIN 100 MG: 100 CAPSULE ORAL at 22:10

## 2017-06-19 RX ADMIN — Medication 100 MG: at 07:56

## 2017-06-19 RX ADMIN — VANCOMYCIN HYDROCHLORIDE 125 MG: 10 INJECTION, POWDER, LYOPHILIZED, FOR SOLUTION INTRAVENOUS at 07:59

## 2017-06-19 RX ADMIN — CARVEDILOL 6.25 MG: 6.25 TABLET, FILM COATED ORAL at 07:58

## 2017-06-19 RX ADMIN — GABAPENTIN 100 MG: 100 CAPSULE ORAL at 15:55

## 2017-06-19 RX ADMIN — ATORVASTATIN CALCIUM 20 MG: 20 TABLET, FILM COATED ORAL at 22:10

## 2017-06-19 RX ADMIN — NICOTINE 21 MG: 21 PATCH, EXTENDED RELEASE TRANSDERMAL at 05:34

## 2017-06-19 RX ADMIN — OMEPRAZOLE 20 MG: 20 CAPSULE, DELAYED RELEASE ORAL at 07:59

## 2017-06-19 RX ADMIN — SPIRONOLACTONE 25 MG: 25 TABLET, FILM COATED ORAL at 07:56

## 2017-06-19 RX ADMIN — ASPIRIN 81 MG: 81 TABLET ORAL at 07:56

## 2017-06-19 RX ADMIN — MAGNESIUM SULFATE IN WATER 2 G: 40 INJECTION, SOLUTION INTRAVENOUS at 15:56

## 2017-06-19 RX ADMIN — SENNOSIDES AND DOCUSATE SODIUM 2 TABLET: 8.6; 5 TABLET ORAL at 22:10

## 2017-06-19 RX ADMIN — FUROSEMIDE 40 MG: 40 TABLET ORAL at 07:56

## 2017-06-19 RX ADMIN — CLOTRIMAZOLE: 10 CREAM TOPICAL at 08:05

## 2017-06-19 RX ADMIN — GABAPENTIN 100 MG: 100 CAPSULE ORAL at 07:56

## 2017-06-19 RX ADMIN — LISINOPRIL 20 MG: 20 TABLET ORAL at 07:58

## 2017-06-19 RX ADMIN — FLUTICASONE PROPIONATE 100 MCG: 50 SPRAY, METERED NASAL at 13:38

## 2017-06-19 ASSESSMENT — ENCOUNTER SYMPTOMS
WHEEZING: 0
DIZZINESS: 0
DOUBLE VISION: 0
DEPRESSION: 0
BLURRED VISION: 0
NAUSEA: 0
FALLS: 0
TREMORS: 0
TINGLING: 0
FEVER: 0
SHORTNESS OF BREATH: 0
DIARRHEA: 0
SPUTUM PRODUCTION: 0
CHILLS: 0
WEAKNESS: 0
COUGH: 0
HEADACHES: 0
DIAPHORESIS: 0
VOMITING: 0
MYALGIAS: 0
BRUISES/BLEEDS EASILY: 0
PHOTOPHOBIA: 0
NECK PAIN: 0

## 2017-06-19 ASSESSMENT — LIFESTYLE VARIABLES: SUBSTANCE_ABUSE: 0

## 2017-06-19 ASSESSMENT — PAIN SCALES - GENERAL
PAINLEVEL_OUTOF10: 0
PAINLEVEL_OUTOF10: 2

## 2017-06-19 NOTE — DISCHARGE PLANNING
Received notice form [facility] they have declined patient. As patient not willing to accept financial agreement for long term care.

## 2017-06-19 NOTE — DISCHARGE PLANNING
Per [facility] request, spoke with Gwen at [facility], they do not have a male bed currently.  Per Gwen she has the patient as first on list for bed.  [facility] advised.

## 2017-06-19 NOTE — DISCHARGE PLANNING
Received notice from [facility], they have accepted patient.  Per [facility] they will need name of ABX at discharge.  Per [facility] patient will not be allowed to smoke mariajuana.

## 2017-06-19 NOTE — HEART FAILURE PROGRAM
"Cardiovascular Nurse Navigator () Progress Note:     Please note this is a HF pt. As of 6/19, plan appears to be for pt to go to SNF, [facility] accepting. Therefore, order to schedulers to arrange HF f/u in July    If pt ends up not going to SNF, he must have an appointment scheduled within 7 days of discharge (PCP or Cards).      Bedside nursing to please provide patient with HF packet and begin teaching and documenting in education tab, each shift should teach sections until all are covered.     When completing the after visit summary (discharge instructions) please select \"Cardiac Diagnosis, and Heart Failure\" in the special instructions section so that the heart failure discharge instructions will populate.     Thank you and please call DECLAN Rojo with any questions: [phone].   "

## 2017-06-19 NOTE — CARE PLAN
Problem: Safety  Goal: Will remain free from injury  Outcome: PROGRESSING AS EXPECTED  Intervention: Provide assistance with mobility    06/18/17 2100   OTHER   Assistance / Tolerance No assistance required       Intervention: Collaborate with Interdisciplinary Team for safe transfer and mobilization techniques    06/18/17 2000   OTHER   Assistive Devices None         Goal: Will remain free from falls  Outcome: PROGRESSING AS EXPECTED  Intervention: Assess risk factors for falls    06/17/17 0800 06/18/17 2100 06/18/17 2317   OTHER   Fall Risk --  Risk to Fall - 0 - 1 point --    Risk for Injury-Any positive answers results in the pt being at high risk for fall related injury --  Not Applicable --    Mobility Status Assessment --  0-Ambulates & Transfers Independently. No Assistance Required --    History of fall --  0 --    Date of Last Fall (denies) --  --    Pt Calls for Assistance --  --  Yes       Intervention: Implement fall precautions    06/17/17 0800 06/18/17 0800 06/18/17 2100   OTHER   Environmental Precautions --  --  Treaded Slipper Socks on Patient;Personal Belongings, Wastebasket, Call Bell etc. in Easy Reach;Transferred to Stronger Side;Report Given to Other Health Care Providers Regarding Fall Risk;Bed in Low Position;Communication Sign for Patients & Families;Mobility Assessed & Appropriate Sign Placed   IV Pole on Same Side of Bed as Bathroom (sl) --  --    Bedrails --  --  Bedrails Closest to Bathroom Down   Chair/Bed Strip Alarm --  Patient Educated Regarding Fall Risk and Need for Bed Alarm, Understands and Continues to Refuse --    Bed Alarm (Built in - for ICU ONLY) Patient Educated Regarding Fall Risk and Need for Bed Alarm, Understands and Continues to Refuse --  --

## 2017-06-19 NOTE — PROGRESS NOTES
[facility] Medicine Interval Note    Name Maxi Esposito       1950   Age/Sex 67 y.o. male   MRN [id number]   Code Status Full     After 5PM or if no immediate response to page, please call for cross-coverage  Attending/Team: Dr. Evy US / audra Call [phone] to page   1st Call - Day Intern (R1):   Dr. Puneet Womack MD 2nd Call - Day Sr. Resident (R2/R3):   Dr. Renuka US         Chief complaint/ reason for interval visit (Primary Diagnosis)    SOB and leg swelling      Interval Problem Daily Status Update    Clinically improved, less respiratory distress, O2 saturation 92 on RA. BUN still high 30. Wbc still high 17.4 with left shift      Review of Systems   Constitutional: Positive for malaise/fatigue. Negative for fever, chills and diaphoresis.   HENT: Negative for hearing loss and tinnitus.    Eyes: Negative for blurred vision, double vision and photophobia.   Respiratory: Negative for cough, sputum production, shortness of breath and wheezing.    Cardiovascular: Negative for chest pain and leg swelling.   Gastrointestinal: Negative for nausea, vomiting and diarrhea.   Genitourinary: Negative for dysuria and urgency.   Musculoskeletal: Negative for myalgias, falls and neck pain.   Skin: Negative for itching and rash.   Neurological: Negative for dizziness, tingling, tremors, weakness and headaches.   Endo/Heme/Allergies: Negative for environmental allergies. Does not bruise/bleed easily.   Psychiatric/Behavioral: Negative for depression and substance abuse.       Consultants/Specialty  Social service for SNF    Disposition  Clinically cleared. Waiting SNF. Refused many times    Quality Measures  Labs reviewed, Medications reviewed and EKG reviewed  Arshad catheter: No Arshad      DVT Prophylaxis: Enoxaparin (Lovenox)              Physical Exam       Filed Vitals:    17 2317 17 0341 17 0700   BP: 115/70  136/65 117/79   Pulse: 81 84 66 72   Temp: 36.4 °C (97.5 °F)   36.1 °C (96.9 °F) 37 °C (98.6 °F)   Resp: 16 16 18 15   Height:       Weight:       SpO2: 94% 92% 92% 97%     Body mass index is 39.35 kg/(m^2).    Oxygen Therapy:  Pulse Oximetry: 97 %, O2 (LPM): 1, O2 Delivery: None (Room Air)    Physical Exam  Physical Exam   Constitutional: He is oriented to person, place, and time.   Obese  Cardiovascular: Normal rate and regular rhythm.    Pulmonary/Chest: Effort normal. No crackles today He exhibits no tenderness.   Distant lung sounds    Abdominal: Soft. There is no tenderness. There is no guarding.   Abdomen obese   Musculoskeletal: He exhibits edema which is decreasing  and tenderness.   Erythema both lower extremities with tenderness and pitting edema-improved since admission  Neurological: He is alert and oriented to person, place, and time. No cranial nerve deficit. GCS score is 15.   Skin: Skin is warm. Rash noted. There is erythema-improving      Lab Data Review:      6/9/2017  2:32 PM    Recent Labs      06/17/17 0259 06/18/17 0313 06/19/17   0201   SODIUM  138  138  138   POTASSIUM  4.1  4.4  4.3   CHLORIDE  102  103  100   CO2  27  27  30   BUN  30*  29*  30*   CREATININE  0.96  0.93  0.97   MAGNESIUM  2.0  1.9  1.9   CALCIUM  9.0  8.8  8.9       Recent Labs      06/17/17 0259 06/18/17 0313  06/19/17   0201   ALTSGPT  17  16  16   ASTSGOT  12  13  13   ALKPHOSPHAT  60  56  63   TBILIRUBIN  0.4  0.5  0.4   GLUCOSE  121*  112*  117*       Recent Labs      06/17/17 0259 06/18/17 0313  06/19/17   0201   RBC  5.69  5.45  5.44   HEMOGLOBIN  16.2  15.6  15.6   HEMATOCRIT  49.8  48.6  48.2   PLATELETCT  414  401  379       Recent Labs      06/17/17 0259 06/18/17 0313 06/19/17   0201   WBC  18.0*  17.8*  17.4*   NEUTSPOLYS  51.30  56.20  55.30   LYMPHOCYTES  33.70  26.70  25.40   MONOCYTES  10.60  11.30  13.20   EOSINOPHILS  4.40  4.10  6.10   BASOPHILS  0.00  0.70  0.00   ASTSGOT  12  13  13   ALTSGPT  17  16  16   ALKPHOSPHAT  60  56  63   TBILIRUBIN   0.4  0.5  0.4           Assessment/Plan       Heart failure with preserved EF  -------------------------------   presented with SOB, cough. Basal crepitations, scattered wheezes on examination, leg swelling on admission. Desaturated at night to 80s needed about 2 LPM on first days of admission  Early CXR showed features of COPD and pulmonary congestion  ECHO showed LVH, G II-III diastolic dysfunction, RVSP 35. Likely CHF with preserved EF  Most likely heart failure with preserved EF  Responded to diuretics, lasix and aldactone.  in his WT to more than 20 Ibs since admission  Chest clear on examination today. No respiratory distress. No desaturation on RA, 92 % in the last days  BUN still high 30, due to diuresis    Plan:    Continue lasix 40 mg q day, continue aldactone  Continue on lisinopril   Carvedilol  6.5 mg BID   Daily weight and I and O follow up, water restriction  Follow renal function and electrolytes      # COPD   hx of smoking, COPD on inhalars, no PFT in chart   no wheezes now, less respiratory distress, cough is better. improving   finished antibiotics and steroid    Plan:  Continue RT Protocol  Albuterol PRN  Cough suppressant         BILATERAL LOWER EXTREMITY EDEMA: CELLULITIS WITH STASIS DERMATITIS  ---------------------------------------------------------------------------------------------------  Patient has bilateral leg edema with venous stasis  Wbc 14 on admission, wbc still high 17.4  Severe erythema and tenderness of both lower extremities with chr venous stasis derm changes    DVT ruled out  Blood culture negative  It seems that it chronic cellulitis due to venous stasis on the top of interstitial fluid accumulation due to heart failure  Finished antibiotic, doxycycline    Plan:  ----------   Elastic Stocking   Leg elevation      # Sinus pauses     Presented with sinus pauses up to 4.9 sec, seems asymptomatic on day of admission  Cardiology consulted on day of admission and thinks  it is related to hypoxic event from his sleep apnea  No pauses now  Off tele now    # leukocytosis   - wbc 17.4, mainly neutrophils, possibly due to COPD exacerbation, leg cellulitis in addition to stress reaction.   - blood smear looks normal  - follow up as outpatient       # VICENTE  - clinically seems to have sleep apnea, risk factor for HF  - no sleep study done  - nocturnal oximetry showed desaturation down to low 80s with O2 need up to 2 LPM  - CPAP as outpatient    T2 DM:  --------------  A1c 6.5  Blood glucose controlled   Accuchecks Q6 hr  hypoglycemic protocol  ISS       HTN:  ----------  BP controlled  On Lisinopril 20 mg, amlodipine 5 mg, carvedilol 6.5 mg BID  Hydralazine IV ordered PRN      DLD:  ----------  stable    Ct Statin    GERD:  ------------    No alarming symptoms  Ct Omeprazole    Tinea pedis:  ---------------  Stable.continue topical clotrimazole     Substance Dependence:  ------------------------------  Smoking and Cannabis user:    Counseling to quit marijuana, and Smoking  Nicotine patch    Hx of Depression:  ----------------------  Stable now  Continue On Aripiprazole  Currently No SI or HI

## 2017-06-19 NOTE — DISCHARGE SUMMARY
[facility] [place] [facility] Discharge Summary      Admit Date:  6/8/2017       Discharge Date:  6/24/2016    Service:   [facility] Internal Medicine purple Team  Attending Physician(s):   Dr. Ratliff      Senior Resident(s):   Dr. Tiff US  Luiz Resident(s):   Dr. Puneet Womack MD      Primary Diagnosis:     Acute on chronic congestive heart failure with preserved ejection fraction    Secondary Diagnoses:                    COPD exacerbation (CMS-HCC) POA: Unknown:    Leukocytosis     Heart failure with preserved ejection fraction (CMS-HCC) (Chronic) POA: Yes    Bilateral cellulitis of lower leg POA: Unknown    Sinus pause POA: No    Essential hypertension (Chronic) POA: Yes    Obstructive sleep apnea POA: Yes    DM II (diabetes mellitus, type II), controlled (CMS-[facility]) POA: Yes        Hospital Summary (Brief Narrative):           Mr. Esposito is 67 Y old male smoker with hx of COPD not on home oxygen, HF, DM on oral medication, admitted to [facility], cough and increase in leg edema with features of cellulitis. On presentation, there was  Wheeze and rales on examination, legs swollen 3+. Desaturated at night to 80s needed about 2 LPM on first days of admission. He has features of obstructive sleep apnea but no sleep study done. Also has no pulmonary function test. Early CXR showed features of COPD and pulmonary congestion, possible atypical infection  ECHO showed LVH, G II-III diastolic dysfunction, RVSP 35. Likely CHF with preserved EF.  For his COPD exacerbation, we managed him with breathing treatment, steroid PO and received antibiotics with doxycycline for total 10 days.  His O2 saturation was about 92 % on RA  For his heart failure condition we managed him with  diuretics, lasix and aldactone, lisinopril, carvedilol and water restriction, I an O follow up. The patient responded, lost about 20 Ibs. BUN increased due to diuresis to about 40, we followed that and adjusted his diuretic doses. The patient has improved  significantly since admission, SOB improved and leg edema decreased.     The patient has leukocytosis up to 18.000 which was consistently high during this hospitalization. From chart review, the patient had hx of this leukocytosis since 2016. The initial thought was infection related leukocytosis or steroid effect. Bone marrow biopsy was suggested by hematology in 2016 if the patient continues to have this leukocytosis. We consulted hematology again and bone marrow biopsy was done on 6/22.  The initial report is reassuring with Normocellular marrow (50%) with trilineage hematopoiesis. Relative myeloid hyperplasia, No significant dyspoiesis. The overall findings are worrisome for chronic myelomonocytic leukemia. Sent for cytology, flow cytometry, Waiting the final report. To be followed.          Patient /Hospital Summary (Details -- Problem Oriented) :          Heart failure with preserved EF  -------------------------------   see hospital summary for more details  Need follow up with PCP within 7 days from now for his HF condition      # COPD   hx of smoking, COPD on inhalars, no PFT in chart   finished antibiotics and steroid   to continue on his inhalars.   needs follow up with pulmonary function test within next 8 weeks.        BILATERAL LOWER EXTREMITY EDEMA: CELLULITIS WITH STASIS DERMATITIS  ---------------------------------------------------------------------------------------------------  Patient has bilateral leg edema with venous stasis  Wbc 14 on admission, wbc still high 17.1  Severe erythema and tenderness of both lower extremities with chr venous stasis derm changes    DVT ruled out  Blood culture negative  It seems that it chronic cellulitis due to venous stasis on the top of interstitial fluid accumulation due to heart failure  Finished antibiotic, doxycycline  Continue Elastic Stocking  and Leg elevation      # Sinus pauses     Presented with sinus pauses up to 4.9 sec, seems asymptomatic on day of  admission  Cardiology consulted on first day of admission and thinks it is related to hypoxic event from his sleep apnea  Resolved     # leukocytosis   - wbc 17.1 mainly neutrophils with monocytes. The initial thought was infection and stress related but it is persistent for more than one year  - blood smear looks normal  - initial report of Bone marrow biopsy showed: Normocellular marrow (50%) with trilineage hematopoiesis.         Relative myeloid hyperplasia.         No significant dyspoiesis.         Rare benign lymphoid aggregates.         Decreased sideroblastic and storage iron.         PENDING KARYOTYPE AND FISH     The overall findings are worrisome for chronic myelomonocytic leukemia. Waiting final report        # VICENTE  - clinically seems to have sleep apnea, risk factor for HF  - no sleep study done  - nocturnal oximetry showed desaturation down to low 80s with O2 need up to 2 LPM  - need sleep study and CPAP as outpatient    T2 DM:  --------------  A1c 6.5  Blood glucose controlled   Restarted his glipizide on discharge       HTN:  ----------  BP controlled  On Lisinopril 20 mg, amlodipine 5 mg, carvedilol 6.5 mg BID  Hydralazine IV ordered PRN      DLD:  ----------  stable    Continued on Statin    GERD:  ------------    No alarming symptoms  Continued on Omeprazole    Tinea pedis:  ---------------  Stable.continue topical clotrimazole     Substance Dependence:  ------------------------------  Smoking and Cannabis user:    Counseling to quit marijuana, and Smoking  Nicotine patch    Hx of Depression:  ----------------------  Stable now  Continue On Aripiprazole  Currently No SI or HI      Consultants:     Cardiology and hematology/oncology     Procedures:        Bone marrow biopsy on 6/24    Imaging/ Testing:        Echocardiogram:    Mild concentric left ventricular hypertrophy.  Left ventricular ejection fraction is visually estimated to be 60%.  Grade II-III diastolic dysfunction.  Estimated right  ventricular systolic pressure  is 35 mmHg.  Dilated inferior vena cava without inspiratory collapse.    CXR :  Pulmonary vascular congestion. Superimposed infection not excluded.  Atherosclerotic plaque.      High resolution CT chest:     Mild anterior peripheral interstitial prominence in the lung apices suggesting mild interstitial lung disease or pulmonary edema.  Trace effusions and bibasilar atelectasis.  Coronary artery calcifications.    Bone marrow biopsy :  Preliminary report :    PRELIMINARY DIAGNOSIS:    Peripheral blood smear and CBC:         Normocytic hypochromic erythrocytes.         Mild leukocyte ptosis with mild absolute neutrophilia,          eosinophilia, and monocytosis.  Bone marrow aspirate, clot, and core biopsy:         Normocellular marrow (50%) with trilineage hematopoiesis.         Relative myeloid hyperplasia.         No significant dyspoiesis.         Rare benign lymphoid aggregates.         Decreased sideroblastic and storage iron.         PENDING KARYOTYPE AND FISH (PDGFRA, PDGFRB, and FGFR1) STUDIES.         See comment.    Comment: The patient demonstrates leukocytosis with persistent  monocytosis of over 1 K/uL for at least three months, and neutrophilia.  The overall findings are worrisome for chronic myelomonocytic leukemia  (CMML). However, in absence of significant dysplastic changes (as seen  this patient), the demonstration of clonal hematopoiesis is essential  for the diagnosis. Thus, the final report will be issued upon the  receipt of the results of cytogenetic studies.  Flow cytometric study of the bone marrow aspirate technically performed  at [facility] (Accession #[id number]) and interpreted by Dr. Moss at  [facility] shows no diagnostic immunophenotypic  abnormalities.        Discharge Medications:         Medication Reconciliation: Completed        Medication List      START taking these medications       Instructions    acetaminophen 325 MG  Tabs   Last time this was given:  650 mg on 6/23/2017  7:42 AM   Commonly known as:  TYLENOL    Take 2 Tabs by mouth every 6 hours as needed (Mild Pain; (Pain scale 1-3); Temp greater than 100.5 F).   Dose:  650 mg       * albuterol 2.5mg/0.5ml Nebu   Last time this was given:  2.5 mg on 6/20/2017  2:54 PM   Commonly known as:  PROVENTIL    0.5 mL by Nebulization route every 6 hours as needed for Shortness of Breath.   Dose:  2.5 mg       * albuterol 108 (90 BASE) MCG/ACT Aers inhalation aerosol   Last time this was given:  2 Puffs on 6/20/2017  8:02 AM    Inhale 2 Puffs by mouth every four hours as needed for Shortness of Breath.   Dose:  2 Puff       amlodipine 5 MG Tabs   Last time this was given:  5 mg on 6/24/2017  7:47 AM   Commonly known as:  NORVASC    Take 1 Tab by mouth every day.   Dose:  5 mg       artificial tears 1.4 % Soln   Last time this was given:  1 Drop on 6/24/2017  7:48 AM    Place 1 Drop in both eyes 2 Times a Day.   Dose:  1 Drop       budesonide-formoterol 160-4.5 MCG/ACT Aero   Last time this was given:  2 Puffs on 6/24/2017  9:30 AM   Commonly known as:  SYMBICORT    Inhale 2 Puffs by mouth 2 Times a Day.   Dose:  2 Puff       carvedilol 6.25 MG Tabs   Last time this was given:  6.25 mg on 6/24/2017  7:46 AM   Commonly known as:  COREG    Take 1 Tab by mouth 2 times a day, with meals.   Dose:  6.25 mg       cholecalciferol 400 UNIT Tabs   Last time this was given:  1,000 Units on 6/24/2017  7:47 AM   Commonly known as:  VITAMIN D3    Take 2.5 Tabs by mouth every day.   Dose:  1000 Units       clotrimazole 1 % Crea   Last time this was given:  6/24/2017  7:48 AM   Commonly known as:  LOTRIMIN    Apply to feet       dextrose 50% 50 % Soln   Commonly known as:  D50W    25 mL by Intravenous route as needed (If FSBG is less than or equal to 70 mg/dL and If patient is NPO).   Dose:  25 mL       fluticasone 50 MCG/ACT nasal spray   Last time this was given:  100 mcg on 6/24/2017  9:30 AM    Commonly known as:  FLONASE    Spray 2 Sprays in nose every day.   Dose:  2 Spray       furosemide 20 MG Tabs   Start taking on:  6/25/2017   Last time this was given:  40 mg on 6/24/2017  7:47 AM   Commonly known as:  LASIX    Take 1 Tab by mouth every day.   Dose:  20 mg       gabapentin 100 MG Caps   Last time this was given:  100 mg on 6/24/2017  7:47 AM   Commonly known as:  NEURONTIN    Take 2 Caps by mouth 3 times a day.   Dose:  200 mg       glucose 4 g 4 g Chew    Take 4 Tabs by mouth as needed for Low Blood Sugar (If FSBG is less than or equal to 70 mg/dL and patient able to eat or drink).   Dose:  16 g       guaifenesin DM sugar free  MG/5ML Liqd soln   Last time this was given:  10 mL on 6/9/2017  8:27 AM   Commonly known as:  DIABETIC TUSSIN DM    Take 10 mL by mouth every 6 hours as needed for Cough.   Dose:  10 mL       hydrALAZINE 20 MG/ML Soln   Last time this was given:  20 mg on 6/16/2017  3:55 AM   Commonly known as:  APRESOLINE    1 mL by Intravenous route every 6 hours as needed.   Dose:  20 mg       lisinopril 20 MG Tabs   Last time this was given:  20 mg on 6/24/2017  7:47 AM   Commonly known as:  PRINIVIL    Take 1 Tab by mouth every day.   Dose:  20 mg       nicotine 21 MG/24HR Pt24   Last time this was given:  21 mg on 6/24/2017  5:32 AM   Commonly known as:  NICODERM    Apply 1 Patch to skin as directed every 24 hours.   Dose:  1 Patch       nicotine polacrilex 2 MG Gum   Commonly known as:  NICORETTE    Take 1 Each by mouth every 1 hour as needed (For nicotine urge).   Dose:  2 mg       spironolactone 25 MG Tabs   Last time this was given:  25 mg on 6/24/2017  7:47 AM   Commonly known as:  ALDACTONE    Take 1 Tab by mouth every day.   Dose:  25 mg       * Notice:  This list has 2 medication(s) that are the same as other medications prescribed for you. Read the directions carefully, and ask your doctor or other care provider to review them with you.      CONTINUE taking these  medications       Instructions    ABILIFY 5 MG tablet   Last time this was given:  5 mg on 6/23/2017  8:28 PM   Generic drug:  aripiprazole   Next Dose Due:  Take tonight at bedtime.    Take 5 mg by mouth every bedtime.   Dose:  5 mg       aspirin EC 81 MG Tbec   Last time this was given:  81 mg on 6/24/2017  7:46 AM   Commonly known as:  ECOTRIN   Next Dose Due:  Take tomorrow morning.    Take 81 mg by mouth every day.   Dose:  81 mg       atorvastatin 20 MG Tabs   Last time this was given:  20 mg on 6/23/2017  8:28 PM   Commonly known as:  LIPITOR   Next Dose Due:  Take tonight at bedtime.    Take 1 Tab by mouth every bedtime.   Dose:  20 mg       glipiZIDE SR 2.5 MG Tb24   Commonly known as:  GLUCOTROL   Next Dose Due:  Take tomorrow morning.    Take 2.5 mg by mouth every morning.   Dose:  2.5 mg       omeprazole 20 MG delayed-release capsule   Last time this was given:  20 mg on 6/24/2017  7:47 AM   Commonly known as:  PRILOSEC    Take 1 Cap by mouth every day.   Dose:  20 mg             Disposition:   SNF    Diet:   Diabetic, Heart failure diet, water restriction     Activity:   As tolerated    Instructions:         The patient was instructed to return to the ER in the event of worsening symptoms. I have counseled the patient on the importance of compliance and the patient has agreed to proceed with all medical recommendations and follow up plan indicated above.   The patient understands that all medications come with benefits and risks. Risks may include permanent injury or death and these risks can be minimized with close reassessment and monitoring.        Primary Care Provider:     Discharge summary faxed to primary care provider:  Deferred  Copy of discharge summary given to the patient: Deferred    Follow up appointment details :      Needs follow up with PCP within one week for his heart failure condition. Appointment confirmed with heart failure clinic on  Next Thursday 6/29 at 9:45  Follow up for the  final result of bone marrow biopsy, the cytogenic study    Pending Studies:        the final result of bone marrow biopsy, the cytogenic study    Time spent on discharge day patient visit, preparing discharge paperwork and arranging for patient follow up.    Summary of follow up issues:   Needs follow up with PCP within one week for his heart failure condition.   Follow up for the final result of bone marrow biopsy, the cytogenic study  Follow up for his sleep apnea with sleep study  Follow up with Pulmonary function test within 8 weeks     Discharge Time (Minutes) :  40 min      Condition on Discharge    ______________________________________________________________________    Interval history/exam for day of discharge:     His SOB and leg edema improving. Still has some weakness and fatigue    Filed Vitals:    06/24/17 0000 06/24/17 0300 06/24/17 0450 06/24/17 0725   BP:  105/49     Pulse:  59 60 85   Temp:  36 °C (96.8 °F)  36.4 °C (97.6 °F)   Resp:  24 20 18   Height:       Weight: 113.3 kg (249 lb 12.5 oz)      SpO2:  90%  92%     Weight/BMI: Body mass index is 39.11 kg/(m^2).  Pulse Oximetry: 92 % (wrong chart), O2 (LPM): 0, O2 Delivery: None (Room Air)    General: not distressed  CVS: normal S1 and S2, no murmur or gallop no rub  PULM: HVB, no wheezes or crackles   Pitting leg edema 1 +, erythematous skin    Most Recent Labs:    Lab Results   Component Value Date/Time    WBC 17.1* 06/20/2017 05:17 AM    RBC 5.58 06/20/2017 05:17 AM    HEMOGLOBIN 16.0 06/20/2017 05:17 AM    HEMATOCRIT 49.8 06/20/2017 05:17 AM    MCV 89.2 06/20/2017 05:17 AM    MCH 28.7 06/20/2017 05:17 AM    MCHC 32.1* 06/20/2017 05:17 AM    MPV 11.5 06/20/2017 05:17 AM    NEUTROPHILS-POLYS 57.90 06/20/2017 05:17 AM    LYMPHOCYTES 23.50 06/20/2017 05:17 AM    MONOCYTES 11.80 06/20/2017 05:17 AM    EOSINOPHILS 5.50 06/20/2017 05:17 AM    BASOPHILS 0.70 06/20/2017 05:17 AM    HYPOCHROMIA 1+ 06/13/2016 02:41 AM    ANISOCYTOSIS 1+ 06/17/2017 02:59  AM      Lab Results   Component Value Date/Time    SODIUM 134* 06/24/2017 07:48 AM    POTASSIUM 5.2 06/24/2017 07:48 AM    CHLORIDE 103 06/24/2017 07:48 AM    CO2 22 06/24/2017 07:48 AM    GLUCOSE 123* 06/24/2017 07:48 AM    BUN 40* 06/24/2017 07:48 AM    CREATININE 0.89 06/24/2017 07:48 AM      Lab Results   Component Value Date/Time    ALT(SGPT) 19 06/20/2017 05:17 AM    AST(SGOT) 16 06/20/2017 05:17 AM    ALKALINE PHOSPHATASE 63 06/20/2017 05:17 AM    TOTAL BILIRUBIN 0.5 06/20/2017 05:17 AM    LIPASE 24 06/08/2017 07:39 PM    ALBUMIN 3.5 06/20/2017 05:17 AM    GLOBULIN 2.8 06/20/2017 05:17 AM    INR 1.01 06/19/2017 05:55 PM    MACROCYTOSIS 1+ 06/17/2017 02:59 AM     Lab Results   Component Value Date/Time    PT 13.6 06/19/2017 05:55 PM    INR 1.01 06/19/2017 05:55 PM

## 2017-06-19 NOTE — PROGRESS NOTES
Patient alert and oriented, some anxiety.   Lungs diminished, tolerating room air.   BLE swelling improved per patient.   Independent in room, SBA to ambulate the halls.   NPO @ [facility] for bone marrow biopsy tomorrow.   Awaiting discharge to SNF.

## 2017-06-19 NOTE — CARE PLAN
Problem: Discharge Barriers/Planning  Goal: Patient’s continuum of care needs will be met  Outcome: PROGRESSING SLOWER THAN EXPECTED  Discharge planning slow- search expanded for SNF placement.     Problem: Respiratory:  Goal: Respiratory status will improve  Outcome: PROGRESSING AS EXPECTED  Lungs diminished but clear. Tolerating room air, ambulating on room air.

## 2017-06-20 LAB
ALBUMIN SERPL BCP-MCNC: 3.5 G/DL (ref 3.2–4.9)
ALBUMIN/GLOB SERPL: 1.3 G/DL
ALP SERPL-CCNC: 63 U/L (ref 30–99)
ALT SERPL-CCNC: 19 U/L (ref 2–50)
ANION GAP SERPL CALC-SCNC: 8 MMOL/L (ref 0–11.9)
AST SERPL-CCNC: 16 U/L (ref 12–45)
BASOPHILS # BLD AUTO: 0.7 % (ref 0–1.8)
BASOPHILS # BLD: 0.12 K/UL (ref 0–0.12)
BILIRUB SERPL-MCNC: 0.5 MG/DL (ref 0.1–1.5)
BUN SERPL-MCNC: 25 MG/DL (ref 8–22)
CALCIUM SERPL-MCNC: 9.1 MG/DL (ref 8.5–10.5)
CHLORIDE SERPL-SCNC: 100 MMOL/L (ref 96–112)
CO2 SERPL-SCNC: 29 MMOL/L (ref 20–33)
CREAT SERPL-MCNC: 0.84 MG/DL (ref 0.5–1.4)
EOSINOPHIL # BLD AUTO: 0.94 K/UL (ref 0–0.51)
EOSINOPHIL NFR BLD: 5.5 % (ref 0–6.9)
ERYTHROCYTE [DISTWIDTH] IN BLOOD BY AUTOMATED COUNT: 50 FL (ref 35.9–50)
GFR SERPL CREATININE-BSD FRML MDRD: >60 ML/MIN/1.73 M 2
GLOBULIN SER CALC-MCNC: 2.8 G/DL (ref 1.9–3.5)
GLUCOSE BLD-MCNC: 125 MG/DL (ref 65–99)
GLUCOSE BLD-MCNC: 136 MG/DL (ref 65–99)
GLUCOSE BLD-MCNC: 153 MG/DL (ref 65–99)
GLUCOSE SERPL-MCNC: 109 MG/DL (ref 65–99)
HCT VFR BLD AUTO: 49.8 % (ref 42–52)
HGB BLD-MCNC: 16 G/DL (ref 14–18)
IMM GRANULOCYTES # BLD AUTO: 0.11 K/UL (ref 0–0.11)
IMM GRANULOCYTES NFR BLD AUTO: 0.6 % (ref 0–0.9)
LYMPHOCYTES # BLD AUTO: 4.02 K/UL (ref 1–4.8)
LYMPHOCYTES NFR BLD: 23.5 % (ref 22–41)
MAGNESIUM SERPL-MCNC: 2.1 MG/DL (ref 1.5–2.5)
MCH RBC QN AUTO: 28.7 PG (ref 27–33)
MCHC RBC AUTO-ENTMCNC: 32.1 G/DL (ref 33.7–35.3)
MCV RBC AUTO: 89.2 FL (ref 81.4–97.8)
MONOCYTES # BLD AUTO: 2.02 K/UL (ref 0–0.85)
MONOCYTES NFR BLD AUTO: 11.8 % (ref 0–13.4)
NEUTROPHILS # BLD AUTO: 9.88 K/UL (ref 1.82–7.42)
NEUTROPHILS NFR BLD: 57.9 % (ref 44–72)
NRBC # BLD AUTO: 0 K/UL
NRBC BLD AUTO-RTO: 0 /100 WBC
PLATELET # BLD AUTO: 353 K/UL (ref 164–446)
PMV BLD AUTO: 11.5 FL (ref 9–12.9)
POTASSIUM SERPL-SCNC: 4.5 MMOL/L (ref 3.6–5.5)
PROT SERPL-MCNC: 6.3 G/DL (ref 6–8.2)
RBC # BLD AUTO: 5.58 M/UL (ref 4.7–6.1)
SODIUM SERPL-SCNC: 137 MMOL/L (ref 135–145)
WBC # BLD AUTO: 17.1 K/UL (ref 4.8–10.8)

## 2017-06-20 PROCEDURE — A9270 NON-COVERED ITEM OR SERVICE: HCPCS | Performed by: INTERNAL MEDICINE

## 2017-06-20 PROCEDURE — 36415 COLL VENOUS BLD VENIPUNCTURE: CPT

## 2017-06-20 PROCEDURE — 700102 HCHG RX REV CODE 250 W/ 637 OVERRIDE(OP): Performed by: INTERNAL MEDICINE

## 2017-06-20 PROCEDURE — 99232 SBSQ HOSP IP/OBS MODERATE 35: CPT | Mod: GC | Performed by: INTERNAL MEDICINE

## 2017-06-20 PROCEDURE — A9270 NON-COVERED ITEM OR SERVICE: HCPCS | Performed by: GENERAL ACUTE CARE HOSPITAL

## 2017-06-20 PROCEDURE — A9270 NON-COVERED ITEM OR SERVICE: HCPCS | Performed by: STUDENT IN AN ORGANIZED HEALTH CARE EDUCATION/TRAINING PROGRAM

## 2017-06-20 PROCEDURE — 700102 HCHG RX REV CODE 250 W/ 637 OVERRIDE(OP): Performed by: GENERAL ACUTE CARE HOSPITAL

## 2017-06-20 PROCEDURE — A9270 NON-COVERED ITEM OR SERVICE: HCPCS

## 2017-06-20 PROCEDURE — 94760 N-INVAS EAR/PLS OXIMETRY 1: CPT

## 2017-06-20 PROCEDURE — 80053 COMPREHEN METABOLIC PANEL: CPT

## 2017-06-20 PROCEDURE — 700102 HCHG RX REV CODE 250 W/ 637 OVERRIDE(OP): Performed by: STUDENT IN AN ORGANIZED HEALTH CARE EDUCATION/TRAINING PROGRAM

## 2017-06-20 PROCEDURE — 700101 HCHG RX REV CODE 250: Performed by: GENERAL ACUTE CARE HOSPITAL

## 2017-06-20 PROCEDURE — 82962 GLUCOSE BLOOD TEST: CPT | Mod: 91

## 2017-06-20 PROCEDURE — 700102 HCHG RX REV CODE 250 W/ 637 OVERRIDE(OP)

## 2017-06-20 PROCEDURE — 83735 ASSAY OF MAGNESIUM: CPT

## 2017-06-20 PROCEDURE — 94640 AIRWAY INHALATION TREATMENT: CPT

## 2017-06-20 PROCEDURE — 770006 HCHG ROOM/CARE - MED/SURG/GYN SEMI*

## 2017-06-20 PROCEDURE — 85025 COMPLETE CBC W/AUTO DIFF WBC: CPT

## 2017-06-20 RX ORDER — ALBUTEROL SULFATE 90 UG/1
AEROSOL, METERED RESPIRATORY (INHALATION)
Status: COMPLETED
Start: 2017-06-20 | End: 2017-06-20

## 2017-06-20 RX ADMIN — NICOTINE 21 MG: 21 PATCH, EXTENDED RELEASE TRANSDERMAL at 05:05

## 2017-06-20 RX ADMIN — ALBUTEROL SULFATE 2.5 MG: 2.5 SOLUTION RESPIRATORY (INHALATION) at 14:54

## 2017-06-20 RX ADMIN — ALBUTEROL SULFATE 2 PUFF: 90 AEROSOL, METERED RESPIRATORY (INHALATION) at 08:02

## 2017-06-20 RX ADMIN — SPIRONOLACTONE 25 MG: 25 TABLET, FILM COATED ORAL at 08:06

## 2017-06-20 RX ADMIN — FUROSEMIDE 40 MG: 40 TABLET ORAL at 08:05

## 2017-06-20 RX ADMIN — POLYVINYL ALCOHOL 1 DROP: 14 SOLUTION/ DROPS OPHTHALMIC at 08:03

## 2017-06-20 RX ADMIN — CARVEDILOL 6.25 MG: 6.25 TABLET, FILM COATED ORAL at 17:03

## 2017-06-20 RX ADMIN — VANCOMYCIN HYDROCHLORIDE 125 MG: 10 INJECTION, POWDER, LYOPHILIZED, FOR SOLUTION INTRAVENOUS at 22:58

## 2017-06-20 RX ADMIN — FLUTICASONE PROPIONATE 100 MCG: 50 SPRAY, METERED NASAL at 08:01

## 2017-06-20 RX ADMIN — GABAPENTIN 100 MG: 100 CAPSULE ORAL at 08:05

## 2017-06-20 RX ADMIN — BUDESONIDE AND FORMOTEROL FUMARATE DIHYDRATE 2 PUFF: 160; 4.5 AEROSOL RESPIRATORY (INHALATION) at 08:02

## 2017-06-20 RX ADMIN — GABAPENTIN 100 MG: 100 CAPSULE ORAL at 14:10

## 2017-06-20 RX ADMIN — LISINOPRIL 20 MG: 20 TABLET ORAL at 08:05

## 2017-06-20 RX ADMIN — INSULIN LISPRO 1 UNITS: 100 INJECTION, SOLUTION INTRAVENOUS; SUBCUTANEOUS at 12:17

## 2017-06-20 RX ADMIN — OMEPRAZOLE 20 MG: 20 CAPSULE, DELAYED RELEASE ORAL at 08:05

## 2017-06-20 RX ADMIN — CLOTRIMAZOLE: 10 CREAM TOPICAL at 08:09

## 2017-06-20 RX ADMIN — ASPIRIN 81 MG: 81 TABLET ORAL at 08:05

## 2017-06-20 RX ADMIN — AMLODIPINE BESYLATE 5 MG: 5 TABLET ORAL at 08:04

## 2017-06-20 RX ADMIN — ARIPIPRAZOLE 5 MG: 10 TABLET ORAL at 21:08

## 2017-06-20 RX ADMIN — CARVEDILOL 6.25 MG: 6.25 TABLET, FILM COATED ORAL at 08:05

## 2017-06-20 RX ADMIN — GABAPENTIN 100 MG: 100 CAPSULE ORAL at 21:08

## 2017-06-20 RX ADMIN — ATORVASTATIN CALCIUM 20 MG: 20 TABLET, FILM COATED ORAL at 21:08

## 2017-06-20 RX ADMIN — Medication 100 MG: at 08:04

## 2017-06-20 RX ADMIN — BUDESONIDE AND FORMOTEROL FUMARATE DIHYDRATE 2 PUFF: 160; 4.5 AEROSOL RESPIRATORY (INHALATION) at 21:43

## 2017-06-20 RX ADMIN — TRAMADOL HYDROCHLORIDE 50 MG: 50 TABLET, COATED ORAL at 17:06

## 2017-06-20 RX ADMIN — VANCOMYCIN HYDROCHLORIDE 125 MG: 10 INJECTION, POWDER, LYOPHILIZED, FOR SOLUTION INTRAVENOUS at 08:07

## 2017-06-20 RX ADMIN — ACETAMINOPHEN 650 MG: 325 TABLET, FILM COATED ORAL at 17:03

## 2017-06-20 ASSESSMENT — ENCOUNTER SYMPTOMS
DOUBLE VISION: 0
BLURRED VISION: 0
COUGH: 0
FEVER: 0
WEAKNESS: 0
VOMITING: 0
TREMORS: 0
FALLS: 0
WHEEZING: 0
PHOTOPHOBIA: 0
DIZZINESS: 0
BRUISES/BLEEDS EASILY: 0
SPUTUM PRODUCTION: 0
DIAPHORESIS: 0
NECK PAIN: 0
CHILLS: 0
DIARRHEA: 0
MYALGIAS: 0
TINGLING: 0
DEPRESSION: 0
NAUSEA: 0
SHORTNESS OF BREATH: 0
HEADACHES: 0

## 2017-06-20 ASSESSMENT — PAIN SCALES - GENERAL
PAINLEVEL_OUTOF10: 4
PAINLEVEL_OUTOF10: 0
PAINLEVEL_OUTOF10: 0

## 2017-06-20 ASSESSMENT — LIFESTYLE VARIABLES: SUBSTANCE_ABUSE: 0

## 2017-06-20 NOTE — CARE PLAN
Problem: Safety  Goal: Will remain free from falls  Outcome: PROGRESSING AS EXPECTED    Problem: Medication  Goal: Compliance with prescribed medication will improve  Outcome: PROGRESSING AS EXPECTED    Problem: Respiratory:  Goal: Respiratory status will improve  Outcome: PROGRESSING AS EXPECTED

## 2017-06-20 NOTE — PROGRESS NOTES
[facility] [place]    Name Maxi Esposito 1950   Age/Sex 67 y.o. male   MRN [id number]   Code Status Full     After 5PM or if no immediate response to page, please call for cross-coverage  Attending/Team: Dr. Evy US / audra Call [phone] to page   1st Call - Day Intern (R1):   Dr. Puneet Womack MD 2nd Call - Day Sr. Resident (R2/R3):   Dr. Renuka US         Chief complaint/ reason for interval visit (Primary Diagnosis)    SOB and leg swelling      Interval Problem Daily Status Update    Clinically improved, less respiratory distress, O2 saturation 92 on RA. BUN still high 25. Wbc still high 17.1 with left shift.  Oncology consulted yesterday for the persistent leukocytosis. Recommend BM biopsy, supposed to be done today. Postponed to Thursday       Review of Systems   Constitutional: Positive for malaise/fatigue. Negative for fever, chills and diaphoresis.   HENT: Negative for hearing loss and tinnitus.    Eyes: Negative for blurred vision, double vision and photophobia.   Respiratory: Negative for cough, sputum production, shortness of breath and wheezing.    Cardiovascular: Negative for chest pain and leg swelling.   Gastrointestinal: Negative for nausea, vomiting and diarrhea.   Genitourinary: Negative for dysuria and urgency.   Musculoskeletal: Negative for myalgias, falls and neck pain.   Skin: Negative for itching and rash.   Neurological: Negative for dizziness, tingling, tremors, weakness and headaches.   Endo/Heme/Allergies: Negative for environmental allergies. Does not bruise/bleed easily.   Psychiatric/Behavioral: Negative for depression and substance abuse.       Consultants/Specialty  Social service for SNF  Oncology     Disposition   waiting bone marrow biopsy and possibly discharged to SNF      Quality Measures  Labs reviewed, Medications reviewed and EKG reviewed  Arshad catheter: No Arshad      DVT Prophylaxis: Enoxaparin (Lovenox)              Physical Exam  "      Filed Vitals:    06/19/17 1800 06/19/17 1946 06/20/17 0600 06/20/17 0751   BP:  105/68 112/72 115/73   Pulse:  62 74 86   Temp:  36.4 °C (97.6 °F) 36.7 °C (98.1 °F) 36.7 °C (98 °F)   Resp:  17 18 14   Height: 1.702 m (5' 7\")      Weight: 114.2 kg (251 lb 12.3 oz)      SpO2:  92% 95% 95%     Body mass index is 39.42 kg/(m^2). Weight: 114.2 kg (251 lb 12.3 oz)  Oxygen Therapy:  Pulse Oximetry: 95 %, O2 (LPM): 0, O2 Delivery: None (Room Air)    Physical Exam  Physical Exam   Constitutional: He is oriented to person, place, and time.   Obese  Cardiovascular: Normal rate and regular rhythm.    Pulmonary/Chest: Effort normal. No crackles today He exhibits no tenderness.   Distant lung sounds    Abdominal: Soft. There is no tenderness. There is no guarding.   Abdomen obese   Musculoskeletal: He exhibits edema which is decreasing  and tenderness.   Erythema both lower extremities with tenderness and pitting edema-improved since admission  Neurological: He is alert and oriented to person, place, and time. No cranial nerve deficit. GCS score is 15.   Skin: Skin is warm. Rash noted. There is erythema-improving      Lab Data Review:      6/9/2017  2:32 PM    Recent Labs      06/18/17 0313 06/19/17   0201  06/20/17   0517   SODIUM  138  138  137   POTASSIUM  4.4  4.3  4.5   CHLORIDE  103  100  100   CO2  27  30  29   BUN  29*  30*  25*   CREATININE  0.93  0.97  0.84   MAGNESIUM  1.9  1.9  2.1   CALCIUM  8.8  8.9  9.1       Recent Labs      06/18/17   0313  06/19/17   0201  06/20/17   0517   ALTSGPT  16  16  19   ASTSGOT  13  13  16   ALKPHOSPHAT  56  63  63   TBILIRUBIN  0.5  0.4  0.5   GLUCOSE  112*  117*  109*       Recent Labs      06/18/17   0313  06/19/17   0201  06/19/17   1755  06/20/17   0517   RBC  5.45  5.44   --   5.58   HEMOGLOBIN  15.6  15.6   --   16.0   HEMATOCRIT  48.6  48.2   --   49.8   PLATELETCT  401  379   --   353   PROTHROMBTM   --    --   13.6   --    APTT   --    --   28.1   --    INR   --    -- "   1.01   --        Recent Labs      17   0313  17   0201  17   0517   WBC  17.8*  17.4*  17.1*   NEUTSPOLYS  56.20  55.30  57.90   LYMPHOCYTES  26.70  25.40  23.50   MONOCYTES  11.30  13.20  11.80   EOSINOPHILS  4.10  6.10  5.50   BASOPHILS  0.70  0.00  0.70   ASTSGOT  13  13  16   ALTSGPT  16  16  19   ALKPHOSPHAT  56  63  63   TBILIRUBIN  0.5  0.4  0.5           Assessment/Plan       Heart failure with preserved EF  -------------------------------   presented with SOB, cough. Basal crepitations, scattered wheezes on examination, leg swelling on admission. Desaturated at night to 80s needed about 2 LPM on first days of admission  Early CXR showed features of COPD and pulmonary congestion  ECHO showed LVH, G II-III diastolic dysfunction, RVSP 35. Likely CHF with preserved EF  Most likely heart failure with preserved EF  Responded to diuretics, lasix and aldactone.  in his WT to more than 20 Ibs since admission  Chest clear on examination today. No respiratory distress. No desaturation on RA, 92 % in the last days  BUN still high 25, due to diuresis    Plan:    Continue lasix 40 mg q day, continue aldactone  Continue on lisinopril   Carvedilol  6.5 mg BID   Daily weight and I and O follow up, water restriction   Follow renal function and electrolytes      # COPD   hx of smoking, COPD on inhalars, no PFT in chart   no wheezes now, less respiratory distress, cough is better. improving   finished antibiotics and steroid    Plan:  Continue RT Protocol  Albuterol PRN  Cough suppressant         BILATERAL LOWER EXTREMITY EDEMA: CELLULITIS WITH STASIS DERMATITIS  ---------------------------------------------------------------------------------------------------  Patient has bilateral leg edema with venous stasis  Wbc 14 on admission, wbc still high 17.1  Severe erythema and tenderness of both lower extremities with chr venous stasis derm changes    DVT ruled out  Blood culture negative  It seems that  it chronic cellulitis due to venous stasis on the top of interstitial fluid accumulation due to heart failure  Finished antibiotic, doxycycline    Plan:  ----------   Elastic Stocking   Leg elevation      # Sinus pauses     Presented with sinus pauses up to 4.9 sec, seems asymptomatic on day of admission  Cardiology consulted on day of admission and thinks it is related to hypoxic event from his sleep apnea  No pauses now  Off tele now    # leukocytosis   - wbc 17.1 mainly neutrophils, possibly due to COPD exacerbation, leg cellulitis in addition to stress reaction, but this leukocytosis has been persistent for more than year  - blood smear looks normal  - oncology consulted and the plan is to do bone marrow biopsy which was supposed to be done today, but postponed to Thursday        # VICENTE  - clinically seems to have sleep apnea, risk factor for HF  - no sleep study done  - nocturnal oximetry showed desaturation down to low 80s with O2 need up to 2 LPM  - CPAP as outpatient    T2 DM:  --------------  A1c 6.5  Blood glucose controlled   Accuchecks Q6 hr  hypoglycemic protocol  ISS       HTN:  ----------  BP controlled  On Lisinopril 20 mg, amlodipine 5 mg, carvedilol 6.5 mg BID  Hydralazine IV ordered PRN      DLD:  ----------  stable    Ct Statin    GERD:  ------------    No alarming symptoms  Ct Omeprazole    Tinea pedis:  ---------------  Stable.continue topical clotrimazole     Substance Dependence:  ------------------------------  Smoking and Cannabis user:    Counseling to quit marijuana, and Smoking  Nicotine patch    Hx of Depression:  ----------------------  Stable now  Continue On Aripiprazole  Currently No SI or HI

## 2017-06-20 NOTE — PROGRESS NOTES
Spoke with [facility]ology [facility] Endoscopy this morning, bone marrow biopsy is not scheduled until Thursday 6/22 @ 1100. MD made aware- NPO canceled.     Per Dr. Womack, bone biopsy canceled until further notice.

## 2017-06-20 NOTE — DISCHARGE PLANNING
Attempted to follow up with [facility] regarding bed availability, however, Gwen(admissions) in morning meeting. Message left. Received a call from Julia at [facility] stating they will be doing an onsite eval on pt. Ugo() updated via voicemail.

## 2017-06-21 LAB
GLUCOSE BLD-MCNC: 107 MG/DL (ref 65–99)
GLUCOSE BLD-MCNC: 111 MG/DL (ref 65–99)

## 2017-06-21 PROCEDURE — A9270 NON-COVERED ITEM OR SERVICE: HCPCS | Performed by: INTERNAL MEDICINE

## 2017-06-21 PROCEDURE — 700102 HCHG RX REV CODE 250 W/ 637 OVERRIDE(OP): Performed by: GENERAL ACUTE CARE HOSPITAL

## 2017-06-21 PROCEDURE — 700102 HCHG RX REV CODE 250 W/ 637 OVERRIDE(OP): Performed by: STUDENT IN AN ORGANIZED HEALTH CARE EDUCATION/TRAINING PROGRAM

## 2017-06-21 PROCEDURE — 700102 HCHG RX REV CODE 250 W/ 637 OVERRIDE(OP): Performed by: INTERNAL MEDICINE

## 2017-06-21 PROCEDURE — 99231 SBSQ HOSP IP/OBS SF/LOW 25: CPT | Mod: GC | Performed by: INTERNAL MEDICINE

## 2017-06-21 PROCEDURE — A9270 NON-COVERED ITEM OR SERVICE: HCPCS | Performed by: GENERAL ACUTE CARE HOSPITAL

## 2017-06-21 PROCEDURE — 97530 THERAPEUTIC ACTIVITIES: CPT

## 2017-06-21 PROCEDURE — 770006 HCHG ROOM/CARE - MED/SURG/GYN SEMI*

## 2017-06-21 PROCEDURE — A9270 NON-COVERED ITEM OR SERVICE: HCPCS | Performed by: STUDENT IN AN ORGANIZED HEALTH CARE EDUCATION/TRAINING PROGRAM

## 2017-06-21 PROCEDURE — 700111 HCHG RX REV CODE 636 W/ 250 OVERRIDE (IP): Performed by: INTERNAL MEDICINE

## 2017-06-21 PROCEDURE — 82962 GLUCOSE BLOOD TEST: CPT | Mod: 91

## 2017-06-21 RX ADMIN — GABAPENTIN 100 MG: 100 CAPSULE ORAL at 08:03

## 2017-06-21 RX ADMIN — OMEPRAZOLE 20 MG: 20 CAPSULE, DELAYED RELEASE ORAL at 08:03

## 2017-06-21 RX ADMIN — ASPIRIN 81 MG: 81 TABLET ORAL at 08:03

## 2017-06-21 RX ADMIN — VANCOMYCIN HYDROCHLORIDE 125 MG: 10 INJECTION, POWDER, LYOPHILIZED, FOR SOLUTION INTRAVENOUS at 08:12

## 2017-06-21 RX ADMIN — ATORVASTATIN CALCIUM 20 MG: 20 TABLET, FILM COATED ORAL at 20:53

## 2017-06-21 RX ADMIN — SENNOSIDES AND DOCUSATE SODIUM 2 TABLET: 8.6; 5 TABLET ORAL at 08:03

## 2017-06-21 RX ADMIN — TRAMADOL HYDROCHLORIDE 50 MG: 50 TABLET, COATED ORAL at 21:24

## 2017-06-21 RX ADMIN — BUDESONIDE AND FORMOTEROL FUMARATE DIHYDRATE 2 PUFF: 160; 4.5 AEROSOL RESPIRATORY (INHALATION) at 20:53

## 2017-06-21 RX ADMIN — GABAPENTIN 100 MG: 100 CAPSULE ORAL at 16:03

## 2017-06-21 RX ADMIN — FUROSEMIDE 40 MG: 40 TABLET ORAL at 08:03

## 2017-06-21 RX ADMIN — CARVEDILOL 6.25 MG: 6.25 TABLET, FILM COATED ORAL at 16:03

## 2017-06-21 RX ADMIN — SPIRONOLACTONE 25 MG: 25 TABLET, FILM COATED ORAL at 08:03

## 2017-06-21 RX ADMIN — AMLODIPINE BESYLATE 5 MG: 5 TABLET ORAL at 08:03

## 2017-06-21 RX ADMIN — CARVEDILOL 6.25 MG: 6.25 TABLET, FILM COATED ORAL at 08:03

## 2017-06-21 RX ADMIN — Medication 100 MG: at 08:03

## 2017-06-21 RX ADMIN — LISINOPRIL 20 MG: 20 TABLET ORAL at 08:03

## 2017-06-21 RX ADMIN — VANCOMYCIN HYDROCHLORIDE 125 MG: 10 INJECTION, POWDER, LYOPHILIZED, FOR SOLUTION INTRAVENOUS at 20:53

## 2017-06-21 RX ADMIN — GABAPENTIN 100 MG: 100 CAPSULE ORAL at 20:53

## 2017-06-21 RX ADMIN — NICOTINE 21 MG: 21 PATCH, EXTENDED RELEASE TRANSDERMAL at 05:40

## 2017-06-21 ASSESSMENT — GAIT ASSESSMENTS
ASSISTIVE DEVICE: FRONT WHEEL WALKER
DISTANCE (FEET): 100
GAIT LEVEL OF ASSIST: STAND BY ASSIST

## 2017-06-21 ASSESSMENT — ENCOUNTER SYMPTOMS
HEADACHES: 0
FEVER: 0
MYALGIAS: 0
SHORTNESS OF BREATH: 0
PHOTOPHOBIA: 0
BRUISES/BLEEDS EASILY: 0
DIZZINESS: 0
NECK PAIN: 0
FALLS: 0
VOMITING: 0
DIAPHORESIS: 0
DIARRHEA: 0
TREMORS: 0
BLURRED VISION: 0
DOUBLE VISION: 0
NAUSEA: 0
DEPRESSION: 0
SPUTUM PRODUCTION: 0
WEAKNESS: 0
CHILLS: 0
COUGH: 0
WHEEZING: 0
TINGLING: 0

## 2017-06-21 ASSESSMENT — PAIN SCALES - GENERAL
PAINLEVEL_OUTOF10: 8
PAINLEVEL_OUTOF10: 0
PAINLEVEL_OUTOF10: 0

## 2017-06-21 ASSESSMENT — COGNITIVE AND FUNCTIONAL STATUS - GENERAL
WALKING IN HOSPITAL ROOM: A LITTLE
CLIMB 3 TO 5 STEPS WITH RAILING: A LITTLE
SUGGESTED CMS G CODE MODIFIER MOBILITY: CJ
MOBILITY SCORE: 22

## 2017-06-21 ASSESSMENT — LIFESTYLE VARIABLES: SUBSTANCE_ABUSE: 0

## 2017-06-21 NOTE — PROGRESS NOTES
AAOx4. C/o 0/10 pain, declining intervention at this time. -N/V. +N/T in LE bilaterally. +BS in all 4 quadrants, last BM yesterday per pt. Swelling present to LE bilaterally. Pt c/o dizziness/fatigue, MD aware. POC discussed, denies further needs at this time. Call light within reach & hourly rounding in place.

## 2017-06-21 NOTE — CARE PLAN
Problem: Infection  Goal: Will remain free from infection  Outcome: PROGRESSING SLOWER THAN EXPECTED  Persistant leukocytosis. Bone marrow biopsy to be completed tomorrow. Administered scheduled Vancomycin.    Problem: Discharge Barriers/Planning  Goal: Patient’s continuum of care needs will be met  Outcome: PROGRESSING SLOWER THAN EXPECTED  Case management involved. Pt has been declined from multiple facilities.

## 2017-06-21 NOTE — PROGRESS NOTES
Pt AOx4, denies pain. Pt fatigued, resting quietly in bed, needs met at this time. Call light within reach, personal belongings available, bed in lowest position, treaded socks on. Hourly rounding in place.

## 2017-06-21 NOTE — PROGRESS NOTES
[facility] [place] [facility] Interval [place]    Name Maxi Esposito 1950   Age/Sex 67 y.o. male   MRN [id number]   Code Status Full     After 5PM or if no immediate response to page, please call for cross-coverage  Attending/Team: Dr. Evy US / audra Call [phone] to page   1st Call - Day Intern (R1):   Dr. Puneet Womack MD 2nd Call - Day Sr. Resident (R2/R3):   Dr. Tiff US         Chief complaint/ reason for interval visit (Primary Diagnosis)    SOB and leg swelling      Interval Problem Daily Status Update    Clinically improved, less respiratory distress, O2 saturation 92 on RA at rest, but he desaturated on ambulation to 80s needed 1 LPM  BM biopsy to be done tomorrow      Review of Systems   Constitutional: Positive for malaise/fatigue. Negative for fever, chills and diaphoresis.   HENT: Negative for hearing loss and tinnitus.    Eyes: Negative for blurred vision, double vision and photophobia.   Respiratory: Negative for cough, sputum production, shortness of breath and wheezing.    Cardiovascular: Negative for chest pain and leg swelling.   Gastrointestinal: Negative for nausea, vomiting and diarrhea.   Genitourinary: Negative for dysuria and urgency.   Musculoskeletal: Negative for myalgias, falls and neck pain.   Skin: Negative for itching and rash.   Neurological: Negative for dizziness, tingling, tremors, weakness and headaches.   Endo/Heme/Allergies: Negative for environmental allergies. Does not bruise/bleed easily.   Psychiatric/Behavioral: Negative for depression and substance abuse.       Consultants/Specialty  Social service for SNF  Oncology     Disposition   waiting bone marrow biopsy and possibly discharged to SNF      Quality Measures  Labs reviewed, Medications reviewed and EKG reviewed  Arshad catheter: No Arshad      DVT Prophylaxis: Enoxaparin (Lovenox)              Physical Exam       Filed Vitals:    17 1457 17 1910 17 0312 17 0649   BP:  116/56 140/82  141/97   Pulse: 78 82 66 70   Temp:  36.2 °C (97.2 °F) 37 °C (98.6 °F) 36.7 °C (98 °F)   Resp: 16 17 16 18   Height:       Weight:       SpO2: 97% 97% 93% 91%     Body mass index is 39.42 kg/(m^2).    Oxygen Therapy:  Pulse Oximetry: 91 %, O2 (LPM): 0, O2 Delivery: None (Room Air)    Physical Exam  Physical Exam   Constitutional: He is oriented to person, place, and time.   Obese  Cardiovascular: Normal rate and regular rhythm.    Pulmonary/Chest: Effort normal. No crackles today He exhibits no tenderness.   Distant lung sounds    Abdominal: Soft. There is no tenderness. There is no guarding.   Abdomen obese   Musculoskeletal: He exhibits edema which is decreasing  and tenderness.   Erythema both lower extremities with tenderness and pitting edema-improved since admission  Neurological: He is alert and oriented to person, place, and time. No cranial nerve deficit. GCS score is 15.   Skin: Skin is warm. Rash noted. There is erythema-improving      Lab Data Review:      6/9/2017  2:32 PM    Recent Labs      06/19/17   0201 06/20/17   0517   SODIUM  138  137   POTASSIUM  4.3  4.5   CHLORIDE  100  100   CO2  30  29   BUN  30*  25*   CREATININE  0.97  0.84   MAGNESIUM  1.9  2.1   CALCIUM  8.9  9.1       Recent Labs      06/19/17   0201 06/20/17   0517   ALTSGPT  16  19   ASTSGOT  13  16   ALKPHOSPHAT  63  63   TBILIRUBIN  0.4  0.5   GLUCOSE  117*  109*       Recent Labs      06/19/17 0201 06/19/17   1755  06/20/17   0517   RBC  5.44   --   5.58   HEMOGLOBIN  15.6   --   16.0   HEMATOCRIT  48.2   --   49.8   PLATELETCT  379   --   353   PROTHROMBTM   --   13.6   --    APTT   --   28.1   --    INR   --   1.01   --        Recent Labs      06/19/17 0201 06/20/17   0517   WBC  17.4*  17.1*   NEUTSPOLYS  55.30  57.90   LYMPHOCYTES  25.40  23.50   MONOCYTES  13.20  11.80   EOSINOPHILS  6.10  5.50   BASOPHILS  0.00  0.70   ASTSGOT  13  16   ALTSGPT  16  19   ALKPHOSPHAT  63  63   TBILIRUBIN  0.4  0.5            Assessment/Plan       Heart failure with preserved EF  -------------------------------   presented with SOB, cough. Basal crepitations, scattered wheezes on examination, leg swelling on admission. Desaturated at night to 80s needed about 2 LPM on first days of admission  Early CXR showed features of COPD and pulmonary congestion  ECHO showed LVH, G II-III diastolic dysfunction, RVSP 35. Likely CHF with preserved EF  Most likely heart failure with preserved EF  Responded to diuretics, lasix and aldactone.  in his WT to more than 20 Ibs since admission. Improving   Chest clear on examination today. No respiratory distress. Desaturated to 80s on walking     Plan:    Continue lasix 40 mg q day, continue aldactone  Continue on lisinopril   Carvedilol  6.5 mg BID   Daily weight and I and O follow up, water restriction   Follow renal function and electrolytes      # COPD   hx of smoking, COPD on inhalars, no PFT in chart   no wheezes now, less respiratory distress, cough is better. improving   finished antibiotics and steroid    Plan:  Continue RT Protocol  Albuterol PRN  Cough suppressant         BILATERAL LOWER EXTREMITY EDEMA: CELLULITIS WITH STASIS DERMATITIS  ---------------------------------------------------------------------------------------------------  Patient has bilateral leg edema with venous stasis  Wbc 14 on admission, wbc still high 17.1  Severe erythema and tenderness of both lower extremities with chr venous stasis derm changes    DVT ruled out  Blood culture negative  It seems that it chronic cellulitis due to venous stasis on the top of interstitial fluid accumulation due to heart failure  Finished antibiotic, doxycycline    Plan:  ----------   Elastic Stocking   Leg elevation      # Sinus pauses     Presented with sinus pauses up to 4.9 sec, seems asymptomatic on day of admission  Cardiology consulted on day of admission and thinks it is related to hypoxic event from his sleep  apnea  No pauses now  Off tele now    # leukocytosis   - wbc 17.1 mainly neutrophils, possibly due to COPD exacerbation, leg cellulitis in addition to stress reaction, but this leukocytosis has been persistent for more than year  - blood smear looks normal  - oncology consulted and the plan is to do bone marrow biopsy probably tomorrow       # VICENTE  - clinically seems to have sleep apnea, risk factor for HF  - no sleep study done  - nocturnal oximetry showed desaturation down to low 80s with O2 need up to 2 LPM  - CPAP as outpatient    T2 DM:  --------------  A1c 6.5  Blood glucose controlled          HTN:  ----------  BP controlled  On Lisinopril 20 mg, amlodipine 5 mg, carvedilol 6.5 mg BID  Hydralazine IV ordered PRN      DLD:  ----------  stable    Ct Statin    GERD:  ------------    No alarming symptoms  Ct Omeprazole    Tinea pedis:  ---------------  Stable.continue topical clotrimazole     Substance Dependence:  ------------------------------  Smoking and Cannabis user:    Counseling to quit marijuana, and Smoking  Nicotine patch    Hx of Depression:  ----------------------  Stable now  Continue On Aripiprazole  Currently No SI or HI

## 2017-06-21 NOTE — PROGRESS NOTES
Patient alert and oriented, some anxiety.    Lungs diminished, tolerating room air.    BLE swelling improved per patient.    Independent in room, SBA to ambulate the halls.   Bone marrow biopsy postponed until Thursday 6/22 @ 1100.   Awaiting discharge to SNF.

## 2017-06-21 NOTE — CARE PLAN
Problem: Communication  Goal: The ability to communicate needs accurately and effectively will improve  Outcome: PROGRESSING SLOWER THAN EXPECTED  Patient highly anxious, perseverates on quality of care- highly dismissive of plan of care education.     Problem: Respiratory:  Goal: Respiratory status will improve  Outcome: PROGRESSING AS EXPECTED  Stable on room air, lungs clear but diminished. Requested breathing treatment x1.

## 2017-06-21 NOTE — CARE PLAN
Problem: Safety  Goal: Will remain free from falls  Safety precautions in place. Bed in low position, treaded socks on, personal possessions in reach, call light in reach and pt using it to call for assistance.     Problem: Discharge Barriers/Planning  Goal: Patient’s continuum of care needs will be met  Pt awaiting placement to a SNF.

## 2017-06-22 PROCEDURE — 38221 DX BONE MARROW BIOPSIES: CPT | Performed by: INTERNAL MEDICINE

## 2017-06-22 PROCEDURE — 770006 HCHG ROOM/CARE - MED/SURG/GYN SEMI*

## 2017-06-22 PROCEDURE — 160027 HCHG SURGERY MINUTES - 1ST 30 MINS LEVEL 2: Performed by: INTERNAL MEDICINE

## 2017-06-22 PROCEDURE — 88305 TISSUE EXAM BY PATHOLOGIST: CPT | Mod: 59

## 2017-06-22 PROCEDURE — 88237 TISSUE CULTURE BONE MARROW: CPT

## 2017-06-22 PROCEDURE — 160002 HCHG RECOVERY MINUTES (STAT): Performed by: INTERNAL MEDICINE

## 2017-06-22 PROCEDURE — 99152 MOD SED SAME PHYS/QHP 5/>YRS: CPT | Performed by: INTERNAL MEDICINE

## 2017-06-22 PROCEDURE — A9270 NON-COVERED ITEM OR SERVICE: HCPCS | Performed by: GENERAL ACUTE CARE HOSPITAL

## 2017-06-22 PROCEDURE — 88185 FLOWCYTOMETRY/TC ADD-ON: CPT | Mod: 91

## 2017-06-22 PROCEDURE — 88313 SPECIAL STAINS GROUP 2: CPT | Mod: 91

## 2017-06-22 PROCEDURE — A9270 NON-COVERED ITEM OR SERVICE: HCPCS | Performed by: INTERNAL MEDICINE

## 2017-06-22 PROCEDURE — 160035 HCHG PACU - 1ST 60 MINS PHASE I: Performed by: INTERNAL MEDICINE

## 2017-06-22 PROCEDURE — 88264 CHROMOSOME ANALYSIS 20-25: CPT

## 2017-06-22 PROCEDURE — 700102 HCHG RX REV CODE 250 W/ 637 OVERRIDE(OP): Performed by: STUDENT IN AN ORGANIZED HEALTH CARE EDUCATION/TRAINING PROGRAM

## 2017-06-22 PROCEDURE — A9270 NON-COVERED ITEM OR SERVICE: HCPCS | Performed by: NURSE PRACTITIONER

## 2017-06-22 PROCEDURE — 160048 HCHG OR STATISTICAL LEVEL 1-5: Performed by: INTERNAL MEDICINE

## 2017-06-22 PROCEDURE — 700102 HCHG RX REV CODE 250 W/ 637 OVERRIDE(OP): Performed by: NURSE PRACTITIONER

## 2017-06-22 PROCEDURE — 99232 SBSQ HOSP IP/OBS MODERATE 35: CPT | Mod: GC | Performed by: INTERNAL MEDICINE

## 2017-06-22 PROCEDURE — 99153 MOD SED SAME PHYS/QHP EA: CPT | Performed by: INTERNAL MEDICINE

## 2017-06-22 PROCEDURE — 700111 HCHG RX REV CODE 636 W/ 250 OVERRIDE (IP)

## 2017-06-22 PROCEDURE — 700102 HCHG RX REV CODE 250 W/ 637 OVERRIDE(OP): Performed by: INTERNAL MEDICINE

## 2017-06-22 PROCEDURE — 88311 DECALCIFY TISSUE: CPT

## 2017-06-22 PROCEDURE — 88184 FLOWCYTOMETRY/ TC 1 MARKER: CPT

## 2017-06-22 PROCEDURE — 88291 CYTO/MOLECULAR REPORT: CPT

## 2017-06-22 PROCEDURE — 07DR3ZX EXTRACTION OF ILIAC BONE MARROW, PERCUTANEOUS APPROACH, DIAGNOSTIC: ICD-10-PCS | Performed by: INTERNAL MEDICINE

## 2017-06-22 PROCEDURE — A9270 NON-COVERED ITEM OR SERVICE: HCPCS | Performed by: STUDENT IN AN ORGANIZED HEALTH CARE EDUCATION/TRAINING PROGRAM

## 2017-06-22 PROCEDURE — 700102 HCHG RX REV CODE 250 W/ 637 OVERRIDE(OP): Performed by: GENERAL ACUTE CARE HOSPITAL

## 2017-06-22 PROCEDURE — 160038 HCHG SURGERY MINUTES - EA ADDL 1 MIN LEVEL 2: Performed by: INTERNAL MEDICINE

## 2017-06-22 RX ORDER — SODIUM CHLORIDE 9 MG/ML
500 INJECTION, SOLUTION INTRAVENOUS
Status: ACTIVE | OUTPATIENT
Start: 2017-06-22 | End: 2017-06-22

## 2017-06-22 RX ORDER — MIDAZOLAM HYDROCHLORIDE 1 MG/ML
.5-2 INJECTION INTRAMUSCULAR; INTRAVENOUS PRN
Status: ACTIVE | OUTPATIENT
Start: 2017-06-22 | End: 2017-06-22

## 2017-06-22 RX ORDER — MIDAZOLAM HYDROCHLORIDE 1 MG/ML
INJECTION INTRAMUSCULAR; INTRAVENOUS
Status: DISCONTINUED | OUTPATIENT
Start: 2017-06-22 | End: 2017-06-22 | Stop reason: HOSPADM

## 2017-06-22 RX ORDER — OXYCODONE HYDROCHLORIDE 5 MG/1
2.5 TABLET ORAL EVERY 6 HOURS PRN
Status: DISCONTINUED | OUTPATIENT
Start: 2017-06-22 | End: 2017-06-23

## 2017-06-22 RX ORDER — OXYCODONE HYDROCHLORIDE 5 MG/1
5 TABLET ORAL EVERY 6 HOURS PRN
Status: DISCONTINUED | OUTPATIENT
Start: 2017-06-22 | End: 2017-06-23

## 2017-06-22 RX ADMIN — FUROSEMIDE 40 MG: 40 TABLET ORAL at 09:44

## 2017-06-22 RX ADMIN — TRAMADOL HYDROCHLORIDE 50 MG: 50 TABLET, COATED ORAL at 18:48

## 2017-06-22 RX ADMIN — OXYCODONE HYDROCHLORIDE 5 MG: 5 TABLET ORAL at 21:11

## 2017-06-22 RX ADMIN — SPIRONOLACTONE 25 MG: 25 TABLET, FILM COATED ORAL at 09:45

## 2017-06-22 RX ADMIN — AMLODIPINE BESYLATE 5 MG: 5 TABLET ORAL at 09:44

## 2017-06-22 RX ADMIN — POLYVINYL ALCOHOL 1 DROP: 14 SOLUTION/ DROPS OPHTHALMIC at 09:46

## 2017-06-22 RX ADMIN — OMEPRAZOLE 20 MG: 20 CAPSULE, DELAYED RELEASE ORAL at 09:43

## 2017-06-22 RX ADMIN — Medication 100 MG: at 09:45

## 2017-06-22 RX ADMIN — GABAPENTIN 100 MG: 100 CAPSULE ORAL at 20:30

## 2017-06-22 RX ADMIN — ATORVASTATIN CALCIUM 20 MG: 20 TABLET, FILM COATED ORAL at 20:30

## 2017-06-22 RX ADMIN — CARVEDILOL 6.25 MG: 6.25 TABLET, FILM COATED ORAL at 09:43

## 2017-06-22 RX ADMIN — LISINOPRIL 20 MG: 20 TABLET ORAL at 09:44

## 2017-06-22 RX ADMIN — NICOTINE 21 MG: 21 PATCH, EXTENDED RELEASE TRANSDERMAL at 05:49

## 2017-06-22 RX ADMIN — FLUTICASONE PROPIONATE 100 MCG: 50 SPRAY, METERED NASAL at 09:46

## 2017-06-22 RX ADMIN — BUDESONIDE AND FORMOTEROL FUMARATE DIHYDRATE 2 PUFF: 160; 4.5 AEROSOL RESPIRATORY (INHALATION) at 21:00

## 2017-06-22 RX ADMIN — GABAPENTIN 100 MG: 100 CAPSULE ORAL at 16:02

## 2017-06-22 RX ADMIN — POLYVINYL ALCOHOL 1 DROP: 14 SOLUTION/ DROPS OPHTHALMIC at 21:24

## 2017-06-22 RX ADMIN — CLOTRIMAZOLE: 10 CREAM TOPICAL at 21:24

## 2017-06-22 RX ADMIN — VANCOMYCIN HYDROCHLORIDE 125 MG: 10 INJECTION, POWDER, LYOPHILIZED, FOR SOLUTION INTRAVENOUS at 09:43

## 2017-06-22 RX ADMIN — BUDESONIDE AND FORMOTEROL FUMARATE DIHYDRATE 2 PUFF: 160; 4.5 AEROSOL RESPIRATORY (INHALATION) at 09:45

## 2017-06-22 RX ADMIN — VANCOMYCIN HYDROCHLORIDE 125 MG: 10 INJECTION, POWDER, LYOPHILIZED, FOR SOLUTION INTRAVENOUS at 21:26

## 2017-06-22 RX ADMIN — GABAPENTIN 100 MG: 100 CAPSULE ORAL at 09:45

## 2017-06-22 RX ADMIN — CLOTRIMAZOLE: 10 CREAM TOPICAL at 09:46

## 2017-06-22 ASSESSMENT — ENCOUNTER SYMPTOMS
DIARRHEA: 0
DEPRESSION: 0
CHILLS: 0
NECK PAIN: 0
WEAKNESS: 0
MYALGIAS: 0
DOUBLE VISION: 0
TREMORS: 0
HEADACHES: 0
NAUSEA: 0
SPUTUM PRODUCTION: 0
BLURRED VISION: 0
FALLS: 0
BRUISES/BLEEDS EASILY: 0
DIZZINESS: 0
VOMITING: 0
DIAPHORESIS: 0
SHORTNESS OF BREATH: 0
FEVER: 0
WHEEZING: 0
PHOTOPHOBIA: 0
COUGH: 0
TINGLING: 0

## 2017-06-22 ASSESSMENT — PAIN SCALES - GENERAL
PAINLEVEL_OUTOF10: 0
PAINLEVEL_OUTOF10: 0
PAINLEVEL_OUTOF10: 7
PAINLEVEL_OUTOF10: 2

## 2017-06-22 ASSESSMENT — LIFESTYLE VARIABLES: SUBSTANCE_ABUSE: 0

## 2017-06-22 NOTE — DISCHARGE PLANNING
[facility]. [facility] will follow up first thing in the morning.

## 2017-06-22 NOTE — PROGRESS NOTES
Assumed care of pt, A/OX4. No c/o pain at this time, +N/T in BLE, +SOB/heavy chest(MD aware), -N/V. Pt is NPO at this time due to getting a bone marrow biopsy this AM. On 1L of O2 stating within normal limits, VSS. L FA IV saline locked. Morning med pass done at this time, hourly rounding continued, bed is in lowest position, call light within reach.

## 2017-06-22 NOTE — CARE PLAN
Problem: Safety  Goal: Will remain free from falls  Safety precautions in place. Bed in low position, treaded socks on, personal possessions in reach, call light in reach and pt using it to call for assistance.     Problem: Knowledge Deficit  Goal: Knowledge of disease process/condition, treatment plan, diagnostic tests, and medications will improve  Discussed plan of care. Answered pt questions.

## 2017-06-22 NOTE — PROGRESS NOTES
[facility] Internal Medicine Interval Note    Name Maxi Esposito       1950   Age/Sex 67 y.o. male   MRN [id number]   Code Status Full     After 5PM or if no immediate response to page, please call for cross-coverage  Attending/Team: Dr. Evy US / audra Call [phone] to page   1st Call - Day Intern (R1):   Dr. Puneet Womack MD 2nd Call - Day Sr. Resident (R2/R3):   Dr. Tiff US         Chief complaint/ reason for interval visit (Primary Diagnosis)    SOB and leg swelling      Interval Problem Daily Status Update      Bone marrow biopsy done today.  Respiratory condition is improving.  Patient still complaining of fatigue and generalized weakness    Review of Systems   Constitutional: Positive for malaise/fatigue. Negative for fever, chills and diaphoresis.   HENT: Negative for hearing loss and tinnitus.    Eyes: Negative for blurred vision, double vision and photophobia.   Respiratory: Negative for cough, sputum production, shortness of breath and wheezing.    Cardiovascular: Negative for chest pain and leg swelling.   Gastrointestinal: Negative for nausea, vomiting and diarrhea.   Genitourinary: Negative for dysuria and urgency.   Musculoskeletal: Negative for myalgias, falls and neck pain.   Skin: Negative for itching and rash.   Neurological: Negative for dizziness, tingling, tremors, weakness and headaches.   Endo/Heme/Allergies: Negative for environmental allergies. Does not bruise/bleed easily.   Psychiatric/Behavioral: Negative for depression and substance abuse.       Consultants/Specialty  Social service for SNF  Oncology     Disposition  SNF      Quality Measures  Labs reviewed, Medications reviewed and EKG reviewed  Arshad catheter: No Arshad      DVT Prophylaxis: Enoxaparin (Lovenox)              Physical Exam       Filed Vitals:    17 1157 17 1202 17 1206 17 1251   BP:    93/51   Pulse: 78 79 60    Temp:       Resp: 15 18 33 17   Height:       Weight:       SpO2: 93% 94%  94% 93%     Body mass index is 39.42 kg/(m^2).    Oxygen Therapy:  Pulse Oximetry: 93 %, O2 (LPM): 1, O2 Delivery: Nasal Cannula    Physical Exam  Physical Exam   Constitutional: He is oriented to person, place, and time.   Obese  Cardiovascular: Normal rate and regular rhythm.    Pulmonary/Chest: Effort normal. No crackles today He exhibits no tenderness.   Distant lung sounds    Abdominal: Soft. There is no tenderness. There is no guarding.   Abdomen obese   Musculoskeletal: He exhibits edema which is decreasing  and tenderness.   Erythema both lower extremities with tenderness and pitting edema-improved since admission  Neurological: He is alert and oriented to person, place, and time. No cranial nerve deficit. GCS score is 15.   Skin: Skin is warm. Rash noted. There is erythema-improving      Lab Data Review:      6/9/2017  2:32 PM    Recent Labs      06/20/17   0517   SODIUM  137   POTASSIUM  4.5   CHLORIDE  100   CO2  29   BUN  25*   CREATININE  0.84   MAGNESIUM  2.1   CALCIUM  9.1       Recent Labs      06/20/17   0517   ALTSGPT  19   ASTSGOT  16   ALKPHOSPHAT  63   TBILIRUBIN  0.5   GLUCOSE  109*       Recent Labs      06/19/17   1755  06/20/17   0517   RBC   --   5.58   HEMOGLOBIN   --   16.0   HEMATOCRIT   --   49.8   PLATELETCT   --   353   PROTHROMBTM  13.6   --    APTT  28.1   --    INR  1.01   --        Recent Labs      06/20/17   0517   WBC  17.1*   NEUTSPOLYS  57.90   LYMPHOCYTES  23.50   MONOCYTES  11.80   EOSINOPHILS  5.50   BASOPHILS  0.70   ASTSGOT  16   ALTSGPT  19   ALKPHOSPHAT  63   TBILIRUBIN  0.5           Assessment/Plan       Heart failure with preserved EF  -------------------------------   presented with SOB, cough. Basal crepitations, scattered wheezes on examination, leg swelling on admission. Desaturated at night to 80s needed about 2 LPM on first days of admission  Early CXR showed features of COPD and pulmonary congestion  ECHO showed LVH, G II-III diastolic dysfunction, RVSP 35.  Likely CHF with preserved EF  Most likely heart failure with preserved EF  Responded to diuretics, lasix and aldactone.  in his WT to more than 20 Ibs since admission. Improving   Chest clear on examination today. No respiratory distress. Desaturated to 80s on walking     Plan:    Continue lasix 40 mg q day, continue aldactone  Continue on lisinopril   Carvedilol  6.5 mg BID   Daily weight and I and O follow up, water restriction   Follow renal function and electrolytes      # COPD   hx of smoking, COPD on inhalars, no PFT in chart   no wheezes now, less respiratory distress, cough is better. improving   finished antibiotics and steroid    Plan:  Continue RT Protocol  Albuterol PRN  Cough suppressant         BILATERAL LOWER EXTREMITY EDEMA: CELLULITIS WITH STASIS DERMATITIS  ---------------------------------------------------------------------------------------------------  Patient has bilateral leg edema with venous stasis  Wbc 14 on admission, wbc still high 17.1  Severe erythema and tenderness of both lower extremities with chr venous stasis derm changes    DVT ruled out  Blood culture negative  It seems that it chronic cellulitis due to venous stasis on the top of interstitial fluid accumulation due to heart failure  Finished antibiotic, doxycycline    Plan:  ----------   Elastic Stocking   Leg elevation      # Sinus pauses     Presented with sinus pauses up to 4.9 sec, seems asymptomatic on day of admission  Cardiology consulted on day of admission and thinks it is related to hypoxic event from his sleep apnea  No pauses now  Off tele now    # leukocytosis   - wbc 17.1 mainly neutrophils, possibly due to COPD exacerbation, leg cellulitis in addition to stress reaction, but this leukocytosis has been persistent for more than year  - blood smear looks normal  - oncology consulted and bone marrow biopsy done today       # VICENTE  - clinically seems to have sleep apnea, risk factor for HF  - no sleep study  done  - nocturnal oximetry showed desaturation down to low 80s with O2 need up to 2 LPM  - CPAP as outpatient    T2 DM:  --------------  A1c 6.5  Blood glucose controlled          HTN:  ----------  BP controlled  On Lisinopril 20 mg, amlodipine 5 mg, carvedilol 6.5 mg BID  Hydralazine IV ordered PRN      DLD:  ----------  stable    Ct Statin    GERD:  ------------    No alarming symptoms  Ct Omeprazole    Tinea pedis:  ---------------  Stable.continue topical clotrimazole     Substance Dependence:  ------------------------------  Smoking and Cannabis user:    Counseling to quit marijuana, and Smoking  Nicotine patch    Hx of Depression:  ----------------------  Stable now  Continue On Aripiprazole  Currently No SI or HI

## 2017-06-22 NOTE — PROGRESS NOTES
Pt AOx4, denies pain at this time. Pt sitting at edge of bed. No behavior issues noted at this time. No c/o pain/discomfort. Call light within reach, personal belongings available, bed in lowest position, treaded socks on. Hourly rounding in place.    Pt refusing bed alarm despite education on safety/necessity. Pt ambulates with steady gait. Able to make needs known.

## 2017-06-23 LAB
25(OH)D3 SERPL-MCNC: 27 NG/ML (ref 30–100)
VIT B12 SERPL-MCNC: 346 PG/ML (ref 211–911)

## 2017-06-23 PROCEDURE — 82607 VITAMIN B-12: CPT

## 2017-06-23 PROCEDURE — A9270 NON-COVERED ITEM OR SERVICE: HCPCS | Performed by: INTERNAL MEDICINE

## 2017-06-23 PROCEDURE — 700102 HCHG RX REV CODE 250 W/ 637 OVERRIDE(OP): Performed by: INTERNAL MEDICINE

## 2017-06-23 PROCEDURE — 36415 COLL VENOUS BLD VENIPUNCTURE: CPT

## 2017-06-23 PROCEDURE — A9270 NON-COVERED ITEM OR SERVICE: HCPCS | Performed by: GENERAL ACUTE CARE HOSPITAL

## 2017-06-23 PROCEDURE — 82306 VITAMIN D 25 HYDROXY: CPT

## 2017-06-23 PROCEDURE — 99231 SBSQ HOSP IP/OBS SF/LOW 25: CPT | Mod: GC | Performed by: INTERNAL MEDICINE

## 2017-06-23 PROCEDURE — 770006 HCHG ROOM/CARE - MED/SURG/GYN SEMI*

## 2017-06-23 PROCEDURE — A9270 NON-COVERED ITEM OR SERVICE: HCPCS | Performed by: STUDENT IN AN ORGANIZED HEALTH CARE EDUCATION/TRAINING PROGRAM

## 2017-06-23 PROCEDURE — 700102 HCHG RX REV CODE 250 W/ 637 OVERRIDE(OP): Performed by: STUDENT IN AN ORGANIZED HEALTH CARE EDUCATION/TRAINING PROGRAM

## 2017-06-23 PROCEDURE — 700102 HCHG RX REV CODE 250 W/ 637 OVERRIDE(OP): Performed by: GENERAL ACUTE CARE HOSPITAL

## 2017-06-23 RX ORDER — OXYCODONE HYDROCHLORIDE 5 MG/1
5 TABLET ORAL EVERY 6 HOURS PRN
Status: DISCONTINUED | OUTPATIENT
Start: 2017-06-23 | End: 2017-06-24 | Stop reason: HOSPADM

## 2017-06-23 RX ADMIN — ASPIRIN 81 MG: 81 TABLET ORAL at 07:38

## 2017-06-23 RX ADMIN — VANCOMYCIN HYDROCHLORIDE 125 MG: 10 INJECTION, POWDER, LYOPHILIZED, FOR SOLUTION INTRAVENOUS at 23:37

## 2017-06-23 RX ADMIN — Medication 100 MG: at 07:41

## 2017-06-23 RX ADMIN — CHOLECALCIFEROL TAB 10 MCG (400 UNIT) 1000 UNITS: 10 TAB at 07:38

## 2017-06-23 RX ADMIN — BUDESONIDE AND FORMOTEROL FUMARATE DIHYDRATE 2 PUFF: 160; 4.5 AEROSOL RESPIRATORY (INHALATION) at 21:00

## 2017-06-23 RX ADMIN — BUDESONIDE AND FORMOTEROL FUMARATE DIHYDRATE 2 PUFF: 160; 4.5 AEROSOL RESPIRATORY (INHALATION) at 07:40

## 2017-06-23 RX ADMIN — LISINOPRIL 20 MG: 20 TABLET ORAL at 07:41

## 2017-06-23 RX ADMIN — OXYCODONE HYDROCHLORIDE 5 MG: 5 TABLET ORAL at 23:37

## 2017-06-23 RX ADMIN — NICOTINE 21 MG: 21 PATCH, EXTENDED RELEASE TRANSDERMAL at 04:52

## 2017-06-23 RX ADMIN — CLOTRIMAZOLE: 10 CREAM TOPICAL at 07:41

## 2017-06-23 RX ADMIN — OMEPRAZOLE 20 MG: 20 CAPSULE, DELAYED RELEASE ORAL at 07:40

## 2017-06-23 RX ADMIN — TRAMADOL HYDROCHLORIDE 50 MG: 50 TABLET, COATED ORAL at 22:29

## 2017-06-23 RX ADMIN — CARVEDILOL 6.25 MG: 6.25 TABLET, FILM COATED ORAL at 07:37

## 2017-06-23 RX ADMIN — ATORVASTATIN CALCIUM 20 MG: 20 TABLET, FILM COATED ORAL at 20:28

## 2017-06-23 RX ADMIN — GABAPENTIN 100 MG: 100 CAPSULE ORAL at 14:44

## 2017-06-23 RX ADMIN — AMLODIPINE BESYLATE 5 MG: 5 TABLET ORAL at 07:41

## 2017-06-23 RX ADMIN — ARIPIPRAZOLE 5 MG: 10 TABLET ORAL at 20:28

## 2017-06-23 RX ADMIN — SPIRONOLACTONE 25 MG: 25 TABLET, FILM COATED ORAL at 07:41

## 2017-06-23 RX ADMIN — GABAPENTIN 100 MG: 100 CAPSULE ORAL at 20:28

## 2017-06-23 RX ADMIN — ACETAMINOPHEN 650 MG: 325 TABLET, FILM COATED ORAL at 07:42

## 2017-06-23 RX ADMIN — GABAPENTIN 100 MG: 100 CAPSULE ORAL at 07:40

## 2017-06-23 RX ADMIN — POLYVINYL ALCOHOL 1 DROP: 14 SOLUTION/ DROPS OPHTHALMIC at 07:42

## 2017-06-23 RX ADMIN — CLOTRIMAZOLE: 10 CREAM TOPICAL at 21:35

## 2017-06-23 RX ADMIN — CARVEDILOL 6.25 MG: 6.25 TABLET, FILM COATED ORAL at 17:56

## 2017-06-23 RX ADMIN — FLUTICASONE PROPIONATE 100 MCG: 50 SPRAY, METERED NASAL at 07:42

## 2017-06-23 RX ADMIN — VANCOMYCIN HYDROCHLORIDE 125 MG: 10 INJECTION, POWDER, LYOPHILIZED, FOR SOLUTION INTRAVENOUS at 07:43

## 2017-06-23 RX ADMIN — POLYVINYL ALCOHOL 1 DROP: 14 SOLUTION/ DROPS OPHTHALMIC at 21:35

## 2017-06-23 RX ADMIN — FUROSEMIDE 40 MG: 40 TABLET ORAL at 07:42

## 2017-06-23 ASSESSMENT — ENCOUNTER SYMPTOMS
FALLS: 0
NECK PAIN: 0
DIZZINESS: 0
SHORTNESS OF BREATH: 0
FEVER: 0
WEAKNESS: 0
HEADACHES: 0
BLURRED VISION: 0
VOMITING: 0
TINGLING: 0
NAUSEA: 0
DEPRESSION: 0
DOUBLE VISION: 0
COUGH: 0
TREMORS: 0
MYALGIAS: 0
WHEEZING: 0
DIARRHEA: 0
CHILLS: 0
BRUISES/BLEEDS EASILY: 0
DIAPHORESIS: 0
SPUTUM PRODUCTION: 0
PHOTOPHOBIA: 0

## 2017-06-23 ASSESSMENT — PAIN SCALES - GENERAL
PAINLEVEL_OUTOF10: 5
PAINLEVEL_OUTOF10: 5

## 2017-06-23 ASSESSMENT — LIFESTYLE VARIABLES: SUBSTANCE_ABUSE: 0

## 2017-06-23 NOTE — PROGRESS NOTES
Patient AO x 4 and pleasant, some tingling in both feet, pain rt bone biopsy given medication see MAR. No other complaints at this time. Hourly rounding in place.

## 2017-06-23 NOTE — DISCHARGE PLANNING
Gwen stated that they might have a bed tomorrow, but today they do not have a male bed. Gwen stated that she would have Cece from admissions call tomorrow to let [facility] know.

## 2017-06-23 NOTE — PROGRESS NOTES
[facility] Internal Medicine Interval Note    Name Maxi Esposito 1950   Age/Sex 67 y.o. male   MRN [id number]   Code Status Full     After 5PM or if no immediate response to page, please call for cross-coverage  Attending/Team: Dr. Evy US / audra Call [phone] to page   1st Call - Day Intern (R1):   Dr. Puneet Womack MD 2nd Call - Day Sr. Resident (R2/R3):   Dr. Tiff US         Chief complaint/ reason for interval visit (Primary Diagnosis)    SOB and leg swelling      Interval Problem Daily Status Update      Bone marrow biopsy done yesterday without complications  Respiratory condition is improving.  Patient still complaining of fatigue and generalized weakness. Complained of pain at the bone marrow procedure site, left iliac crest, responded to pain medication  Still waiting SNF approval     Review of Systems   Constitutional: Positive for malaise/fatigue. Negative for fever, chills and diaphoresis.   HENT: Negative for hearing loss and tinnitus.    Eyes: Negative for blurred vision, double vision and photophobia.   Respiratory: Negative for cough, sputum production, shortness of breath and wheezing.    Cardiovascular: Negative for chest pain and leg swelling.   Gastrointestinal: Negative for nausea, vomiting and diarrhea.   Genitourinary: Negative for dysuria and urgency.   Musculoskeletal: Negative for myalgias, falls and neck pain.   Skin: Negative for itching and rash.   Neurological: Negative for dizziness, tingling, tremors, weakness and headaches.   Endo/Heme/Allergies: Negative for environmental allergies. Does not bruise/bleed easily.   Psychiatric/Behavioral: Negative for depression and substance abuse.       Consultants/Specialty  Social service for SNF  Oncology     Disposition  SNF      Quality Measures  Labs reviewed, Medications reviewed and EKG reviewed  Arshad catheter: No Arshad      DVT Prophylaxis: Enoxaparin (Lovenox)              Physical Exam       Filed Vitals:    17  0100 06/23/17 0441 06/23/17 0543 06/23/17 0736   BP: 99/73 101/57  119/78   Pulse: 72 78  75   Temp:  36.2 °C (97.2 °F)     Resp: 16 17     Height:       Weight:   111.2 kg (245 lb 2.4 oz)    SpO2: 91% 95%       Body mass index is 38.39 kg/(m^2). Weight: 111.2 kg (245 lb 2.4 oz)  Oxygen Therapy:  Pulse Oximetry: 95 %, O2 (LPM): 0, O2 Delivery: None (Room Air)    Physical Exam  Physical Exam   Constitutional: He is oriented to person, place, and time.   Obese  Cardiovascular: Normal rate and regular rhythm.    Pulmonary/Chest: Effort normal. No crackles today He exhibits no tenderness.   Distant lung sounds    Abdominal: Soft. There is no tenderness. There is no guarding.   Abdomen obese   Musculoskeletal: He exhibits edema which is decreasing  and tenderness.   Erythema both lower extremities with tenderness and pitting edema-improved since admission  Neurological: He is alert and oriented to person, place, and time. No cranial nerve deficit. GCS score is 15.   Skin: Skin is warm. Rash noted. There is erythema-improving      Lab Data Review:      6/9/2017  2:32 PM    No results for input(s): SODIUM, POTASSIUM, CHLORIDE, CO2, BUN, CREATININE, MAGNESIUM, PHOSPHORUS, CALCIUM in the last 72 hours.    No results for input(s): ALTSGPT, ASTSGOT, ALKPHOSPHAT, TBILIRUBIN, DBILIRUBIN, GAMMAGT, AMYLASE, LIPASE, ALB, PREALBUMIN, GLUCOSE in the last 72 hours.    No results for input(s): RBC, HEMOGLOBIN, HEMATOCRIT, PLATELETCT, PROTHROMBTM, APTT, INR, IRON, FERRITIN, TOTIRONBC in the last 72 hours.              Assessment/Plan       Heart failure with preserved EF  -------------------------------   presented with SOB, cough. Basal crepitations, scattered wheezes on examination, leg swelling on admission. Desaturated at night to 80s needed about 2 LPM on first days of admission  Early CXR showed features of COPD and pulmonary congestion  ECHO showed LVH, G II-III diastolic dysfunction, RVSP 35. Likely CHF with preserved EF  Most  likely heart failure with preserved EF  Responded to diuretics, lasix and aldactone.  in his WT to more than 20 Ibs since admission. Improving   Chest clear on examination today. No respiratory distress. Desaturated to 80s on walking     Plan:    Continue lasix 40 mg q day, continue aldactone  Continue on lisinopril   Carvedilol  6.5 mg BID   Daily weight and I and O follow up, water restriction   Follow renal function and electrolytes      # COPD   hx of smoking, COPD on inhalars, no PFT in chart   no wheezes now, less respiratory distress, cough is better. improving   finished antibiotics and steroid    Plan:  Continue RT Protocol  Albuterol PRN  Cough suppressant         BILATERAL LOWER EXTREMITY EDEMA: CELLULITIS WITH STASIS DERMATITIS  ---------------------------------------------------------------------------------------------------  Patient has bilateral leg edema with venous stasis  Wbc 14 on admission, wbc still high 17.1  Severe erythema and tenderness of both lower extremities with chr venous stasis derm changes    DVT ruled out  Blood culture negative  It seems that it chronic cellulitis due to venous stasis on the top of interstitial fluid accumulation due to heart failure  Finished antibiotic, doxycycline    Plan:  ----------   Elastic Stocking   Leg elevation      # Sinus pauses     Presented with sinus pauses up to 4.9 sec, seems asymptomatic on day of admission  Cardiology consulted on day of admission and thinks it is related to hypoxic event from his sleep apnea  No pauses now  Off tele now    # leukocytosis   - wbc 17.1 mainly neutrophils, possibly due to COPD exacerbation, leg cellulitis in addition to stress reaction, but this leukocytosis has been persistent for more than year  - blood smear looks normal  - oncology consulted and bone marrow biopsy done yesterday, waiting the result       # VICENTE  - clinically seems to have sleep apnea, risk factor for HF  - no sleep study done  -  nocturnal oximetry showed desaturation down to low 80s with O2 need up to 2 LPM  - CPAP as outpatient    T2 DM:  --------------  A1c 6.5  Blood glucose controlled          HTN:  ----------  BP controlled  On Lisinopril 20 mg, amlodipine 5 mg, carvedilol 6.5 mg BID  Hydralazine IV ordered PRN      DLD:  ----------  stable    Ct Statin    GERD:  ------------    No alarming symptoms  Ct Omeprazole    Tinea pedis:  ---------------  Stable.continue topical clotrimazole     Substance Dependence:  ------------------------------  Smoking and Cannabis user:    Counseling to quit marijuana, and Smoking  Nicotine patch    Hx of Depression:  ----------------------  Stable now  Continue On Aripiprazole  Currently No SI or HI

## 2017-06-23 NOTE — PROGRESS NOTES
Discontinued order for oxycodone, called [facility] and discussed situation. Oxycodone 5mg PO q 6hr PRN pain ordered.

## 2017-06-23 NOTE — CARE PLAN
Problem: Pain Management  Goal: Pain level will decrease to patient’s comfort goal  Outcome: PROGRESSING AS EXPECTED  Pt complained of pain, medicated with PRN pain medications see MAR, will continue to monitor for pain    Problem: Venous Thromboembolism (VTW)/Deep Vein Thrombosis (DVT) Prevention:  Goal: Patient will participate in Venous Thrombosis (VTE)/Deep Vein Thrombosis (DVT)Prevention Measures  Outcome: PROGRESSING AS EXPECTED  Refused VTE, Lovenox, ambulatory

## 2017-06-23 NOTE — PROGRESS NOTES
Problem: Infection  Goal: Will remain free from infection  Outcome: PROGRESSING SLOWER THAN EXPECTED  Persistant leukocytosis. Bone marrow biopsy completed today. Administered scheduled Vancomycin.    Problem: Discharge Barriers/Planning  Goal: Patient’s continuum of care needs will be met  Outcome: PROGRESSING SLOWER THAN EXPECTED  Case management involved. Pt has been declined from multiple facilities.

## 2017-06-23 NOTE — CARE PLAN
Problem: Pain Management  Goal: Pain level will decrease to patient’s comfort goal  Outcome: PROGRESSING AS EXPECTED  Administered pain medication.     Problem: Infection  Goal: Will remain free from infection  Outcome: PROGRESSING SLOWER THAN EXPECTED  Administered antibiotic.

## 2017-06-23 NOTE — PROGRESS NOTES
"Pt is AOx4, complains of headache, medicated with PRN pain medications, tolerated all oral medications, skin and sacral assessment done, on RA, call light in use, no bed alarm on, charge RN aware, treaded socks on, bed locked in low position, plan of care discussed, all needs met at this time.    Pt upset with RN as RN was assessing him his mentation and his puncture site from his BMA. Saying \"you're talking to me like a retard.\" RN explained to pt that she is assessing the site and the site does not have usual dressing so it needed to be clarified and that left hip is a general term so RN needed to make sure she is looking at the correct site. Pt says RN is disrespectful. RN did not just respond to pt's comments and just said okay. Charge RN aware.       "

## 2017-06-23 NOTE — PROGRESS NOTES
Called ISAIAH Meneses  About patient's uncontrolled pain following a bone biopsy. She ordered Oxycodone 2.5mg-5mg PRN pain q 6hr.

## 2017-06-24 VITALS
HEART RATE: 86 BPM | SYSTOLIC BLOOD PRESSURE: 114 MMHG | HEIGHT: 67 IN | BODY MASS INDEX: 39.2 KG/M2 | OXYGEN SATURATION: 94 % | WEIGHT: 249.78 LBS | RESPIRATION RATE: 20 BRPM | DIASTOLIC BLOOD PRESSURE: 72 MMHG | TEMPERATURE: 97.8 F

## 2017-06-24 LAB
ANION GAP SERPL CALC-SCNC: 9 MMOL/L (ref 0–11.9)
BUN SERPL-MCNC: 40 MG/DL (ref 8–22)
CALCIUM SERPL-MCNC: 9.2 MG/DL (ref 8.5–10.5)
CHLORIDE SERPL-SCNC: 103 MMOL/L (ref 96–112)
CO2 SERPL-SCNC: 22 MMOL/L (ref 20–33)
CREAT SERPL-MCNC: 0.89 MG/DL (ref 0.5–1.4)
GFR SERPL CREATININE-BSD FRML MDRD: >60 ML/MIN/1.73 M 2
GLUCOSE SERPL-MCNC: 123 MG/DL (ref 65–99)
POTASSIUM SERPL-SCNC: 5.2 MMOL/L (ref 3.6–5.5)
SODIUM SERPL-SCNC: 134 MMOL/L (ref 135–145)

## 2017-06-24 PROCEDURE — 700102 HCHG RX REV CODE 250 W/ 637 OVERRIDE(OP): Performed by: GENERAL ACUTE CARE HOSPITAL

## 2017-06-24 PROCEDURE — 700102 HCHG RX REV CODE 250 W/ 637 OVERRIDE(OP): Performed by: INTERNAL MEDICINE

## 2017-06-24 PROCEDURE — A9270 NON-COVERED ITEM OR SERVICE: HCPCS | Performed by: GENERAL ACUTE CARE HOSPITAL

## 2017-06-24 PROCEDURE — A9270 NON-COVERED ITEM OR SERVICE: HCPCS | Performed by: INTERNAL MEDICINE

## 2017-06-24 PROCEDURE — A9270 NON-COVERED ITEM OR SERVICE: HCPCS | Performed by: STUDENT IN AN ORGANIZED HEALTH CARE EDUCATION/TRAINING PROGRAM

## 2017-06-24 PROCEDURE — 700102 HCHG RX REV CODE 250 W/ 637 OVERRIDE(OP): Performed by: STUDENT IN AN ORGANIZED HEALTH CARE EDUCATION/TRAINING PROGRAM

## 2017-06-24 PROCEDURE — 700111 HCHG RX REV CODE 636 W/ 250 OVERRIDE (IP): Performed by: INTERNAL MEDICINE

## 2017-06-24 PROCEDURE — 80048 BASIC METABOLIC PNL TOTAL CA: CPT

## 2017-06-24 PROCEDURE — 99239 HOSP IP/OBS DSCHRG MGMT >30: CPT | Mod: GC | Performed by: INTERNAL MEDICINE

## 2017-06-24 PROCEDURE — 36415 COLL VENOUS BLD VENIPUNCTURE: CPT

## 2017-06-24 RX ORDER — DEXTROMETHORPHAN HBR. AND GUAIFENESIN 10; 100 MG/5ML; MG/5ML
10 SOLUTION ORAL EVERY 6 HOURS PRN
Qty: 1 BOTTLE | Refills: 0
Start: 2017-06-24

## 2017-06-24 RX ORDER — DEXTROSE MONOHYDRATE 25 G/50ML
25 INJECTION, SOLUTION INTRAVENOUS PRN
Start: 2017-06-24

## 2017-06-24 RX ORDER — LISINOPRIL 20 MG/1
20 TABLET ORAL DAILY
Qty: 30 TAB
Start: 2017-06-24

## 2017-06-24 RX ORDER — NICOTINE 21 MG/24HR
1 PATCH, TRANSDERMAL 24 HOURS TRANSDERMAL EVERY 24 HOURS
Qty: 30 PATCH
Start: 2017-06-24

## 2017-06-24 RX ORDER — GABAPENTIN 100 MG/1
200 CAPSULE ORAL 3 TIMES DAILY
Qty: 90 CAP
Start: 2017-06-24

## 2017-06-24 RX ORDER — FUROSEMIDE 20 MG/1
20 TABLET ORAL DAILY
Qty: 60 TAB
Start: 2017-06-25

## 2017-06-24 RX ORDER — GABAPENTIN 100 MG/1
200 CAPSULE ORAL 3 TIMES DAILY
Status: DISCONTINUED | OUTPATIENT
Start: 2017-06-24 | End: 2017-06-24 | Stop reason: HOSPADM

## 2017-06-24 RX ORDER — AMLODIPINE BESYLATE 5 MG/1
5 TABLET ORAL DAILY
Qty: 30 TAB
Start: 2017-06-24

## 2017-06-24 RX ORDER — CARVEDILOL 6.25 MG/1
6.25 TABLET ORAL 2 TIMES DAILY WITH MEALS
Qty: 60 TAB
Start: 2017-06-24

## 2017-06-24 RX ORDER — ACETAMINOPHEN 325 MG/1
650 TABLET ORAL EVERY 6 HOURS PRN
Qty: 30 TAB | Refills: 0
Start: 2017-06-24

## 2017-06-24 RX ORDER — POLYVINYL ALCOHOL 14 MG/ML
1 SOLUTION/ DROPS OPHTHALMIC 2 TIMES DAILY
Qty: 1 BOTTLE | Refills: 3
Start: 2017-06-24

## 2017-06-24 RX ORDER — OMEPRAZOLE 20 MG/1
20 CAPSULE, DELAYED RELEASE ORAL DAILY
Qty: 30 CAP
Start: 2017-06-24

## 2017-06-24 RX ORDER — HYDRALAZINE HYDROCHLORIDE 20 MG/ML
20 INJECTION INTRAMUSCULAR; INTRAVENOUS EVERY 6 HOURS PRN
Refills: 0
Start: 2017-06-24

## 2017-06-24 RX ORDER — BUDESONIDE AND FORMOTEROL FUMARATE DIHYDRATE 160; 4.5 UG/1; UG/1
2 AEROSOL RESPIRATORY (INHALATION) 2 TIMES DAILY
Start: 2017-06-24

## 2017-06-24 RX ORDER — CLOTRIMAZOLE 1 %
CREAM (GRAM) TOPICAL
Qty: 1 TUBE | Refills: 0
Start: 2017-06-24

## 2017-06-24 RX ORDER — ALBUTEROL SULFATE 90 UG/1
2 AEROSOL, METERED RESPIRATORY (INHALATION) EVERY 4 HOURS PRN
Qty: 8.5 G
Start: 2017-06-24

## 2017-06-24 RX ORDER — SPIRONOLACTONE 25 MG/1
25 TABLET ORAL DAILY
Qty: 30 TAB | Refills: 3
Start: 2017-06-24

## 2017-06-24 RX ORDER — FLUTICASONE PROPIONATE 50 MCG
2 SPRAY, SUSPENSION (ML) NASAL DAILY
Qty: 16 G
Start: 2017-06-24

## 2017-06-24 RX ORDER — FUROSEMIDE 20 MG/1
20 TABLET ORAL
Status: DISCONTINUED | OUTPATIENT
Start: 2017-06-25 | End: 2017-06-24 | Stop reason: HOSPADM

## 2017-06-24 RX ADMIN — CHOLECALCIFEROL TAB 10 MCG (400 UNIT) 1000 UNITS: 10 TAB at 07:47

## 2017-06-24 RX ADMIN — NICOTINE 21 MG: 21 PATCH, EXTENDED RELEASE TRANSDERMAL at 05:32

## 2017-06-24 RX ADMIN — BUDESONIDE AND FORMOTEROL FUMARATE DIHYDRATE 2 PUFF: 160; 4.5 AEROSOL RESPIRATORY (INHALATION) at 09:30

## 2017-06-24 RX ADMIN — ASPIRIN 81 MG: 81 TABLET ORAL at 07:46

## 2017-06-24 RX ADMIN — OXYCODONE HYDROCHLORIDE 5 MG: 5 TABLET ORAL at 12:28

## 2017-06-24 RX ADMIN — AMLODIPINE BESYLATE 5 MG: 5 TABLET ORAL at 07:47

## 2017-06-24 RX ADMIN — CLOTRIMAZOLE: 10 CREAM TOPICAL at 07:48

## 2017-06-24 RX ADMIN — GABAPENTIN 200 MG: 100 CAPSULE ORAL at 15:47

## 2017-06-24 RX ADMIN — Medication 100 MG: at 07:47

## 2017-06-24 RX ADMIN — GABAPENTIN 100 MG: 100 CAPSULE ORAL at 07:47

## 2017-06-24 RX ADMIN — CARVEDILOL 6.25 MG: 6.25 TABLET, FILM COATED ORAL at 07:46

## 2017-06-24 RX ADMIN — FLUTICASONE PROPIONATE 100 MCG: 50 SPRAY, METERED NASAL at 09:30

## 2017-06-24 RX ADMIN — VANCOMYCIN HYDROCHLORIDE 125 MG: 10 INJECTION, POWDER, LYOPHILIZED, FOR SOLUTION INTRAVENOUS at 07:49

## 2017-06-24 RX ADMIN — OXYCODONE HYDROCHLORIDE 5 MG: 5 TABLET ORAL at 05:32

## 2017-06-24 RX ADMIN — POLYVINYL ALCOHOL 1 DROP: 14 SOLUTION/ DROPS OPHTHALMIC at 07:48

## 2017-06-24 RX ADMIN — SPIRONOLACTONE 25 MG: 25 TABLET, FILM COATED ORAL at 07:47

## 2017-06-24 RX ADMIN — FUROSEMIDE 40 MG: 40 TABLET ORAL at 07:47

## 2017-06-24 RX ADMIN — LISINOPRIL 20 MG: 20 TABLET ORAL at 07:47

## 2017-06-24 RX ADMIN — SENNOSIDES AND DOCUSATE SODIUM 2 TABLET: 8.6; 5 TABLET ORAL at 07:46

## 2017-06-24 RX ADMIN — OMEPRAZOLE 20 MG: 20 CAPSULE, DELAYED RELEASE ORAL at 07:47

## 2017-06-24 ASSESSMENT — ENCOUNTER SYMPTOMS
TREMORS: 0
DIARRHEA: 0
VOMITING: 0
FALLS: 0
SHORTNESS OF BREATH: 0
BLURRED VISION: 0
MYALGIAS: 0
TINGLING: 0
DEPRESSION: 0
COUGH: 0
PHOTOPHOBIA: 0
SPUTUM PRODUCTION: 0
NECK PAIN: 0
DIZZINESS: 0
DOUBLE VISION: 0
DIAPHORESIS: 0
BRUISES/BLEEDS EASILY: 0
WHEEZING: 0
FEVER: 0
HEADACHES: 0
CHILLS: 0
WEAKNESS: 0
NAUSEA: 0

## 2017-06-24 ASSESSMENT — PAIN SCALES - GENERAL
PAINLEVEL_OUTOF10: 6
PAINLEVEL_OUTOF10: 0
PAINLEVEL_OUTOF10: 2

## 2017-06-24 ASSESSMENT — LIFESTYLE VARIABLES: SUBSTANCE_ABUSE: 0

## 2017-06-24 NOTE — DISCHARGE PLANNING
Medical Social Work  PC from [facility]; told them patient has been accepted at [facility], and I need a d/c summary prior to accepting him.  Patient is scheduled to transport at 3:00.    Faxed transport communication form to [facility] for a tentative transport time of 3:00 to [facility] in [facility]

## 2017-06-24 NOTE — DISCHARGE PLANNING
Medical Social Work  Informed patient he has been accepted at [facility] in [place]. Patient stated he thought he was going to the facility by the [place], I told him the local skilled have all denied for various reasons.  Patient then stated his white count is high and he just had a bone biopsy and he doesn't feel well enough to go anywhere.  When I told him I have been told he is m/c, patient stated no one told him. Patient demanded to talk to the doctor, and that he has rights if he doesn't want to go.  I told him I would contact the doctor.      Paged [facility]  Paged by Dr. Norman, told him what patient is telling me.  Further the patient was told today he is m/clear.

## 2017-06-24 NOTE — PROGRESS NOTES
Epic is down in medical records. Charge RN gave medical records the number and address to rehab facility to fax there. Will update pt.

## 2017-06-24 NOTE — CARE PLAN
Problem: Bowel/Gastric:  Goal: Normal bowel function is maintained or improved  Outcome: PROGRESSING AS EXPECTED  Pt had BM today.     Problem: Discharge Barriers/Planning  Goal: Patient’s continuum of care needs will be met  Outcome: MET Date Met:  06/24/17  Pt being d/c to [facility] today.

## 2017-06-24 NOTE — DISCHARGE INSTRUCTIONS
Discharge Instructions    Discharged to other by [facility] with escort. Discharged via wheelchair, hospital escort: Yes.  Special equipment needed: Not Applicable    Be sure to schedule a follow-up appointment with your primary care doctor or any specialists as instructed.     Discharge Plan:   Smoking Cessation Offered: Patient Refused  Influenza Vaccine Indication: Not indicated: Previously immunized this influenza season and > 8 years of age    I understand that a diet low in cholesterol, fat, and sodium is recommended for good health. Unless I have been given specific instructions below for another diet, I accept this instruction as my diet prescription.   Other diet: diabetic diet with 1800 ml fluid restriction     Special Instructions: None    · Is patient discharged on Warfarin / Coumadin?   No     · Is patient Post Blood Transfusion?  No    Depression / Suicide Risk    As you are discharged from this [facility] facility, it is important to learn how to keep safe from harming yourself.    Recognize the warning signs:  · Abrupt changes in personality, positive or negative- including increase in energy   · Giving away possessions  · Change in eating patterns- significant weight changes-  positive or negative  · Change in sleeping patterns- unable to sleep or sleeping all the time   · Unwillingness or inability to communicate  · Depression  · Unusual sadness, discouragement and loneliness  · Talk of wanting to die  · Neglect of personal appearance   · Rebelliousness- reckless behavior  · Withdrawal from people/activities they love  · Confusion- inability to concentrate     If you or a loved one observes any of these behaviors or has concerns about self-harm, here's what you can do:  · Talk about it- your feelings and reasons for harming yourself  · Remove any means that you might use to hurt yourself (examples: pills, rope, extension cords, firearm)  · Get professional help from the community (Mental Health,  Substance Abuse, psychological counseling)  · Do not be alone:Call your Safe Contact- someone whom you trust who will be there for you.  · Call your local CRISIS HOTLINE 901-9774 or 244-702-5873  · Call your local Children's Mobile Crisis Response Team [place] (154) 210-5271 or www.BitePal  · Call the toll free National Suicide Prevention Hotlines   · National Suicide Prevention Lifeline 030-835-MFAI (3705)  · [employer] Hope Line Network 800-SUICIDE (988-3095)    Chronic Obstructive Pulmonary Disease  Chronic obstructive pulmonary disease (COPD) is a common lung condition in which airflow from the lungs is limited. COPD is a general term that can be used to describe many different lung problems that limit airflow, including both chronic bronchitis and emphysema. If you have COPD, your lung function will probably never return to normal, but there are measures you can take to improve lung function and make yourself feel better.  CAUSES   · Smoking (common).  · Exposure to secondhand smoke.  · Genetic problems.  · Chronic inflammatory lung diseases or recurrent infections.  SYMPTOMS  2. Shortness of breath, especially with physical activity.  3. Deep, persistent (chronic) cough with a large amount of thick mucus.  4. Wheezing.  5. Rapid breaths (tachypnea).  6. Gray or bluish discoloration (cyanosis) of the skin, especially in your fingers, toes, or lips.  7. Fatigue.  8. Weight loss.  9. Frequent infections or episodes when breathing symptoms become much worse (exacerbations).  10. Chest tightness.  DIAGNOSIS  Your health care provider will take a medical history and perform a physical examination to diagnose COPD. Additional tests for COPD may include:  2. Lung (pulmonary) function tests.  3. Chest X-ray.  4. CT scan.  5. Blood tests.  TREATMENT   Treatment for COPD may include:  2. Inhaler and nebulizer medicines. These help manage the symptoms of COPD and make your breathing more  comfortable.  3. Supplemental oxygen. Supplemental oxygen is only helpful if you have a low oxygen level in your blood.  4. Exercise and physical activity. These are beneficial for nearly all people with COPD.  5. Lung surgery or transplant.  6. Nutrition therapy to gain weight, if you are underweight.  7. Pulmonary rehabilitation. This may involve working with a team of health care providers and specialists, such as respiratory, occupational, and physical therapists.  HOME CARE INSTRUCTIONS  2. Take all medicines (inhaled or pills) as directed by your health care provider.  3. Avoid over-the-counter medicines or cough syrups that dry up your airway (such as antihistamines) and slow down the elimination of secretions unless instructed otherwise by your health care provider.  4. If you are a smoker, the most important thing that you can do is stop smoking. Continuing to smoke will cause further lung damage and breathing trouble. Ask your health care provider for help with quitting smoking. He or she can direct you to community resources or hospitals that provide support.  5. Avoid exposure to irritants such as smoke, chemicals, and fumes that aggravate your breathing.  6. Use oxygen therapy and pulmonary rehabilitation if directed by your health care provider. If you require home oxygen therapy, ask your health care provider whether you should purchase a pulse oximeter to measure your oxygen level at home.  7. Avoid contact with individuals who have a contagious illness.  8. Avoid extreme temperature and humidity changes.  9. Eat healthy foods. Eating smaller, more frequent meals and resting before meals may help you maintain your strength.  10. Stay active, but balance activity with periods of rest. Exercise and physical activity will help you maintain your ability to do things you want to do.  11. Preventing infection and hospitalization is very important when you have COPD. Make sure to receive all the vaccines  your health care provider recommends, especially the pneumococcal and influenza vaccines. Ask your health care provider whether you need a pneumonia vaccine.  12. Learn and use relaxation techniques to manage stress.  13. Learn and use controlled breathing techniques as directed by your health care provider. Controlled breathing techniques include:  1. Pursed lip breathing. Start by breathing in (inhaling) through your nose for 1 second. Then, purse your lips as if you were going to whistle and breathe out (exhale) through the pursed lips for 2 seconds.  2. Diaphragmatic breathing. Start by putting one hand on your abdomen just above your waist. Inhale slowly through your nose. The hand on your abdomen should move out. Then purse your lips and exhale slowly. You should be able to feel the hand on your abdomen moving in as you exhale.  14. Learn and use controlled coughing to clear mucus from your lungs. Controlled coughing is a series of short, progressive coughs. The steps of controlled coughing are:  1. Lean your head slightly forward.  2. Breathe in deeply using diaphragmatic breathing.  3. Try to hold your breath for 3 seconds.  4. Keep your mouth slightly open while coughing twice.  5. Spit any mucus out into a tissue.  6. Rest and repeat the steps once or twice as needed.  SEEK MEDICAL CARE IF:  · You are coughing up more mucus than usual.  · There is a change in the color or thickness of your mucus.  · Your breathing is more labored than usual.  · Your breathing is faster than usual.  SEEK IMMEDIATE MEDICAL CARE IF:  · You have shortness of breath while you are resting.  · You have shortness of breath that prevents you from:  ¨ Being able to talk.  ¨ Performing your usual physical activities.  · You have chest pain lasting longer than 5 minutes.  · Your skin color is more cyanotic than usual.  · You measure low oxygen saturations for longer than 5 minutes with a pulse oximeter.  MAKE SURE YOU:  · Understand  these instructions.  · Will watch your condition.  · Will get help right away if you are not doing well or get worse.     This information is not intended to replace advice given to you by your health care provider. Make sure you discuss any questions you have with your health care provider.     Document Released: 09/27/2006 Document Revised: 01/08/2016 Document Reviewed: 08/14/2014  [employer] Patient Education ©2016 Elsevier Inc.  Cellulitis  Cellulitis is an infection of the skin and the tissue under the skin. The infected area is usually red and tender. This happens most often in the arms and lower legs.  HOME CARE   · Take your antibiotic medicine as told. Finish the medicine even if you start to feel better.  · Keep the infected arm or leg raised (elevated).  · Put a warm cloth on the area up to 4 times per day.  · Only take medicines as told by your doctor.  · Keep all doctor visits as told.  GET HELP IF:  11. You see red streaks on the skin coming from the infected area.  12. Your red area gets bigger or turns a dark color.  13. Your bone or joint under the infected area is painful after the skin heals.  14. Your infection comes back in the same area or different area.  15. You have a puffy (swollen) bump in the infected area.  16. You have new symptoms.  17. You have a fever.  GET HELP RIGHT AWAY IF:   6. You feel very sleepy.  7. You throw up (vomit) or have watery poop (diarrhea).  8. You feel sick and have muscle aches and pains.  MAKE SURE YOU:   8. Understand these instructions.  9. Will watch your condition.  10. Will get help right away if you are not doing well or get worse.     This information is not intended to replace advice given to you by your health care provider. Make sure you discuss any questions you have with your health care provider.     Document Released: 06/05/2009 Document Revised: 01/08/2016 Document Reviewed: 03/04/2013  [employer] Patient Education ©2016  Elsevier Inc.

## 2017-06-24 NOTE — PROGRESS NOTES
Pt sitting on side of bed. AOX4. Morning meds given per MAR. Denies any pain. Denies any needs at this time, will continue to monitor.

## 2017-06-24 NOTE — DISCHARGE PLANNING
Transport arranged with Lauren. Patient will be leaving today @1700 via the [facility] van going to [place]. PIPPA for JUDITH Arizmendi. Job ID: [id number]

## 2017-06-24 NOTE — PROGRESS NOTES
[facility] Internal Medicine Interval Note    Name Maxi Esposito       1950   Age/Sex 67 y.o. male   MRN [id number]   Code Status Full     After 5PM or if no immediate response to page, please call for cross-coverage  Attending/Team: Dr. Evy US / audra Call [phone] to page   1st Call - Day Intern (R1):   Dr. Puneet Womack MD 2nd Call - Day Sr. Resident (R2/R3):   Dr. Tiff US         Chief complaint/ reason for interval visit (Primary Diagnosis)    SOB and leg swelling      Interval Problem Daily Status Update      Bone marrow biopsy preliminary report is The overall findings are worrisome for chronic myelomonocytic leukemia. Waiting final report depending on flow cytomety  Respiratory condition is improving.  BUN 40 elevated. Lasix decreased to 20 mg  Patient still complaining of fatigue and generalized weakness.   Reports some peripheral neuropathy at his feet. Gabapentin increased to 200 mg TID  Still waiting SNF approval     Review of Systems   Constitutional: Positive for malaise/fatigue. Negative for fever, chills and diaphoresis.   HENT: Negative for hearing loss and tinnitus.    Eyes: Negative for blurred vision, double vision and photophobia.   Respiratory: Negative for cough, sputum production, shortness of breath and wheezing.    Cardiovascular: Negative for chest pain and leg swelling.   Gastrointestinal: Negative for nausea, vomiting and diarrhea.   Genitourinary: Negative for dysuria and urgency.   Musculoskeletal: Negative for myalgias, falls and neck pain.   Skin: Negative for itching and rash.   Neurological: Negative for dizziness, tingling, tremors, weakness and headaches.   Endo/Heme/Allergies: Negative for environmental allergies. Does not bruise/bleed easily.   Psychiatric/Behavioral: Negative for depression and substance abuse.       Consultants/Specialty  Social service for SNF  Oncology     Disposition  SNF      Quality Measures  Labs reviewed, Medications reviewed and EKG  reviewed  Arshad catheter: No Arshad      DVT Prophylaxis: Enoxaparin (Lovenox)              Physical Exam       Filed Vitals:    06/24/17 0000 06/24/17 0300 06/24/17 0450 06/24/17 0725   BP:  105/49     Pulse:  59 60 85   Temp:  36 °C (96.8 °F)  36.4 °C (97.6 °F)   Resp:  24 20 18   Height:       Weight: 113.3 kg (249 lb 12.5 oz)      SpO2:  90%  92%     Body mass index is 39.11 kg/(m^2). Weight: 113.3 kg (249 lb 12.5 oz)  Oxygen Therapy:  Pulse Oximetry: 92 % (wrong chart), O2 (LPM): 0, O2 Delivery: None (Room Air)    Physical Exam  Physical Exam   Constitutional: He is oriented to person, place, and time.   Obese  Cardiovascular: Normal rate and regular rhythm.    Pulmonary/Chest: Effort normal. No crackles today He exhibits no tenderness.   Distant lung sounds    Abdominal: Soft. There is no tenderness. There is no guarding.   Abdomen obese   Musculoskeletal: He exhibits edema which is decreasing  and tenderness.   Erythema both lower extremities with tenderness and pitting edema-improved since admission  Neurological: He is alert and oriented to person, place, and time. No cranial nerve deficit. GCS score is 15.   Skin: Skin is warm. Rash noted. There is erythema-improving      Lab Data Review:      6/9/2017  2:32 PM    Recent Labs      06/24/17   0748   SODIUM  134*   POTASSIUM  5.2   CHLORIDE  103   CO2  22   BUN  40*   CREATININE  0.89   CALCIUM  9.2       Recent Labs      06/24/17   0748   GLUCOSE  123*       No results for input(s): RBC, HEMOGLOBIN, HEMATOCRIT, PLATELETCT, PROTHROMBTM, APTT, INR, IRON, FERRITIN, TOTIRONBC in the last 72 hours.              Assessment/Plan       Heart failure with preserved EF  -------------------------------   presented with SOB, cough. Basal crepitations, scattered wheezes on examination, leg swelling on admission. Desaturated at night to 80s needed about 2 LPM on first days of admission  Early CXR showed features of COPD and pulmonary congestion  ECHO showed LVH, G II-III  diastolic dysfunction, RVSP 35. Likely CHF with preserved EF  Most likely heart failure with preserved EF  Responded to diuretics, lasix and aldactone.  in his WT to more than 20 Ibs since admission. Improving   Chest clear on examination today. No respiratory distress.   BUN elevated up to 40     Plan:    Decrease lasix to 20 mg , continue aldactone  Continue on lisinopril   Carvedilol  6.5 mg BID   Daily weight and I and O follow up, water restriction   Follow renal function and electrolytes      # COPD   hx of smoking, COPD on inhalars, no PFT in chart   no wheezes now, less respiratory distress, cough is better. improving   finished antibiotics and steroid    Plan:  Continue RT Protocol  Albuterol PRN  Cough suppressant         BILATERAL LOWER EXTREMITY EDEMA: CELLULITIS WITH STASIS DERMATITIS  ---------------------------------------------------------------------------------------------------  Patient has bilateral leg edema with venous stasis  Wbc 14 on admission, wbc still high 17.1  Severe erythema and tenderness of both lower extremities with chr venous stasis derm changes    DVT ruled out  Blood culture negative  It seems that it chronic cellulitis due to venous stasis on the top of interstitial fluid accumulation due to heart failure  Finished antibiotic, doxycycline    Plan:  ----------   Elastic Stocking   Leg elevation      # Sinus pauses     Presented with sinus pauses up to 4.9 sec, seems asymptomatic on day of admission  Cardiology consulted on day of admission and thinks it is related to hypoxic event from his sleep apnea  No pauses now  Off tele now    # leukocytosis   - wbc 17.1 mainly neutrophils, possibly due to COPD exacerbation, leg cellulitis in addition to stress reaction, but this leukocytosis has been persistent for more than year  - blood smear looks normal  - initial report of Bone marrow biopsy showed: Normocellular marrow (50%) with trilineage hematopoiesis.         Relative  myeloid hyperplasia.         No significant dyspoiesis         PENDING KARYOTYPE AND FISH     The overall findings are worrisome for chronic myelomonocytic leukemia. Waiting final report       # VICENTE  - clinically seems to have sleep apnea, risk factor for HF  - no sleep study done  - nocturnal oximetry showed desaturation down to low 80s with O2 need up to 2 LPM  - CPAP as outpatient    T2 DM:  --------------  A1c 6.5  Blood glucose controlled          HTN:  ----------  BP controlled  On Lisinopril 20 mg, amlodipine 5 mg, carvedilol 6.5 mg BID  Hydralazine IV ordered PRN      DLD:  ----------  stable    Ct Statin    GERD:  ------------    No alarming symptoms  Ct Omeprazole    Tinea pedis:  ---------------  Stable.continue topical clotrimazole     Substance Dependence:  ------------------------------  Smoking and Cannabis user:    Counseling to quit marijuana, and Smoking  Nicotine patch    Hx of Depression:  ----------------------  Stable now  Continue On Aripiprazole  Currently No SI or HI

## 2017-06-24 NOTE — DISCHARGE PLANNING
Medical Social Work  PC from [facility] will take patient today, set up transport time of 3:00. Will need d/c summary prior to being admitted.    Paged [facility]

## 2017-06-25 ENCOUNTER — PATIENT OUTREACH (OUTPATIENT)
Dept: HEALTH INFORMATION MANAGEMENT | Facility: OTHER | Age: 67
End: 2017-06-25

## 2017-06-25 NOTE — PROGRESS NOTES
· Chart reviewed.  Patient was discharged from [facility] 6/24/17 and transferred to [facility] in [place].  Pt does not qualify for discharge outreach phone call at this time.

## 2017-06-25 NOTE — PROGRESS NOTES
Transport here to  pt. D/C instructions and pt's med sent with transport. IV d/c, catheter intact. Pt left with his personal belongings.

## 2017-06-25 NOTE — DISCHARGE PLANNING
I received a discharge packet and COBRA transfer form on this patient from the charge nurse.  She said that it was sitting on her desk when she came in this morning. The patient transferred to [facility] in [place] yesterday at 5:00 PM.  I called [facility] just now and the nurse there checked to see if they received a transfer packet and COBRA and she said that they did receive it.  So this is apparently a second copy.  I will leave it for the  on Monday so that he is aware that this packet and COBRA were found here.

## 2017-06-28 NOTE — DOCUMENTATION QUERY
DOCUMENTATION QUERY    PROVIDERS: Please select “Cosign w/ note”to reply to query.    To better represent the severity of illness of your patient, please review the following information and exercise your independent professional judgment in responding to this query.     Sepsis  is documented in the Emergency Room Record only.    Based upon the clinical findings, risk factors, and treatment, can you please clarify if the diagnosis of sepsis was ruled in or ruled out?    Sepsis was ruled in  Sepsis was ruled out  Unable to determine  Other (please specify)    The medical record reflects the following:   Clinical Findings  Sepsis documented in the emergency room record only.   Treatment  Antibiotics given for COPD exacerbation.   Risk Factors  Deterioration.   Location within medical record  ER record.     Thank you,   Dona Terrazas

## 2017-07-19 ENCOUNTER — PATIENT OUTREACH (OUTPATIENT)
Dept: HEALTH INFORMATION MANAGEMENT | Facility: OTHER | Age: 67
End: 2017-07-19

## 2017-07-20 ENCOUNTER — OFFICE VISIT (OUTPATIENT)
Dept: CARDIOLOGY | Facility: MEDICAL CENTER | Age: 67
End: 2017-07-20
Payer: MEDICARE

## 2017-07-20 VITALS
SYSTOLIC BLOOD PRESSURE: 116 MMHG | HEART RATE: 84 BPM | DIASTOLIC BLOOD PRESSURE: 86 MMHG | HEIGHT: 67 IN | WEIGHT: 260 LBS | BODY MASS INDEX: 40.81 KG/M2 | OXYGEN SATURATION: 90 %

## 2017-07-20 DIAGNOSIS — I10 HTN (HYPERTENSION), MALIGNANT: ICD-10-CM

## 2017-07-20 DIAGNOSIS — J44.9 CHRONIC OBSTRUCTIVE PULMONARY DISEASE, UNSPECIFIED COPD TYPE (HCC): ICD-10-CM

## 2017-07-20 DIAGNOSIS — R06.09 DYSPNEA ON EXERTION: ICD-10-CM

## 2017-07-20 DIAGNOSIS — F17.200 SMOKER: ICD-10-CM

## 2017-07-20 PROCEDURE — 94620 PR PULMONARY STRESS TESTING,SIMPLE: CPT | Performed by: INTERNAL MEDICINE

## 2017-07-20 PROCEDURE — 99214 OFFICE O/P EST MOD 30 MIN: CPT | Mod: 25 | Performed by: INTERNAL MEDICINE

## 2017-07-20 ASSESSMENT — MINNESOTA LIVING WITH HEART FAILURE QUESTIONNAIRE (MLHF)
TOTAL_SCORE: 76
DIFFICULTY WITH RECREATIONAL PASTIMES, SPORTS, HOBBIES: 5
DIFFICULTY WITH SEXUAL ACTIVITIES: 5
WALKING ABOUT OR CLIMBING STAIRS DIFFICULT: 5
COSTING YOU MONEY FOR MEDICAL CARE: 4
MAKING YOU WORRY: 0
TIRED, FATIGUED OR LOW ON ENERGY: 5
GIVING YOU SIDE EFFECTS FROM TREATMENTS: 3
WORKING AROUND THE HOUSE OR YARD DIFFICULT: 5
MAKING YOU SHORT OF BREATH: 5
DIFFICULTY SLEEPING WELL AT NIGHT: 5
DIFFICULTY SOCIALIZING WITH FAMILY OR FRIENDS: 4
EATING LESS FOODS YOU LIKE: 3
FEELING LIKE A BURDEN TO FAMILY AND FRIENDS: 3
SWELLING IN ANKLES OR LEGS: 5
MAKING YOU STAY IN A HOSPITAL: 5
LOSS OF SELF CONTROL IN YOUR LIFE: 0
DIFFICULTY GOING AWAY FROM HOME: 4
HAVING TO SIT OR LIE DOWN DURING THE DAY: 5
MAKING YOU FEEL DEPRESSED: 0
DIFFICULTY TO CONCENTRATE OR REMEMBERING THINGS: 0
DIFFICULTY WORKING TO EARN A LIVING: 5

## 2017-07-20 ASSESSMENT — 6 MINUTE WALK TEST (6MWT): TOTAL DISTANCE WALKED (METERS): 375

## 2017-07-20 NOTE — MR AVS SNAPSHOT
"Maxi Esposito   2017 4:15 PM   Office Visit   MRN: [id number]    Department:  [facility]   Dept Phone:  [phone]    Description:  Male : 1950   Provider:  Carol Shaw M.D.           Reason for Visit     New Patient           Allergies as of 2017     Allergen Noted Reactions    Shellfish Allergy 2017   Shortness of Breath, Swelling    \"Eyes closed up really tight and it was hard to breath\"      Vital Signs     Blood Pressure Pulse Height Weight Body Mass Index Oxygen Saturation    116/86 mmHg 84 1.702 m (5' 7.01\") 117.935 kg (260 lb) 40.71 kg/m2 90%    Smoking Status                   Former Smoker           Basic Information     Date Of Birth Sex Race Ethnicity Preferred Language    1950 Male White Non- English      Problem List              ICD-10-CM Priority Class Noted - Resolved    Weakness R53.1 Low  3/8/2016 - Present    Sinusitis J32.9 Medium  3/9/2016 - Present    HTN (hypertension) I10 Medium  3/9/2016 - Present    Type 2 diabetes mellitus with hyperglycemia (CMS-HCC) (Chronic) E11.65   3/9/2016 - Present    Leukocytosis D72.829 Low  3/9/2016 - Present    Sepsis (CMS-HCC) A41.9 High  2016 - Present    Elevated lactic acid level R79.89   2016 - Present    Dyspnea R06.00 Medium  2016 - Present    Non compliance w medication regimen Z91.14 Low  2016 - Present    Blurry vision, bilateral H53.8 Medium  2016 - Present    Eosinophilia D72.1 Medium  2016 - Present    Malaise and fatigue R53.81, R53.83 High  2016 - Present    Old lacunar stroke without late effect-on Imaging Z86.73 Low  2016 - Present    Lactic acidosis E87.2   2016 - Present    SIRS (systemic inflammatory response syndrome) (CMS-HCC) R65.10   2016 - Present    COPD exacerbation (CMS-HCC) J44.1 High  2016 - Present    CAP (community acquired pneumonia) J18.9 High  2016 - Present    Cellulitis L03.90   2016 - Present    Acute " respiratory failure with hypoxia (CMS-HCC) J96.01 High  3/3/2017 - Present    Acute exacerbation of COPD with asthma (CMS-HCC) J44.1, J45.901 High  3/3/2017 - Present    Hypokalemia E87.6 Medium  3/4/2017 - Present    Tobacco abuse counseling Z71.6 Low  3/4/2017 - Present    Hypertensive urgency I16.0 Medium  3/6/2017 - Present    Chest pain R07.9 Medium  3/6/2017 - Present    DM2 (diabetes mellitus, type 2) (CMS-HCC) E11.9 Medium  3/7/2017 - Present    Venous stasis I87.8 Low  3/7/2017 - Present    Generalized weakness R53.1   5/4/2017 - Present    Homelessness Z59.0   5/4/2017 - Present    Noncompliance with medications Z91.14   5/4/2017 - Present    Diabetes 1.5, 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   5/4/2017 - Present    Cellulitis of both lower extremities L03.115, L03.116   5/4/2017 - Present    Bilateral cellulitis of lower leg L03.116, L03.115 Medium  6/8/2017 - Present    Sinus pause I45.5 Medium  6/10/2017 - Present    Obstructive sleep apnea G47.33   6/12/2017 - Present    DM II (diabetes mellitus, type II), controlled (CMS-HCC) E11.9   6/12/2017 - Present    Essential hypertension (Chronic) I10 Low  6/17/2017 - Present      Health Maintenance        Date Due Completion Dates    DIABETES MONOFILAMENT / LE EXAM 1950 ---    RETINAL SCREENING 1/9/1969 ---    URINE ACR / MICROALBUMIN 1/9/1969 ---    IMM DTaP/Tdap/Td Vaccine (1 - Tdap) 1/9/1970 ---    COLONOSCOPY 1/9/2001 ---    IMM ZOSTER VACCINE 1/9/2011 ---    IMM INFLUENZA (1) 9/1/2017 3/5/2017, 4/11/2016    A1C SCREENING 12/8/2017 6/8/2017, 5/5/2017, 3/5/2017, 11/7/2016, 6/4/2016, 5/29/2016, 4/6/2016, 3/8/2016    FASTING LIPID PROFILE 6/9/2018 6/9/2017, 5/5/2017, 5/29/2016, 4/20/2016    SERUM CREATININE 6/24/2018 6/24/2017, 6/20/2017, 6/19/2017, 6/18/2017, 6/17/2017, 6/16/2017, 6/15/2017, 6/14/2017, 6/13/2017, 6/12/2017, 6/11/2017, 6/10/2017, 6/9/2017, 6/8/2017, 5/20/2017, 5/5/2017, 5/4/2017, 4/24/2017, 3/22/2017, 3/17/2017, 3/9/2017, 3/8/2017,  3/7/2017, 3/3/2017, 1/12/2017, 11/15/2016, 11/2/2016, 8/6/2016, 8/3/2016, 8/1/2016, 7/28/2016, 7/4/2016, 7/1/2016, 6/29/2016, 6/28/2016, 6/26/2016, 6/21/2016, 6/18/2016, 6/16/2016, 6/15/2016, 6/14/2016, 6/4/2016, 6/3/2016, 6/2/2016, 5/31/2016, 5/28/2016, 5/27/2016, 5/26/2016, 5/24/2016, 5/23/2016, 5/22/2016, 5/16/2016, 5/15/2016, 5/14/2016, 5/13/2016, 5/12/2016, 5/9/2016, 4/26/2016, 4/25/2016, 4/25/2016, 4/24/2016, 4/23/2016, 4/22/2016, 4/20/2016, 4/18/2016, 4/17/2016, 4/16/2016, 4/7/2016, 4/6/2016, 4/2/2016, 3/24/2016, 3/16/2016, 3/9/2016, 3/8/2016            Current Immunizations     13-VALENT PCV PREVNAR 4/9/2016  8:47 AM    Influenza Vaccine Adult HD 3/5/2017  5:32 AM, 4/11/2016  2:37 PM    Pneumococcal polysaccharide vaccine (PPSV-23) 4/13/2017      Below and/or attached are the medications your provider expects you to take. Review all of your home medications and newly ordered medications with your provider and/or pharmacist. Follow medication instructions as directed by your provider and/or pharmacist. Please keep your medication list with you and share with your provider. Update the information when medications are discontinued, doses are changed, or new medications (including over-the-counter products) are added; and carry medication information at all times in the event of emergency situations     Allergies:  SHELLFISH ALLERGY - Shortness of Breath,Swelling               Medications  Valid as of: July 20, 2017 -  4:38 PM    Generic Name Brand Name Tablet Size Instructions for use    Acetaminophen (Tab) TYLENOL 325 MG Take 2 Tabs by mouth every 6 hours as needed (Mild Pain; (Pain scale 1-3); Temp greater than 100.5 F).        Albuterol Sulfate (Nebu Soln) PROVENTIL 2.5mg/0.5ml 0.5 mL by Nebulization route every 6 hours as needed for Shortness of Breath.        Albuterol Sulfate (Aero Soln) albuterol 108 (90 BASE) MCG/ACT Inhale 2 Puffs by mouth every four hours as needed for Shortness of Breath.         AmLODIPine Besylate (Tab) NORVASC 5 MG Take 1 Tab by mouth every day.        ARIPiprazole (Tab) ABILIFY 5 MG Take 5 mg by mouth every bedtime.        Aspirin (Tablet Delayed Response) ECOTRIN 81 MG Take 81 mg by mouth every day.        Atorvastatin Calcium (Tab) LIPITOR 20 MG Take 1 Tab by mouth every bedtime.        Budesonide-Formoterol Fumarate (Aerosol) SYMBICORT 160-4.5 MCG/ACT Inhale 2 Puffs by mouth 2 Times a Day.        Carvedilol (Tab) COREG 6.25 MG Take 1 Tab by mouth 2 times a day, with meals.        Cholecalciferol (Tab) VITAMIN D3 400 UNIT Take 2.5 Tabs by mouth every day.        Clotrimazole (Cream) LOTRIMIN 1 % Apply to feet        Dextromethorphan-Guaifenesin (Liquid) DIABETIC TUSSIN DM  MG/5ML Take 10 mL by mouth every 6 hours as needed for Cough.        Dextrose (Solution) D50W 50 % 25 mL by Intravenous route as needed (If FSBG is less than or equal to 70 mg/dL and If patient is NPO).        Fluticasone Propionate (Suspension) FLONASE 50 MCG/ACT Spray 2 Sprays in nose every day.        Furosemide (Tab) LASIX 20 MG Take 1 Tab by mouth every day.        Gabapentin (Cap) NEURONTIN 100 MG Take 2 Caps by mouth 3 times a day.        GlipiZIDE (TABLET SR 24 HR) GLUCOTROL 2.5 MG Take 2.5 mg by mouth every morning.        Glucose-Vitamin C (Chew Tab) glucose 4 g 4 g Take 4 Tabs by mouth as needed for Low Blood Sugar (If FSBG is less than or equal to 70 mg/dL and patient able to eat or drink).        HydrALAZINE HCl (Solution) APRESOLINE 20 MG/ML 1 mL by Intravenous route every 6 hours as needed.        Lisinopril (Tab) PRINIVIL 20 MG Take 1 Tab by mouth every day.        Nicotine (PATCH 24 HR) NICODERM 21 MG/24HR Apply 1 Patch to skin as directed every 24 hours.        Nicotine Polacrilex (Gum) NICORETTE 2 MG Take 1 Each by mouth every 1 hour as needed (For nicotine urge).        Omeprazole (CAPSULE DELAYED RELEASE) PRILOSEC 20 MG Take 1 Cap by mouth every day.        Polyvinyl Alcohol (Solution)  artificial tears 1.4 % Place 1 Drop in both eyes 2 Times a Day.        Spironolactone (Tab) ALDACTONE 25 MG Take 1 Tab by mouth every day.        .                 Medicines prescribed today were sent to:     [facility] [id number] [facility], [place] - [address]    [address] [place] [address]    Phone: [phone] Fax: [phone]    Open 24 Hours?: Yes      Medication refill instructions:       If your prescription bottle indicates you have medication refills left, it is not necessary to call your provider’s office. Please contact your pharmacy and they will refill your medication.    If your prescription bottle indicates you do not have any refills left, you may request refills at any time through one of the following ways: The online PreViser system (except Urgent Care), by calling your provider’s office, or by asking your pharmacy to contact your provider’s office with a refill request. Medication refills are processed only during regular business hours and may not be available until the next business day. Your provider may request additional information or to have a follow-up visit with you prior to refilling your medication.   *Please Note: Medication refills are assigned a new Rx number when refilled electronically. Your pharmacy may indicate that no refills were authorized even though a new prescription for the same medication is available at the pharmacy. Please request the medicine by name with the pharmacy before contacting your provider for a refill.           PreViser Access Code: [email or web address]  Expires: 8/19/2017  4:38 PM    PreViser  A secure, online tool to manage your health information     [employer]’s PreViser® is a secure, online tool that connects you to your personalized health information from the privacy of your home -- day or night - making it very easy for you to manage your healthcare. Once the activation process is completed, you can even  access your medical information using the Global Analytics stephie, which is available for free in the Apple Stephie store or Google Play store.     Global Analytics provides the following levels of access (as shown below):   My Chart Features   [facility] Doctor [facility]  Specialists [facility]  Urgent  Care [facility]  Primary Care  Doctor   Email your healthcare team securely and privately 24/7 X X X    Manage appointments: schedule your next appointment; view details of past/upcoming appointments X      Request prescription refills. X      View recent personal medical records, including lab and immunizations X X X X   View health record, including health history, allergies, medications X X X X   Read reports about your outpatient visits, procedures, consult and ER notes X X X X   See your discharge summary, which is a recap of your hospital and/or ER visit that includes your diagnosis, lab results, and care plan. X X       How to register for Global Analytics:  1. Go to  [email or web address].  2. Click on the Sign Up Now box, which takes you to the New Member Sign Up page. You will need to provide the following information:  a. Enter your Global Analytics Access Code exactly as it appears at the top of this page. (You will not need to use this code after you’ve completed the sign-up process. If you do not sign up before the expiration date, you must request a new code.)   b. Enter your date of birth.   c. Enter your home email address.   d. Click Submit, and follow the next screen’s instructions.  3. Create a Global Analytics ID. This will be your Global Analytics login ID and cannot be changed, so think of one that is secure and easy to remember.  4. Create a Global Analytics password. You can change your password at any time.  5. Enter your Password Reset Question and Answer. This can be used at a later time if you forget your password.   6. Enter your e-mail address. This allows you to receive e-mail notifications when new information is available in Global Analytics.  7. Click  Sign Up. You can now view your health information.    For assistance activating your [employer] account, call [phone]

## 2017-07-20 NOTE — PROGRESS NOTES
Maxi Esposito was discharged from [facility] on 05/06/17 with a LACE score of 70.  He was discharged with instructions to follow up/ establish care with a Primary Care Physician.  [facility]'s Patient advocate was able to get him established with Awilda Titus. [facility]'s Patient advocate was able to engage with patient and confirms that patient was able to  both medications that were prescribed for him. Patient states that he would take the bus as his source of transportation. Please see patients appointments below:    - PCP - Dr. Awilda Cobian - 05/31/17 - No show

## 2017-07-21 ASSESSMENT — ENCOUNTER SYMPTOMS
PND: 0
DOUBLE VISION: 0
LOSS OF CONSCIOUSNESS: 0
BLURRED VISION: 0
WEIGHT LOSS: 0
NAUSEA: 0
ABDOMINAL PAIN: 0
FEVER: 0
CHILLS: 0
PALPITATIONS: 0
SHORTNESS OF BREATH: 1
HEADACHES: 0
BLOOD IN STOOL: 0
SPEECH CHANGE: 0
DEPRESSION: 0
CLAUDICATION: 0
HALLUCINATIONS: 0
ORTHOPNEA: 0
BRUISES/BLEEDS EASILY: 0
MYALGIAS: 0
FALLS: 0
VOMITING: 0
EYE PAIN: 0
DIZZINESS: 0
SENSORY CHANGE: 0
COUGH: 0
EYE DISCHARGE: 0

## 2017-07-21 NOTE — PROGRESS NOTES
"Subjective:   Maxi Esposito is a 67 y.o. male who presents today in our heart failure clinic due to prior history and most recent hospitalization for COPD exacerbation. There was mentioning of heart failure with preserved ejection fraction. However, upon chart review, it appears that patient was in the hospital mostly because of COPD exacerbation. Patient continue to smoke. He doesn't have any desire to stop smoking. He does want to reduce the amount however. No chest pain. He continues to feel short of breath.    Patient was able to complete 375 m during his 6 minute walk test. his O2 saturation at baseline was 90% and at the end of the test, the O2 saturation was 92%. he reported 4.5 level of dyspnea on Jacquelin scale.      Past Medical History   Diagnosis Date   • Hypertension    • Diabetes (CMS-[facility])    • Sepsis (CMS-[facility])    • Pneumonia    • Chronic obstructive pulmonary disease (CMS-[facility])    • Medical non-compliance    • Congestive heart failure (CMS-[facility])      Past Surgical History   Procedure Laterality Date   • Splenectomy  1980   • Bone marrow aspiration  6/22/2017     Procedure: BONE MARROW ASPIRATION;  Surgeon: Sarah Toscano M.D.;  Location: [facility];  Service:    • Bone marrow biopsy, ndl/trocar  6/22/2017     Procedure: BONE MARROW BIOPSY, NDL/TROCAR;  Surgeon: Sarah Toscano M.D.;  Location: [facility];  Service:      History reviewed. No pertinent family history.  History   Smoking status   • Former Smoker   • Quit date: 07/16/2016   Smokeless tobacco   • Never Used     Allergies   Allergen Reactions   • Shellfish Allergy Shortness of Breath and Swelling     \"Eyes closed up really tight and it was hard to breath\"     Outpatient Encounter Prescriptions as of 7/20/2017   Medication Sig Dispense Refill   • acetaminophen (TYLENOL) 325 MG Tab Take 2 Tabs by mouth every 6 hours as needed (Mild Pain; (Pain scale 1-3); Temp greater than 100.5 F). 30 Tab 0   • dextrose 50% " (D50W) 50 % Solution 25 mL by Intravenous route as needed (If FSBG is less than or equal to 70 mg/dL and If patient is NPO).     • glucose 4 g 4 g Chew Tab Take 4 Tabs by mouth as needed for Low Blood Sugar (If FSBG is less than or equal to 70 mg/dL and patient able to eat or drink).     • albuterol (PROVENTIL) 2.5mg/0.5ml Nebu Soln 0.5 mL by Nebulization route every 6 hours as needed for Shortness of Breath.     • albuterol 108 (90 BASE) MCG/ACT Aero Soln inhalation aerosol Inhale 2 Puffs by mouth every four hours as needed for Shortness of Breath. 8.5 g    • amlodipine (NORVASC) 5 MG Tab Take 1 Tab by mouth every day. 30 Tab    • artificial tears 1.4 % Solution Place 1 Drop in both eyes 2 Times a Day. 1 Bottle 3   • budesonide-formoterol (SYMBICORT) 160-4.5 MCG/ACT Aerosol Inhale 2 Puffs by mouth 2 Times a Day.     • carvedilol (COREG) 6.25 MG Tab Take 1 Tab by mouth 2 times a day, with meals. 60 Tab    • cholecalciferol (VITAMIN D3) 400 UNIT Tab Take 2.5 Tabs by mouth every day. 150 Tab    • fluticasone (FLONASE) 50 MCG/ACT nasal spray Spray 2 Sprays in nose every day. 16 g    • furosemide (LASIX) 20 MG Tab Take 1 Tab by mouth every day. 60 Tab    • gabapentin (NEURONTIN) 100 MG Cap Take 2 Caps by mouth 3 times a day. 90 Cap    • hydrALAZINE (APRESOLINE) 20 MG/ML Solution 1 mL by Intravenous route every 6 hours as needed.  0   • nicotine (NICODERM) 21 MG/24HR PATCH 24 HR Apply 1 Patch to skin as directed every 24 hours. 30 Patch    • nicotine polacrilex (NICORETTE) 2 MG Gum Take 1 Each by mouth every 1 hour as needed (For nicotine urge).     • lisinopril (PRINIVIL) 20 MG Tab Take 1 Tab by mouth every day. 30 Tab    • spironolactone (ALDACTONE) 25 MG Tab Take 1 Tab by mouth every day. 30 Tab 3   • omeprazole (PRILOSEC) 20 MG delayed-release capsule Take 1 Cap by mouth every day. 30 Cap    • clotrimazole (LOTRIMIN) 1 % Cream Apply to feet 1 Tube 0   • aripiprazole (ABILIFY) 5 MG tablet Take 5 mg by mouth every  "bedtime.     • glipiZIDE SR (GLUCOTROL) 2.5 MG TABLET SR 24 HR Take 2.5 mg by mouth every morning.     • atorvastatin (LIPITOR) 20 MG Tab Take 1 Tab by mouth every bedtime. 30 Tab 11   • aspirin EC (ECOTRIN) 81 MG Tablet Delayed Response Take 81 mg by mouth every day.     • guaifenesin DM sugar free (DIABETIC TUSSIN DM)  MG/5ML Liquid soln Take 10 mL by mouth every 6 hours as needed for Cough. (Patient not taking: Reported on 7/20/2017) 1 Bottle 0     No facility-administered encounter medications on file as of 7/20/2017.     Review of Systems   Constitutional: Negative for fever, chills, weight loss and malaise/fatigue.   HENT: Negative for ear discharge, ear pain, hearing loss and nosebleeds.    Eyes: Negative for blurred vision, double vision, pain and discharge.   Respiratory: Positive for shortness of breath. Negative for cough.    Cardiovascular: Negative for chest pain, palpitations, orthopnea, claudication, leg swelling and PND.   Gastrointestinal: Negative for nausea, vomiting, abdominal pain, blood in stool and melena.   Genitourinary: Negative for dysuria and hematuria.   Musculoskeletal: Negative for myalgias, joint pain and falls.   Skin: Negative for itching and rash.   Neurological: Negative for dizziness, sensory change, speech change, loss of consciousness and headaches.   Endo/Heme/Allergies: Negative for environmental allergies. Does not bruise/bleed easily.   Psychiatric/Behavioral: Negative for depression, suicidal ideas and hallucinations.        Objective:   /86 mmHg  Pulse 84  Ht 1.702 m (5' 7.01\")  Wt 117.935 kg (260 lb)  BMI 40.71 kg/m2  SpO2 90%    Physical Exam   Constitutional: He is oriented to person, place, and time. He appears well-developed and well-nourished.   HENT:   Head: Normocephalic and atraumatic.   Eyes: EOM are normal.   Neck: Normal range of motion. No JVD present.   Cardiovascular: Normal rate, regular rhythm, normal heart sounds and intact distal " pulses.  Exam reveals no gallop and no friction rub.    No murmur heard.  Bilateral femoral pulses are 2+, bilateral dorsalis pedis pulses are 2+, bilateral posterior tibialis pulses are 2+.   Pulmonary/Chest: No respiratory distress.   Abdominal: Soft. Bowel sounds are normal. There is no tenderness. There is no rebound and no guarding.   The is no presence of abdominal bruits   Musculoskeletal: Normal range of motion.   Neurological: He is alert and oriented to person, place, and time.   Skin: Skin is warm and dry.   Psychiatric: He has a normal mood and affect.   Nursing note and vitals reviewed.      Assessment:     1. Chronic obstructive pulmonary disease, unspecified COPD type (CMS-HCC)  [facility] PULMONARY STRESS TESTING,SIMPLE   2. HTN (hypertension), malignant  [facility] PULMONARY STRESS TESTING,SIMPLE   3. Smoker  [facility] PULMONARY STRESS TESTING,SIMPLE   4. Dyspnea on exertion  [facility] PULMONARY STRESS TESTING,SIMPLE       Medical Decision Making:  Today's Assessment / Status / Plan:     Today, based on physical examination findings, patient is euvolemic. No JVD, lungs are clear to auscultation, no pitting edema in bilateral lower extremities, no ascites.    At this time, I'm not certain that patient has primary underlying cardiac conditions that is causing his symptomatology. I do think that his overall clinical status is probably related to pulmonary etiologies rather than cardiology.    Optimize COPD care.    I do think that with progressive weight loss along with smoking cessation, patient would do much better. I stressed the importance of those interventions with him. It appears to me that patient has poor insight into his overall medical condition. However, we will continue to follow with you optimize his care.

## 2017-08-11 NOTE — PROGRESS NOTES
"Heart Failure New Appointment     6MWT- 375 meters  MLWHF- 80    OP Heart Failure  Vitals  Appointment Type: Heart Failure New  Weight: 117.935 kg (260 lb)  How Weight Obtained: Stand Up Scale  Height: 170.2 cm (5' 7.01\")  BMI (Calculated): 40.71  Blood Pressure : 116/86 mmHg  Pulse: 84    System Assessment  NYHA Functional Class Assessment:  (no dx of HF)     Smoking Hx  Have you Ever Smoked: Yes  Have you Smoked in the Last 12 Mos: Yes  Have you Quit Smoking: No   Smoking Cessation Offered: Patient Refused    Alcohol Hx  Do you Drink?: No     Illicit Drug Hx  Illicit Drug History: No    Social Hx  Social History: Lives with Other (Lives in AL facility)  Level of Support: Unknown  Advance Directives: Began discussion, Paperwork provided    Education  Symptoms: Needs Reinforcement  Weighing: Needs Reinforcement  Weight Gain Response: Needs Reinforcement   Recording Data: Needs Reinforcement  Teach Back Failures:  (Patient not interactive at this time)  Compliance:  (Did not commit to making changes)     Medications  Medication Reconciliation : Complete  Medication Counseling Provided: Needs Reinforcement  Able to Accurately Identify Medication Indications: None  Medication Discrepancies: None  Is Patient on an Evidence Based Beta Blocker: No  Is Patient on ACE-1 or ARB: No    Dietary Assessment  Food Labels: Needs Reinforcement  Foods High in Sodium: Needs Reinforcement  Daily Sodium Intake: Needs Reinforcement  Diet: Needs Reinforcement  Food Preparation: Needs Reinforcement  Eating Out Plan: Needs Reinforcement  Healthier Options: Needs Reinforcement  Fluid Restriction: Not Applicable    [place] Living with Heart Failure  Swelling in Ankles or Legs: 5  Having to Sit or Lie Down During the Day: 5  Walking About or Climbing Stairs Difficult: 5  Working Around the House or Yard Difficult: 5  Difficulty Going Away from Home: 4  Difficulty Sleeping Well at Night: 5  Difficulty Socializing with Family or Friends: " 4  Difficulty Working to Earn a Livin  Difficulty with Recreational Pastimes, Sports, Hobbies: 5  Difficulty with Sexual Activities: 5  Eating Less Foods You Like: 3  Making you Short of Breath: 5  Tired, Fatigued or Low on Energy: 5  Making you Stay in a Hospital: 5  Costing you Money for Medical Care?: 4  Giving you Side Effects from Treatments: 3  Feeling like a [place] to Family and Friends: 3  Loss of Self Control in your Life: 0  Making You Worry: 0  Difficulty to Concentrate or Remembering Things: 0  Making you Feel Depressed: 0  MLHF Total Score : 76    6 Minute Walk Test  Baseline to end of test: 6:00  Total meters walked: 375     Education Narrative  Reviewed anatomy and physiology of heart failure with patient. Went over heart failure worksheet and patient's individual HF diagnosis, EF, risk factors, general medication classes and indications, as well as personal goals.  Goals: Patient's primary goal is to heal the cellulitis on his legs.    Discussed daily weights, sodium restriction, worsening signs and symptoms to report to physician, heart medications, and importance of adherence to medication regimen. Emphasized recommendation from AHA/AAHFN to keep daily sodium intake between 1500mg-2000mg.     Reviewed dietary handouts, advance directive planning handout, and informed patient of HF new appointment follow-up phone call. Discussed dietary considerations and reviewed Seven Day Heart Healthy Meal Plan by [employer].    Invited patient and family members/friends to HF support group and encouraged patient to call Heart Failure clinic during normal business hours with any questions.  Heart Failure program card with number given to patient.    Patient not interactive with education, but accepted information.     Patient states full understanding of all information given.     Jhonna HF RN  [phone]

## 2019-04-27 NOTE — PROGRESS NOTES
Bedside report received from day RN. Assumed pt care. Pt a&ox4. Sitting at edge of bed. POC discussed. Pt denies any needs at this time. Bed locked and in lowest position. Call light within reach. Hourly rounding in place.    Street

## 2020-02-04 NOTE — RESPIRATORY CARE
COPD EDUCATION by COPD CLINICAL EDUCATOR  3/4/2017 at 8:04 AM by Jayashree Kidd     Patient reviewed by COPD education team. Patient does not qualify for COPD program.  
Rapid Response. PT on 4 L/m Oxy, SpO2 high 90's, BS diminished.   Scheduled SVN given, Pt speaking in complete sentences, no SOB noted.   BS diminished post SVN. RRT, RTs and RNs at bedside.   
Patient

## 2020-04-02 NOTE — CARE PLAN
Problem: Knowledge Deficit  Goal: Knowledge of disease process/condition, treatment plan, diagnostic tests, and medications will improve  Educate patient on importance of frequently checking blood sugars to provide better control of blood sugar with adequate insulin coverage     Problem: Psychosocial Needs:  Goal: Level of anxiety will decrease  Intervention: Collaborate with Interdisciplinary Team including Psychologist/Behavioral Health Team  Establish boundaries with patient regarding plan of care           Attending Attestation (For Attendings USE Only)...

## 2020-10-15 NOTE — ED NOTES
Pt c/o bilateral leg swelling and redness. Pt hx of cellulitis. Pt also hx of CHF, been off his medications. ekg called.    yes

## 2021-01-01 NOTE — CARE PLAN
Problem: Safety  Goal: Will remain free from injury  Outcome: PROGRESSING AS EXPECTED  Pt is A&OX 4. Treaded socks on, bed locked & low, call light and belongings within reach.         Problem: Knowledge Deficit  Goal: Knowledge of disease process/condition, treatment plan, diagnostic tests, and medications will improve  Outcome: PROGRESSING AS EXPECTED  POC discussed with patient at bedside.              Initial

## 2021-01-15 DIAGNOSIS — Z23 NEED FOR VACCINATION: ICD-10-CM

## 2022-09-07 NOTE — PROGRESS NOTES
Contacted by phlebotomist that pt has refused midnight labs. Pt states that he wants to wait til morning and is refusing to allow phlebotomy to collect blood. VSS, NAD, bed in low locked position, call light in reach. Telemetry has notified this RN of one 1.2 second pause since start of shift.   at home:

## 2023-05-03 NOTE — ED AVS SNAPSHOT
3/24/2017          Maxi Esposito  [address] [place] [address]    Dear Maxi:    [facility] wants to ensure your discharge home is safe and you or your loved ones have had all your questions answered regarding your care after you leave the hospital.    You may receive a telephone call within two days of your discharge.  This call is to make certain you understand your discharge instructions as well as ensure we provided you with the best care possible during your stay with us.     The call will only last approximately 3-5 minutes and will be done by a nurse.    Once again, we want to ensure your discharge home is safe and that you have a clear understanding of any next steps in your care.  If you have any questions or concerns, please do not hesitate to contact us, we are here for you.  Thank you for choosing [facility] for your healthcare needs.    Sincerely,    James Woods    [facility]         continue IV Zometa with Dr. Kat Ray in oncology

## 2024-03-01 NOTE — PROCEDURES
DATE OF PROCEDURE: June 22, 2017    PROCEDURE: Bone marrow biopsy with bone marrow aspiration.     REQUESTING PHYSICIAN: Dr. Ratliff    INDICATIONS: leukocytosis    INFORMED CONSENT: Consent signed and on the chart.     DESCRIPTION: A time out was called. The patient was placed in the prone position. The left buttock was prepped and draped in a sterile fashion. 1 mL of 1% lidocaine was injected into the skin followed by 3 mL into the periosteum of the posterior ilium bone. Large-bore needle was used to obtain 5 mL of aspirate followed by 5 mL   into a heparinized syringe for flow cytometry. This followed by a  bone marrow biopsy using the Jamshidi needle.     SEDATION USED: 3 mg IV versed and 100 mcg IV fentanyl    ESTIMATED BLOOD LOSS: none  
[Time Spent: ___ minutes] : I have spent [unfilled] minutes of time on the encounter.

## 2024-06-27 NOTE — PROGRESS NOTES
Care of patient assumed after report. Assessment completed, all needs met. Patient sitting up at side of bed. Call light in reach, fall precautions in place. Discussed plan of care with patient. Will continue to monitor.     Danielle arrived looking for pt. As per documentation, pt had gotten a ride home other than what HH had set up. See ELSA Angelo note. @1500, Charge ELSA Mcbride made aware. Charge attempted multiple calls with provided phone number in chart and could not get in contact. Interim Nurse manager GUERRERO Stoddard made aware. Escalated to security to look through cameras to see if pt had been escorted out by . No further information at this time

## (undated) DEVICE — CON SEDATION/>5 YR 1ST 15 MIN

## (undated) DEVICE — CANNULA W/ SUPPLY TUBING O2 - (50/CA)

## (undated) DEVICE — ELECTRODE 850 FOAM ADHESIVE - HYDROGEL RADIOTRNSPRNT (50/PK)

## (undated) DEVICE — SYRINGE 3 CC 22 GA X 1-1/2 - NDL SAFETY (50/BX 8BX/CA)

## (undated) DEVICE — CON SEDATION EA ADDL 15 MIN

## (undated) DEVICE — SYRINGE 6 CC 20 GA X 1 1/2 - NDL SAFETY  (50/BX)

## (undated) DEVICE — SOD. CHL 10CC SYRINGE PREFILL - W/10 CC (30/BX)